# Patient Record
Sex: MALE | Race: WHITE | NOT HISPANIC OR LATINO | Employment: UNEMPLOYED | URBAN - METROPOLITAN AREA
[De-identification: names, ages, dates, MRNs, and addresses within clinical notes are randomized per-mention and may not be internally consistent; named-entity substitution may affect disease eponyms.]

---

## 2017-10-11 ENCOUNTER — APPOINTMENT (EMERGENCY)
Dept: CT IMAGING | Facility: HOSPITAL | Age: 52
End: 2017-10-11
Payer: COMMERCIAL

## 2017-10-11 ENCOUNTER — HOSPITAL ENCOUNTER (EMERGENCY)
Facility: HOSPITAL | Age: 52
End: 2017-10-11
Attending: EMERGENCY MEDICINE
Payer: COMMERCIAL

## 2017-10-11 ENCOUNTER — HOSPITAL ENCOUNTER (INPATIENT)
Facility: HOSPITAL | Age: 52
LOS: 10 days | Discharge: LEFT BEFORE DISCHARGE | DRG: 021 | End: 2017-10-22
Attending: ANESTHESIOLOGY | Admitting: ANESTHESIOLOGY
Payer: COMMERCIAL

## 2017-10-11 VITALS
DIASTOLIC BLOOD PRESSURE: 98 MMHG | HEART RATE: 84 BPM | TEMPERATURE: 98.4 F | WEIGHT: 173.6 LBS | RESPIRATION RATE: 18 BRPM | OXYGEN SATURATION: 96 % | SYSTOLIC BLOOD PRESSURE: 144 MMHG | BODY MASS INDEX: 23.54 KG/M2

## 2017-10-11 DIAGNOSIS — I60.9 SAH (SUBARACHNOID HEMORRHAGE) (HCC): ICD-10-CM

## 2017-10-11 DIAGNOSIS — I60.9 SUBARACHNOID HEMORRHAGE (HCC): Primary | ICD-10-CM

## 2017-10-11 DIAGNOSIS — H53.2 DOUBLE VISION: ICD-10-CM

## 2017-10-11 LAB
ALBUMIN SERPL BCP-MCNC: 4.3 G/DL (ref 3.5–5)
ALP SERPL-CCNC: 88 U/L (ref 46–116)
ALT SERPL W P-5'-P-CCNC: 187 U/L (ref 12–78)
ANION GAP SERPL CALCULATED.3IONS-SCNC: 12 MMOL/L (ref 4–13)
APTT PPP: 29 SECONDS (ref 23–35)
AST SERPL W P-5'-P-CCNC: 212 U/L (ref 5–45)
BASOPHILS # BLD AUTO: 0.11 THOUSANDS/ΜL (ref 0–0.1)
BASOPHILS NFR BLD AUTO: 2 % (ref 0–1)
BILIRUB SERPL-MCNC: 0.4 MG/DL (ref 0.2–1)
BUN SERPL-MCNC: 8 MG/DL (ref 5–25)
CALCIUM SERPL-MCNC: 9.2 MG/DL (ref 8.3–10.1)
CHLORIDE SERPL-SCNC: 98 MMOL/L (ref 100–108)
CO2 SERPL-SCNC: 27 MMOL/L (ref 21–32)
CREAT SERPL-MCNC: 0.75 MG/DL (ref 0.6–1.3)
EOSINOPHIL # BLD AUTO: 0.22 THOUSAND/ΜL (ref 0–0.61)
EOSINOPHIL NFR BLD AUTO: 3 % (ref 0–6)
ERYTHROCYTE [DISTWIDTH] IN BLOOD BY AUTOMATED COUNT: 12.6 % (ref 11.6–15.1)
ETHANOL SERPL-MCNC: 330 MG/DL (ref 0–3)
GFR SERPL CREATININE-BSD FRML MDRD: 107 ML/MIN/1.73SQ M
GLUCOSE SERPL-MCNC: 103 MG/DL (ref 65–140)
HCT VFR BLD AUTO: 44.9 % (ref 36.5–49.3)
HGB BLD-MCNC: 15.2 G/DL (ref 12–17)
INR PPP: 0.84 (ref 0.86–1.16)
LIPASE SERPL-CCNC: 424 U/L (ref 73–393)
LYMPHOCYTES # BLD AUTO: 2 THOUSANDS/ΜL (ref 0.6–4.47)
LYMPHOCYTES NFR BLD AUTO: 27 % (ref 14–44)
MCH RBC QN AUTO: 33.2 PG (ref 26.8–34.3)
MCHC RBC AUTO-ENTMCNC: 33.9 G/DL (ref 31.4–37.4)
MCV RBC AUTO: 98 FL (ref 82–98)
MONOCYTES # BLD AUTO: 0.94 THOUSAND/ΜL (ref 0.17–1.22)
MONOCYTES NFR BLD AUTO: 13 % (ref 4–12)
NEUTROPHILS # BLD AUTO: 4.24 THOUSANDS/ΜL (ref 1.85–7.62)
NEUTS SEG NFR BLD AUTO: 55 % (ref 43–75)
PLATELET # BLD AUTO: 243 THOUSANDS/UL (ref 149–390)
PMV BLD AUTO: 10.4 FL (ref 8.9–12.7)
POTASSIUM SERPL-SCNC: 4 MMOL/L (ref 3.5–5.3)
PROT SERPL-MCNC: 8.3 G/DL (ref 6.4–8.2)
PROTHROMBIN TIME: 11.7 SECONDS (ref 12.1–14.4)
RBC # BLD AUTO: 4.58 MILLION/UL (ref 3.88–5.62)
SODIUM SERPL-SCNC: 137 MMOL/L (ref 136–145)
WBC # BLD AUTO: 7.51 THOUSAND/UL (ref 4.31–10.16)

## 2017-10-11 PROCEDURE — 85025 COMPLETE CBC W/AUTO DIFF WBC: CPT | Performed by: PHYSICIAN ASSISTANT

## 2017-10-11 PROCEDURE — 93005 ELECTROCARDIOGRAM TRACING: CPT

## 2017-10-11 PROCEDURE — 85610 PROTHROMBIN TIME: CPT | Performed by: PHYSICIAN ASSISTANT

## 2017-10-11 PROCEDURE — 70450 CT HEAD/BRAIN W/O DYE: CPT

## 2017-10-11 PROCEDURE — 85730 THROMBOPLASTIN TIME PARTIAL: CPT | Performed by: PHYSICIAN ASSISTANT

## 2017-10-11 PROCEDURE — 96361 HYDRATE IV INFUSION ADD-ON: CPT

## 2017-10-11 PROCEDURE — 36415 COLL VENOUS BLD VENIPUNCTURE: CPT | Performed by: PHYSICIAN ASSISTANT

## 2017-10-11 PROCEDURE — 80320 DRUG SCREEN QUANTALCOHOLS: CPT | Performed by: PHYSICIAN ASSISTANT

## 2017-10-11 PROCEDURE — 96360 HYDRATION IV INFUSION INIT: CPT

## 2017-10-11 PROCEDURE — 93005 ELECTROCARDIOGRAM TRACING: CPT | Performed by: PHYSICIAN ASSISTANT

## 2017-10-11 PROCEDURE — 80053 COMPREHEN METABOLIC PANEL: CPT | Performed by: PHYSICIAN ASSISTANT

## 2017-10-11 PROCEDURE — 83690 ASSAY OF LIPASE: CPT | Performed by: PHYSICIAN ASSISTANT

## 2017-10-11 PROCEDURE — 99285 EMERGENCY DEPT VISIT HI MDM: CPT

## 2017-10-11 RX ORDER — NICOTINE 21 MG/24HR
14 PATCH, TRANSDERMAL 24 HOURS TRANSDERMAL ONCE
Status: DISCONTINUED | OUTPATIENT
Start: 2017-10-11 | End: 2017-10-11

## 2017-10-11 RX ADMIN — SODIUM CHLORIDE 1000 ML: 0.9 INJECTION, SOLUTION INTRAVENOUS at 20:05

## 2017-10-11 NOTE — ED NOTES
Daughter stated she was going to go home and come back later  She requested to be called with any updates        Devin Snellen Cell : [phone]     Jana Powell  10/11/17 1901

## 2017-10-11 NOTE — ED PROVIDER NOTES
History  Chief Complaint   Patient presents with   Maureen Erinyasmani     Pt presents to the ED for evaluation of "double vision" since yesterday morning  Pt reports drinking four 12oz beers today, yesterday 8-9 beers  Pt reports "never woke up with this vision" Pt reports "dark spot" in one eye     54yo male who presents to ER for evaluation of double vision  Onset yesterday  States he has been getting frontal headaches for weeks now  Taking aleve without relief  Admits to drinking alcohol daily  Denies injury  Denies all other symptoms  History provided by:  Patient  History limited by: alcohol intoxication  Prior to Admission Medications   Prescriptions Last Dose Informant Patient Reported? Taking?   oxyCODONE-acetaminophen (PERCOCET) 5-325 mg per tablet   No No   Sig: Take 1 tablet by mouth every 4 (four) hours as needed for severe pain  Max Daily Amount: 6 tablets      Facility-Administered Medications: None       History reviewed  No pertinent past medical history  History reviewed  No pertinent surgical history  History reviewed  No pertinent family history  I have reviewed and agree with the history as documented  Social History   Substance Use Topics    Smoking status: Current Every Day Smoker     Packs/day: 1 00    Smokeless tobacco: Not on file    Alcohol use 7 2 oz/week     12 Cans of beer per week        Review of Systems   Unable to perform ROS: Other (alcohol intoxication)   Constitutional: Negative for chills and fever  Eyes: Positive for visual disturbance  Respiratory: Negative for shortness of breath  Cardiovascular: Negative for chest pain  Gastrointestinal: Negative for diarrhea, nausea and vomiting  Musculoskeletal: Negative for back pain and neck pain  Skin: Negative for rash and wound  Neurological: Positive for headaches         Physical Exam  ED Triage Vitals   Temperature Pulse Respirations Blood Pressure SpO2   10/11/17 1839 10/11/17 1839 10/11/17 1839 10/11/17 1839 10/11/17 1839   98 4 °F (36 9 °C) 99 18 (!) 166/113 96 %      Temp Source Heart Rate Source Patient Position - Orthostatic VS BP Location FiO2 (%)   10/11/17 1839 10/11/17 1839 10/11/17 1839 10/11/17 1839 --   Oral Monitor Sitting Left arm       Pain Score       10/11/17 2003       No Pain           Physical Exam   Constitutional: He is oriented to person, place, and time  He appears well-developed and well-nourished  HENT:   Head: Normocephalic and atraumatic  Right Ear: External ear normal    Left Ear: External ear normal    Mouth/Throat: Oropharynx is clear and moist    Eyes: Conjunctivae are normal  Pupils are equal, round, and reactive to light  Right eye exhibits nystagmus  Left eye exhibits nystagmus  Neck: Normal range of motion  Neck supple  Cardiovascular: Normal rate, regular rhythm, normal heart sounds and intact distal pulses  No murmur heard  Pulmonary/Chest: Effort normal and breath sounds normal    Musculoskeletal: Normal range of motion  He exhibits no deformity  Lymphadenopathy:     He has no cervical adenopathy  Neurological: He is alert and oriented to person, place, and time  No cranial nerve deficit  He exhibits normal muscle tone  Coordination (fails finger to nose test by over shooting) abnormal    Skin: Skin is warm and dry  No pallor  Psychiatric: He has a normal mood and affect  Nursing note and vitals reviewed        ED Medications  Medications   sodium chloride 0 9 % bolus 1,000 mL (1,000 mL Intravenous New Bag 10/11/17 2005)       Diagnostic Studies  Labs Reviewed   CBC AND DIFFERENTIAL - Abnormal        Result Value Ref Range Status    Monocytes Relative 13 (*) 4 - 12 % Final    Basophils Relative 2 (*) 0 - 1 % Final    Basophils Absolute 0 11 (*) 0 00 - 0 10 Thousands/µL Final    WBC 7 51  4 31 - 10 16 Thousand/uL Final    RBC 4 58  3 88 - 5 62 Million/uL Final    Hemoglobin 15 2  12 0 - 17 0 g/dL Final    Hematocrit 44 9  36 5 - 49 3 % Final    MCV 98  82 - 98 fL Final    MCH 33 2  26 8 - 34 3 pg Final    MCHC 33 9  31 4 - 37 4 g/dL Final    RDW 12 6  11 6 - 15 1 % Final    MPV 10 4  8 9 - 12 7 fL Final    Platelets 267  361 - 390 Thousands/uL Final    Neutrophils Relative 55  43 - 75 % Final    Lymphocytes Relative 27  14 - 44 % Final    Eosinophils Relative 3  0 - 6 % Final    Neutrophils Absolute 4 24  1 85 - 7 62 Thousands/µL Final    Lymphocytes Absolute 2 00  0 60 - 4 47 Thousands/µL Final    Monocytes Absolute 0 94  0 17 - 1 22 Thousand/µL Final    Eosinophils Absolute 0 22  0 00 - 0 61 Thousand/µL Final   COMPREHENSIVE METABOLIC PANEL - Abnormal     Chloride 98 (*) 100 - 108 mmol/L Final     (*) 5 - 45 U/L Final    Comment:   Specimen collection should occur prior to Sulfasalazine administration due to the potential for falsely depressed results   (*) 12 - 78 U/L Final    Comment:   Specimen collection should occur prior to Sulfasalazine administration due to the potential for falsely depressed results  Total Protein 8 3 (*) 6 4 - 8 2 g/dL Final    Sodium 137  136 - 145 mmol/L Final    Potassium 4 0  3 5 - 5 3 mmol/L Final    CO2 27  21 - 32 mmol/L Final    Anion Gap 12  4 - 13 mmol/L Final    BUN 8  5 - 25 mg/dL Final    Creatinine 0 75  0 60 - 1 30 mg/dL Final    Comment: Standardized to IDMS reference method    Glucose 103  65 - 140 mg/dL Final    Comment:   If the patient is fasting, the ADA then defines impaired fasting glucose as > 100 mg/dL and diabetes as > or equal to 123 mg/dL  Specimen collection should occur prior to Sulfasalazine administration due to the potential for falsely depressed results  Specimen collection should occur prior to Sulfapyridine administration due to the potential for falsely elevated results      Calcium 9 2  8 3 - 10 1 mg/dL Final    Alkaline Phosphatase 88  46 - 116 U/L Final    Albumin 4 3  3 5 - 5 0 g/dL Final    Total Bilirubin 0 40  0 20 - 1 00 mg/dL Final    eGFR 107 ml/min/1 73sq m Final    Narrative:     National Kidney Disease Education Program recommendations are as follows:  GFR calculation is accurate only with a steady state creatinine  Chronic Kidney disease less than 60 ml/min/1 73 sq  meters  Kidney failure less than 15 ml/min/1 73 sq  meters  LIPASE - Abnormal     Lipase 424 (*) 73 - 393 u/L Final   PROTIME-INR - Abnormal     Protime 11 7 (*) 12 1 - 14 4 seconds Final    INR 0 84 (*) 0 86 - 1 16 Final   MEDICAL ALCOHOL - Abnormal     Ethanol Lvl 330 (*) 0 - 3 mg/dL Final   APTT - Normal    PTT 29  23 - 35 seconds Final    Narrative: Therapeutic Heparin Range = 60-90 seconds       CT head without contrast   Final Result   Subarachnoid hemorrhage accumulating within the 3rd ventricle, frontal horn right lateral ventricle and temporal horn left lateral ventricle  Findings raise suspicion for ruptured aneurysm  ##phoslh##[email or web address]         ##cfslh   I personally discussed this result with [address] on 10/11/2017 8:10 PM    ##         Workstation performed: [id number]             Procedures  Procedures      Phone Contacts  ED Phone Contact    ED Course  ED Course as of Oct 11 2245   Wed Oct 11, 2017   2020 Medical Critical [facility] at [facility] AND [facility] paged for transfer    2047 Discussed case with Dr Kimberley Adams [facility]- no reccommended intervention at this time, Lizzy Tran from from [facility] is setting up transport and bed assignment and will call back with availability                                   MDM  Number of Diagnoses or Management Options  Double vision: new and requires workup  Subarachnoid hemorrhage [facility]): new and requires workup     Amount and/or Complexity of Data Reviewed  Clinical lab tests: ordered and reviewed  Tests in the radiology section of CPT®: ordered and reviewed  Discuss the patient with other providers: yes    Patient Progress  Patient progress: stable    The patient presented with a condition in which there was a high probability of imminent or life-threatening deterioration, and critical care services (excluding separately billable procedures) totalled 30-74 minutes  Disposition  Final diagnoses:   Subarachnoid hemorrhage ([address] )   Double vision     ED Disposition     ED Disposition Condition Comment    Transfer to Another [facility] should be transferred out to [facility]        MD Documentation    Guadalupe Cockayne Most Recent Value   Patient Condition  The patient has been stabilized such that within reasonable medical probability, no material deterioration of the patient condition or the condition of the unborn child(yeni) is likely to result from the transfer   Reason for Transfer  Level of Care needed not available at this facility   Benefits of Transfer  Specialized equipment and/or services available at the receiving facility (Include comment)________________________   Risks of Transfer  Potential deterioration of medical condition, Loss of IV, Increased discomfort during transfer, Possible worsening of condition or death during transfer, Potential for delay in receiving treatment   Accepting Physician  [address] [place] [address] Name, Júnior Hampton    (Name & Tel number)  Paige aYn by Khurram Perez and Unit #)  Lenoard Certain   Sending MD Nael Weeks   Provider Certification  General risk, such as traffic hazards, adverse weather conditions, rough terrain or turbulence, possible failure of equipment (including vehicle or aircraft), or consequences of actions of persons outside the control of the transport personnel, Unanticipated needs of medical equipment and personnel during transport, Risk of worsening condition, The possibility of a transport vehicle being unavailable      RN Documentation    Flowsheet Row Most [address] [facility] Name, [address]   B    (Name & Tel number)  Ale Rogel   Transported by Khurram Perez and Unit #)  Lenoard Certain      Follow-up Information None       Discharge Medication List as of 10/11/2017 10:19 PM      CONTINUE these medications which have NOT CHANGED    Details   oxyCODONE-acetaminophen (PERCOCET) 5-325 mg per tablet Take 1 tablet by mouth every 4 (four) hours as needed for severe pain  Max Daily Amount: 6 tablets, Starting 5/16/2016, Until Discontinued, Print           No discharge procedures on file      ED Provider  Electronically Signed by       Naila Ramos PA-C  10/11/17 6203

## 2017-10-12 ENCOUNTER — APPOINTMENT (INPATIENT)
Dept: NON INVASIVE DIAGNOSTICS | Facility: HOSPITAL | Age: 52
DRG: 021 | End: 2017-10-12
Payer: COMMERCIAL

## 2017-10-12 ENCOUNTER — APPOINTMENT (EMERGENCY)
Dept: RADIOLOGY | Facility: HOSPITAL | Age: 52
DRG: 021 | End: 2017-10-12
Payer: COMMERCIAL

## 2017-10-12 ENCOUNTER — APPOINTMENT (INPATIENT)
Dept: RADIOLOGY | Facility: HOSPITAL | Age: 52
DRG: 021 | End: 2017-10-12
Payer: COMMERCIAL

## 2017-10-12 ENCOUNTER — ANESTHESIA (INPATIENT)
Dept: RADIOLOGY | Facility: HOSPITAL | Age: 52
DRG: 021 | End: 2017-10-12
Payer: COMMERCIAL

## 2017-10-12 ENCOUNTER — TELEPHONE (OUTPATIENT)
Dept: RADIOLOGY | Facility: HOSPITAL | Age: 52
End: 2017-10-12

## 2017-10-12 ENCOUNTER — ANESTHESIA EVENT (INPATIENT)
Dept: RADIOLOGY | Facility: HOSPITAL | Age: 52
DRG: 021 | End: 2017-10-12
Payer: COMMERCIAL

## 2017-10-12 ENCOUNTER — GENERIC CONVERSION - ENCOUNTER (OUTPATIENT)
Dept: OTHER | Facility: OTHER | Age: 52
End: 2017-10-12

## 2017-10-12 ENCOUNTER — APPOINTMENT (INPATIENT)
Dept: RADIOLOGY | Facility: HOSPITAL | Age: 52
DRG: 021 | End: 2017-10-12
Attending: RADIOLOGY
Payer: COMMERCIAL

## 2017-10-12 PROBLEM — F17.200 NICOTINE DEPENDENCE: Status: ACTIVE | Noted: 2017-10-12

## 2017-10-12 PROBLEM — F10.10 ALCOHOL ABUSE: Status: ACTIVE | Noted: 2017-10-12

## 2017-10-12 PROBLEM — R51.9 HEADACHE: Status: ACTIVE | Noted: 2017-10-12

## 2017-10-12 PROBLEM — I60.9 SUBARACHNOID HEMORRHAGE (HCC): Status: ACTIVE | Noted: 2017-10-12

## 2017-10-12 LAB
ABO GROUP BLD: NORMAL
ALBUMIN SERPL BCP-MCNC: 3.6 G/DL (ref 3.5–5)
ALP SERPL-CCNC: 65 U/L (ref 46–116)
ALT SERPL W P-5'-P-CCNC: 161 U/L (ref 12–78)
ANION GAP SERPL CALCULATED.3IONS-SCNC: 11 MMOL/L (ref 4–13)
APTT PPP: 28 SECONDS (ref 23–35)
AST SERPL W P-5'-P-CCNC: 201 U/L (ref 5–45)
ATRIAL RATE: 98 BPM
BASE EXCESS BLDA CALC-SCNC: 0.7 MMOL/L
BILIRUB SERPL-MCNC: 0.67 MG/DL (ref 0.2–1)
BLD GP AB SCN SERPL QL: NEGATIVE
BUN SERPL-MCNC: 7 MG/DL (ref 5–25)
CALCIUM SERPL-MCNC: 8.2 MG/DL (ref 8.3–10.1)
CHLORIDE SERPL-SCNC: 104 MMOL/L (ref 100–108)
CHOLEST SERPL-MCNC: 249 MG/DL (ref 50–200)
CO2 SERPL-SCNC: 25 MMOL/L (ref 21–32)
CREAT SERPL-MCNC: 0.54 MG/DL (ref 0.6–1.3)
ERYTHROCYTE [DISTWIDTH] IN BLOOD BY AUTOMATED COUNT: 12.7 % (ref 11.6–15.1)
EST. AVERAGE GLUCOSE BLD GHB EST-MCNC: 103 MG/DL
GFR SERPL CREATININE-BSD FRML MDRD: 122 ML/MIN/1.73SQ M
GLUCOSE SERPL-MCNC: 76 MG/DL (ref 65–140)
GLUCOSE SERPL-MCNC: 83 MG/DL (ref 65–140)
HBA1C MFR BLD: 5.2 % (ref 4.2–6.3)
HCO3 BLDA-SCNC: 25.6 MMOL/L (ref 22–28)
HCT VFR BLD AUTO: 42.8 % (ref 36.5–49.3)
HDLC SERPL-MCNC: 135 MG/DL (ref 40–60)
HGB BLD-MCNC: 15.2 G/DL (ref 12–17)
INR PPP: 0.96 (ref 0.86–1.16)
LDLC SERPL CALC-MCNC: 90 MG/DL (ref 0–100)
MAGNESIUM SERPL-MCNC: 2.1 MG/DL (ref 1.6–2.6)
MCH RBC QN AUTO: 34.8 PG (ref 26.8–34.3)
MCHC RBC AUTO-ENTMCNC: 35.5 G/DL (ref 31.4–37.4)
MCV RBC AUTO: 98 FL (ref 82–98)
O2 CT BLDA-SCNC: 19.8 ML/DL (ref 16–23)
OXYHGB MFR BLDA: 97.6 % (ref 94–97)
P AXIS: 73 DEGREES
PCO2 BLDA: 42 MM HG (ref 36–44)
PH BLDA: 7.4 [PH] (ref 7.35–7.45)
PLATELET # BLD AUTO: 212 THOUSANDS/UL (ref 149–390)
PMV BLD AUTO: 10.2 FL (ref 8.9–12.7)
PO2 BLDA: 160.2 MM HG (ref 75–129)
POTASSIUM SERPL-SCNC: 3.6 MMOL/L (ref 3.5–5.3)
PR INTERVAL: 156 MS
PROT SERPL-MCNC: 7.4 G/DL (ref 6.4–8.2)
PROTHROMBIN TIME: 12.8 SECONDS (ref 12.1–14.4)
QRS AXIS: 85 DEGREES
QRSD INTERVAL: 100 MS
QT INTERVAL: 362 MS
QTC INTERVAL: 462 MS
RBC # BLD AUTO: 4.37 MILLION/UL (ref 3.88–5.62)
RH BLD: POSITIVE
SODIUM SERPL-SCNC: 140 MMOL/L (ref 136–145)
SPECIMEN EXPIRATION DATE: NORMAL
SPECIMEN SOURCE: ABNORMAL
T WAVE AXIS: 72 DEGREES
TRIGL SERPL-MCNC: 122 MG/DL
VENTRICULAR RATE: 98 BPM
WBC # BLD AUTO: 6.28 THOUSAND/UL (ref 4.31–10.16)

## 2017-10-12 PROCEDURE — 99285 EMERGENCY DEPT VISIT HI MDM: CPT

## 2017-10-12 PROCEDURE — 36223 PLACE CATH CAROTID/INOM ART: CPT

## 2017-10-12 PROCEDURE — 82805 BLOOD GASES W/O2 SATURATION: CPT | Performed by: INTERNAL MEDICINE

## 2017-10-12 PROCEDURE — 80061 LIPID PANEL: CPT | Performed by: EMERGENCY MEDICINE

## 2017-10-12 PROCEDURE — 0BH17EZ INSERTION OF ENDOTRACHEAL AIRWAY INTO TRACHEA, VIA NATURAL OR ARTIFICIAL OPENING: ICD-10-PCS | Performed by: INTERNAL MEDICINE

## 2017-10-12 PROCEDURE — 86900 BLOOD TYPING SEROLOGIC ABO: CPT | Performed by: EMERGENCY MEDICINE

## 2017-10-12 PROCEDURE — 70450 CT HEAD/BRAIN W/O DYE: CPT

## 2017-10-12 PROCEDURE — 009630Z DRAINAGE OF CEREBRAL VENTRICLE WITH DRAINAGE DEVICE, PERCUTANEOUS APPROACH: ICD-10-PCS | Performed by: NEUROLOGICAL SURGERY

## 2017-10-12 PROCEDURE — 83036 HEMOGLOBIN GLYCOSYLATED A1C: CPT | Performed by: EMERGENCY MEDICINE

## 2017-10-12 PROCEDURE — 70553 MRI BRAIN STEM W/O & W/DYE: CPT

## 2017-10-12 PROCEDURE — 94760 N-INVAS EAR/PLS OXIMETRY 1: CPT

## 2017-10-12 PROCEDURE — 85610 PROTHROMBIN TIME: CPT | Performed by: EMERGENCY MEDICINE

## 2017-10-12 PROCEDURE — 36224 PLACE CATH CAROTD ART: CPT

## 2017-10-12 PROCEDURE — 86850 RBC ANTIBODY SCREEN: CPT | Performed by: EMERGENCY MEDICINE

## 2017-10-12 PROCEDURE — 36226 PLACE CATH VERTEBRAL ART: CPT

## 2017-10-12 PROCEDURE — 87040 BLOOD CULTURE FOR BACTERIA: CPT | Performed by: INTERNAL MEDICINE

## 2017-10-12 PROCEDURE — 93306 TTE W/DOPPLER COMPLETE: CPT

## 2017-10-12 PROCEDURE — 36227 PLACE CATH XTRNL CAROTID: CPT

## 2017-10-12 PROCEDURE — A9585 GADOBUTROL INJECTION: HCPCS | Performed by: ANESTHESIOLOGY

## 2017-10-12 PROCEDURE — B31GYZZ FLUOROSCOPY OF BILATERAL VERTEBRAL ARTERIES USING OTHER CONTRAST: ICD-10-PCS | Performed by: RADIOLOGY

## 2017-10-12 PROCEDURE — 82948 REAGENT STRIP/BLOOD GLUCOSE: CPT

## 2017-10-12 PROCEDURE — 83735 ASSAY OF MAGNESIUM: CPT | Performed by: EMERGENCY MEDICINE

## 2017-10-12 PROCEDURE — 70496 CT ANGIOGRAPHY HEAD: CPT

## 2017-10-12 PROCEDURE — 94002 VENT MGMT INPAT INIT DAY: CPT | Performed by: SOCIAL WORKER

## 2017-10-12 PROCEDURE — B41FYZZ FLUOROSCOPY OF RIGHT LOWER EXTREMITY ARTERIES USING OTHER CONTRAST: ICD-10-PCS | Performed by: RADIOLOGY

## 2017-10-12 PROCEDURE — 86901 BLOOD TYPING SEROLOGIC RH(D): CPT | Performed by: EMERGENCY MEDICINE

## 2017-10-12 PROCEDURE — 85027 COMPLETE CBC AUTOMATED: CPT | Performed by: EMERGENCY MEDICINE

## 2017-10-12 PROCEDURE — 71010 HB CHEST X-RAY 1 VIEW FRONTAL (PORTABLE): CPT

## 2017-10-12 PROCEDURE — 70498 CT ANGIOGRAPHY NECK: CPT

## 2017-10-12 PROCEDURE — B315YZZ FLUOROSCOPY OF BILATERAL COMMON CAROTID ARTERIES USING OTHER CONTRAST: ICD-10-PCS | Performed by: RADIOLOGY

## 2017-10-12 PROCEDURE — B316YZZ FLUOROSCOPY OF RIGHT INTERNAL CAROTID ARTERY USING OTHER CONTRAST: ICD-10-PCS | Performed by: RADIOLOGY

## 2017-10-12 PROCEDURE — 5A1955Z RESPIRATORY VENTILATION, GREATER THAN 96 CONSECUTIVE HOURS: ICD-10-PCS | Performed by: INTERNAL MEDICINE

## 2017-10-12 PROCEDURE — 85730 THROMBOPLASTIN TIME PARTIAL: CPT | Performed by: EMERGENCY MEDICINE

## 2017-10-12 PROCEDURE — C1894 INTRO/SHEATH, NON-LASER: HCPCS

## 2017-10-12 PROCEDURE — 36415 COLL VENOUS BLD VENIPUNCTURE: CPT | Performed by: EMERGENCY MEDICINE

## 2017-10-12 PROCEDURE — 94760 N-INVAS EAR/PLS OXIMETRY 1: CPT | Performed by: SOCIAL WORKER

## 2017-10-12 PROCEDURE — B319YZZ FLUOROSCOPY OF RIGHT EXTERNAL CAROTID ARTERY USING OTHER CONTRAST: ICD-10-PCS | Performed by: RADIOLOGY

## 2017-10-12 PROCEDURE — 76937 US GUIDE VASCULAR ACCESS: CPT

## 2017-10-12 PROCEDURE — 80053 COMPREHEN METABOLIC PANEL: CPT | Performed by: EMERGENCY MEDICINE

## 2017-10-12 PROCEDURE — C1769 GUIDE WIRE: HCPCS

## 2017-10-12 PROCEDURE — 93005 ELECTROCARDIOGRAM TRACING: CPT | Performed by: EMERGENCY MEDICINE

## 2017-10-12 RX ORDER — MIDAZOLAM HYDROCHLORIDE 1 MG/ML
2 INJECTION INTRAMUSCULAR; INTRAVENOUS ONCE
Status: COMPLETED | OUTPATIENT
Start: 2017-10-12 | End: 2017-10-12

## 2017-10-12 RX ORDER — NIMODIPINE 30 MG/1
60 CAPSULE, LIQUID FILLED ORAL
Status: DISCONTINUED | OUTPATIENT
Start: 2017-10-12 | End: 2017-10-12

## 2017-10-12 RX ORDER — PROPOFOL 10 MG/ML
INJECTION, EMULSION INTRAVENOUS AS NEEDED
Status: DISCONTINUED | OUTPATIENT
Start: 2017-10-12 | End: 2017-10-12 | Stop reason: SURG

## 2017-10-12 RX ORDER — LORAZEPAM 2 MG/ML
2 INJECTION INTRAMUSCULAR
Status: DISCONTINUED | OUTPATIENT
Start: 2017-10-12 | End: 2017-10-17

## 2017-10-12 RX ORDER — OXAZEPAM 15 MG/1
30 CAPSULE ORAL EVERY 8 HOURS SCHEDULED
Status: DISCONTINUED | OUTPATIENT
Start: 2017-10-12 | End: 2017-10-12

## 2017-10-12 RX ORDER — LORAZEPAM 2 MG/ML
6 INJECTION INTRAMUSCULAR ONCE
Status: COMPLETED | OUTPATIENT
Start: 2017-10-12 | End: 2017-10-12

## 2017-10-12 RX ORDER — LIDOCAINE HYDROCHLORIDE 10 MG/ML
INJECTION, SOLUTION EPIDURAL; INFILTRATION; INTRACAUDAL; PERINEURAL
Status: COMPLETED
Start: 2017-10-12 | End: 2017-10-12

## 2017-10-12 RX ORDER — PROPOFOL 10 MG/ML
5-50 INJECTION, EMULSION INTRAVENOUS
Status: DISCONTINUED | OUTPATIENT
Start: 2017-10-12 | End: 2017-10-16

## 2017-10-12 RX ORDER — ACETAMINOPHEN 325 MG/1
650 TABLET ORAL EVERY 4 HOURS PRN
Status: DISCONTINUED | OUTPATIENT
Start: 2017-10-12 | End: 2017-10-14

## 2017-10-12 RX ORDER — HYDRALAZINE HYDROCHLORIDE 20 MG/ML
10 INJECTION INTRAMUSCULAR; INTRAVENOUS ONCE
Status: COMPLETED | OUTPATIENT
Start: 2017-10-12 | End: 2017-10-12

## 2017-10-12 RX ORDER — GLYCOPYRROLATE 0.2 MG/ML
INJECTION INTRAMUSCULAR; INTRAVENOUS AS NEEDED
Status: DISCONTINUED | OUTPATIENT
Start: 2017-10-12 | End: 2017-10-12 | Stop reason: SURG

## 2017-10-12 RX ORDER — DIAZEPAM 5 MG/ML
5 INJECTION, SOLUTION INTRAMUSCULAR; INTRAVENOUS EVERY 6 HOURS PRN
Status: DISCONTINUED | OUTPATIENT
Start: 2017-10-12 | End: 2017-10-12

## 2017-10-12 RX ORDER — SUCCINYLCHOLINE CHLORIDE 20 MG/ML
INJECTION INTRAMUSCULAR; INTRAVENOUS AS NEEDED
Status: DISCONTINUED | OUTPATIENT
Start: 2017-10-12 | End: 2017-10-12 | Stop reason: SURG

## 2017-10-12 RX ORDER — LIDOCAINE HYDROCHLORIDE 10 MG/ML
INJECTION, SOLUTION INFILTRATION; PERINEURAL AS NEEDED
Status: DISCONTINUED | OUTPATIENT
Start: 2017-10-12 | End: 2017-10-12 | Stop reason: SURG

## 2017-10-12 RX ORDER — PROPOFOL 10 MG/ML
INJECTION, EMULSION INTRAVENOUS
Status: COMPLETED
Start: 2017-10-12 | End: 2017-10-12

## 2017-10-12 RX ORDER — ROCURONIUM BROMIDE 10 MG/ML
INJECTION, SOLUTION INTRAVENOUS AS NEEDED
Status: DISCONTINUED | OUTPATIENT
Start: 2017-10-12 | End: 2017-10-12 | Stop reason: SURG

## 2017-10-12 RX ORDER — LABETALOL HYDROCHLORIDE 5 MG/ML
10 INJECTION, SOLUTION INTRAVENOUS EVERY 4 HOURS PRN
Status: DISCONTINUED | OUTPATIENT
Start: 2017-10-12 | End: 2017-10-22 | Stop reason: HOSPADM

## 2017-10-12 RX ORDER — ONDANSETRON 2 MG/ML
INJECTION INTRAMUSCULAR; INTRAVENOUS AS NEEDED
Status: DISCONTINUED | OUTPATIENT
Start: 2017-10-12 | End: 2017-10-12 | Stop reason: SURG

## 2017-10-12 RX ORDER — FENTANYL CITRATE 50 UG/ML
INJECTION, SOLUTION INTRAMUSCULAR; INTRAVENOUS
Status: COMPLETED
Start: 2017-10-12 | End: 2017-10-12

## 2017-10-12 RX ORDER — CHLORHEXIDINE GLUCONATE 0.12 MG/ML
15 RINSE ORAL EVERY 12 HOURS SCHEDULED
Status: DISCONTINUED | OUTPATIENT
Start: 2017-10-12 | End: 2017-10-20

## 2017-10-12 RX ORDER — LABETALOL HYDROCHLORIDE 5 MG/ML
INJECTION, SOLUTION INTRAVENOUS AS NEEDED
Status: DISCONTINUED | OUTPATIENT
Start: 2017-10-12 | End: 2017-10-12 | Stop reason: SURG

## 2017-10-12 RX ORDER — NICOTINE 21 MG/24HR
21 PATCH, TRANSDERMAL 24 HOURS TRANSDERMAL DAILY
Status: DISCONTINUED | OUTPATIENT
Start: 2017-10-12 | End: 2017-10-22 | Stop reason: HOSPADM

## 2017-10-12 RX ORDER — SODIUM CHLORIDE, SODIUM GLUCONATE, SODIUM ACETATE, POTASSIUM CHLORIDE, MAGNESIUM CHLORIDE, SODIUM PHOSPHATE, DIBASIC, AND POTASSIUM PHOSPHATE .53; .5; .37; .037; .03; .012; .00082 G/100ML; G/100ML; G/100ML; G/100ML; G/100ML; G/100ML; G/100ML
125 INJECTION, SOLUTION INTRAVENOUS CONTINUOUS
Status: DISCONTINUED | OUTPATIENT
Start: 2017-10-12 | End: 2017-10-14

## 2017-10-12 RX ORDER — FENTANYL CITRATE 50 UG/ML
50 INJECTION, SOLUTION INTRAMUSCULAR; INTRAVENOUS ONCE
Status: COMPLETED | OUTPATIENT
Start: 2017-10-12 | End: 2017-10-12

## 2017-10-12 RX ORDER — LABETALOL HYDROCHLORIDE 5 MG/ML
10 INJECTION, SOLUTION INTRAVENOUS ONCE
Status: COMPLETED | OUTPATIENT
Start: 2017-10-12 | End: 2017-10-12

## 2017-10-12 RX ORDER — FENTANYL CITRATE 50 UG/ML
100 INJECTION, SOLUTION INTRAMUSCULAR; INTRAVENOUS ONCE
Status: COMPLETED | OUTPATIENT
Start: 2017-10-12 | End: 2017-10-12

## 2017-10-12 RX ORDER — VECURONIUM BROMIDE 1 MG/ML
10 INJECTION, POWDER, LYOPHILIZED, FOR SOLUTION INTRAVENOUS ONCE
Status: COMPLETED | OUTPATIENT
Start: 2017-10-12 | End: 2017-10-12

## 2017-10-12 RX ORDER — SODIUM CHLORIDE 9 MG/ML
INJECTION, SOLUTION INTRAVENOUS CONTINUOUS PRN
Status: DISCONTINUED | OUTPATIENT
Start: 2017-10-12 | End: 2017-10-12 | Stop reason: SURG

## 2017-10-12 RX ORDER — LIDOCAINE HYDROCHLORIDE 10 MG/ML
10 INJECTION, SOLUTION EPIDURAL; INFILTRATION; INTRACAUDAL; PERINEURAL ONCE
Status: COMPLETED | OUTPATIENT
Start: 2017-10-12 | End: 2017-10-12

## 2017-10-12 RX ORDER — ONDANSETRON 2 MG/ML
4 INJECTION INTRAMUSCULAR; INTRAVENOUS EVERY 6 HOURS PRN
Status: DISCONTINUED | OUTPATIENT
Start: 2017-10-12 | End: 2017-10-22 | Stop reason: HOSPADM

## 2017-10-12 RX ORDER — MIDAZOLAM HYDROCHLORIDE 1 MG/ML
INJECTION INTRAMUSCULAR; INTRAVENOUS
Status: COMPLETED
Start: 2017-10-12 | End: 2017-10-12

## 2017-10-12 RX ADMIN — NEOSTIGMINE METHYLSULFATE 3 MG: 1 INJECTION, SOLUTION INTRAMUSCULAR; INTRAVENOUS; SUBCUTANEOUS at 10:56

## 2017-10-12 RX ADMIN — CHLORHEXIDINE GLUCONATE 15 ML: 1.2 RINSE ORAL at 15:51

## 2017-10-12 RX ADMIN — ONDANSETRON 4 MG: 2 INJECTION INTRAMUSCULAR; INTRAVENOUS at 09:15

## 2017-10-12 RX ADMIN — NICOTINE 21 MG: 21 PATCH, EXTENDED RELEASE TRANSDERMAL at 15:51

## 2017-10-12 RX ADMIN — SODIUM CHLORIDE, SODIUM GLUCONATE, SODIUM ACETATE, POTASSIUM CHLORIDE AND MAGNESIUM CHLORIDE 125 ML/HR: 526; 502; 368; 37; 30 INJECTION, SOLUTION INTRAVENOUS at 03:42

## 2017-10-12 RX ADMIN — LABETALOL HYDROCHLORIDE 10 MG: 5 INJECTION, SOLUTION INTRAVENOUS at 11:12

## 2017-10-12 RX ADMIN — LEVETIRACETAM 500 MG: 100 INJECTION, SOLUTION INTRAVENOUS at 02:22

## 2017-10-12 RX ADMIN — LIDOCAINE HYDROCHLORIDE 50 MG: 10 INJECTION, SOLUTION INFILTRATION; PERINEURAL at 09:06

## 2017-10-12 RX ADMIN — PROPOFOL 80 MCG/KG/MIN: 10 INJECTION, EMULSION INTRAVENOUS at 17:34

## 2017-10-12 RX ADMIN — LEVETIRACETAM 500 MG: 100 INJECTION, SOLUTION INTRAVENOUS at 08:22

## 2017-10-12 RX ADMIN — PROPOFOL 60 MCG/KG/MIN: 10 INJECTION, EMULSION INTRAVENOUS at 20:27

## 2017-10-12 RX ADMIN — PROPOFOL 100 MCG/KG/MIN: 10 INJECTION, EMULSION INTRAVENOUS at 15:16

## 2017-10-12 RX ADMIN — PROPOFOL 50 MCG/KG/MIN: 10 INJECTION, EMULSION INTRAVENOUS at 13:25

## 2017-10-12 RX ADMIN — FENTANYL CITRATE 50 MCG: 50 INJECTION, SOLUTION INTRAMUSCULAR; INTRAVENOUS at 16:46

## 2017-10-12 RX ADMIN — ACETAMINOPHEN 650 MG: 325 TABLET, FILM COATED ORAL at 01:44

## 2017-10-12 RX ADMIN — MIDAZOLAM HYDROCHLORIDE 2 MG: 1 INJECTION INTRAMUSCULAR; INTRAVENOUS at 16:50

## 2017-10-12 RX ADMIN — FENTANYL CITRATE 100 MCG/HR: at 15:46

## 2017-10-12 RX ADMIN — SODIUM CHLORIDE 2 MG/HR: 0.9 INJECTION, SOLUTION INTRAVENOUS at 09:15

## 2017-10-12 RX ADMIN — ROCURONIUM BROMIDE 10 MG: 10 INJECTION, SOLUTION INTRAVENOUS at 09:06

## 2017-10-12 RX ADMIN — ROCURONIUM BROMIDE 20 MG: 10 INJECTION, SOLUTION INTRAVENOUS at 10:24

## 2017-10-12 RX ADMIN — PROPOFOL 50 MCG/KG/MIN: 10 INJECTION, EMULSION INTRAVENOUS at 22:30

## 2017-10-12 RX ADMIN — LEVETIRACETAM 500 MG: 100 INJECTION, SOLUTION INTRAVENOUS at 21:01

## 2017-10-12 RX ADMIN — IOHEXOL 85 ML: 350 INJECTION, SOLUTION INTRAVENOUS at 00:27

## 2017-10-12 RX ADMIN — FENTANYL CITRATE 100 MCG: 50 INJECTION INTRAMUSCULAR; INTRAVENOUS at 13:15

## 2017-10-12 RX ADMIN — FENTANYL CITRATE 100 MCG/HR: at 22:30

## 2017-10-12 RX ADMIN — GADOBUTROL 7 ML: 604.72 INJECTION INTRAVENOUS at 23:20

## 2017-10-12 RX ADMIN — GLYCOPYRROLATE 0.4 MG: 0.2 INJECTION, SOLUTION INTRAMUSCULAR; INTRAVENOUS at 10:56

## 2017-10-12 RX ADMIN — OXAZEPAM 30 MG: 15 CAPSULE, GELATIN COATED ORAL at 01:43

## 2017-10-12 RX ADMIN — SODIUM CHLORIDE: 0.9 INJECTION, SOLUTION INTRAVENOUS at 08:58

## 2017-10-12 RX ADMIN — DEXAMETHASONE SODIUM PHOSPHATE 10 MG: 10 INJECTION INTRAMUSCULAR; INTRAVENOUS at 09:15

## 2017-10-12 RX ADMIN — FENTANYL CITRATE 50 MCG: 50 INJECTION INTRAMUSCULAR; INTRAVENOUS at 16:46

## 2017-10-12 RX ADMIN — CEFAZOLIN SODIUM 2000 MG: 2 SOLUTION INTRAVENOUS at 20:15

## 2017-10-12 RX ADMIN — MIDAZOLAM 2 MG: 1 INJECTION INTRAMUSCULAR; INTRAVENOUS at 16:50

## 2017-10-12 RX ADMIN — FENTANYL CITRATE 100 MCG: 50 INJECTION, SOLUTION INTRAMUSCULAR; INTRAVENOUS at 13:15

## 2017-10-12 RX ADMIN — LIDOCAINE HYDROCHLORIDE 10 ML: 10 INJECTION, SOLUTION EPIDURAL; INFILTRATION; INTRACAUDAL; PERINEURAL at 15:49

## 2017-10-12 RX ADMIN — LABETALOL 20 MG/4 ML (5 MG/ML) INTRAVENOUS SYRINGE 10 MG: at 13:15

## 2017-10-12 RX ADMIN — SUCCINYLCHOLINE CHLORIDE 120 MG: 20 INJECTION, SOLUTION INTRAMUSCULAR; INTRAVENOUS at 09:06

## 2017-10-12 RX ADMIN — NIMODIPINE 60 MG: 30 CAPSULE, LIQUID FILLED ORAL at 05:40

## 2017-10-12 RX ADMIN — SUCCINYLCHOLINE CHLORIDE 20 MG: 20 INJECTION, SOLUTION INTRAMUSCULAR; INTRAVENOUS at 09:11

## 2017-10-12 RX ADMIN — NIMODIPINE 60 MG: 30 CAPSULE, LIQUID FILLED ORAL at 01:47

## 2017-10-12 RX ADMIN — OXAZEPAM 15 MG: 15 CAPSULE, GELATIN COATED ORAL at 12:50

## 2017-10-12 RX ADMIN — PROPOFOL: 10 INJECTION, EMULSION INTRAVENOUS at 13:30

## 2017-10-12 RX ADMIN — LORAZEPAM 6 MG: 2 INJECTION INTRAMUSCULAR; INTRAVENOUS at 13:05

## 2017-10-12 RX ADMIN — IODIXANOL 136 ML: 320 INJECTION, SOLUTION INTRAVASCULAR at 11:42

## 2017-10-12 RX ADMIN — CHLORHEXIDINE GLUCONATE 15 ML: 1.2 RINSE ORAL at 04:15

## 2017-10-12 RX ADMIN — PROPOFOL 200 MG: 10 INJECTION, EMULSION INTRAVENOUS at 09:06

## 2017-10-12 RX ADMIN — ROCURONIUM BROMIDE 10 MG: 10 INJECTION, SOLUTION INTRAVENOUS at 09:48

## 2017-10-12 RX ADMIN — CHLORHEXIDINE GLUCONATE 15 ML: 1.2 RINSE ORAL at 20:36

## 2017-10-12 RX ADMIN — ROCURONIUM BROMIDE 20 MG: 10 INJECTION, SOLUTION INTRAVENOUS at 09:12

## 2017-10-12 RX ADMIN — VECURONIUM BROMIDE 10 MG: 1 INJECTION, POWDER, LYOPHILIZED, FOR SOLUTION INTRAVENOUS at 13:25

## 2017-10-12 RX ADMIN — LABETALOL 20 MG/4 ML (5 MG/ML) INTRAVENOUS SYRINGE 10 MG: at 12:41

## 2017-10-12 RX ADMIN — FOLIC ACID 1 MG: 5 INJECTION, SOLUTION INTRAMUSCULAR; INTRAVENOUS; SUBCUTANEOUS at 01:28

## 2017-10-12 RX ADMIN — HYDRALAZINE HYDROCHLORIDE 10 MG: 20 INJECTION INTRAMUSCULAR; INTRAVENOUS at 13:15

## 2017-10-12 RX ADMIN — THIAMINE HYDROCHLORIDE 100 MG: 100 INJECTION, SOLUTION INTRAMUSCULAR; INTRAVENOUS at 01:45

## 2017-10-12 RX ADMIN — DIAZEPAM 5 MG: 5 INJECTION, SOLUTION INTRAMUSCULAR; INTRAVENOUS at 01:49

## 2017-10-12 NOTE — RESPIRATORY THERAPY NOTE
RT Protocol Note  Angel Perea 50 y o  male MRN: [id number]  Unit/Bed#: ICU 02 Encounter: [id number]    Assessment     Principal Problem:    Subarachnoid hemorrhage (HCC)  Active Problems:    Headache    Alcohol abuse    Nicotine dependence      Home Pulmonary Medications:  none    Past Medical History:   Diagnosis Date    Alcoholism /alcohol abuse ([address] )      Social History     Social History    Marital status: Single     Spouse name: N/A    Number of children: N/A    Years of education: N/A     Social History Main Topics    Smoking status: Current Every Day Smoker     Packs/day: 1 50    Smokeless tobacco: Never Used    Alcohol use 18 0 oz/week     30 Cans of beer per week    Drug use:      Types: Marijuana, Cocaine    Sexual activity: Not Asked     Other Topics Concern    None     Social History Narrative    None       Subjective     Subjective Data: Pt  sedate on vent  Pt  taken to CT for head CT  Objective     Physical Exam:   Assessment Type: Assess only  General Appearance: Sedated  Respiratory Pattern: Assisted  Chest Assessment: Chest expansion symmetrical  Bilateral Breath Sounds: Clear, Diminished  Cough: None  O2 Device: vent  Subjective Data: Pt  sedate on vent  Pt  taken to CT for head CT  Vitals:  Blood pressure 129/70, pulse 74, temperature 98 6 °F (37 °C), temperature source Oral, resp  rate 16, height 6' (1 829 m), weight 78 9 kg (173 lb 15 1 oz), SpO2 93 %  Imaging and other studies: I have personally reviewed pertinent reports  O2 Device: vent     Plan     Respiratory Plan: Vent/NIV/HFNC        Resp Comments: pt intubated after IR procedure for change in mental status  pt has no pulm hx or meds at home  will cont to follow pt per resp  protocol until extubated  Pt  Has smoking hx

## 2017-10-12 NOTE — H&P
History and Physical - Critical Care   Cecelia Savage 50 y o  male MRN: [id number]  Unit/Bed#: ICU 02 Encounter: [id number]    Reason for Admission / Chief Complaint: SAH     History of Present Illness:  Cecelia Savage is a 50 y o  male who presents as a tx from [place] for [facility], patient came to [place] c/o double vision x1 day  He was also c/o frontal headaches for the last few weeks for which he has been taking aleve w/o relief  He is a daily alcohol drinker  Patient denies falls/head injury  No other complaints  Patient was intoxicated on arrival to [address] w/ alcohol level of 330  CT head was + for subarachnoid hemorrhage w/ intraventricular extension  Platt Baird I  Brooks IV   On arrival to [facility] patient w/o complaints, states that double vision is improving, denies headache, lightheadedness, blurry vision, numbness or tingling in arms and legs  Patient is an alcoholic w;/ history of withdrawal, multiple rehab admissions, drinks about 1 case of beer daily, heavy tobacco use  History obtained from chart review and the patient  Denies IVDA    Past Medical History:  Past Medical History:   Diagnosis Date    Alcoholism /alcohol abuse [facility])        Past Surgical History:  History reviewed  No pertinent surgical history  Past Family History:  History reviewed  No pertinent family history      Social History:  History   Smoking Status    Current Every Day Smoker    Packs/day: 1 50   Smokeless Tobacco    Never Used     History   Alcohol Use    18 0 oz/week    30 Cans of beer per week     History   Drug Use    Types: Marijuana, Cocaine     Marital Status: Single    Medications:  Current Facility-Administered Medications   Medication Dose Route Frequency    acetaminophen (TYLENOL) tablet 650 mg  650 mg Oral Q4H PRN    chlorhexidine (PERIDEX) 0 12 % oral rinse 15 mL  15 mL Swish & Spit Q12H [address]    diazepam (VALIUM) injection 5 mg  5 mg Intravenous Q6H PRN    labetalol (NORMODYNE) injection 10 mg  10 mg Intravenous Q4H PRN    levETIRAcetam (KEPPRA) 500 mg in sodium chloride 0 9 % 100 mL IVPB  500 mg Intravenous Q12H [address]    multi-electrolyte (ISOLYTE-S PH 7 4 equivalent) IV solution  125 mL/hr Intravenous Continuous    nicotine (NICODERM CQ) 21 mg/24 hr TD 24 hr patch 21 mg  21 mg Transdermal Daily    niMODipine (NIMOTOP) capsule 60 mg  60 mg Oral Q4H [address]    ondansetron (ZOFRAN) injection 4 mg  4 mg Intravenous Q6H PRN    oxazepam (SERAX) capsule 30 mg  30 mg Oral Q8H [address]    thiamine (VITAMIN B1) 100 mg in sodium chloride 0 9 % 50 mL IVPB  100 mg Intravenous Q24H     Home medications:  Prior to Admission medications    Not on File     Allergies:  No Known Allergies    ROS:   Review of Systems   Eyes: Positive for visual disturbance  Neurological: Positive for headaches  All other systems reviewed and are negative  Vitals:  Vitals:    10/11/17 2345 10/12/17 0130 10/12/17 0200 10/12/17 0247   BP: 138/87 146/96 134/89    Pulse: (!) 112 85 86    Resp: 18 16 16    Temp:    97 9 °F (36 6 °C)   TempSrc:    Oral   SpO2: 94% 95% 93%    Weight:       Height:    6' (1 829 m)     Temperature:   Temp (24hrs), Av 3 °F (36 8 °C), Min:97 9 °F (36 6 °C), Max:98 6 °F (37 °C)    Current Temperature: 97 9 °F (36 6 °C)    Weights:   IBW: 77 6 kg  Body mass index is 23 73 kg/m²  Hemodynamic Monitoring:  N/A     Non-Invasive/Invasive Ventilation Settings:  Respiratory    Lab Data (Last 4 hours)    None         O2/Vent Data (Last 4 hours)    None              No results found for: PHART, VBV9XVT, PO2ART, YBY9YVH, L1TFUOWC, BEART, SOURCE  SpO2: SpO2: 93 %     Physical Exam:  Physical Exam   Constitutional: He is oriented to person, place, and time  He appears well-developed and well-nourished  HENT:   Head: Normocephalic and atraumatic  Right Ear: External ear normal    Left Ear: External ear normal    Eyes: EOM are normal  Pupils are equal, round, and reactive to light  Neck: Normal range of motion   Neck supple  No tracheal deviation present  No thyromegaly present  Cardiovascular: Normal rate, regular rhythm and normal heart sounds  Exam reveals no gallop and no friction rub  No murmur heard  Pulmonary/Chest: Effort normal and breath sounds normal  No respiratory distress  He has no wheezes  He has no rales  He exhibits no tenderness  Abdominal: Soft  Bowel sounds are normal  He exhibits no distension and no mass  There is no tenderness  There is no rebound and no guarding  Musculoskeletal: Normal range of motion  He exhibits no edema or deformity  Neurological: He is alert and oriented to person, place, and time  No cranial nerve deficit  Dysmetria w/ LUE, visual fields intact, EOMI, no pronator drift, MS 5/5 B/L UE and LE    Skin: Skin is warm and dry  No erythema  Psychiatric: He has a normal mood and affect  Intoxicated     Nursing note and vitals reviewed  Labs:    Results from last 7 days  Lab Units 10/12/17  0228 10/11/17  1951   WBC Thousand/uL 6 28 7 51   HEMOGLOBIN g/dL 15 2 15 2   HEMATOCRIT % 42 8 44 9   PLATELETS Thousands/uL 212 243   NEUTROS PCT %  --  55   MONOS PCT %  --  13*        Results from last 7 days  Lab Units 10/11/17  1951   SODIUM mmol/L 137   POTASSIUM mmol/L 4 0   CHLORIDE mmol/L 98*   CO2 mmol/L 27   BUN mg/dL 8   CREATININE mg/dL 0 75   CALCIUM mg/dL 9 2   TOTAL PROTEIN g/dL 8 3*   BILIRUBIN TOTAL mg/dL 0 40   ALK PHOS U/L 88   ALT U/L 187*   AST U/L 212*   GLUCOSE RANDOM mg/dL 103                Results from last 7 days  Lab Units 10/11/17  1951   INR  0 84*   PTT seconds 29         No results found for: TROPONINI    Imaging:   10/11 CT head - Subarachnoid hemorrhage accumulating within the 3rd ventricle, frontal horn right lateral ventricle and temporal horn left lateral ventricle  Findings raise suspicion for ruptured aneurysm  10/12 CTA head/neck - 1   Precontrast CT scan of the head demonstrates intraventricular hemorrhage grossly stable low with  slight redistribution when compared with the prior  2  Left-sided cavernous carotid contour irregularity may reflect a focal aneurysm measuring up to 1 7  mm projecting medially  3  Focal aneurysmal dilatation of the right intradural vertebral artery measures up to 5 8 cm with  more proximal measurement 4 4 cm, incidentally noted  Normal neck CTA  I have personally reviewed pertinent reports  EKG: This was personally reviewed by myself  Micro:  No results found for: Lucas Wadsworth    ______________________________________________________________________    Assessment:   Patient Active Problem List   Diagnosis    Subarachnoid hemorrhage ([address] )    Headache    Alcohol abuse    Nicotine dependence         Plan:      Neuro: SAH w/ Intraventricular extention concerning for Annerysmal bleed, consult neurosurgery, maintain SBP <140, Insert a-line, start nimodipine and nicardipine as needed for BP goal  TCDs x 21 days, hold anticoagulation  Keppra 500 BID for seizure prophylaxis  Alcohol abuse/dependance - start serax 30 mg q8 hours, valium as needed  Tobacco abuse - nicotine patch   CV: Keep SBP <140 , no acute issues     Pulm: No acute issues     GI: NPO for now     : No acute issues, I/O     F/E/N: Isolyte @ 125/hour, thiamine, folate daily , replete electrolytes as needed     ID: No acute issues     Heme: CBC, PTT, INR now and in AM, Type and Screen     Endo: Accuchecks q6 hours while NPO     Msk/Skin: No acute issues      Disposition: Admit to ICU         ______________________________________________________________________    VTE Pharmacologic Prophylaxis: SAH   VTE Mechanical Prophylaxis: sequential compression device    Invasive lines and devices:   Invasive Devices     Peripheral Intravenous Line            Peripheral IV 10/11/17 Left Antecubital less than 1 day    Peripheral IV 10/11/17 Right Antecubital less than 1 day                Code Status: Level 1 - Full Code  POA: POLST:      Given critical illness, patient length of stay will require greater than two midnights  Portions of the record may have been created with voice recognition software  Occasional wrong word or "sound a like" substitutions may have occurred due to the inherent limitations of voice recognition software  Read the chart carefully and recognize, using context, where substitutions have occurred        Osiris Gatica MD

## 2017-10-12 NOTE — PHYSICAL THERAPY NOTE
Physical Therapy Cancellation Note  PT orders received  Chart review completed  PT currently off the floor at IR  PT will follow and eval as appropriate     Pretty Garcia PT

## 2017-10-12 NOTE — CASE MANAGEMENT
Initial Clinical Review    Admission: Date/Time/Statement: 10/12/17 @ 0032     Orders Placed This Encounter   Procedures    Inpatient Admission     Standing Status:   Standing     Number of Occurrences:   1     Order Specific Question:   Admitting Physician     Answer:   Claire Sevilla     Order Specific Question:   Level of Care     Answer:   Critical Care [15]     Order Specific Question:   Estimated length of stay     Answer:   More than 2 Midnights     Order Specific Question:   Certification     Answer:   I certify that inpatient services are medically necessary for this patient for a duration of greater than two midnights  See H&P and MD Progress Notes for additional information about the patient's course of treatment  10/11/2017 (3 hours)  [facility] Emergency Department  TRANSFER TO [facility])   Double vision  Temperature Pulse Respirations Blood Pressure SpO2   10/11/17 1839 10/11/17 1839 10/11/17 1839 10/11/17 1839 10/11/17 1839   98 4 °F (36 9 °C) 99 18 (!) 166/113 96 %       ED: Date/Time/Mode of Arrival:   [facility]  ED Arrival Information     Expected Arrival Acuity Means of Arrival Escorted By Service Admission Type    10/11/2017 22:36 10/11/2017 22:40 Emergent Ambulance [facility] Critical Care/ICU Emergency    Arrival Complaint    SUBARACHNOID          Chief Complaint:   Chief Complaint   Patient presents with    Blurred Vision     Pt is a transfer from [facility] for a [address]  Per pt, he has no significant PMH besides chronic alcoholism  Tuesday morning, pt woke up with blurred vision after drinking and thought it was residual from drinking so went back to sleep   Pt woke up later in the day and the blurred vision was still present so he decided to have it checked out  j       History of Illness:       Amelia Braxton is a 50 y o  male who presents as a tx from [place] for [address], patient came to [place] c/o double vision x1 day  He was also c/o frontal headaches for the last few weeks for which he has been taking aleve w/o relief  He is a daily alcohol drinker  Patient denies falls/head injury  No other complaints  Patient was intoxicated on arrival to [facility] w/ alcohol level of 330  CT head was + for subarachnoid hemorrhage w/ intraventricular extension  Platt Baird I  Brooks IV   On arrival to [facility] patient w/o complaints, states that double vision is improving, denies headache, lightheadedness, blurry vision, numbness or tingling in arms and legs  Patient is an alcoholic w;/ history of withdrawal, multiple rehab admissions, drinks about 1 case of beer daily, heavy tobacco use  ED Vital Signs:   ED Triage Vitals [10/11/17 2245]   Temperature Pulse Respirations Blood Pressure SpO2   98 6 °F (37 °C) 92 18 (!) 157/104 96 %      Temp Source Heart Rate Source Patient Position - Orthostatic VS BP Location FiO2 (%)   Oral Monitor Lying Right arm --      Pain Score       4        Wt Readings from Last 1 Encounters:   10/12/17 78 9 kg (173 lb 15 1 oz)         Vital Signs (abnormal):     10/11/17 2345  --   112  18  138/87  --  94 %  --     10/12/17 0200  --  86  16  134/89  --  93 %  --       10/12/17 0300  --  88   36  108/78  90 MAP  94 %  Nasal cannula  Lying         Abnormal Labs/Diagnostic Test Results:   Right eye exhibits nystagmus  Left eye exhibits nystagmus     Coordination (fails finger to nose test by over shooting)    CBC AND DIFFERENTIAL - Abnormal      Monocytes Relative 13 (*) 4 - 12 % Final     Basophils Relative 2 (*) 0 - 1 % Final     Basophils Absolute 0 11 (*) 0 00 - 0 10 Thousands/µL Final       COMPREHENSIVE METABOLIC PANEL - Abnormal      Chloride 98 (*) 100 - 108 mmol/L Final      (*) 5 - 45 U/L Final      (*) 12 - 78 U/L Final     Total Protein 8 3 (*) 6 4 - 8 2 g/dL Final     LIPASE - Abnormal      Lipase 424 (*) 73 - 393 u/L Final   PROTIME-INR - Abnormal      Protime 11 7 (*) 12 1 - 14 4 seconds Final     INR 0 84 (*) 0 86 - 1 16 Final   MEDICAL ALCOHOL - Abnormal      Ethanol Lvl 330 (*) 0 - 3 mg/dL Final     CT head without contrast   Final Result   Subarachnoid hemorrhage accumulating within the 3rd ventricle, frontal horn right lateral ventricle and temporal horn left lateral ventricle  Findings raise suspicion for ruptured aneurysm  ED Treatment:   Medication Administration from 10/11/2017 2236 to 10/12/2017 0239       Date/Time Order Dose Route     10/12/2017 0143 oxazepam (SERAX) capsule 30 mg 30 mg Oral     10/12/2017 0149 diazepam (VALIUM) injection 5 mg 5 mg Intravenous     10/12/2017 0144 acetaminophen (TYLENOL) tablet 650 mg 650 mg Oral     10/12/2017 0145 thiamine (VITAMIN B1) 100 mg in sodium chloride 0 9 % 50 mL IVPB 100 mg Intravenous     11/51/0379 0276 folic acid 1 mg in sodium chloride 0 9 % 50 mL IVPB 1 mg Intravenous     10/12/2017 0147 niMODipine (NIMOTOP) capsule 60 mg 60 mg Oral     10/12/2017 0222 levETIRAcetam (KEPPRA) 500 mg in sodium chloride 0 9 % 100 mL IVPB 500 mg Intravenous          Past Medical/Surgical History:    Active Ambulatory Problems     Diagnosis Date Noted    No Active Ambulatory Problems     Resolved Ambulatory Problems     Diagnosis Date Noted    No Resolved Ambulatory Problems     Past Medical History:   Diagnosis Date    Alcoholism /alcohol abuse [facility])        Admitting Diagnosis: Subarachnoid hemorrhage ([facility] [address] ) [I60 9]    Age/Sex: 50 y o  male    Assessment/Plan:     Anesthesia Start Date/Time: 10/12/17 0859   Procedure: IR CEREBRAL ANGIOGRAPHY / INTERVENTION   Indications: IVH       Patient Active Problem List   Diagnosis    Subarachnoid hemorrhage ([facility] [address] )    Headache    Alcohol abuse    Nicotine dependence            Plan:                            Neuro: SAH w/ Intraventricular extention concerning for Annerysmal bleed, consult neurosurgery, maintain SBP <140, Insert a-line, start nimodipine and nicardipine as needed for BP goal  TCDs x 21 days, hold anticoagulation  Keppra 500 BID for seizure prophylaxis  Alcohol abuse/dependance - start serax 30 mg q8 hours, valium as needed    Tobacco abuse - nicotine patch                        CV: Keep SBP <140 , no acute issues                           Pulm: No acute issues                           GI: NPO for now                           : No acute issues, I/O                           F/E/N: Isolyte @ 125/hour, thiamine, folate daily , replete electrolytes as needed                           ID: No acute issues                           Heme: CBC, PTT, INR now and in AM, Type and Screen                           Endo: Accuchecks q6 hours while NPO                           Msk/Skin: No acute issues                            Disposition: Admit to ICU         Admission Orders:    BLOOD CULTURES X2  MRI HEAD  CT HEAD   CONSULT NEUROSURGERY  SEQUENTIAL COMPRESSION DEVICE  ASPIRATION PRECAUTIONS  SEIZURE PRECAUTIONS  PT OT AND SPEECH EVAL AND TREAT   DYSPHAGIA ASSESSMENT PRIOR TO PO INTAKE  NEURO CHECKS  Q1 HR  CONSULT  PHYSICAL MEDICINE AND REHABILITATION  ARTERIAL LINE    Scheduled Meds:   chlorhexidine 15 mL Swish & Spit Q12H [address]   hydrALAZINE 10 mg Intravenous Once   labetalol 10 mg Intravenous Once   levETIRAcetam 500 mg Intravenous Q12H [address]   LORazepam 6 mg Intravenous Once   multivitamin 10 mL Intravenous Daily   nicotine 21 mg Transdermal Daily   propofol      thiamine (VITAMIN B1) 50 mL IVPB 100 mg Intravenous Q24H     Continuous Infusions:   multi-electrolyte 125 mL/hr Last Rate: 125 mL/hr (10/12/17 0837)     PRN Meds:   acetaminophen    labetalol    LORazepam    ondansetron

## 2017-10-12 NOTE — EMTALA/ACUTE CARE TRANSFER
[address] [address] [address]  Dept: [phone]      EMTALA TRANSFER CONSENT    NAME Aleksander Dye                                         1965                              MRN [id number]    I have been informed of my rights regarding examination, treatment, and transfer   by Dr Rogerio Dumas MD    Benefits: Specialized equipment and/or services available at the receiving facility (Include comment)________________________    Risks: Potential deterioration of medical condition, Loss of IV, Increased discomfort during transfer, Possible worsening of condition or death during transfer, Potential for delay in receiving treatment      Consent for Transfer:  I acknowledge that my medical condition has been evaluated and explained to me by the emergency department physician or other qualified medical person and/or my attending physician, who has recommended that I be transferred to the service of  Accepting Physician: Reyes Kang at [facility] Name, [facility] : \[facility] [place] [facility]\""  The above potential benefits of such transfer, the potential risks associated with such transfer, and the probable risks of not being transferred have been explained to me, and I fully understand them  The doctor has explained that, in my case, the benefits of transfer outweigh the risks  I agree to be transferred  I authorize the performance of emergency medical procedures and treatments upon me in both transit and upon arrival at the receiving facility  Additionally, I authorize the release of any and all medical records to the receiving facility and request they be transported with me, if possible  I understand that the safest mode of transportation during a medical emergency is an ambulance and that the Hospital advocates the use of this mode of transport   Risks of traveling to the receiving facility by car, including absence of medical control, life sustaining equipment, such as oxygen, and medical personnel has been explained to me and I fully understand them  (MARIA ELENA CORRECT BOX BELOW)  [  ]  I consent to the stated transfer and to be transported by ambulance/helicopter  [  ]  I consent to the stated transfer, but refuse transportation by ambulance and accept full responsibility for my transportation by car  I understand the risks of non-ambulance transfers and I exonerate the Hospital and its staff from any deterioration in my condition that results from this refusal     X___________________________________________    DATE  10/11/17  TIME________  Signature of patient or legally responsible individual signing on patient behalf           RELATIONSHIP TO PATIENT_________________________          Provider Certification    NAME Jacquie Rivera                                         1965                              MRN [id number]    A medical screening exam was performed on the above named patient  Based on the examination:    Condition Necessitating Transfer There were no encounter diagnoses      Patient Condition: The patient has been stabilized such that within reasonable medical probability, no material deterioration of the patient condition or the condition of the unborn child(yeni) is likely to result from the transfer    Reason for Transfer: Level of Care needed not available at this facility    Transfer Requirements: Facility [facility]   · Space available and qualified personnel available for treatment as acknowledged by Anastacia Easton  · Agreed to accept transfer and to provide appropriate medical treatment as acknowledged by       [address]  · Appropriate medical records of the examination and treatment of the patient are provided at the time of transfer   [address] _______  · Transfer will be performed by qualified personnel from [facility]  and appropriate transfer equipment as required, including the use of necessary and appropriate life support measures  Provider Certification: I have examined the patient and explained the following risks and benefits of being transferred/refusing transfer to the patient/family:  General risk, such as traffic hazards, adverse weather conditions, rough terrain or turbulence, possible failure of equipment (including vehicle or aircraft), or consequences of actions of persons outside the control of the transport personnel, Unanticipated needs of medical equipment and personnel during transport, Risk of worsening condition, The possibility of a transport vehicle being unavailable      Based on these reasonable risks and benefits to the patient and/or the unborn child(yeni), and based upon the information available at the time of the patients examination, I certify that the medical benefits reasonably to be expected from the provision of appropriate medical treatments at another medical facility outweigh the increasing risks, if any, to the individuals medical condition, and in the case of labor to the unborn child, from effecting the transfer      X____________________________________________ DATE 10/11/17        TIME_______      ORIGINAL - SEND TO MEDICAL RECORDS   COPY - SEND WITH PATIENT DURING TRANSFER

## 2017-10-12 NOTE — ANESTHESIA POSTPROCEDURE EVALUATION
Post-Op Assessment Note      CV Status:  Stable    Mental Status:  Alert and combative    Hydration Status:  Stable    PONV Controlled:  Controlled    Airway Patency:  Patent    Post Op Vitals Reviewed: Yes          Staff: CRNA           BP   148/74   Temp 99 0   Pulse 90   Resp 12   SpO2 97%

## 2017-10-12 NOTE — CONSULTS
Consultation - Neurosurgery   Huong Mcclain 50 y o  male MRN: [id number]  Unit/Bed#: ICU 02 Encounter: [id number]  Consult completed on 10/12/2107      Inpatient consult to Neurosurgery  Consult performed by: Anita Kong ordered by: Rana Koyanagi          Assessment/Plan     Assessment:  1  Subarachnoid hemorrhage with intraventricular hemorrhage extension  2  Mabelene Corolla 1; Valdes 4  3  Alcohol abuse  4  Nicotine dependency     Plan:  · Exam: examined on sedation  Spontaneously moving all extremities and +cough/gag when sedation was first started  Pupils are 2mm bilaterally, conjugate gaze  No corneal responses bilaterally  · Imaging: reviewed personally and with attending on 10/12:  · 10/11 - CT head wo: 1) subarachnoid hemorrhage accumulating within the 3rd ventricle, frontal horn right lateral ventricle and temporal horn of left lateral ventricle  Findings raise suspicion for ruptured aneurysm  · 10/12 - CTA head and neck wo: 1) Although CTA demonstrates mild multifocal vascular ectasia and, an incidental, inconsequential tiny left cavernous ICA aneurysm, CTA does not offer cause of patient's 3rd ventricular hemorrhage  2) Redistribution of intraventricular hemorrhage  Based on appearance of the cistern of the lamina terminalis and floor of the 3rd ventricle and minor asymmetry of the hemorrhage extending into the left diencephalon, query hypertensive thalamic hemorrhage which gained access to the posterior 3rd ventricle  · 10/12 Angio - 7 5 x 3 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  · Patient had change in mental status, he was not verbally respond, becoming confused   He was intubated for airway protection and taken for a STAT CT head  · CT head wo showing redistribution of within the 3rd ventricle as well as within the occipital horns of the lateral ventricle (R>L)  · Recommend MRI w/wo, EDWIN, blood cx to r/o endocarditis  · Continue keppra 500mg BID  · SBP goals <160  · Observe for DT's   · Pain control/Sedation: fentanyl 100mcg/hr, propofolo 100mcg/kg/min   · Medical management per primary team  · EVD placed at bedside by Nati Ventura, supervised by Dr Jorge L Galloway  · EVD at 10+mmHg above tragus  · CT head in a m    · Nsx continue to monitor, call with question or concerns      History of Present Illness   HPI: Amelia Braxton is a 50y o  year old male who was a transfer from [facility] for a SAH  He presented to [facility] with complains of double vision for one day as well as frontal headaches for a few weeks  He was taking Aleve without relief  He admits to being a daily alcohol drinker  He denies any falls or head injury  When presented to 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  He had a decline in mental status requiring intubation and a STAT CT head showing redistribution of the [facility]  Review of Systems    Historical Information   Past Medical History:   Diagnosis Date    Alcoholism /alcohol abuse [facility])      History reviewed  No pertinent surgical history  History   Alcohol Use    18 0 oz/week    30 Cans of beer per week     History   Drug Use    Types: Marijuana, Cocaine     History   Smoking Status    Current Every Day Smoker    Packs/day: 1 50   Smokeless Tobacco    Never Used     History reviewed  No pertinent family history      Meds/Allergies   all current active meds have been reviewed and current meds:   Current Facility-Administered Medications   Medication Dose Route Frequency    lidocaine (PF) (XYLOCAINE-MPF) 1 % injection **AcuDose Override Pull**        acetaminophen (TYLENOL) tablet 650 mg  650 mg Oral Q4H PRN    chlorhexidine (PERIDEX) 0 12 % oral rinse 15 mL  15 mL Swish & Spit Q12H [address]    fentaNYL 1250 mcg in sodium chloride 0 9% 125mL drip  100 mcg/hr Intravenous Continuous    fentanyl citrate (PF) 100 MCG/2ML **AcuDose Override Pull**        fentanyl citrate (PF) 100 MCG/2ML 100 mcg  100 mcg Intravenous Once    hydrALAZINE (APRESOLINE) injection 10 mg  10 mg Intravenous Once    labetalol (NORMODYNE) injection 10 mg  10 mg Intravenous Q4H PRN    labetalol (NORMODYNE) injection 10 mg  10 mg Intravenous Once    levETIRAcetam (KEPPRA) 500 mg in sodium chloride 0 9 % 100 mL IVPB  500 mg Intravenous Q12H [address]    LORazepam (ATIVAN) 2 mg/mL injection 2 mg  2 mg Intravenous Q1H PRN    multi-electrolyte (ISOLYTE-S PH 7 4 equivalent) IV solution  125 mL/hr Intravenous Continuous    nicotine (NICODERM CQ) 21 mg/24 hr TD 24 hr patch 21 mg  21 mg Transdermal Daily    ondansetron (ZOFRAN) injection 4 mg  4 mg Intravenous Q6H PRN    propofol (DIPRIVAN) 1000 mg in 100 mL infusion (premix)  100 mcg/kg/min Intravenous Titrated    thiamine (VITAMIN B1) 100 mg in sodium chloride 0 9 % 50 mL IVPB  100 mg Intravenous Q24H    vecuronium (NORCURON) injection 10 mg  10 mg Intravenous Once     No Known Allergies    Objective   I/O       10/10 0701 - 10/11 0700 10/11 0701 - 10/12 0700 10/12 0701 - 10/13 0700    I V  (mL/kg)  299 6 (3 8) 800 (10 1)    Total Intake(mL/kg)  299 6 (3 8) 800 (10 1)    Urine (mL/kg/hr)  200     Total Output   200      Net   +99 6 +800                 Physical Exam   Constitutional: He appears well-developed  He is sedated and intubated  Cardiovascular: Normal rate  Pulmonary/Chest: He is intubated  Neurological:   Reflex Scores:       Bicep reflexes are 1+ on the right side and 1+ on the left side  Brachioradialis reflexes are 1+ on the right side and 1+ on the left side         Patellar reflexes are 1+ on the right side and 1+ on the left side  Achilles reflexes are 1+ on the right side and 1+ on the left side  Neurologic Exam     Mental Status   Observed while increasing sedation, patient was spontaneously moving all extremities which diminished due to sedation  Cranial Nerves     CN III, IV, VI   Right pupil: Size: 2 mm  Shape: regular  Reactivity: non-reactive  Left pupil: Size: 2 mm  Shape: regular  Reactivity: non-reactive  Conjugate gaze: present  +cough/gag  - corneals bilaterally      Motor Exam Spontaneously moving all extremities which diminished due to increase in sedation  Sensory Exam   Unable to assess due to intubation/sedation     Gait, Coordination, and Reflexes     Reflexes   Right brachioradialis: 1+  Left brachioradialis: 1+  Right biceps: 1+  Left biceps: 1+  Right patellar: 1+  Left patellar: 1+  Right achilles: 1+  Left achilles: 1+  Right Goldberg: absent  Left Goldberg: absent  Right ankle clonus: absent  Left ankle clonus: absent      Vitals:Blood pressure 129/70, pulse 74, temperature 98 6 °F (37 °C), temperature source Oral, resp  rate 16, height 6' (1 829 m), weight 78 9 kg (173 lb 15 1 oz), SpO2 93 %  ,Body mass index is 23 59 kg/m²       Lab Results:     Results from last 7 days  Lab Units 10/12/17  0228 10/11/17  1951   WBC Thousand/uL 6 28 7 51   HEMOGLOBIN g/dL 15 2 15 2   HEMATOCRIT % 42 8 44 9   PLATELETS Thousands/uL 212 243   NEUTROS PCT %  --  55   MONOS PCT %  --  13*       Results from last 7 days  Lab Units 10/12/17  0537 10/11/17  1951   SODIUM mmol/L 140 137   POTASSIUM mmol/L 3 6 4 0   CHLORIDE mmol/L 104 98*   CO2 mmol/L 25 27   BUN mg/dL 7 8   CREATININE mg/dL 0 54* 0 75   CALCIUM mg/dL 8 2* 9 2   TOTAL PROTEIN g/dL 7 4 8 3*   BILIRUBIN TOTAL mg/dL 0 67 0 40   ALK PHOS U/L 65 88   ALT U/L 161* 187*   AST U/L 201* 212*   GLUCOSE RANDOM mg/dL 76 103       Results from last 7 days  Lab Units 10/12/17  0537   MAGNESIUM mg/dL 2 1           Results from last 7 days  Lab Units 10/12/17  0537 10/11/17  1951   INR  0 96 0 84*   PTT seconds 28 29     No results found for: TROPONINT  ABG:No results found for: PHART, DAL9IVE, PO2ART, XNK6JPJ, V1AYRJID, BEART, SOURCE    Imaging Studies: I have personally reviewed pertinent reports  and I have personally reviewed pertinent films in PACS    EKG, Pathology, and Other Studies: I have personally reviewed pertinent reports     and I have personally reviewed pertinent films in PACS    VTE Prophylaxis: Sequential compression device (Venodyne)  and Reason for no pharmacologic prophylaxis intraventricular hemorrhage    Code Status: Level 1 - Full Code  Advance Directive and Living Will:      Power of :    POLST:

## 2017-10-12 NOTE — PROCEDURES
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 most likely not responsible for his hemorrhage  However given his poor exam radiographic evidence of hydrocephalus is determined the ventriculostomy would be useful in CSF diversion  The risks and benefits of the procedure were discussed with the family including bleeding, infection, stroke, paralysis, and death  Complications:  None     Blood loss:   Minima    Antibiotics:  2g ancef given postprocedurally  Procedure Details: The right side of the head was shaved  Kocher's point was marked  Sterilely prepped with Chloro Prep  Draped sterilely  1cm incision made over Kochers' point  Scraped periosteum off  Small retractor placed  Hand drill used for enrique hole  Dura incised with an 11 blade  Ventricular catheter passed successfully into ventricle on the third attempt  Brisk flow obtained under clear pressure  Clear at first, then blood tinged  Tunneled 2 cm posteriorly  Incision closed with 2-0 nylon suture  Catheter secured in 3 places with 2-0 silk and catheter secured to connecter with 2-0 silk  Plan:  HCT ordered  2g Ancef q8 x3  Patient tolerated procedure well  I was present for the entire procedure and completed the critical portions

## 2017-10-12 NOTE — RESTORATIVE TECHNICIAN NOTE
Restorative Specialist Mobility Note       Activity: Other (Comment) (attempted to see pt, was out in IR   Will try later)

## 2017-10-12 NOTE — PROGRESS NOTES
Patient belongings sent home with [facility] per patients request   2 socks, 2 pants, 2 shirts, two underwear, pair of shoes, 1 belt, 2 knives,

## 2017-10-12 NOTE — SPEECH THERAPY NOTE
Speech/Language Pathology  Assessment    Patient Name: Amelia Braxton  XHOXK'[id number] Date: 10/12/2017     Problem List  Patient Active Problem List   Diagnosis    Subarachnoid hemorrhage ([facility] [address] )    Headache    Alcohol abuse    Nicotine dependence     Past Medical History  Past Medical History:   Diagnosis Date    Alcoholism /alcohol abuse ([facility] [address] )    50 y o  male who presents as a tx from [place] for [facility], patient came to [place] c/o double vision x1 day  He was also c/o frontal headaches for the last few weeks for which he has been taking aleve w/o relief  He is a daily alcohol drinker  Patient denies falls/head injury  No other complaints  Patient was intoxicated on arrival to [facility] w/ alcohol level of 330  CT head was + for subarachnoid hemorrhage w/ intraventricular extension  Platt Baird I  Brooks IV   On arrival to [facility] patient w/o complaints, states that double vision is improving, denies headache, lightheadedness, blurry vision, numbness or tingling in arms and legs  Patient is an alcoholic w;/ history of withdrawal, multiple rehab admissions, drinks about 1 case of beer daily, heavy tobacco use  CT Head-Subarachnoid hemorrhage accumulating within the 3rd ventricle, frontal horn right lateral ventricle and temporal horn left lateral ventricle  Findings raise suspicion for ruptured aneurysm    Order received, chart reviewed  Arrived on floor to see pt who is now requiring intubation  Please re consult when able

## 2017-10-12 NOTE — PROGRESS NOTES
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  Unlikely to represent the source of bleeding giving the location  - Intraventricular hemorrhage unknown etiology -df dg  Mycotic aneurysm   - will perform workup for infectious endocarditis - blood cultures x3, Echo of heart  - change in mental status after coming from cerebral angio - stat CT of head, MRI of head pending  - Keppra 500 BID for seizure prophylaxis  maintain SBP <140, Insert A-line      2  Acute encephalopathy 2/2 vs  Alcohol withdrawal vs  Worsening of intraventricular hemorrhage - started on ativan 2mg iv prn every 1 hour  - patient intubated  - ordered stat head CT    3  Tobacco abuse - nicotine patch                          CV: Patient on labetalol 10mg iv every 4 hours prn for BP >160                           Pulm:  Intubated, will adjust vent settings per ABG                           GI: NPO                           : No acute issues, I/O                           F/E/N: Isolyte @ 125/hour, thiamine, folate, multivitamin daily , replete electrolytes as needed                           ID: No acute issues                           Heme: stable                           Endo: Accuchecks q6 hours                            Msk/Skin: No acute issues                            Disposition: Admit to ICU         Code Status: Level 1 - Full Code    Counseling / Coordination of Care  ______________________________________________________________________    Chief Complaint: double vision    24 Hour Events: none      ______________________________________________________________________    Physical Exam:   Physical Exam   Constitutional: He appears well-developed and well-nourished  HENT:   Head: Normocephalic and atraumatic  Eyes: Pupils are equal, round, and reactive to light  Right eye exhibits no discharge  Left eye exhibits no discharge  No scleral icterus  Neck: Neck supple  Cardiovascular: Normal rate and regular rhythm  No murmur heard  Pulmonary/Chest: Effort normal and breath sounds normal  No respiratory distress  He has no wheezes  Abdominal: Soft  He exhibits no distension  There is no tenderness  Musculoskeletal: He exhibits no edema  Neurological: No cranial nerve deficit  Double vision   Skin: Skin is dry        ______________________________________________________________________  Vitals:    10/12/17 0300 10/12/17 0400 10/12/17 0500 10/12/17 0600   BP: 108/78 123/72 128/78 142/94   Pulse: 88 70 70 68   Resp: (!) 36 15 15 16   Temp:  98 6 °F (37 °C)     TempSrc:  Oral     SpO2: 94% 97% 96% 96%   Weight:       Height:           Temperature:   Temp (24hrs), Av 4 °F (36 9 °C), Min:97 9 °F (36 6 °C), Max:98 6 °F (37 °C)    Current Temperature: 98 6 °F (37 °C)  Weights:   IBW: 77 6 kg    Body mass index is 23 59 kg/m²    Weight (last 2 days)     Date/Time   Weight    10/12/17 0247  78 9 (173 94)    10/11/17 2245  79 4 (175)            Hemodynamic Monitoring:     Non-Invasive/Invasive Ventilation Settings:  Respiratory    Lab Data (Last 4 hours)    None         O2/Vent Data (Last 4 hours)    None              No results found for: PHART, CJC6DBS, PO2ART, UYK0ZJO, W4TIEXUG, BEART, SOURCE  Intake and Outputs:  I/O       10/10 0701 - 10/11 0700 10/11 0701 - 10/12 0700 10/12 0701 - 10/13 07    I V  (mL/kg)  299 6 (3 8)     Total Intake(mL/kg) 299 6 (3 8)     Urine (mL/kg/hr)  200     Total Output   200      Net   +99 6                 Nutrition:        Diet Orders            Start     Ordered    10/12/17 0023  Diet NPO  Diet effective now     Question Answer Comment   Diet Type NPO    RD to adjust diet per protocol? No        10/12/17 0032        Labs:     Results from last 7 days  Lab Units 10/12/17  0228 10/11/17  1951   WBC Thousand/uL 6 28 7 51   HEMOGLOBIN g/dL 15 2 15 2   HEMATOCRIT % 42 8 44 9   PLATELETS Thousands/uL 212 243   NEUTROS PCT %  --  55   MONOS PCT %  --  13*       Results from last 7 days  Lab Units 10/12/17  0537 10/11/17  1951   SODIUM mmol/L 140 137   POTASSIUM mmol/L 3 6 4 0   CHLORIDE mmol/L 104 98*   CO2 mmol/L 25 27   BUN mg/dL 7 8   CREATININE mg/dL 0 54* 0 75   CALCIUM mg/dL 8 2* 9 2   TOTAL PROTEIN g/dL 7 4 8 3*   BILIRUBIN TOTAL mg/dL 0 67 0 40   ALK PHOS U/L 65 88   ALT U/L 161* 187*   AST U/L 201* 212*   GLUCOSE RANDOM mg/dL 76 103       Results from last 7 days  Lab Units 10/12/17  0537   MAGNESIUM mg/dL 2 1     No results found for: PHOS     Results from last 7 days  Lab Units 10/12/17  0537 10/11/17  1951   INR  0 96 0 84*   PTT seconds 28 29     No results found for: TROPONINI      ABG:No results found for: PHART, AYK8ZOK, PO2ART, OKA5MRV, I5YQUYYO, BEART, SOURCE  Imaging:  I have personally reviewed pertinent reports      EKG:   Micro:  No results found for: Pat Pointer, WOUNDCULT, SPUTUMCULTUR  Allergies: No Known Allergies  Medications:   Scheduled Meds:  chlorhexidine 15 mL Swish & Spit Q12H [address]   levETIRAcetam 500 mg Intravenous Q12H [address]   nicotine 21 mg Transdermal Daily   niMODipine 60 mg Oral Q4H CARMITA   oxazepam 30 mg Oral Q8H [address]   thiamine (VITAMIN B1) 50 mL IVPB 100 mg Intravenous Q24H     Continuous Infusions:  multi-electrolyte 125 mL/hr Last Rate: 125 mL/hr (10/12/17 0342)     PRN Meds:    acetaminophen 650 mg Q4H PRN   diazepam 5 mg Q6H PRN   labetalol 10 mg Q4H PRN   ondansetron 4 mg Q6H PRN VTE Pharmacologic Prophylaxis: Reason for no pharmacologic prophylaxis subarachnoid hemorrhage  VTE Mechanical Prophylaxis: sequential compression device  Invasive lines and devices: Invasive Devices     Peripheral Intravenous Line            Peripheral IV 10/11/17 Left Antecubital less than 1 day    Peripheral IV 10/11/17 Right Antecubital less than 1 day                     Portions of the record may have been created with voice recognition software  Occasional wrong word or "sound a like" substitutions may have occurred due to the inherent limitations of voice recognition software  Read the chart carefully and recognize, using context, where substitutions have occurred      Ryan Toney MD

## 2017-10-12 NOTE — NUTRITION
10/12/17 1418   Recommendations/Interventions   Interventions Diet: continued as ordered   Nutrition Recommendations Initiate diet  (When medically appropriate, rec advance diet to Cardiac Step 1 (due to elevated cholesterol))

## 2017-10-12 NOTE — ANESTHESIA PREPROCEDURE EVALUATION
PRESENTED TO [facility] WITH H/A, INTOXICATED, FOUND TO HAVE SAH    Review of Systems/Medical History  Patient summary reviewed  Chart reviewed  No history of anesthetic complications     Cardiovascular  Negative cardio ROS    Pulmonary  Smoker cigarette smoker , ,        GI/Hepatic  Negative GI/hepatic ROS          Negative  ROS        Endo/Other  Negative endo/other ROS      GYN  Negative gynecology ROS          Hematology  Negative hematology ROS      Musculoskeletal  Negative musculoskeletal ROS        Neurology  Negative neurology ROS     Comment: [address] Psychology   Negative psychology ROS            Physical Exam    Airway    Mallampati score: III  TM Distance: >3 FB  Neck ROM: full     Dental   Comment: POOR DENTITION, MULTIPLE BROKEN DECAYED TEETH, #8 LOOSE,     Cardiovascular  Comment: Negative ROS,     Pulmonary      Other Findings        Anesthesia Plan  ASA Score- 2 Emergent      Anesthesia Type- general with ASA Monitors  Additional Monitors: arterial line  Airway Plan: ETT  Comment: SANTANA Weber , have personally seen and evaluated the patient prior to anesthetic care  I have reviewed the pre-anesthetic record, and other medical records if appropriate to the anesthetic care  If a CRNA is involved in the case, I have reviewed the CRNA assessment, if present, and agree  Risks/benefits and alternatives discussed with patient including possible PONV, sore throat, and possibility of rare anesthetic and surgical emergencies          Induction- intravenous and rapid sequence induction  Informed Consent- Anesthetic plan and risks discussed with patient  I personally reviewed this patient with the CRNA  Discussed and agreed on the Anesthesia Plan with the CRNA  Verito Lopez

## 2017-10-12 NOTE — PROGRESS NOTES
Patient seen and examined at bedside  Chart, labs and imaging reviewed  Very briefly, Mr Lizett Uribe has an intraventricular hemorrhage with mild hydrocephalus  CTA did not reveal a source of bleed  He requires a cerebral angiogram and possible embolization depending on the findings  The patient is oriented to self and place and year  Exam is essentially unremarkable with note made of some subjective diplopia but no appreciable CN palsy  Patient understands the risks and benefits and would like to proceed with the procedure  This has also been discussed with his mother who has provided consent as well

## 2017-10-12 NOTE — BRIEF OP NOTE (RAD/CATH)
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

## 2017-10-13 ENCOUNTER — APPOINTMENT (INPATIENT)
Dept: NON INVASIVE DIAGNOSTICS | Facility: HOSPITAL | Age: 52
DRG: 021 | End: 2017-10-13
Payer: COMMERCIAL

## 2017-10-13 ENCOUNTER — APPOINTMENT (INPATIENT)
Dept: RADIOLOGY | Facility: HOSPITAL | Age: 52
DRG: 021 | End: 2017-10-13
Payer: COMMERCIAL

## 2017-10-13 LAB
ALBUMIN SERPL BCP-MCNC: 3.2 G/DL (ref 3.5–5)
ALP SERPL-CCNC: 51 U/L (ref 46–116)
ALT SERPL W P-5'-P-CCNC: 119 U/L (ref 12–78)
ANION GAP SERPL CALCULATED.3IONS-SCNC: 8 MMOL/L (ref 4–13)
AST SERPL W P-5'-P-CCNC: 107 U/L (ref 5–45)
ATRIAL RATE: 91 BPM
BASOPHILS # BLD AUTO: 0.01 THOUSANDS/ΜL (ref 0–0.1)
BASOPHILS NFR BLD AUTO: 0 % (ref 0–1)
BILIRUB SERPL-MCNC: 0.86 MG/DL (ref 0.2–1)
BUN SERPL-MCNC: 11 MG/DL (ref 5–25)
CALCIUM SERPL-MCNC: 8.1 MG/DL (ref 8.3–10.1)
CHLORIDE SERPL-SCNC: 99 MMOL/L (ref 100–108)
CO2 SERPL-SCNC: 28 MMOL/L (ref 21–32)
CREAT SERPL-MCNC: 0.61 MG/DL (ref 0.6–1.3)
EOSINOPHIL # BLD AUTO: 0.01 THOUSAND/ΜL (ref 0–0.61)
EOSINOPHIL NFR BLD AUTO: 0 % (ref 0–6)
ERYTHROCYTE [DISTWIDTH] IN BLOOD BY AUTOMATED COUNT: 13 % (ref 11.6–15.1)
GFR SERPL CREATININE-BSD FRML MDRD: 116 ML/MIN/1.73SQ M
GLUCOSE SERPL-MCNC: 130 MG/DL (ref 65–140)
HCT VFR BLD AUTO: 38.4 % (ref 36.5–49.3)
HGB BLD-MCNC: 12.8 G/DL (ref 12–17)
LYMPHOCYTES # BLD AUTO: 0.84 THOUSANDS/ΜL (ref 0.6–4.47)
LYMPHOCYTES NFR BLD AUTO: 9 % (ref 14–44)
MAGNESIUM SERPL-MCNC: 2.4 MG/DL (ref 1.6–2.6)
MCH RBC QN AUTO: 33.5 PG (ref 26.8–34.3)
MCHC RBC AUTO-ENTMCNC: 33.3 G/DL (ref 31.4–37.4)
MCV RBC AUTO: 101 FL (ref 82–98)
MONOCYTES # BLD AUTO: 0.64 THOUSAND/ΜL (ref 0.17–1.22)
MONOCYTES NFR BLD AUTO: 7 % (ref 4–12)
NEUTROPHILS # BLD AUTO: 7.63 THOUSANDS/ΜL (ref 1.85–7.62)
NEUTS SEG NFR BLD AUTO: 84 % (ref 43–75)
NRBC BLD AUTO-RTO: 0 /100 WBCS
P AXIS: 72 DEGREES
PHOSPHATE SERPL-MCNC: 3.5 MG/DL (ref 2.7–4.5)
PLATELET # BLD AUTO: 195 THOUSANDS/UL (ref 149–390)
PMV BLD AUTO: 10.3 FL (ref 8.9–12.7)
POTASSIUM SERPL-SCNC: 3.7 MMOL/L (ref 3.5–5.3)
PR INTERVAL: 158 MS
PROT SERPL-MCNC: 6.8 G/DL (ref 6.4–8.2)
QRS AXIS: 80 DEGREES
QRSD INTERVAL: 102 MS
QT INTERVAL: 346 MS
QTC INTERVAL: 419 MS
RBC # BLD AUTO: 3.82 MILLION/UL (ref 3.88–5.62)
SODIUM SERPL-SCNC: 135 MMOL/L (ref 136–145)
T WAVE AXIS: 57 DEGREES
VENTRICULAR RATE: 88 BPM
WBC # BLD AUTO: 9.17 THOUSAND/UL (ref 4.31–10.16)

## 2017-10-13 PROCEDURE — 80053 COMPREHEN METABOLIC PANEL: CPT | Performed by: INTERNAL MEDICINE

## 2017-10-13 PROCEDURE — 85025 COMPLETE CBC W/AUTO DIFF WBC: CPT | Performed by: INTERNAL MEDICINE

## 2017-10-13 PROCEDURE — 94003 VENT MGMT INPAT SUBQ DAY: CPT

## 2017-10-13 PROCEDURE — 94760 N-INVAS EAR/PLS OXIMETRY 1: CPT | Performed by: SOCIAL WORKER

## 2017-10-13 PROCEDURE — 70450 CT HEAD/BRAIN W/O DYE: CPT

## 2017-10-13 PROCEDURE — 83735 ASSAY OF MAGNESIUM: CPT | Performed by: INTERNAL MEDICINE

## 2017-10-13 PROCEDURE — 94760 N-INVAS EAR/PLS OXIMETRY 1: CPT

## 2017-10-13 PROCEDURE — 84100 ASSAY OF PHOSPHORUS: CPT | Performed by: INTERNAL MEDICINE

## 2017-10-13 RX ORDER — THIAMINE MONONITRATE (VIT B1) 100 MG
100 TABLET ORAL DAILY
Status: DISCONTINUED | OUTPATIENT
Start: 2017-10-13 | End: 2017-10-18

## 2017-10-13 RX ORDER — PANTOPRAZOLE SODIUM 40 MG/1
40 INJECTION, POWDER, FOR SOLUTION INTRAVENOUS
Status: DISCONTINUED | OUTPATIENT
Start: 2017-10-13 | End: 2017-10-13

## 2017-10-13 RX ORDER — PANTOPRAZOLE SODIUM 40 MG/1
40 TABLET, DELAYED RELEASE ORAL
Status: DISCONTINUED | OUTPATIENT
Start: 2017-10-13 | End: 2017-10-13

## 2017-10-13 RX ORDER — FOLIC ACID 1 MG/1
1 TABLET ORAL DAILY
Status: DISCONTINUED | OUTPATIENT
Start: 2017-10-13 | End: 2017-10-18

## 2017-10-13 RX ORDER — MIDAZOLAM HYDROCHLORIDE 1 MG/ML
INJECTION INTRAMUSCULAR; INTRAVENOUS
Status: DISPENSED
Start: 2017-10-13 | End: 2017-10-13

## 2017-10-13 RX ORDER — LEVETIRACETAM 100 MG/ML
500 SOLUTION ORAL EVERY 12 HOURS SCHEDULED
Status: DISCONTINUED | OUTPATIENT
Start: 2017-10-13 | End: 2017-10-18

## 2017-10-13 RX ORDER — LEVETIRACETAM 100 MG/ML
500 SOLUTION ORAL EVERY 12 HOURS SCHEDULED
Status: DISCONTINUED | OUTPATIENT
Start: 2017-10-13 | End: 2017-10-13

## 2017-10-13 RX ORDER — LEVETIRACETAM 500 MG/1
500 TABLET ORAL EVERY 12 HOURS SCHEDULED
Status: DISCONTINUED | OUTPATIENT
Start: 2017-10-13 | End: 2017-10-13

## 2017-10-13 RX ADMIN — NICOTINE 21 MG: 21 PATCH, EXTENDED RELEASE TRANSDERMAL at 10:00

## 2017-10-13 RX ADMIN — PROPOFOL 50 MCG/KG/MIN: 10 INJECTION, EMULSION INTRAVENOUS at 14:21

## 2017-10-13 RX ADMIN — CEFAZOLIN SODIUM 2000 MG: 2 SOLUTION INTRAVENOUS at 04:09

## 2017-10-13 RX ADMIN — PROPOFOL 50 MCG/KG/MIN: 10 INJECTION, EMULSION INTRAVENOUS at 06:05

## 2017-10-13 RX ADMIN — CHLORHEXIDINE GLUCONATE 15 ML: 1.2 RINSE ORAL at 10:00

## 2017-10-13 RX ADMIN — PROPOFOL 50 MCG/KG/MIN: 10 INJECTION, EMULSION INTRAVENOUS at 22:14

## 2017-10-13 RX ADMIN — SODIUM CHLORIDE, SODIUM GLUCONATE, SODIUM ACETATE, POTASSIUM CHLORIDE AND MAGNESIUM CHLORIDE 125 ML/HR: 526; 502; 368; 37; 30 INJECTION, SOLUTION INTRAVENOUS at 09:38

## 2017-10-13 RX ADMIN — Medication 100 MG: at 09:59

## 2017-10-13 RX ADMIN — LORAZEPAM 2 MG: 2 INJECTION INTRAMUSCULAR; INTRAVENOUS at 21:31

## 2017-10-13 RX ADMIN — LORAZEPAM 2 MG: 2 INJECTION INTRAMUSCULAR; INTRAVENOUS at 10:13

## 2017-10-13 RX ADMIN — FOLIC ACID 1 MG: 1 TABLET ORAL at 10:00

## 2017-10-13 RX ADMIN — CHLORHEXIDINE GLUCONATE 15 ML: 1.2 RINSE ORAL at 21:10

## 2017-10-13 RX ADMIN — CEFAZOLIN SODIUM 2000 MG: 2 SOLUTION INTRAVENOUS at 11:55

## 2017-10-13 RX ADMIN — PROPOFOL 50 MCG/KG/MIN: 10 INJECTION, EMULSION INTRAVENOUS at 09:32

## 2017-10-13 RX ADMIN — FENTANYL CITRATE 100 MCG/HR: at 09:36

## 2017-10-13 RX ADMIN — LORAZEPAM 2 MG: 2 INJECTION INTRAMUSCULAR; INTRAVENOUS at 03:08

## 2017-10-13 RX ADMIN — PROPOFOL 100 MCG/KG/MIN: 10 INJECTION, EMULSION INTRAVENOUS at 03:05

## 2017-10-13 RX ADMIN — LEVETIRACETAM 500 MG: 100 SOLUTION ORAL at 21:10

## 2017-10-13 RX ADMIN — LEVETIRACETAM 500 MG: 100 INJECTION, SOLUTION INTRAVENOUS at 09:59

## 2017-10-13 RX ADMIN — SODIUM CHLORIDE, SODIUM GLUCONATE, SODIUM ACETATE, POTASSIUM CHLORIDE AND MAGNESIUM CHLORIDE 125 ML/HR: 526; 502; 368; 37; 30 INJECTION, SOLUTION INTRAVENOUS at 17:44

## 2017-10-13 RX ADMIN — SODIUM CHLORIDE, SODIUM GLUCONATE, SODIUM ACETATE, POTASSIUM CHLORIDE AND MAGNESIUM CHLORIDE 125 ML/HR: 526; 502; 368; 37; 30 INJECTION, SOLUTION INTRAVENOUS at 01:09

## 2017-10-13 RX ADMIN — PROPOFOL 50 MCG/KG/MIN: 10 INJECTION, EMULSION INTRAVENOUS at 17:43

## 2017-10-13 RX ADMIN — THIAMINE HYDROCHLORIDE 100 MG: 100 INJECTION, SOLUTION INTRAMUSCULAR; INTRAVENOUS at 00:20

## 2017-10-13 RX ADMIN — FENTANYL CITRATE 100 MCG/HR: at 22:44

## 2017-10-13 RX ADMIN — Medication 40 MG: at 11:55

## 2017-10-13 NOTE — PLAN OF CARE
Problem: SAFETY,RESTRAINT: NV/NON-SELF DESTRUCTIVE BEHAVIOR  Goal: Remains free of harm/injury (restraint for non violent/non self-detsructive behavior)  INTERVENTIONS:  - Instruct patient/family regarding restraint use   - Assess and monitor physiologic and psychological status   - Provide interventions and comfort measures to meet assessed patient needs   - Identify and implement measures to help patient regain control  - Assess readiness for release of restraint   Outcome: Progressing    Goal: Returns to optimal restraint-free functioning  INTERVENTIONS:  - Assess the patient's behavior and symptoms that indicate continued need for restraint  - Identify and implement measures to help patient regain control  - Assess readiness for release of restraint   Outcome: Progressing

## 2017-10-13 NOTE — OCCUPATIONAL THERAPY NOTE
Occupational Therapy   Patient Name: Adolfo Gay VCODA'S Date: 10/13/2017    OT consult received, chart review completed, pt dx'd w/ SAH w/ IVH extension now s/p placement of EVD 10/12/2017 2* R MCA aneurysm, ETOH/nicotine dependency, presently intubated/sedated, vent support, GCS 6-7  Pt cx'd this am 2* not yet medically stable/appropriate to participate in OT evaluation  OT to follow to evaluate to A w/ safe d/c planning/recommendations  Thank you for the consult!  Please call if you have any questions: Nohemi Chan, HALEY, OTR/L, C-GCM, CSRS  Neuro [facility]

## 2017-10-13 NOTE — RESPIRATORY THERAPY NOTE
RT Ventilator Management Note  Flory Thompson 50 y o  male MRN: [id number]  Unit/Bed#: ICU 02 Encounter: [id number]      Daily Screen  Patient safety screen outcome[de-identified] Failed  Not Ready for Weaning due to[de-identified] Underline problem not resolved      Physical Exam:   Assessment Type: (P) Assess only  General Appearance: (P) Sedated  Respiratory Pattern: (P) Assisted  Chest Assessment: (P) Chest expansion symmetrical  Bilateral Breath Sounds: (P) Diminished, Clear  Cough: None  O2 Device: vent  Subjective Data: (P) Pt remains sedated  Offers no complaints at this time  Resp Comments: (P) Pt remains on AC vent mode  SBT not attempted today  Pt agitated when sedation is taken off  Will cont to assess for ability to start SBT daily

## 2017-10-13 NOTE — PROGRESS NOTES
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  - Intraventricular hemorrhage unknown etiology -df dg  Mycotic aneurysm   - will perform workup for infectious endocarditis - blood cultures x3, Echo of heart - pending  - Keppra 500 BID for seizure prophylaxis  maintain SBP <140, continue A-line  - Repeat CT head 10/13 - No significant interval change in intraventricular blood products in the 3rd and lateral ventricles  No significant change in ventriculomegaly   - MRI of brain w wo contrast 10/12 - Obstructive hydrocephalus secondary to hemorrhage within the 3rd ventricle  Dilated lateral and 3rd ventricles noted with transependymal flow CSF  Right transverse frontal ventriculostomy catheter terminates in the body of the right lateral ventricle  2  Hydrocephalus - EVD in place, placed on 10/12  - s/p right frontal ventriculostomy 10/12  - external ventricular drain output - 84ml    3  Acute encephalopathy 2/2 alcohol withdrawal  - started on ativan 2mg iv prn every 1 hour  - patient intubated  - ordered stat head CT - Redistribution of subarachnoid intraventricular hemorrhage as above  Again no mass effect or midline shift  CV: BP stable - 108/60  Patient on labetalol 10mg iv every 4 hours prn for BP >140                           Pulm: Intubated and sedated - propofol 50, fentanyl 100   Vent setting - AC/VC, f20, Vt 450, FiO2 50%, PEEP 5 0   Tobacco abuse - nicotine patch                           GI: Elevation in LFTs 2/2 alcoholic hepatitis  - Maddrey's score 5 3 - good prognosis, no need for steroid therapy                          : morel in place, no acute issues, I/O                           F/E/N: Isolyte @ 125/hour, thiamine, folate daily , replete electrolytes as needed                           ID: No acute issues                           Heme: stable                           Endo: Accuchecks q6 hours                            Msk/Skin: No acute issues                            Disposition: Continue ICU care        Code Status: Level 1 - Full Code    Counseling / Coordination of Care  ______________________________________________________________________    Chief Complaint: none, intubated    24 Hour Events: very restless and combative when tried to wean off propofol      ______________________________________________________________________    Physical Exam:   Physical Exam   Constitutional: He appears well-developed and well-nourished  HENT:   Head: Normocephalic and atraumatic  Eyes: Right eye exhibits no discharge  Left eye exhibits no discharge  No scleral icterus  Pinpoint sluggish pupils   Neck: Neck supple  Cardiovascular: Normal rate and regular rhythm  No murmur heard  Pulmonary/Chest: Effort normal and breath sounds normal  No respiratory distress  He has no wheezes  Abdominal: Soft  He exhibits no distension  Musculoskeletal: He exhibits no edema  Neurological: No cranial nerve deficit     Sedated, intubated, GCS 3   Skin: Skin is dry        ______________________________________________________________________  Vitals:    10/13/17 0500 10/13/17 0600 10/13/17 0633 10/13/17 0700   BP: 112/68 116/71  99/67   Pulse: 76 78  72   Resp: 20 20  20   Temp:       TempSrc:       SpO2: 100% 100% 100% 98%   Weight:       Height:           Temperature:   Temp (24hrs), Av 8 °F (37 1 °C), Min:98 3 °F (36 8 °C), Max:99 3 °F (37 4 °C)    Current Temperature: 98 4 °F (36 9 °C)  Weights:   IBW: 77 6 kg    Body mass index is 23 59 kg/m²  Weight (last 2 days)     Date/Time   Weight    10/12/17 0247  78 9 (173 94)    10/11/17 2245  79 4 (175)            Hemodynamic Monitoring:     Non-Invasive/Invasive Ventilation Settings:  Respiratory    Lab Data (Last 4 hours)    None         O2/Vent Data (Last 4 hours)      10/13 0425 10/13 0633         Vent Mode AC/VC AC/VC      Resp Rate (BPM) (BPM) 20 20      Vt (mL) (mL) 450 450      FIO2 (%) (%) 50 50      PEEP (cmH2O) (cmH2O) 5 5      MV 9 56 9                Lab Results   Component Value Date    PHART 7 403 10/12/2017    GFS9SFF 42 0 10/12/2017    PO2ART 160 2 (H) 10/12/2017    SZX5NVQ 25 6 10/12/2017    BEART 0 7 10/12/2017    SOURCE Line, Arterial 10/12/2017     Intake and Outputs:  I/O       10/10 0701 - 10/11 0700 10/11 0701 - 10/12 0700 10/12 0701 - 10/13 0700    I V  (mL/kg)  299 6 (3 8)     Total Intake(mL/kg)  299 6 (3 8)     Urine (mL/kg/hr)  200     Total Output   200      Net   +99 6                 Nutrition:        Diet Orders            Start     Ordered    10/12/17 0023  Diet NPO  Diet effective now     Question Answer Comment   Diet Type NPO    RD to adjust diet per protocol?  No        10/12/17 0032        Labs:     Results from last 7 days  Lab Units 10/13/17  0422 10/12/17  0228 10/11/17  1951   WBC Thousand/uL 9 17 6 28 7 51   HEMOGLOBIN g/dL 12 8 15 2 15 2   HEMATOCRIT % 38 4 42 8 44 9   PLATELETS Thousands/uL 195 212 243   NEUTROS PCT % 84*  --  55   MONOS PCT % 7  --  13*       Results from last 7 days  Lab Units 10/13/17  0422 10/12/17  0537 10/11/17  1951   SODIUM mmol/L 135* 140 137   POTASSIUM mmol/L 3 7 3 6 4 0   CHLORIDE mmol/L 99* 104 98*   CO2 mmol/L 28 25 27   BUN mg/dL 11 7 8   CREATININE mg/dL 0 61 0 54* 0 75   CALCIUM mg/dL 8 1* 8 2* 9 2   TOTAL PROTEIN g/dL 6 8 7 4 8 3*   BILIRUBIN TOTAL mg/dL 0 86 0 67 0 40   ALK PHOS U/L 51 65 88   ALT U/L 119* 161* 187*   AST U/L 107* 201* 212*   GLUCOSE RANDOM mg/dL 130 76 103       Results from last 7 days  Lab Units 10/13/17  0422 10/12/17  0537   MAGNESIUM mg/dL 2 4 2 1     Lab Results   Component Value Date    PHOS 3 5 10/13/2017        Results from last 7 days  Lab Units 10/12/17  0537 10/11/17  1951   INR  0 96 0 84*   PTT seconds 28 29     No results found for: TROPONINI      ABG:  Lab Results   Component Value Date    PHART 7 403 10/12/2017    WGR6UVC 42 0 10/12/2017    PO2ART 160 2 (H) 10/12/2017    OLM2PQV 25 6 10/12/2017    BEART 0 7 10/12/2017    SOURCE Line, Arterial 10/12/2017     Imaging:  I have personally reviewed pertinent reports  EKG:   Micro:  No results found for: Miguel Gent, WOUNDCULT, SPUTUMCULTUR  Allergies: No Known Allergies  Medications:   Scheduled Meds:    cefazolin 2,000 mg Intravenous Q8H   chlorhexidine 15 mL Swish & Spit Q12H [address]   levETIRAcetam 500 mg Intravenous Q12H CARMITA   midazolam      nicotine 21 mg Transdermal Daily   thiamine (VITAMIN B1) 50 mL IVPB 100 mg Intravenous Q24H     Continuous Infusions:    fentaNYL 100 mcg/hr Last Rate: 100 mcg/hr (10/12/17 2230)   multi-electrolyte 125 mL/hr Last Rate: 125 mL/hr (10/13/17 0439)   propofol 100 mcg/kg/min Last Rate: 50 mcg/kg/min (10/13/17 0605)     PRN Meds:    acetaminophen 650 mg Q4H PRN   labetalol 10 mg Q4H PRN   LORazepam 2 mg Q1H PRN   ondansetron 4 mg Q6H PRN     VTE Pharmacologic Prophylaxis: Reason for no pharmacologic prophylaxis subarachnoid hemorrhage  VTE Mechanical Prophylaxis: sequential compression device  Invasive lines and devices:   Invasive Devices     Peripheral Intravenous Line            Peripheral IV 10/11/17 Left Antecubital 1 day    Peripheral IV 10/11/17 Right Antecubital 1 day    Peripheral IV 10/12/17 Left Forearm less than 1 day          Arterial Line            Arterial Line 10/12/17 Right Radial less than 1 day          Drain            NG/OG/Enteral Tube Orogastric Center mouth less than 1 day    Urethral Catheter Non-latex 16 Fr  less than 1 day    Ventriculostomy/Subdural Ventricular drainage catheter Right Parietal region less than 1 day          Airway            ETT  Hi-Lo 8 mm less than 1 day                     Portions of the record may have been created with voice recognition software  Occasional wrong word or "sound a like" substitutions may have occurred due to the inherent limitations of voice recognition software  Read the chart carefully and recognize, using context, where substitutions have occurred      Timothy Saleh MD

## 2017-10-13 NOTE — PROGRESS NOTES
Progress Note - Neurosurgery   Illene Come 50 y o  male MRN: [id number]  Unit/Bed#: ICU 02 Encounter: [id number]    Assessment:  1  Subarachnoid hemorrhage with intraventricular hemorrhage extension  2  Placement of EVD (10/12/2017)  3  Platt Baird score 1; Valdes 4  4  Right MCA fusiform aneurysm   5  Alcohol abuse  6  Nicotine dependency     Plan: 52M presented with alcohol intoxication & dependency c/o visual disturbances, and headaches  CT showed SAH with IVH extension  He was intubated for airway protection and an EVD was placed  · Exam: Per [facility], sedation break was this morning and he was moving all extremities  examined on sedation, GCS 6-7 (1E, 4-5, 1VT)  Localizes in BUE to painful stimuli and withdrawals in BLE  Negative wells and clonus  Unable to follow commands  Dysconjugate gaze, pupils sluggish reacting bilaterally  +cough  · Imaging: personally reviewed and reviewed by attending on 10/13/2017  · 10/12 -MRI brain w/wo: Pending official radiologist read  · Appeared no new intracranial hemorrhage   · 10/13 - CT head wo: 1) No significant interval change in intraventicular blood products in the 3rd and lateral ventricles  No significant change in ventriculomegaly  2) Postsurgical changes after EVD catheter placement  · STAT CT head if decline in GCS >2 pts/1 hr  · EVD at 10+ mmHg above tragus  Output is 84cc total overnight  · 2g Ancef every 8 hours x 3 doses   · Monitor ICP's ranging 8-9  Goal of ICPs <20  · Continue Keppra 500mg every 12 hours   · DVT ppx: SCDs, hold AC/AP due to hemorrhage  · Pain/Sedation: Fentanyl 100 mcg/hr, Propofol 100 mcg/kg/min  · SBP goal <140-160  · Blood cultures in process  · Recommend EDWIN once medically stable  · Continue management for alcohol withdrawal  2mg Ativan every 1 hours as needed  · Continue to monitor examination  Call with questions or concerns       Subjective/Objective   Chief Complaint: intubated/sedated    Subjective: intubated/sedated    Objective: intubated/sedated    I/O       10/11 0701 - 10/12 0700 10/12 0701 - 10/13 0700 10/13 0701 - 10/14 0700    I V  (mL/kg) 299 6 (3 8) 4014 6 (50 9)     NG/GT  90 30    IV Piggyback  400     Total Intake(mL/kg) 299 6 (3 8) 4504 6 (57 1) 30 (0 4)    Urine (mL/kg/hr) 200 3218 (1 7) 170 (1 1)    Emesis/NG output  250 (0 1) 160 (1)    Drains  84 (0) 2 (0)    Total Output 200 3552 332    Net +99 6 +952 6 -302                 Invasive Devices     Peripheral Intravenous Line            Peripheral IV 10/11/17 Left Antecubital 1 day    Peripheral IV 10/11/17 Right Antecubital 1 day    Peripheral IV 10/12/17 Left Forearm less than 1 day          Arterial Line            Arterial Line 10/12/17 Right Radial less than 1 day          Drain            NG/OG/Enteral Tube Orogastric Center mouth less than 1 day    Urethral Catheter Non-latex 16 Fr  less than 1 day    Ventriculostomy/Subdural Ventricular drainage catheter Right Parietal region less than 1 day          Airway            ETT  Hi-Lo 8 mm less than 1 day                Physical Exam:  Vitals: Blood pressure 105/67, pulse 70, temperature 98 7 °F (37 1 °C), temperature source Oral, resp  rate 20, height 6' (1 829 m), weight 78 9 kg (173 lb 15 1 oz), SpO2 98 %  ,Body mass index is 23 59 kg/m²  Hemodynamic Monitoring: MAP: Arterial Line MAP (mmHg): 76 mmHg, CPP: CPP: 68, ICP Mean: ICP Mean (mmHg): 8 mmHg    General appearance: intubated, no distress  Head: Normocephalic, right EVD at 78+ mmHg tragus  Eyes: dysconjugate gaze, PERRL sluggish bilaterally  Lungs: intubated  Heart: regular heart rate  Neurologic:   Mental status: intubated and sedated  Cranial nerves: pupils reactive, sluggish  +cough  Sensory: unable to assess due to intubation and sedation  Motor: localizes from painful stimuli in bilateral upper extremities and withdrawals equally in bilateral lower extremities  Reflexes: 1+ and symmetric  negative wells, negative clonus  Left +babinski reflex  Lab Results:    Results from last 7 days  Lab Units 10/13/17  0422 10/12/17  0228 10/11/17  1949   WBC Thousand/uL 9 17 6 28 7 51   HEMOGLOBIN g/dL 12 8 15 2 15 2   HEMATOCRIT % 38 4 42 8 44 9   PLATELETS Thousands/uL 195 212 243   NEUTROS PCT % 84*  --  55   MONOS PCT % 7  --  13*       Results from last 7 days  Lab Units 10/13/17  0422 10/12/17  0537 10/11/17  1949   SODIUM mmol/L 135* 140 137   POTASSIUM mmol/L 3 7 3 6 4 0   CHLORIDE mmol/L 99* 104 98*   CO2 mmol/L 28 25 27   BUN mg/dL 11 7 8   CREATININE mg/dL 0 61 0 54* 0 75   CALCIUM mg/dL 8 1* 8 2* 9 2   TOTAL PROTEIN g/dL 6 8 7 4 8 3*   BILIRUBIN TOTAL mg/dL 0 86 0 67 0 40   ALK PHOS U/L 51 65 88   ALT U/L 119* 161* 187*   AST U/L 107* 201* 212*   GLUCOSE RANDOM mg/dL 130 76 103       Results from last 7 days  Lab Units 10/13/17  0422 10/12/17  0537   MAGNESIUM mg/dL 2 4 2 1       Results from last 7 days  Lab Units 10/13/17  0422   PHOSPHORUS mg/dL 3 5       Results from last 7 days  Lab Units 10/12/17  0537 10/11/17  1949   INR  0 96 0 84*   PTT seconds 28 29     No results found for: TROPONINT  ABG:  Lab Results   Component Value Date    PHART 7 403 10/12/2017    VRZ8ENG 42 0 10/12/2017    PO2ART 160 2 (H) 10/12/2017    RTP6UBU 25 6 10/12/2017    BEART 0 7 10/12/2017    SOURCE Line, Arterial 10/12/2017       Imaging Studies: I have personally reviewed pertinent reports  and I have personally reviewed pertinent films in PACS    EKG, Pathology, and Other Studies: I have personally reviewed pertinent reports        VTE  Prophylaxis: Sequential compression device (Venodyne)  and Reason for no pharmacologic prophylaxis SAH with intraventricular extension

## 2017-10-13 NOTE — PHYSICAL THERAPY NOTE
Eval deferred, pt intubated and sedated  Will follow for eval when appropriate    Veronica Christianson PT, DPT CSRS

## 2017-10-14 ENCOUNTER — APPOINTMENT (INPATIENT)
Dept: RADIOLOGY | Facility: HOSPITAL | Age: 52
DRG: 021 | End: 2017-10-14
Payer: COMMERCIAL

## 2017-10-14 PROBLEM — I72.9 PSEUDOANEURYSM (HCC): Status: ACTIVE | Noted: 2017-10-14

## 2017-10-14 PROBLEM — I61.5 INTRAVENTRICULAR HEMORRHAGE (HCC): Status: ACTIVE | Noted: 2017-10-14

## 2017-10-14 LAB
ALBUMIN SERPL BCP-MCNC: 3 G/DL (ref 3.5–5)
ALP SERPL-CCNC: 49 U/L (ref 46–116)
ALT SERPL W P-5'-P-CCNC: 90 U/L (ref 12–78)
ANION GAP SERPL CALCULATED.3IONS-SCNC: 5 MMOL/L (ref 4–13)
ANION GAP SERPL CALCULATED.3IONS-SCNC: 6 MMOL/L (ref 4–13)
AST SERPL W P-5'-P-CCNC: 83 U/L (ref 5–45)
BASOPHILS # BLD AUTO: 0.05 THOUSANDS/ΜL (ref 0–0.1)
BASOPHILS NFR BLD AUTO: 1 % (ref 0–1)
BILIRUB SERPL-MCNC: 0.62 MG/DL (ref 0.2–1)
BUN SERPL-MCNC: 10 MG/DL (ref 5–25)
BUN SERPL-MCNC: 8 MG/DL (ref 5–25)
CALCIUM SERPL-MCNC: 7.9 MG/DL (ref 8.3–10.1)
CALCIUM SERPL-MCNC: 8 MG/DL (ref 8.3–10.1)
CHLORIDE SERPL-SCNC: 101 MMOL/L (ref 100–108)
CHLORIDE SERPL-SCNC: 101 MMOL/L (ref 100–108)
CO2 SERPL-SCNC: 31 MMOL/L (ref 21–32)
CO2 SERPL-SCNC: 32 MMOL/L (ref 21–32)
CREAT SERPL-MCNC: 0.56 MG/DL (ref 0.6–1.3)
CREAT SERPL-MCNC: 0.61 MG/DL (ref 0.6–1.3)
EOSINOPHIL # BLD AUTO: 0.15 THOUSAND/ΜL (ref 0–0.61)
EOSINOPHIL NFR BLD AUTO: 2 % (ref 0–6)
ERYTHROCYTE [DISTWIDTH] IN BLOOD BY AUTOMATED COUNT: 13.2 % (ref 11.6–15.1)
GFR SERPL CREATININE-BSD FRML MDRD: 116 ML/MIN/1.73SQ M
GFR SERPL CREATININE-BSD FRML MDRD: 121 ML/MIN/1.73SQ M
GLUCOSE SERPL-MCNC: 95 MG/DL (ref 65–140)
GLUCOSE SERPL-MCNC: 97 MG/DL (ref 65–140)
HCT VFR BLD AUTO: 37.4 % (ref 36.5–49.3)
HGB BLD-MCNC: 12.2 G/DL (ref 12–17)
LYMPHOCYTES # BLD AUTO: 1.24 THOUSANDS/ΜL (ref 0.6–4.47)
LYMPHOCYTES NFR BLD AUTO: 16 % (ref 14–44)
MAGNESIUM SERPL-MCNC: 2.4 MG/DL (ref 1.6–2.6)
MAGNESIUM SERPL-MCNC: 2.5 MG/DL (ref 1.6–2.6)
MCH RBC QN AUTO: 33.6 PG (ref 26.8–34.3)
MCHC RBC AUTO-ENTMCNC: 32.6 G/DL (ref 31.4–37.4)
MCV RBC AUTO: 103 FL (ref 82–98)
MONOCYTES # BLD AUTO: 0.56 THOUSAND/ΜL (ref 0.17–1.22)
MONOCYTES NFR BLD AUTO: 7 % (ref 4–12)
NEUTROPHILS # BLD AUTO: 5.71 THOUSANDS/ΜL (ref 1.85–7.62)
NEUTS SEG NFR BLD AUTO: 74 % (ref 43–75)
NRBC BLD AUTO-RTO: 0 /100 WBCS
PHOSPHATE SERPL-MCNC: 2.4 MG/DL (ref 2.7–4.5)
PLATELET # BLD AUTO: 166 THOUSANDS/UL (ref 149–390)
PMV BLD AUTO: 10.7 FL (ref 8.9–12.7)
POTASSIUM SERPL-SCNC: 3.7 MMOL/L (ref 3.5–5.3)
POTASSIUM SERPL-SCNC: 4.1 MMOL/L (ref 3.5–5.3)
PROT SERPL-MCNC: 6.2 G/DL (ref 6.4–8.2)
RBC # BLD AUTO: 3.63 MILLION/UL (ref 3.88–5.62)
SODIUM SERPL-SCNC: 138 MMOL/L (ref 136–145)
SODIUM SERPL-SCNC: 138 MMOL/L (ref 136–145)
WBC # BLD AUTO: 7.76 THOUSAND/UL (ref 4.31–10.16)

## 2017-10-14 PROCEDURE — 94760 N-INVAS EAR/PLS OXIMETRY 1: CPT

## 2017-10-14 PROCEDURE — 71010 HB CHEST X-RAY 1 VIEW FRONTAL (PORTABLE): CPT

## 2017-10-14 PROCEDURE — 94003 VENT MGMT INPAT SUBQ DAY: CPT

## 2017-10-14 PROCEDURE — 83735 ASSAY OF MAGNESIUM: CPT | Performed by: INTERNAL MEDICINE

## 2017-10-14 PROCEDURE — 85025 COMPLETE CBC W/AUTO DIFF WBC: CPT | Performed by: INTERNAL MEDICINE

## 2017-10-14 PROCEDURE — 84100 ASSAY OF PHOSPHORUS: CPT | Performed by: INTERNAL MEDICINE

## 2017-10-14 PROCEDURE — 80053 COMPREHEN METABOLIC PANEL: CPT | Performed by: INTERNAL MEDICINE

## 2017-10-14 PROCEDURE — 80048 BASIC METABOLIC PNL TOTAL CA: CPT | Performed by: INTERNAL MEDICINE

## 2017-10-14 RX ORDER — ACETAMINOPHEN 160 MG/5ML
650 SUSPENSION, ORAL (FINAL DOSE FORM) ORAL EVERY 6 HOURS
Status: DISCONTINUED | OUTPATIENT
Start: 2017-10-14 | End: 2017-10-16

## 2017-10-14 RX ORDER — SODIUM CHLORIDE, SODIUM GLUCONATE, SODIUM ACETATE, POTASSIUM CHLORIDE, MAGNESIUM CHLORIDE, SODIUM PHOSPHATE, DIBASIC, AND POTASSIUM PHOSPHATE .53; .5; .37; .037; .03; .012; .00082 G/100ML; G/100ML; G/100ML; G/100ML; G/100ML; G/100ML; G/100ML
100 INJECTION, SOLUTION INTRAVENOUS CONTINUOUS
Status: DISCONTINUED | OUTPATIENT
Start: 2017-10-14 | End: 2017-10-19

## 2017-10-14 RX ORDER — ACETAMINOPHEN 160 MG/5ML
650 SUSPENSION, ORAL (FINAL DOSE FORM) ORAL EVERY 4 HOURS PRN
Status: DISCONTINUED | OUTPATIENT
Start: 2017-10-14 | End: 2017-10-14

## 2017-10-14 RX ORDER — FUROSEMIDE 10 MG/ML
20 INJECTION INTRAMUSCULAR; INTRAVENOUS ONCE
Status: COMPLETED | OUTPATIENT
Start: 2017-10-14 | End: 2017-10-14

## 2017-10-14 RX ADMIN — PROPOFOL 50 MCG/KG/MIN: 10 INJECTION, EMULSION INTRAVENOUS at 13:06

## 2017-10-14 RX ADMIN — CHLORHEXIDINE GLUCONATE 15 ML: 1.2 RINSE ORAL at 20:47

## 2017-10-14 RX ADMIN — LEVETIRACETAM 500 MG: 100 SOLUTION ORAL at 10:54

## 2017-10-14 RX ADMIN — CHLORHEXIDINE GLUCONATE 15 ML: 1.2 RINSE ORAL at 09:15

## 2017-10-14 RX ADMIN — SODIUM CHLORIDE, SODIUM GLUCONATE, SODIUM ACETATE, POTASSIUM CHLORIDE AND MAGNESIUM CHLORIDE 125 ML/HR: 526; 502; 368; 37; 30 INJECTION, SOLUTION INTRAVENOUS at 01:35

## 2017-10-14 RX ADMIN — ACETAMINOPHEN 650 MG: 160 SUSPENSION ORAL at 20:46

## 2017-10-14 RX ADMIN — LEVETIRACETAM 500 MG: 100 SOLUTION ORAL at 20:46

## 2017-10-14 RX ADMIN — SODIUM CHLORIDE, SODIUM GLUCONATE, SODIUM ACETATE, POTASSIUM CHLORIDE AND MAGNESIUM CHLORIDE 125 ML/HR: 526; 502; 368; 37; 30 INJECTION, SOLUTION INTRAVENOUS at 08:20

## 2017-10-14 RX ADMIN — PROPOFOL 50 MCG/KG/MIN: 10 INJECTION, EMULSION INTRAVENOUS at 05:37

## 2017-10-14 RX ADMIN — ACETAMINOPHEN 650 MG: 325 TABLET, FILM COATED ORAL at 05:37

## 2017-10-14 RX ADMIN — FUROSEMIDE 20 MG: 10 INJECTION, SOLUTION INTRAMUSCULAR; INTRAVENOUS at 09:16

## 2017-10-14 RX ADMIN — FOLIC ACID 1 MG: 1 TABLET ORAL at 09:15

## 2017-10-14 RX ADMIN — FENTANYL CITRATE 100 MCG/HR: at 11:43

## 2017-10-14 RX ADMIN — PROPOFOL 50 MCG/KG/MIN: 10 INJECTION, EMULSION INTRAVENOUS at 17:45

## 2017-10-14 RX ADMIN — ACETAMINOPHEN 650 MG: 160 SUSPENSION ORAL at 09:15

## 2017-10-14 RX ADMIN — Medication 20 MG: at 09:16

## 2017-10-14 RX ADMIN — FENTANYL CITRATE 100 MCG/HR: at 23:57

## 2017-10-14 RX ADMIN — PROPOFOL 50 MCG/KG/MIN: 10 INJECTION, EMULSION INTRAVENOUS at 09:17

## 2017-10-14 RX ADMIN — POTASSIUM PHOSPHATE, MONOBASIC AND POTASSIUM PHOSPHATE, DIBASIC 21 MMOL: 224; 236 INJECTION, SOLUTION INTRAVENOUS at 10:53

## 2017-10-14 RX ADMIN — PROPOFOL 50 MCG/KG/MIN: 10 INJECTION, EMULSION INTRAVENOUS at 20:46

## 2017-10-14 RX ADMIN — NICOTINE 21 MG: 21 PATCH, EXTENDED RELEASE TRANSDERMAL at 09:17

## 2017-10-14 RX ADMIN — PROPOFOL 50 MCG/KG/MIN: 10 INJECTION, EMULSION INTRAVENOUS at 01:38

## 2017-10-14 RX ADMIN — Medication 100 MG: at 09:17

## 2017-10-14 RX ADMIN — ACETAMINOPHEN 650 MG: 160 SUSPENSION ORAL at 15:37

## 2017-10-14 NOTE — SOCIAL WORK
Pt intubated and sedated  CM met with pt mother - Kamille Whittaker and daughter Cristal Harp [phone]  CM reviewed role with dcp  Pt resides with friends and his g/f Radha Vogel in a 2 story home  Pt has 7 LOKI and bedroom/bathroom on 2nd floor  Pt independent with ADLs and ambulation PTA  Pt does not work and at times helps his brother  Pt has no hx of VNA or STR  Pt has hx of incarceration 10 years ago for DUI  Pt has long hx of ETOH abuse and was in IP rehab 30 years ago  According to pt family he currently drinks everyday  Pt has no hx of MH tx  Pt does not have insurance and is MA pending  Pharmacy - [facility]  CM reviewed in the event pt would need STR at d/c and pt agreeable  MA process would need to be completed through [address] [place] [address] on Aging  CM to f/u Monday with submitting MA-51 and PASRR  Family would like pt to have IP Alcohol tx at d/c  CM reviewed it would be pt choice  CM reviewed HOST program as a possible option  CM reviewed d/c planning process including the following: identifying help at home, patient preference for d/c planning needs, Discharge Lounge, [facility] Meds to Bed program, availability of treatment team to discuss questions or concerns patient and/or family may have regarding understanding medications and recognizing signs and symptoms once discharged  CM also encouraged patient to follow up with all recommended appointments after discharge  Patient advised of importance for patient and family to participate in managing patients medical well being

## 2017-10-14 NOTE — PROGRESS NOTES
md assesses pt  Labs and hemos reviewed, K po4 to be replaced as ordered   Iv fluids decreased to 10/hr, md aware of fever this am ATC tylenol iniatiated as ordered and cooling blanket placed for normothermia

## 2017-10-14 NOTE — PLAN OF CARE
Problem: DISCHARGE PLANNING - CARE MANAGEMENT  Goal: Discharge to post-acute care or home with appropriate resources  INTERVENTIONS:  - Conduct assessment to determine patient/family and health care team treatment goals, and need for post-acute services based on payer coverage, community resources, and patient preferences, and barriers to discharge  - Address psychosocial, clinical, and financial barriers to discharge as identified in assessment in conjunction with the patient/family and health care team  - Arrange appropriate level of post-acute services according to patient's   needs and preference and payer coverage in collaboration with the physician and health care team  - Communicate with and update the patient/family, physician, and health care team regarding progress on the discharge plan  - Arrange appropriate transportation to post-acute venues   - Pt to d/c with appropriate resources when medically stable    Outcome: Progressing

## 2017-10-14 NOTE — RESPIRATORY THERAPY NOTE
RT Ventilator Management Note  Rachid Click 50 y o  male MRN: [id number]  Unit/Bed#: ICU 02 Encounter: [id number]      Daily Screen  Patient safety screen outcome[de-identified] Failed  Not Ready for Weaning due to[de-identified] Underline problem not resolved      Physical Exam:   Assessment Type: Assess only  General Appearance: Sedated  Respiratory Pattern: Assisted  Chest Assessment: Chest expansion symmetrical  Bilateral Breath Sounds: Clear, Diminished  Cough: Productive  Suction: ET Tube  O2 Device: vent  Subjective Data: na      Resp Comments: Pt  remains stable on AC mode  No problems throughout the night  Will continue to monitor

## 2017-10-14 NOTE — RESPIRATORY THERAPY NOTE
RT Ventilator Management Note  Amelia Braxton 50 y o  male MRN: [id number]  Unit/Bed#: ICU 02 Encounter: [id number]      Daily Screen  Patient safety screen outcome[de-identified] Failed  Not Ready for Weaning due to[de-identified] Underline problem not resolved      Physical Exam:   Assessment Type: Assess only  General Appearance: Sedated  Respiratory Pattern: Assisted  Chest Assessment: Chest expansion symmetrical  Bilateral Breath Sounds: Clear, Diminished  Cough: None  O2 Device: vent  Subjective Data: na      Resp Comments: Pt  remains on AC mode  no changes at this time  Will continue to monitor

## 2017-10-14 NOTE — NUTRITION
10/14/17 1159   Recommendations/Interventions   Nutrition Recommendations Adjust EN/PN  (w/ current propofol @ 23 7ml/hr rec continue Jevity 1 2 @50ml/hr and add 2 packs of prosource daily  will provide 2186 total kcal, 97g protein, 203g cho, 972ml free water  provides ~28kcal/kg current body weight and ~1 2g protein/kg   replete phosphorus )

## 2017-10-14 NOTE — PROGRESS NOTES
Progress Note - Critical Care   Julia Heredia 50 y o  male MRN: [id number]  Unit/Bed#: ICU 02 Encounter: [id number]    Attending Physician: Sara Carbone MD      ______________________________________________________________________  Assessment and Plan:   Principal Problem:    Subarachnoid hemorrhage ([facility])  Active Problems:    Headache    Alcohol abuse    Nicotine dependence  Resolved Problems:    * No resolved hospital problems  *    Neuro: 1  SAH w/ Intraventricular hemorrhage with unknown source of bleeding - cerebral angiography showed  7 5 x 3 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  - Intraventricular hemorrhage unknown etiology -df dg  Mycotic aneurysm   - will perform workup for infectious endocarditis - blood cultures x3 - negative, Echo of heart - EF 88%, grade 1 diastolic dysfunction  - Keppra 500 BID for seizure prophylaxis  maintain SBP <140, continue A-line  - Repeat CT head 10/13 - No significant interval change in intraventricular blood products in the 3rd and lateral ventricles  No significant change in ventriculomegaly   - MRI of brain w wo contrast 10/12 - Obstructive hydrocephalus secondary to hemorrhage within the 3rd ventricle  Dilated lateral and 3rd ventricles noted with transependymal flow CSF  Right transverse frontal ventriculostomy catheter terminates in the body of the right lateral ventricle  2  Hydrocephalus - EVD in place, placed on 10/12  - s/p right frontal ventriculostomy 10/12  - external ventricular drain output - 84ml    3  Acute encephalopathy 2/2 alcohol withdrawal  - intubated, ativan 2mg iv prn every 1 hour                          CV: BP stable - 116/68  Patient on labetalol 10mg iv every 4 hours prn for BP >140                           Pulm: Intubated and sedated - propofol 50, fentanyl 100   Vent setting - AC/VC, f20, Vt 450, FiO2 50%, PEEP 5 0   Tobacco abuse - nicotine patch                           GI: Elevation in LFTs 2/2 alcoholic hepatitis  - Maddrey's score 5 3 - good prognosis, no need for steroid therapy                          : morel in place, no acute issues, I/O                           F/E/N: Isolyte @ 125/hour, thiamine, folate daily , replete electrolytes as needed                           ID: No acute issues                           Heme: stable                           Endo: Accuchecks q6 hours                            Msk/Skin: No acute issues                            Disposition: Continue ICU care        Code Status: Level 1 - Full Code    Counseling / Coordination of Care  ______________________________________________________________________    Chief Complaint: none, intubated    24 Hour Events: very restless and combative when tried to wean off propofol up to rate 30, at that time had to be given ativan      ______________________________________________________________________    Physical Exam:   Physical Exam   Constitutional: He appears well-developed and well-nourished  HENT:   Head: Normocephalic and atraumatic  Eyes: Right eye exhibits no discharge  Left eye exhibits no discharge  No scleral icterus  Pinpoint sluggish pupils   Neck: Neck supple  Cardiovascular: Normal rate and regular rhythm  No murmur heard  Pulmonary/Chest: Effort normal and breath sounds normal  No respiratory distress  He has no wheezes  Abdominal: Soft  He exhibits no distension  Musculoskeletal: He exhibits no edema  Neurological: No cranial nerve deficit     Sedated, intubated, GCS 3   Skin: Skin is dry        ______________________________________________________________________  Vitals:    10/14/17 0300 10/14/17 0302 10/14/17 0400 10/14/17 0530   BP:       Pulse: 80  82    Resp: 20  19    Temp: 100 3 °F (37 9 °C)   (!) 101 °F (38 3 °C)   TempSrc: Oral   Rectal   SpO2: 96% 96% 96%    Weight:       Height:           Temperature:   Temp (24hrs), Av 7 °F (37 6 °C), Min:98 4 °F (36 9 °C), Max:101 °F (38 3 °C)    Current Temperature: (!) 101 °F (38 3 °C)  Weights:   IBW: 77 6 kg    Body mass index is 23 59 kg/m²  Weight (last 2 days)     Date/Time   Weight    10/12/17 0247  78 9 (173 94)            Hemodynamic Monitoring:     Non-Invasive/Invasive Ventilation Settings:  Respiratory    Lab Data (Last 4 hours)    None         O2/Vent Data (Last 4 hours)      10/14 0302           Vent Mode AC/VC       Resp Rate (BPM) (BPM) 20       Vt (mL) (mL) 450       FIO2 (%) (%) 40       PEEP (cmH2O) (cmH2O) 5       MV 9 65                 No results found for: PHART, XND7WJV, PO2ART, XDH0DRH, U3WGSPXE, BEART, SOURCE  Intake and Outputs:  I/O       10/10 0701 - 10/11 0700 10/11 0701 - 10/12 0700 10/12 0701 - 10/13 0700    I V  (mL/kg)  299 6 (3 8)     Total Intake(mL/kg)  299 6 (3 8)     Urine (mL/kg/hr)  200     Total Output   200      Net   +99 6                 Nutrition:        Diet Orders            Start     Ordered    10/13/17 1252  Diet Enteral/Parenteral; Tube Feeding No Oral Diet; Jevity 1 2 Rob; 50; 50  Diet effective now     Comments:  Jevity 1 2 - 50cc/hour   Question Answer Comment   Diet Type Enteral/Parenteral    Enteral/Parenteral Tube Feeding No Oral Diet    Tube Feeding Formula: Jevity 1 2 Rob    Tube Feeding Bolus (mL): 50    Tube Feeding Continuous rate (mL/hr): 50    RD to adjust diet per protocol?  No        10/13/17 1251        Labs:     Results from last 7 days  Lab Units 10/14/17  0426 10/13/17  0422 10/12/17  0228 10/11/17  1949   WBC Thousand/uL 7 76 9 17 6 28 7 51   HEMOGLOBIN g/dL 12 2 12 8 15 2 15 2   HEMATOCRIT % 37 4 38 4 42 8 44 9   PLATELETS Thousands/uL 166 195 212 243   NEUTROS PCT % 74 84*  --  55   MONOS PCT % 7 7  --  13*       Results from last 7 days  Lab Units 10/13/17  0422 10/12/17  0537 10/11/17  1949   SODIUM mmol/L 135* 140 137   POTASSIUM mmol/L 3 7 3 6 4 0   CHLORIDE mmol/L 99* 104 98*   CO2 mmol/L 28 25 27   BUN mg/dL 11 7 8   CREATININE mg/dL 0 61 0 54* 0 75   CALCIUM mg/dL 8  1* 8 2* 9 2   TOTAL PROTEIN g/dL 6 8 7 4 8 3*   BILIRUBIN TOTAL mg/dL 0 86 0 67 0 40   ALK PHOS U/L 51 65 88   ALT U/L 119* 161* 187*   AST U/L 107* 201* 212*   GLUCOSE RANDOM mg/dL 130 76 103       Results from last 7 days  Lab Units 10/13/17  0422 10/12/17  0537   MAGNESIUM mg/dL 2 4 2 1     Lab Results   Component Value Date    PHOS 3 5 10/13/2017        Results from last 7 days  Lab Units 10/12/17  0537 10/11/17  1949   INR  0 96 0 84*   PTT seconds 28 29     No results found for: TROPONINI      ABG:  Lab Results   Component Value Date    PHART 7 403 10/12/2017    OBQ1GNN 42 0 10/12/2017    PO2ART 160 2 (H) 10/12/2017    [id number] 25 6 10/12/2017    BEART 0 7 10/12/2017    SOURCE Line, Arterial 10/12/2017     Imaging:  I have personally reviewed pertinent reports  EKG:   Micro:  Lab Results   Component Value Date    BLOODCX No Growth at 24 hrs  10/12/2017    BLOODCX No Growth at 24 hrs  10/12/2017    BLOODCX No Growth at 24 hrs  10/12/2017     Allergies: No Known Allergies  Medications:   Scheduled Meds:    chlorhexidine 15 mL [employer]ish [employer] Spit S74D [address]   folic acid 1 mg Oral Daily   levETIRAcetam 500 mg Per NG Tube Q12H [address]   nicotine 21 mg Transdermal Daily   omeprazole (PRILOSEC) suspension 2 mg/mL 40 mg Oral Daily   thiamine 100 mg Oral Daily     Continuous Infusions:    fentaNYL 100 mcg/hr Last Rate: 100 mcg/hr (10/13/17 2244)   multi-electrolyte 125 mL/hr Last Rate: 125 mL/hr (10/14/17 0135)   propofol 5-50 mcg/kg/min Last Rate: 50 mcg/kg/min (10/14/17 0537)     PRN Meds:    acetaminophen 650 mg Q4H PRN   labetalol 10 mg Q4H PRN   LORazepam 2 mg Q1H PRN   ondansetron 4 mg Q6H PRN     VTE Pharmacologic Prophylaxis: Reason for no pharmacologic prophylaxis subarachnoid hemorrhage  VTE Mechanical Prophylaxis: sequential compression device  Invasive lines and devices:   Invasive Devices     Peripheral Intravenous Line            Peripheral IV 10/11/17 Left Antecubital 2 days    Peripheral IV 10/12/17 Left Forearm 1 day    Peripheral IV 10/13/17 Right Forearm 1 day          Arterial Line            Arterial Line 10/12/17 Right Radial 1 day          Drain            NG/OG/Enteral Tube Orogastric Center mouth 1 day    Urethral Catheter Non-latex 16 Fr  1 day    Ventriculostomy/Subdural Ventricular drainage catheter Right Parietal region 1 day          Airway            ETT  Hi-Lo 8 mm 1 day                     Portions of the record may have been created with voice recognition software  Occasional wrong word or "sound a like" substitutions may have occurred due to the inherent limitations of voice recognition software  Read the chart carefully and recognize, using context, where substitutions have occurred      Carolina Amaro MD

## 2017-10-14 NOTE — RESPIRATORY THERAPY NOTE
RT Ventilator Management Note  Amelia Braxton 50 y o  male MRN: [id number]  Unit/Bed#: ICU 02 Encounter: [id number]      Daily Screen  Patient safety screen outcome[de-identified] Failed  Not Ready for Weaning due to[de-identified] Underline problem not resolved      Physical Exam:   Assessment Type: Assess only  General Appearance: Sedated  Respiratory Pattern: Assisted  Chest Assessment: Chest expansion symmetrical  Bilateral Breath Sounds: Clear, Diminished  Cough: None  O2 Device: vent  Subjective Data: na      Resp Comments: Pt  stable on AC mode  no changes at this time  will continue to monitor

## 2017-10-14 NOTE — PROGRESS NOTES
Assessment:  1  Spontaneous IVH  2  Placement of EVD (10/12/2017)  3  Right MCA fusiform aneurysm   4  Alcohol abuse  5  Nicotine dependency      Plan: 52M presented with alcohol intoxication & dependency c/o visual disturbances, and headaches  CT showed SAH with IVH extension  He was intubated for airway protection and an EVD was placed  Neuro   - Cont EVD at 10   - Cont to monitor for EtOH wd     - Febrile ON with no leukocytosis, cont to monitor   - Cont keppra   - wean sedation as able   - Bcx negative to date   - Okay for [facility] for dvt ppx    No events overnight  Temp:  [98 4 °F (36 9 °C)-101 °F (38 3 °C)] 101 °F (38 3 °C)  HR:  [68-86] 81  Resp:  [19-20] 20  BP: (107-119)/(59-78) 110/67  Arterial Line BP: ()/(58-78) 122/60  Intubated, sedation turned off  PERRL +cpugh  No following commands, but moves all ext spont   Localizes in uppers  EVD at 10, 156cc ICP wnl        Results from last 7 days  Lab Units 10/14/17  0426 10/13/17  0422 10/12/17  0228 10/11/17  1949   WBC Thousand/uL 7 76 9 17 6 28 7 51   HEMOGLOBIN g/dL 12 2 12 8 15 2 15 2   HEMATOCRIT % 37 4 38 4 42 8 44 9   PLATELETS Thousands/uL 166 195 212 243   NEUTROS PCT % 74 84*  --  55   MONOS PCT % 7 7  --  13*       Results from last 7 days  Lab Units 10/14/17  0426 10/13/17  0422 10/12/17  0537   SODIUM mmol/L 138 135* 140   POTASSIUM mmol/L 3 7 3 7 3 6   CHLORIDE mmol/L 101 99* 104   CO2 mmol/L 32 28 25   BUN mg/dL 10 11 7   CREATININE mg/dL 0 56* 0 61 0 54*   CALCIUM mg/dL 7 9* 8 1* 8 2*   TOTAL PROTEIN g/dL 6 2* 6 8 7 4   BILIRUBIN TOTAL mg/dL 0 62 0 86 0 67   ALK PHOS U/L 49 51 65   ALT U/L 90* 119* 161*   AST U/L 83* 107* 201*   GLUCOSE RANDOM mg/dL 97 130 76       Results from last 7 days  Lab Units 10/14/17  0426 10/13/17  0422 10/12/17  0537   MAGNESIUM mg/dL 2 5 2 4 2 1       Results from last 7 days  Lab Units 10/14/17  0426 10/13/17  0422   PHOSPHORUS mg/dL 2 4* 3 5       Results from last 7 days  Lab Units 10/12/17  0537 10/11/17  1949   INR  0 96 0 84*   PTT seconds 28 29     No results found for: TROPONINT  ABG:  Lab Results   Component Value Date    PHART 7 403 10/12/2017    [id number] 42 0 10/12/2017    PO2ART 160 2 (H) 10/12/2017    [id number] 25 6 10/12/2017    BEART 0 7 10/12/2017    SOURCE Line, Arterial 10/12/2017

## 2017-10-15 ENCOUNTER — APPOINTMENT (INPATIENT)
Dept: RADIOLOGY | Facility: HOSPITAL | Age: 52
DRG: 021 | End: 2017-10-15
Payer: COMMERCIAL

## 2017-10-15 PROBLEM — D69.6 THROMBOCYTOPENIA (HCC): Status: ACTIVE | Noted: 2017-10-15

## 2017-10-15 PROBLEM — E88.09 HYPOALBUMINEMIA: Status: ACTIVE | Noted: 2017-10-15

## 2017-10-15 PROBLEM — D72.829 LEUKOCYTOSIS: Status: ACTIVE | Noted: 2017-10-15

## 2017-10-15 LAB
ALBUMIN SERPL BCP-MCNC: 2.7 G/DL (ref 3.5–5)
ALP SERPL-CCNC: 66 U/L (ref 46–116)
ALT SERPL W P-5'-P-CCNC: 85 U/L (ref 12–78)
ANION GAP SERPL CALCULATED.3IONS-SCNC: 6 MMOL/L (ref 4–13)
APPEARANCE CSF: ABNORMAL
AST SERPL W P-5'-P-CCNC: 73 U/L (ref 5–45)
BASOPHILS # BLD AUTO: 0.04 THOUSANDS/ΜL (ref 0–0.1)
BASOPHILS NFR BLD AUTO: 0 % (ref 0–1)
BILIRUB SERPL-MCNC: 0.92 MG/DL (ref 0.2–1)
BUN SERPL-MCNC: 8 MG/DL (ref 5–25)
CALCIUM SERPL-MCNC: 8 MG/DL (ref 8.3–10.1)
CHLORIDE SERPL-SCNC: 100 MMOL/L (ref 100–108)
CO2 SERPL-SCNC: 30 MMOL/L (ref 21–32)
CREAT SERPL-MCNC: 0.5 MG/DL (ref 0.6–1.3)
EOSINOPHIL # BLD AUTO: 0.28 THOUSAND/ΜL (ref 0–0.61)
EOSINOPHIL NFR BLD AUTO: 3 % (ref 0–6)
ERYTHROCYTE [DISTWIDTH] IN BLOOD BY AUTOMATED COUNT: 12.7 % (ref 11.6–15.1)
GFR SERPL CREATININE-BSD FRML MDRD: 126 ML/MIN/1.73SQ M
GLUCOSE CSF-MCNC: 84 MG/DL (ref 50–80)
GLUCOSE SERPL-MCNC: 106 MG/DL (ref 65–140)
GRAM STN SPEC: NORMAL
HCT VFR BLD AUTO: 40 % (ref 36.5–49.3)
HGB BLD-MCNC: 12.9 G/DL (ref 12–17)
LYMPHOCYTES # BLD AUTO: 1.11 THOUSANDS/ΜL (ref 0.6–4.47)
LYMPHOCYTES NFR BLD AUTO: 11 % (ref 14–44)
MAGNESIUM SERPL-MCNC: 2.3 MG/DL (ref 1.6–2.6)
MCH RBC QN AUTO: 33.2 PG (ref 26.8–34.3)
MCHC RBC AUTO-ENTMCNC: 32.3 G/DL (ref 31.4–37.4)
MCV RBC AUTO: 103 FL (ref 82–98)
MONOCYTES # BLD AUTO: 1.23 THOUSAND/ΜL (ref 0.17–1.22)
MONOCYTES NFR BLD AUTO: 12 % (ref 4–12)
NEUTROPHILS # BLD AUTO: 7.78 THOUSANDS/ΜL (ref 1.85–7.62)
NEUTS SEG NFR BLD AUTO: 74 % (ref 43–75)
NRBC BLD AUTO-RTO: 0 /100 WBCS
PHOSPHATE SERPL-MCNC: 3.4 MG/DL (ref 2.7–4.5)
PLATELET # BLD AUTO: 142 THOUSANDS/UL (ref 149–390)
PMV BLD AUTO: 10.7 FL (ref 8.9–12.7)
POTASSIUM SERPL-SCNC: 3.6 MMOL/L (ref 3.5–5.3)
PROT CSF-MCNC: 42 MG/DL (ref 15–45)
PROT SERPL-MCNC: 6.5 G/DL (ref 6.4–8.2)
RBC # BLD AUTO: 3.88 MILLION/UL (ref 3.88–5.62)
RBC # CSF MANUAL: 1250 UL (ref 0–10)
SODIUM SERPL-SCNC: 136 MMOL/L (ref 136–145)
TOTAL CELLS COUNTED BLD: NO
WBC # BLD AUTO: 10.49 THOUSAND/UL (ref 4.31–10.16)
WBC # CSF AUTO: 7 /UL (ref 0–5)

## 2017-10-15 PROCEDURE — 87070 CULTURE OTHR SPECIMN AEROBIC: CPT | Performed by: PHYSICIAN ASSISTANT

## 2017-10-15 PROCEDURE — 87040 BLOOD CULTURE FOR BACTERIA: CPT | Performed by: PHYSICIAN ASSISTANT

## 2017-10-15 PROCEDURE — 89050 BODY FLUID CELL COUNT: CPT | Performed by: PHYSICIAN ASSISTANT

## 2017-10-15 PROCEDURE — 82945 GLUCOSE OTHER FLUID: CPT | Performed by: PHYSICIAN ASSISTANT

## 2017-10-15 PROCEDURE — 83735 ASSAY OF MAGNESIUM: CPT | Performed by: INTERNAL MEDICINE

## 2017-10-15 PROCEDURE — 84157 ASSAY OF PROTEIN OTHER: CPT | Performed by: PHYSICIAN ASSISTANT

## 2017-10-15 PROCEDURE — 94760 N-INVAS EAR/PLS OXIMETRY 1: CPT

## 2017-10-15 PROCEDURE — 94003 VENT MGMT INPAT SUBQ DAY: CPT

## 2017-10-15 PROCEDURE — 89051 BODY FLUID CELL COUNT: CPT | Performed by: PHYSICIAN ASSISTANT

## 2017-10-15 PROCEDURE — 84100 ASSAY OF PHOSPHORUS: CPT | Performed by: INTERNAL MEDICINE

## 2017-10-15 PROCEDURE — 85025 COMPLETE CBC W/AUTO DIFF WBC: CPT | Performed by: INTERNAL MEDICINE

## 2017-10-15 PROCEDURE — 80053 COMPREHEN METABOLIC PANEL: CPT | Performed by: INTERNAL MEDICINE

## 2017-10-15 PROCEDURE — 71010 HB CHEST X-RAY 1 VIEW FRONTAL (PORTABLE): CPT

## 2017-10-15 RX ORDER — FAMOTIDINE 40 MG/5ML
20 POWDER, FOR SUSPENSION ORAL 2 TIMES DAILY
Status: DISCONTINUED | OUTPATIENT
Start: 2017-10-15 | End: 2017-10-18

## 2017-10-15 RX ORDER — SENNOSIDES 8.6 MG
1 TABLET ORAL
Status: DISCONTINUED | OUTPATIENT
Start: 2017-10-15 | End: 2017-10-20

## 2017-10-15 RX ORDER — HEPARIN SODIUM 5000 [USP'U]/ML
5000 INJECTION, SOLUTION INTRAVENOUS; SUBCUTANEOUS EVERY 8 HOURS SCHEDULED
Status: DISCONTINUED | OUTPATIENT
Start: 2017-10-15 | End: 2017-10-21

## 2017-10-15 RX ORDER — POLYETHYLENE GLYCOL 3350 17 G/17G
17 POWDER, FOR SOLUTION ORAL ONCE
Status: COMPLETED | OUTPATIENT
Start: 2017-10-15 | End: 2017-10-15

## 2017-10-15 RX ORDER — POTASSIUM CHLORIDE 20MEQ/15ML
40 LIQUID (ML) ORAL ONCE
Status: COMPLETED | OUTPATIENT
Start: 2017-10-15 | End: 2017-10-15

## 2017-10-15 RX ADMIN — NICOTINE 21 MG: 21 PATCH, EXTENDED RELEASE TRANSDERMAL at 08:32

## 2017-10-15 RX ADMIN — FOLIC ACID 1 MG: 1 TABLET ORAL at 08:32

## 2017-10-15 RX ADMIN — FAMOTIDINE 20 MG: 40 POWDER, FOR SUSPENSION ORAL at 18:44

## 2017-10-15 RX ADMIN — HEPARIN SODIUM 5000 UNITS: 5000 INJECTION, SOLUTION INTRAVENOUS; SUBCUTANEOUS at 15:47

## 2017-10-15 RX ADMIN — ACETAMINOPHEN 650 MG: 160 SUSPENSION ORAL at 03:40

## 2017-10-15 RX ADMIN — POLYETHYLENE GLYCOL 3350 17 G: 17 POWDER, FOR SOLUTION ORAL at 10:01

## 2017-10-15 RX ADMIN — FENTANYL CITRATE 100 MCG/HR: at 12:38

## 2017-10-15 RX ADMIN — ACETAMINOPHEN 650 MG: 160 SUSPENSION ORAL at 15:47

## 2017-10-15 RX ADMIN — FAMOTIDINE 20 MG: 40 POWDER, FOR SUSPENSION ORAL at 13:23

## 2017-10-15 RX ADMIN — PROPOFOL 50 MCG/KG/MIN: 10 INJECTION, EMULSION INTRAVENOUS at 15:47

## 2017-10-15 RX ADMIN — Medication 40 MG: at 08:35

## 2017-10-15 RX ADMIN — LEVETIRACETAM 500 MG: 100 SOLUTION ORAL at 08:20

## 2017-10-15 RX ADMIN — CHLORHEXIDINE GLUCONATE 15 ML: 1.2 RINSE ORAL at 08:33

## 2017-10-15 RX ADMIN — LABETALOL 20 MG/4 ML (5 MG/ML) INTRAVENOUS SYRINGE 10 MG: at 08:53

## 2017-10-15 RX ADMIN — PROPOFOL 50 MCG/KG/MIN: 10 INJECTION, EMULSION INTRAVENOUS at 12:20

## 2017-10-15 RX ADMIN — ACETAMINOPHEN 650 MG: 160 SUSPENSION ORAL at 08:32

## 2017-10-15 RX ADMIN — PROPOFOL 50 MCG/KG/MIN: 10 INJECTION, EMULSION INTRAVENOUS at 05:07

## 2017-10-15 RX ADMIN — ACETAMINOPHEN 650 MG: 160 SUSPENSION ORAL at 20:12

## 2017-10-15 RX ADMIN — HEPARIN SODIUM 5000 UNITS: 5000 INJECTION, SOLUTION INTRAVENOUS; SUBCUTANEOUS at 10:00

## 2017-10-15 RX ADMIN — Medication 100 MG: at 08:32

## 2017-10-15 RX ADMIN — SODIUM CHLORIDE, SODIUM GLUCONATE, SODIUM ACETATE, POTASSIUM CHLORIDE AND MAGNESIUM CHLORIDE 10 ML/HR: 526; 502; 368; 37; 30 INJECTION, SOLUTION INTRAVENOUS at 09:52

## 2017-10-15 RX ADMIN — PROPOFOL 50 MCG/KG/MIN: 10 INJECTION, EMULSION INTRAVENOUS at 01:17

## 2017-10-15 RX ADMIN — POTASSIUM CHLORIDE 40 MEQ: 20 SOLUTION ORAL at 08:35

## 2017-10-15 RX ADMIN — LEVETIRACETAM 500 MG: 100 SOLUTION ORAL at 20:12

## 2017-10-15 RX ADMIN — CHLORHEXIDINE GLUCONATE 15 ML: 1.2 RINSE ORAL at 20:12

## 2017-10-15 RX ADMIN — PROPOFOL 50 MCG/KG/MIN: 10 INJECTION, EMULSION INTRAVENOUS at 08:29

## 2017-10-15 RX ADMIN — PROPOFOL 50 MCG/KG/MIN: 10 INJECTION, EMULSION INTRAVENOUS at 21:00

## 2017-10-15 NOTE — PROGRESS NOTES
Assessment:  1  Spontaneous IVH  2  Placement of EVD (10/12/2017)  3  Right MCA fusiform aneurysm   4  Alcohol abuse  5  Nicotine dependency      Plan: 52M presented with alcohol intoxication & dependency c/o visual disturbances, and headaches  CT showed SAH with IVH extension  He was intubated for airway protection and an EVD was placed  Neuro   - Cont EVD at 10, HCT tomorrow   - Low grade fevers last night, send CSF today   - Cont to monitor for EtOH wd     - Febrile ON with inc leukocytosis, csf, cxr today   - Cont keppra   - wean sedation as able   - Bcx negative to date   - Okay for [facility] for dvt ppx    No events overnight  Temp:  [98 °F (36 7 °C)-100 4 °F (38 °C)] 100 °F (37 8 °C)  HR:  [74-94] 74  Resp:  [18-27] 19  Arterial Line BP: ()/(63-98) 146/72  FiO2 (%):  [40] 40  Intubated, sedation turned off  PERRL +cpugh  No following commands, but moves all ext spont   Localizes in uppers  EVD at 10, 271cc ICP wnl        Results from last 7 days  Lab Units 10/15/17  0507 10/14/17  0426 10/13/17  0422   WBC Thousand/uL 10 49* 7 76 9 17   HEMOGLOBIN g/dL 12 9 12 2 12 8   HEMATOCRIT % 40 0 37 4 38 4   PLATELETS Thousands/uL 142* 166 195   NEUTROS PCT % 74 74 84*   MONOS PCT % 12 7 7       Results from last 7 days  Lab Units 10/15/17  0507 10/14/17  1536 10/14/17  0426 10/13/17  0422   SODIUM mmol/L 136 138 138 135*   POTASSIUM mmol/L 3 6 4 1 3 7 3 7   CHLORIDE mmol/L 100 101 101 99*   CO2 mmol/L 30 31 32 28   BUN mg/dL 8 8 10 11   CREATININE mg/dL 0 50* 0 61 0 56* 0 61   CALCIUM mg/dL 8 0* 8 0* 7 9* 8 1*   TOTAL PROTEIN g/dL 6 5  --  6 2* 6 8   BILIRUBIN TOTAL mg/dL 0 92  --  0 62 0 86   ALK PHOS U/L 66  --  49 51   ALT U/L 85*  --  90* 119*   AST U/L 73*  --  83* 107*   GLUCOSE RANDOM mg/dL 106 95 97 130       Results from last 7 days  Lab Units 10/15/17  0507 10/14/17  1536 10/14/17  0426   MAGNESIUM mg/dL 2 3 2 4 2 5       Results from last 7 days  Lab Units 10/15/17  0507 10/14/17  0426 10/13/17  0422 PHOSPHORUS mg/dL 3 4 2 4* 3 5       Results from last 7 days  Lab Units 10/12/17  0537 10/11/17  1951   INR  0 96 0 84*   PTT seconds 28 29     No results found for: TROPONINT  ABG:  Lab Results   Component Value Date    PHART 7 403 10/12/2017    XUO0QZJ 42 0 10/12/2017    PO2ART 160 2 (H) 10/12/2017    CAA4EQM 25 6 10/12/2017    BEART 0 7 10/12/2017    SOURCE Line, Arterial 10/12/2017

## 2017-10-15 NOTE — RESPIRATORY THERAPY NOTE
RT Ventilator Management Note  Illene Come 50 y o  male MRN: [id number]  Unit/Bed#: ICU 02 Encounter: [id number]      Daily Screen  Patient safety screen outcome[de-identified] Failed  Not Ready for Weaning due to[de-identified] Underline problem not resolved      Physical Exam:   Assessment Type: Assess only  General Appearance: Sedated  Respiratory Pattern: Tachypneic, Assisted  Chest Assessment: Chest expansion symmetrical  Bilateral Breath Sounds: Diminished, Coarse  Cough: None  Suction: ET Tube  O2 Device: vent  Subjective Data: na      Resp Comments: pt  remains stable on AC mode  no changes at this time  Vent circuit changed at this time without incident  will continue to monitor

## 2017-10-15 NOTE — OCCUPATIONAL THERAPY NOTE
Occupational Therapy Cancellation    Orders received  Chart reviewed  Pt remains intubated at this time  Will continue to follow pt on caseload in order to complete evaluation pending further medical stability       Sohail Hanna MS, OTR/L

## 2017-10-15 NOTE — RESPIRATORY THERAPY NOTE
RT Ventilator Management Note  Angel Perea 50 y o  male MRN: [id number]  Unit/Bed#: ICU 02 Encounter: [id number]      Daily Screen  Patient safety screen outcome[de-identified] Failed  Not Ready for Weaning due to[de-identified] Underline problem not resolved      Physical Exam:   Assessment Type: Assess only  General Appearance: Sedated  Respiratory Pattern: Normal, Assisted  Chest Assessment: Chest expansion symmetrical  Bilateral Breath Sounds: Clear  Cough: Productive  Suction: ET Tube  O2 Device: vent  Subjective Data: na      Resp Comments: Pt continues on AC settings, no vent changes at this time  Will continue to monitor

## 2017-10-15 NOTE — PROGRESS NOTES
Progress Note - Critical Care   Kandace Schwab 50 y o  male MRN: [id number]  Unit/Bed#: ICU 02 Encounter: [id number]    Assessment/plan:    51-year-old male initially presented to [facility] 10/11 w/ double vision and alcohol intoxication, found on CT to have intraventricular hemorrhage with subarachnoid extension, intubated and transferred to [facility] ICU, s/p EVD placement 10/12  IR angiogram w/ MCA pseudoaneurysm, infectious workup in process  Intubated/OGT/EVD/morel/a-line    Patient Active Problem List    Diagnosis Date Noted    Thrombocytopenia ([facility] [address] ) 10/15/2017    Hypoalbuminemia 10/15/2017    Leukocytosis 10/15/2017    Intraventricular hemorrhage ([facility] [address] ) 10/14/2017    Pseudoaneurysm ([facility] [address] ) 10/14/2017    Subarachnoid hemorrhage ([facility] [address] ) 10/12/2017    Headache 10/12/2017    Alcohol abuse 10/12/2017    Nicotine dependence 10/12/2017       Neuro: IVH w/ subarachnoid extension (H/H 1, mod fritz 4) s/p EVD placement  IR cerebral angiogram 10/12 w/ 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, per neurosurg unlikely responsible for hemorrhage, infectious workup in process  Repeat [address] 10/13 with no sig interval changes in IVH blood or ventirculomegaly  - Intubated/sedated, propofol 50/fentanyl 100   - On exam this AM (10/15): On sedation, disconjugate gaze, pupils 4mm bilat/sluggish, minimal corneal, +cough, with sedation held sig agitation, eyes open/moving all limbs spontaneously  - 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: BloodCx (10/12): NG48H; TTE (10/12): EF 60%, G1DD, normal RV size/systolic function, IVC dilated  - Persistent low-grade temp ON, elevated WBC (10 46, no left shift): CSF stain/cx, repeat blood cx, CXR  Continue cooling blanket, tylenol scheduled     - EVD in place, pressure 5-11 , output: 271/24H  - SBP goal <140  - Seizure prophylaxis: Keppra 500 NGT BID  - Alcohol w/d: ativan 2mg q1H, thiamine/folate  - Neurosurg following        CV:   - A-line in place  - SBP goal <140, SBP trending 120s-160s, labetalol 10mg Q4H PRN  - TTE: see "neuro"      Lung:   - Intubated, vent settin/450/40/5  - ABG (10/12): 7 4/42/160/25 6, base excess 0 7     GI:  - GI PPx: Pepcid 20mg QD     FEN:   - Tube feeds: jevity 1 2, prosource protein liquid  - Replete lytes PRN     :   - Garcia in place  - BUN/creat 8/0 5  - U/o 3 1L/24H, +1L net     ID:   - Tmax 100 4 @ 8PM, WBC 10 46 (seg 74%) < 7 76 < 9 17  - Infectious workup in process, see "neuro"      Heme:   - Thrombocytopenia:  < 166 < 195  - Hgb stable, 12 9 this AM  - DvtPpx: SCDs, subqH     Endo: Maintain euglycemia, sugars trending 90s-100s  Msk/Skin: No active issues      Disposition: ICU    Chief Complaint: intubated/sedated    HPI/24hr events: Spiked fever ON    Physical Exam:   Physical Exam   Constitutional: No distress  HENT:   Head: Normocephalic and atraumatic  Cardiovascular: Normal rate and regular rhythm  Pulmonary/Chest:   Intubated   Abdominal: Soft  Bowel sounds are normal    OG tube   Genitourinary:   Genitourinary Comments: Jose   Musculoskeletal: He exhibits no edema  Neurological:   On sedation: Disonjugate gaze, pupils sluggish, poor corneals, +cough   Propofol held: Agitated, eyes open/moving limbs   Skin: Skin is warm and dry  He is not diaphoretic  Vitals:    10/15/17 0700 10/15/17 0800 10/15/17 0900 10/15/17 1000   BP:    119/75   Pulse:  72 80 76   Resp:  20 20 20   Temp:  98 6 °F (37 °C) 99 1 °F (37 3 °C) 98 5 °F (36 9 °C)   TempSrc:  Rectal Rectal Rectal   SpO2: 100% 100% 97% 98%   Weight:       Height:         Arterial Line BP: 134/68  Arterial Line MAP (mmHg): 90 mmHg    Temperature:   Temp (24hrs), Av 2 °F (37 3 °C), Min:98 °F (36 7 °C), Max:100 4 °F (38 °C)    Current: Temperature: 98 5 °F (36 9 °C)    Weights:   IBW: 77 6 kg    Body mass index is 23 59 kg/m²    Weight (last 2 days)     None             Non-Invasive/Invasive Ventilation Settings:  Respiratory Lab Data (Last 4 hours)    None         O2/Vent Data (Last 4 hours)    None              No results found for: PHART, ILJ8CHS, PO2ART, DUY8UQR, G4DNRVDL, BEART, SOURCE      Intake and Outputs:  I/O       10/13 0701 - 10/14 0700 10/14 0701 - 10/15 0700    P  O   1400    I V  (mL/kg) 3721 5 (47 2) 1211 5 (15 4)    NG/ 200    IV Piggyback 150 250    Feedings 900 1202      Nutrition:        Diet Orders            Start     Ordered    10/14/17 1451  Diet Enteral/Parenteral; Tube Feeding No Oral Diet; Jevity 1 2 Rob; 50; 50; Prosource Protein Liquid - Two Packets  Diet effective now     Comments:  Jevity 1 2 - 50cc/hour   Question Answer Comment   Diet Type Enteral/Parenteral    Enteral/Parenteral Tube Feeding No Oral Diet    Tube Feeding Formula: Jevity 1 2 Rob    Tube Feeding Bolus (mL): 50    Tube Feeding Continuous rate (mL/hr): 50    Prosource Protein Liquid - No Carb Prosource Protein Liquid - Two Packets    RD to adjust diet per protocol?  No        10/14/17 1450          Labs:     Results from last 7 days  Lab Units 10/15/17  0507 10/14/17  0426 10/13/17  0422   WBC Thousand/uL 10 49* 7 76 9 17   HEMOGLOBIN g/dL 12 9 12 2 12 8   HEMATOCRIT % 40 0 37 4 38 4   PLATELETS Thousands/uL 142* 166 195   NEUTROS PCT % 74 74 84*   MONOS PCT % 12 7 7        Results from last 7 days  Lab Units 10/15/17  0507 10/14/17  1536 10/14/17  0426 10/13/17  0422   SODIUM mmol/L 136 138 138 135*   POTASSIUM mmol/L 3 6 4 1 3 7 3 7   CHLORIDE mmol/L 100 101 101 99*   CO2 mmol/L 30 31 32 28   BUN mg/dL 8 8 10 11   CREATININE mg/dL 0 50* 0 61 0 56* 0 61   CALCIUM mg/dL 8 0* 8 0* 7 9* 8 1*   TOTAL PROTEIN g/dL 6 5  --  6 2* 6 8   BILIRUBIN TOTAL mg/dL 0 92  --  0 62 0 86   ALK PHOS U/L 66  --  49 51   ALT U/L 85*  --  90* 119*   AST U/L 73*  --  83* 107*   GLUCOSE RANDOM mg/dL 106 95 97 130       Results from last 7 days  Lab Units 10/15/17  0507 10/14/17  1536 10/14/17  0426   MAGNESIUM mg/dL 2 3 2 4 2 5       Results from last 7 days  Lab Units 10/15/17  0507 10/14/17  0426 10/13/17  0422   PHOSPHORUS mg/dL 3 4 2 4* 3 5        Results from last 7 days  Lab Units 10/12/17  0537 10/11/17  1951   INR  0 96 0 84*   PTT seconds 28 29         No results found for: TROPONINI    Imaging:  I have personally reviewed pertinent reports  Micro:  Lab Results   Component Value Date    BLOODCX No Growth at 48 hrs  10/12/2017    BLOODCX No Growth at 48 hrs  10/12/2017    BLOODCX No Growth at 48 hrs  10/12/2017       Allergies: No Known Allergies    Medications:   Scheduled Meds:    acetaminophen 650 mg Oral Q6H   chlorhexidine 15 mL Swish & Spit Q12H [address]   famotidine 20 mg Oral BID   folic acid 1 mg Oral Daily   heparin (porcine) 5,000 Units Subcutaneous Q8H [address]   levETIRAcetam 500 mg Per NG Tube Q12H [address]   nicotine 21 mg Transdermal Daily   senna 1 tablet Oral HS   thiamine 100 mg Oral Daily     Continuous Infusions:    fentaNYL 100 mcg/hr Last Rate: 100 mcg/hr (10/15/17 0800)   multi-electrolyte 10 mL/hr Last Rate: 10 mL/hr (10/15/17 0952)   propofol 5-50 mcg/kg/min Last Rate: 50 mcg/kg/min (10/15/17 0829)     PRN Meds:    bisacodyl 5 mg Daily PRN   labetalol 10 mg Q4H PRN   LORazepam 2 mg Q1H PRN   ondansetron 4 mg Q6H PRN       VTE Pharmacologic Prophylaxis: Heparin  VTE Mechanical Prophylaxis: sequential compression device    Invasive lines and devices:   Invasive Devices     Peripheral Intravenous Line            Peripheral IV 10/11/17 Left Antecubital 3 days    Peripheral IV 10/12/17 Left Forearm 2 days    Peripheral IV 10/13/17 Right Forearm 2 days          Arterial Line            Arterial Line 10/12/17 Right Radial 3 days          Drain            Urethral Catheter Non-latex 16 Fr  3 days    NG/OG/Enteral Tube Orogastric Center mouth 2 days    Ventriculostomy/Subdural Ventricular drainage catheter Right Parietal region 2 days          Airway            ETT  Hi-Lo 8 mm 2 days                   Counseling / Coordination of Care  Total time spent today 45 minutes  Greater than 50% of total time was spent with the patient and / or family counseling and / or coordination of care  A description of the counseling / coordination of care: 45     Code Status: Level 1 - Full Code     Portions of the record may have been created with voice recognition software  Occasional wrong word or "sound a like" substitutions may have occurred due to the inherent limitations of voice recognition software  Read the chart carefully and recognize, using context, where substitutions have occurred       Ryan Sims MD

## 2017-10-15 NOTE — PHYSICAL THERAPY NOTE
PT order received; chart reviewed; noted pt remains to be intubated w/ EVD in place; will cont to monitor status and initiate PT eval when clinically appropriate      Leala Romp, PT

## 2017-10-16 ENCOUNTER — APPOINTMENT (INPATIENT)
Dept: RADIOLOGY | Facility: HOSPITAL | Age: 52
DRG: 021 | End: 2017-10-16
Payer: COMMERCIAL

## 2017-10-16 LAB
ANION GAP SERPL CALCULATED.3IONS-SCNC: 8 MMOL/L (ref 4–13)
BASOPHILS # BLD AUTO: 0.04 THOUSANDS/ΜL (ref 0–0.1)
BASOPHILS NFR BLD AUTO: 1 % (ref 0–1)
BUN SERPL-MCNC: 8 MG/DL (ref 5–25)
CALCIUM SERPL-MCNC: 8.6 MG/DL (ref 8.3–10.1)
CHLORIDE SERPL-SCNC: 100 MMOL/L (ref 100–108)
CO2 SERPL-SCNC: 30 MMOL/L (ref 21–32)
CREAT SERPL-MCNC: 0.57 MG/DL (ref 0.6–1.3)
EOSINOPHIL # BLD AUTO: 0.46 THOUSAND/ΜL (ref 0–0.61)
EOSINOPHIL NFR BLD AUTO: 6 % (ref 0–6)
ERYTHROCYTE [DISTWIDTH] IN BLOOD BY AUTOMATED COUNT: 12.5 % (ref 11.6–15.1)
GFR SERPL CREATININE-BSD FRML MDRD: 120 ML/MIN/1.73SQ M
GLUCOSE SERPL-MCNC: 97 MG/DL (ref 65–140)
HCT VFR BLD AUTO: 40.1 % (ref 36.5–49.3)
HGB BLD-MCNC: 13.1 G/DL (ref 12–17)
LYMPHOCYTES # BLD AUTO: 0.9 THOUSANDS/ΜL (ref 0.6–4.47)
LYMPHOCYTES NFR BLD AUTO: 11 % (ref 14–44)
MAGNESIUM SERPL-MCNC: 2.4 MG/DL (ref 1.6–2.6)
MCH RBC QN AUTO: 33.5 PG (ref 26.8–34.3)
MCHC RBC AUTO-ENTMCNC: 32.7 G/DL (ref 31.4–37.4)
MCV RBC AUTO: 103 FL (ref 82–98)
MONOCYTES # BLD AUTO: 1.2 THOUSAND/ΜL (ref 0.17–1.22)
MONOCYTES NFR BLD AUTO: 15 % (ref 4–12)
NEUTROPHILS # BLD AUTO: 5.4 THOUSANDS/ΜL (ref 1.85–7.62)
NEUTS SEG NFR BLD AUTO: 67 % (ref 43–75)
NRBC BLD AUTO-RTO: 0 /100 WBCS
PHOSPHATE SERPL-MCNC: 3.8 MG/DL (ref 2.7–4.5)
PLATELET # BLD AUTO: 162 THOUSANDS/UL (ref 149–390)
PMV BLD AUTO: 11.1 FL (ref 8.9–12.7)
POTASSIUM SERPL-SCNC: 3.7 MMOL/L (ref 3.5–5.3)
RBC # BLD AUTO: 3.91 MILLION/UL (ref 3.88–5.62)
SODIUM SERPL-SCNC: 138 MMOL/L (ref 136–145)
WBC # BLD AUTO: 8.12 THOUSAND/UL (ref 4.31–10.16)

## 2017-10-16 PROCEDURE — 83735 ASSAY OF MAGNESIUM: CPT | Performed by: PHYSICIAN ASSISTANT

## 2017-10-16 PROCEDURE — 70450 CT HEAD/BRAIN W/O DYE: CPT

## 2017-10-16 PROCEDURE — 85025 COMPLETE CBC W/AUTO DIFF WBC: CPT | Performed by: PHYSICIAN ASSISTANT

## 2017-10-16 PROCEDURE — 84100 ASSAY OF PHOSPHORUS: CPT | Performed by: PHYSICIAN ASSISTANT

## 2017-10-16 PROCEDURE — 94760 N-INVAS EAR/PLS OXIMETRY 1: CPT

## 2017-10-16 PROCEDURE — 94003 VENT MGMT INPAT SUBQ DAY: CPT

## 2017-10-16 PROCEDURE — 80048 BASIC METABOLIC PNL TOTAL CA: CPT | Performed by: PHYSICIAN ASSISTANT

## 2017-10-16 RX ORDER — FENTANYL CITRATE 50 UG/ML
50 INJECTION, SOLUTION INTRAMUSCULAR; INTRAVENOUS
Status: DISCONTINUED | OUTPATIENT
Start: 2017-10-16 | End: 2017-10-18

## 2017-10-16 RX ORDER — ACETAMINOPHEN 160 MG/5ML
650 SUSPENSION, ORAL (FINAL DOSE FORM) ORAL EVERY 6 HOURS PRN
Status: DISCONTINUED | OUTPATIENT
Start: 2017-10-16 | End: 2017-10-17

## 2017-10-16 RX ORDER — POLYETHYLENE GLYCOL 3350 17 G/17G
17 POWDER, FOR SOLUTION ORAL DAILY
Status: DISCONTINUED | OUTPATIENT
Start: 2017-10-16 | End: 2017-10-20

## 2017-10-16 RX ADMIN — PROPOFOL 50 MCG/KG/MIN: 10 INJECTION, EMULSION INTRAVENOUS at 00:13

## 2017-10-16 RX ADMIN — LORAZEPAM 2 MG: 2 INJECTION INTRAMUSCULAR; INTRAVENOUS at 00:41

## 2017-10-16 RX ADMIN — LABETALOL 20 MG/4 ML (5 MG/ML) INTRAVENOUS SYRINGE 10 MG: at 11:53

## 2017-10-16 RX ADMIN — DEXMEDETOMIDINE HYDROCHLORIDE 0.7 MCG/KG/HR: 100 INJECTION, SOLUTION INTRAVENOUS at 13:40

## 2017-10-16 RX ADMIN — DEXMEDETOMIDINE HYDROCHLORIDE 0.5 MCG/KG/HR: 100 INJECTION, SOLUTION INTRAVENOUS at 23:12

## 2017-10-16 RX ADMIN — CHLORHEXIDINE GLUCONATE 15 ML: 1.2 RINSE ORAL at 10:28

## 2017-10-16 RX ADMIN — LEVETIRACETAM 500 MG: 100 SOLUTION ORAL at 20:14

## 2017-10-16 RX ADMIN — SODIUM CHLORIDE, SODIUM GLUCONATE, SODIUM ACETATE, POTASSIUM CHLORIDE AND MAGNESIUM CHLORIDE 10 ML/HR: 526; 502; 368; 37; 30 INJECTION, SOLUTION INTRAVENOUS at 18:00

## 2017-10-16 RX ADMIN — BISACODYL 10 MG: 5 TABLET, COATED ORAL at 10:27

## 2017-10-16 RX ADMIN — SENNOSIDES 8.6 MG: 8.6 TABLET, FILM COATED ORAL at 00:17

## 2017-10-16 RX ADMIN — NICOTINE 21 MG: 21 PATCH, EXTENDED RELEASE TRANSDERMAL at 10:29

## 2017-10-16 RX ADMIN — FOLIC ACID 1 MG: 1 TABLET ORAL at 10:28

## 2017-10-16 RX ADMIN — POLYETHYLENE GLYCOL 3350 17 G: 17 POWDER, FOR SOLUTION ORAL at 10:30

## 2017-10-16 RX ADMIN — DEXMEDETOMIDINE HYDROCHLORIDE 0.7 MCG/KG/HR: 100 INJECTION, SOLUTION INTRAVENOUS at 17:43

## 2017-10-16 RX ADMIN — LORAZEPAM 2 MG: 2 INJECTION INTRAMUSCULAR; INTRAVENOUS at 14:16

## 2017-10-16 RX ADMIN — Medication 100 MG: at 10:28

## 2017-10-16 RX ADMIN — FENTANYL CITRATE 50 MCG: 50 INJECTION INTRAMUSCULAR; INTRAVENOUS at 13:21

## 2017-10-16 RX ADMIN — DEXMEDETOMIDINE HYDROCHLORIDE 0.2 MCG/KG/HR: 100 INJECTION, SOLUTION INTRAVENOUS at 09:43

## 2017-10-16 RX ADMIN — CHLORHEXIDINE GLUCONATE 15 ML: 1.2 RINSE ORAL at 20:13

## 2017-10-16 RX ADMIN — FAMOTIDINE 20 MG: 40 POWDER, FOR SUSPENSION ORAL at 17:44

## 2017-10-16 RX ADMIN — LORAZEPAM 2 MG: 2 INJECTION INTRAMUSCULAR; INTRAVENOUS at 18:17

## 2017-10-16 RX ADMIN — HEPARIN SODIUM 5000 UNITS: 5000 INJECTION, SOLUTION INTRAVENOUS; SUBCUTANEOUS at 00:13

## 2017-10-16 RX ADMIN — LORAZEPAM 2 MG: 2 INJECTION INTRAMUSCULAR; INTRAVENOUS at 21:50

## 2017-10-16 RX ADMIN — SENNOSIDES 8.6 MG: 8.6 TABLET, FILM COATED ORAL at 21:50

## 2017-10-16 RX ADMIN — HEPARIN SODIUM 5000 UNITS: 5000 INJECTION, SOLUTION INTRAVENOUS; SUBCUTANEOUS at 06:52

## 2017-10-16 RX ADMIN — ACETAMINOPHEN 650 MG: 160 SUSPENSION ORAL at 04:44

## 2017-10-16 RX ADMIN — ACETAMINOPHEN 650 MG: 160 SUSPENSION ORAL at 10:29

## 2017-10-16 RX ADMIN — LORAZEPAM 2 MG: 2 INJECTION INTRAMUSCULAR; INTRAVENOUS at 10:22

## 2017-10-16 RX ADMIN — LEVETIRACETAM 500 MG: 100 SOLUTION ORAL at 10:30

## 2017-10-16 RX ADMIN — HEPARIN SODIUM 5000 UNITS: 5000 INJECTION, SOLUTION INTRAVENOUS; SUBCUTANEOUS at 21:50

## 2017-10-16 RX ADMIN — PROPOFOL 50 MCG/KG/MIN: 10 INJECTION, EMULSION INTRAVENOUS at 05:28

## 2017-10-16 RX ADMIN — LABETALOL 20 MG/4 ML (5 MG/ML) INTRAVENOUS SYRINGE 10 MG: at 22:25

## 2017-10-16 RX ADMIN — FENTANYL CITRATE 100 MCG/HR: at 01:35

## 2017-10-16 RX ADMIN — HEPARIN SODIUM 5000 UNITS: 5000 INJECTION, SOLUTION INTRAVENOUS; SUBCUTANEOUS at 14:42

## 2017-10-16 RX ADMIN — FAMOTIDINE 20 MG: 40 POWDER, FOR SUSPENSION ORAL at 10:30

## 2017-10-16 NOTE — PROGRESS NOTES
Progress Note - Critical Care   Jose Joshi 50 y o  male MRN: [id number]  Unit/Bed#: ICU 02 Encounter: [id number]    Attending Physician: Santiago Reveles MD      ______________________________________________________________________  Assessment and Plan:   Principal Problem:    Subarachnoid hemorrhage ([address] )  Active Problems:    Headache    Alcohol abuse    Nicotine dependence    Intraventricular hemorrhage (HCC)    Pseudoaneurysm (HCC)    Thrombocytopenia (HCC)    Hypoalbuminemia    Leukocytosis  Resolved Problems:    * No resolved hospital problems  *    Neuro: 1  SAH w/ Intraventricular hemorrhage with unknown etiology  2  Right MCA pseudoaneurysm  - workup for infectious endocarditis - blood cultures x3 - negative, Echo of heart - EF 60%, G1DD  - Keppra 500 BID for seizure prophylaxis  - maintain SBP <140, continue A-line  - Repeat CT head 10/16 - official reading pending  - MRI of brain w wo contrast 10/12 - Obstructive hydrocephalus secondary to hemorrhage within the 3rd ventricle  Dilated lateral and 3rd ventricles noted with transependymal flow CSF  Right transverse frontal ventriculostomy catheter terminates in the body of the right lateral ventricle  - Repeat CT head 10/16- official reading pending  - Neuro checks every 4 hours    2  Hydrocephalus - EVD in place, placed on 10/12  - s/p right frontal ventriculostomy 10/12  - external ventricular drain output - 271ml    3  Alcohol withdrawal 2/2 alcohol abuse - intubated, ativan 2mg iv prn every 1 hour                          CV: BP stable - 124/68  Patient on labetalol 10mg iv every 4 hours prn for BP >140                           Pulm: Intubated and sedated - propofol 50, fentanyl 100   Vent setting - AC/VC, f20, Vt 450, FiO2 50%, PEEP 5 0   Tobacco abuse - nicotine patch                           GI: Elevation in LFTs 2/2 alcoholic hepatitis  - Maddrey's score 5 3 - good prognosis, no need for steroid therapy  - Tube feeds - 50cc/hour + prosource 2 packs                          : morel in place, no acute issues, I/O - good urine output - 1 7L                           F/E/N: Isolyte @ 10cc/hour, thiamine, folate daily , replete electrolytes as needed                           ID: Low grade fevers 10/14 - - blood culture, CSF fluid - negative for bacteria   - used cooling blanket,currently off cooling, currently afebrile                           Heme: stable                           Endo: stable                           Msk/Skin: No acute issues                            Disposition: Continue ICU care        Code Status: Level 1 - Full Code    Counseling / Coordination of Care  ______________________________________________________________________    Chief Complaint: none, intubated    24 Hour Events: very restless and combative when tried to wean off propofol       ______________________________________________________________________    Physical Exam:   Physical Exam   Constitutional: He appears well-developed and well-nourished  HENT:   Head: Normocephalic and atraumatic  Eyes: Right eye exhibits no discharge  Left eye exhibits no discharge  No scleral icterus  Pinpoint sluggish pupils   Neck: Neck supple  Cardiovascular: Normal rate and regular rhythm  No murmur heard  Pulmonary/Chest: Effort normal and breath sounds normal  No respiratory distress  He has no wheezes  Abdominal: Soft  He exhibits no distension  Musculoskeletal: He exhibits no edema  Neurological: No cranial nerve deficit     Sedated, intubated, GCS 3   Skin: Skin is dry        ______________________________________________________________________  Vitals:    10/16/17 0400 10/16/17 0425 10/16/17 0500 10/16/17 0600   BP:       Pulse: 70  72 74   Resp:   19 20   Temp: 99 6 °F (37 6 °C)      TempSrc: Oral      SpO2: 98% 100% 97% 96%   Weight:       Height:           Temperature:   Temp (24hrs), Av 8 °F (37 1 °C), Min:98 5 °F (36 9 °C), Max:99 6 °F (37 6 °C)    Current Temperature: 99 6 °F (37 6 °C)  Weights:   IBW: 77 6 kg    Body mass index is 23 59 kg/m²  Weight (last 2 days)     None        Hemodynamic Monitoring:     Non-Invasive/Invasive Ventilation Settings:  Respiratory    Lab Data (Last 4 hours)    None         O2/Vent Data (Last 4 hours)      10/16 0425           Vent Mode AC/VC       Resp Rate (BPM) (BPM) 20       Vt (mL) (mL) 450       FIO2 (%) (%) 40       PEEP (cmH2O) (cmH2O) 5       MV 9 74                 No results found for: PHART, SPS2TPZ, PO2ART, WRF5HEW, R5YYYGHG, BEART, SOURCE  Intake and Outputs:  I/O       10/10 0701 - 10/11 0700 10/11 0701 - 10/12 0700 10/12 0701 - 10/13 0700    I V  (mL/kg)  299 6 (3 8)     Total Intake(mL/kg)  299 6 (3 8)     Urine (mL/kg/hr)  200     Total Output   200      Net   +99 6                 Nutrition:        Diet Orders            Start     Ordered    10/14/17 1451  Diet Enteral/Parenteral; Tube Feeding No Oral Diet; Jevity 1 2 Rob; 50; 50; Prosource Protein Liquid - Two Packets  Diet effective now     Comments:  Jevity 1 2 - 50cc/hour   Question Answer Comment   Diet Type Enteral/Parenteral    Enteral/Parenteral Tube Feeding No Oral Diet    Tube Feeding Formula: Jevity 1 2 Rob    Tube Feeding Bolus (mL): 50    Tube Feeding Continuous rate (mL/hr): 50    Prosource Protein Liquid - No Carb Prosource Protein Liquid - Two Packets    RD to adjust diet per protocol?  No        10/14/17 1450        Labs:     Results from last 7 days  Lab Units 10/16/17  0446 10/15/17  0507 10/14/17  0426   WBC Thousand/uL 8 12 10 49* 7 76   HEMOGLOBIN g/dL 13 1 12 9 12 2   HEMATOCRIT % 40 1 40 0 37 4   PLATELETS Thousands/uL 162 142* 166   NEUTROS PCT % 67 74 74   MONOS PCT % 15* 12 7       Results from last 7 days  Lab Units 10/16/17  0446 10/15/17  0507 10/14/17  1536 10/14/17  0426 10/13/17  0422   SODIUM mmol/L 138 136 138 138 135*   POTASSIUM mmol/L 3 7 3 6 4 1 3 7 3 7   CHLORIDE mmol/L 100 100 101 101 99*   CO2 mmol/L 30 30 31 32 28   BUN mg/dL 8 8 8 10 11   CREATININE mg/dL 0 57* 0 50* 0 61 0 56* 0 61   CALCIUM mg/dL 8 6 8 0* 8 0* 7 9* 8 1*   TOTAL PROTEIN g/dL  --  6 5  --  6 2* 6 8   BILIRUBIN TOTAL mg/dL  --  0 92  --  0 62 0 86   ALK PHOS U/L  --  66  --  49 51   ALT U/L  --  85*  --  90* 119*   AST U/L  --  73*  --  83* 107*   GLUCOSE RANDOM mg/dL 97 106 95 97 130       Results from last 7 days  Lab Units 10/16/17  0446 10/15/17  0507 10/14/17  1536   MAGNESIUM mg/dL 2 4 2 3 2 4     Lab Results   Component Value Date    PHOS 3 8 10/16/2017    PHOS 3 4 10/15/2017    PHOS 2 4 (L) 10/14/2017        Results from last 7 days  Lab Units 10/12/17  0537 10/11/17  1951   INR  0 96 0 84*   PTT seconds 28 29     No results found for: TROPONINI      ABG:  Lab Results   Component Value Date    PHART 7 403 10/12/2017    PFJ5GEK 42 0 10/12/2017    PO2ART 160 2 (H) 10/12/2017    WBR7HDS 25 6 10/12/2017    BEART 0 7 10/12/2017    SOURCE Line, Arterial 10/12/2017     Imaging:  I have personally reviewed pertinent reports  EKG:   Micro:  Lab Results   Component Value Date    BLOODCX No Growth at 72 hrs  10/12/2017    BLOODCX No Growth at 72 hrs  10/12/2017    BLOODCX No Growth at 72 hrs   10/12/2017     Allergies: No Known Allergies  Medications:   Scheduled Meds:    acetaminophen 650 mg Oral Q6H   chlorhexidine 15 mL Swish & Spit Q12H [address]   famotidine 20 mg Oral BID   folic acid 1 mg Oral Daily   heparin (porcine) 5,000 Units Subcutaneous Q8H [address]   levETIRAcetam 500 mg Per NG Tube Q12H [address]   nicotine 21 mg Transdermal Daily   senna 1 tablet Oral HS   thiamine 100 mg Oral Daily     Continuous Infusions:    fentaNYL 100 mcg/hr Last Rate: 100 mcg/hr (10/16/17 0135)   multi-electrolyte 10 mL/hr Last Rate: 10 mL/hr (10/15/17 3852)   propofol 5-50 mcg/kg/min Last Rate: 50 mcg/kg/min (10/16/17 9128)     PRN Meds:    bisacodyl 5 mg Daily PRN   labetalol 10 mg Q4H PRN   LORazepam 2 mg Q1H PRN   ondansetron 4 mg Q6H PRN     VTE Pharmacologic Prophylaxis: Reason for no pharmacologic prophylaxis subarachnoid hemorrhage  VTE Mechanical Prophylaxis: sequential compression device  Invasive lines and devices: Invasive Devices     Peripheral Intravenous Line            Peripheral IV 10/12/17 Left Forearm 3 days    Peripheral IV 10/13/17 Right Forearm 3 days          Arterial Line            Arterial Line 10/12/17 Right Radial 3 days          Drain            NG/OG/Enteral Tube Orogastric Center mouth 3 days    Urethral Catheter Non-latex 16 Fr  3 days    Ventriculostomy/Subdural Ventricular drainage catheter Right Parietal region 3 days          Airway            ETT  Hi-Lo 8 mm 3 days                     Portions of the record may have been created with voice recognition software  Occasional wrong word or "sound a like" substitutions may have occurred due to the inherent limitations of voice recognition software  Read the chart carefully and recognize, using context, where substitutions have occurred      Katy Ferguson MD

## 2017-10-16 NOTE — CONSULTS
Patient remains intubated and sedated  Please re-consult once medically stable to participate with therapy

## 2017-10-16 NOTE — CASE MANAGEMENT
Continued Stay Review    Date: 10/16/2017    Vital Signs: /75 Comment: Correlated with A-line within 5 mm/hg   Pulse 88   Temp 99 2 °F (37 3 °C) (Rectal)   Resp 16   Ht 6' (1 829 m)   Wt 78 9 kg (173 lb 15 1 oz)   SpO2 95%   BMI 23 59 kg/m²     Medications:   Scheduled Meds:   chlorhexidine 15 mL Swish & Spit Q12H [address]   famotidine 20 mg Oral BID   folic acid 1 mg Oral Daily   heparin (porcine) 5,000 Units Subcutaneous Q8H [address]   levETIRAcetam 500 mg Per NG Tube Q12H [address]   nicotine 21 mg Transdermal Daily   polyethylene glycol 17 g Oral Daily   senna 1 tablet Oral HS   thiamine 100 mg Oral Daily     Continuous Infusions:   dexmedetomidine 0 1-0 7 mcg/kg/hr Last Rate: 0 7 mcg/kg/hr (10/16/17 1340)   multi-electrolyte 10 mL/hr Last Rate: 10 mL/hr (10/15/17 0915)     PRN Meds:   acetaminophen    fentanyl citrate (PF)    labetalol    LORazepam    ondansetron    Abnormal Labs/Diagnostic Results: cr 0 57    Csf  Wbc 7--glucose 84--rbc 1250    Wbc 10 49  Ct of head-Decreasing volume of blood products and 3rd ventricle as well as mild decrease in ventriculomegaly  Age/Sex: 50 y o  male     1  Assessment/Plan: Subarachnoid hemorrhage with intraventricular hemorrhage extension  2  Placement of EVD (10/12/2017)  3  Platt Baird score 1; Valdes 4  4  Right MCA fusiform aneurysm   5  Alcohol abuse  6  Nicotine dependency      Plan: 52M presented with alcohol intoxication & dependency c/o visual disturbances, and headaches  CT showed SAH with IVH extension  He was intubated for airway protection and an EVD was placed  · Exam: GCS 11T (4E, 6M, 1VT)  Unable to follow commands with eyes, follows commands of wiggling toes, showing thumbs up and two fingers in BUE  +cough/gag  Negative wells or clonus  · Imaging: personally reviewed and reviewed by attending on 10/13/2017  ? 10/12 -MRI brain w/wo: 1) obstructive hydrocephalus secondary to hemorrhage within the 3rd ventricle   Dilated lateral and 3rd ventricles noted with transependymal flow CSF  Right transverse frontal ventriculostomy catheter terminates in the body of the right lateral ventricle  ? 10/13 - CT head wo: 1) no significant interval change in intraventricular blood products in the 3rd and lateral ventricles  No significant change in ventriculomegaly  2) Postsurgical changes after ventriculostomy catheter placement  ? 10/16 - CT head wo: 1) Decreasing volume of blood products and 3rd ventricle as well as mild decrease in ventricuomegaly  · STAT CT head if decline in GCS >2 pts/1 hr  · EVD at 10+ mmHg above tragus  ? Anticipate raising drain tomorrow (10/17/17)  ? Output is 271cc total overnight Continue to monitor  · Monitor ICP's ranging around 11 this morning  Goal of ICPs <20  · Continue Keppra 500mg every 12 hours   · DVT ppx: SCDs, subcutaneous heparin  · Pain/Sedation: Precedex 0 4mcg/kg/hr, as needed fentanyl 50 mcg q1h, as needed ativan 2mg q1 hr   · SBP goal <140  · Infectious workup - Blood cultures x3 - negative at 72 hrs, CSF culture collected on 10/15 - no growth   · Echo showed no vegetations   · Continue management for alcohol withdrawal per primary team   Continue to monitor examination  Call with questions or concerns       Discharge Plan: d/c planning with cm --no absolute plans at present

## 2017-10-16 NOTE — OCCUPATIONAL THERAPY NOTE
Occupational Therapy Cancellation    Orders received  Chart reviewed  Pt remains intubated/sedated  Will continue ot follow pt on caseload in order to complete evaluation pending further medical stability       Darlin Banda MS, OTR/L

## 2017-10-16 NOTE — PROGRESS NOTES
Progress Note - Neurosurgery   Aleksander Dye 50 y o  male MRN: [id number]  Unit/Bed#: ICU 02 Encounter: [id number]      Assessment:  1  Subarachnoid hemorrhage with intraventricular hemorrhage extension  2  Placement of EVD (10/12/2017)  3  Platt Baird score 1; Valdes 4  4  Right MCA fusiform aneurysm   5  Alcohol abuse  6  Nicotine dependency     Plan: 52M presented with alcohol intoxication & dependency c/o visual disturbances, and headaches  CT showed SAH with IVH extension  He was intubated for airway protection and an EVD was placed  · Exam: GCS 11T (4E, 6M, 1VT)  Unable to follow commands with eyes, follows commands of wiggling toes, showing thumbs up and two fingers in BUE  +cough/gag  Negative wells or clonus  · Imaging: personally reviewed and reviewed by attending on 10/13/2017  · 10/12 -MRI brain w/wo: 1) obstructive hydrocephalus secondary to hemorrhage within the 3rd ventricle  Dilated lateral and 3rd ventricles noted with transependymal flow CSF  Right transverse frontal ventriculostomy catheter terminates in the body of the right lateral ventricle  · 10/13 - CT head wo: 1) no significant interval change in intraventricular blood products in the 3rd and lateral ventricles  No significant change in ventriculomegaly  2) Postsurgical changes after ventriculostomy catheter placement  · 10/16 - CT head wo: 1) Decreasing volume of blood products and 3rd ventricle as well as mild decrease in ventricuomegaly  · STAT CT head if decline in GCS >2 pts/1 hr  · EVD at 10+ mmHg above tragus  · Anticipate raising drain tomorrow (10/17/17)  · Output is 271cc total overnight Continue to monitor  · Monitor ICP's ranging around 11 this morning  Goal of ICPs <20    · Continue Keppra 500mg every 12 hours   · DVT ppx: SCDs, subcutaneous heparin  · Pain/Sedation: Precedex 0 4mcg/kg/hr, as needed fentanyl 50 mcg q1h, as needed ativan 2mg q1 hr   · SBP goal <140  · Infectious workup - Blood cultures x3 - negative at 72 hrs, CSF culture collected on 10/15 - no growth   · Echo showed no vegetations   · Continue management for alcohol withdrawal per primary team   · Continue to monitor examination  Call with questions or concerns  Subjective/Objective   Chief Complaint: intubated    Subjective: none offered/intubated    Objective: intubated  I/O       10/14 0701 - 10/15 0700 10/15 0701 - 10/16 0700 10/16 0701 - 10/17 0700    P  O  1400      I V  (mL/kg) 1289 4 (16 3) 868 2 (11) 288 7 (3 7)    NG/ 240 40    IV Piggyback 250      Feedings 1313 1089 100    Total Intake(mL/kg) 4452 4 (56 4) 2197 2 (27 8) 428 7 (5 4)    Urine (mL/kg/hr) 3150 (1 7) 1770 (0 9) 80 (0 2)    Emesis/NG output       Drains 271 (0 1) 271 (0 1) 15 (0)    Total Output 3421 2041 95    Net +1031 4 +156 2 +333 7                 Invasive Devices     Peripheral Intravenous Line            Peripheral IV 10/12/17 Left Forearm 3 days    Peripheral IV 10/13/17 Right Forearm 3 days          Arterial Line            Arterial Line 10/12/17 Right Radial 4 days          Drain            Urethral Catheter Non-latex 16 Fr  4 days    NG/OG/Enteral Tube Orogastric Center mouth 3 days    Ventriculostomy/Subdural Ventricular drainage catheter Right Parietal region 3 days          Airway            ETT  Hi-Lo 8 mm 3 days                Physical Exam:  Vitals: Blood pressure 119/75, pulse 74, temperature 99 6 °F (37 6 °C), temperature source Oral, resp  rate 20, height 6' (1 829 m), weight 78 9 kg (173 lb 15 1 oz), SpO2 96 %  ,Body mass index is 23 59 kg/m²  Hemodynamic Monitoring: MAP: Arterial Line MAP (mmHg): 72 mmHg, CPP: CPP: 61, ICP Mean: ICP Mean (mmHg): 11 mmHg    General appearance: awake, appears stated age, and no distress  Head: Normocephalic, without obvious abnormality, atraumatic  Eyes: unable to follow commands and test EOM  Conjugate gaze  PERRL  Lungs: intubated  Heart: regular heart rate  Neurologic:   Mental status: awake, intubated  Cranial nerves: grossly intact (Cranial nerves II-XII)  Sensory: unable to assess due to intubated  Motor: moving all extremities without focal weakness  Able to follow simple commands of squeezing hands bilaterally, wiggling toes  Able to show thumbs up and 2 fingers on command  Reflexes: BUE 1+ and BLE 2+ symmetric  negative wells, negative clonus      Lab Results:    Results from last 7 days  Lab Units 10/16/17  0446 10/15/17  0507 10/14/17  0426   WBC Thousand/uL 8 12 10 49* 7 76   HEMOGLOBIN g/dL 13 1 12 9 12 2   HEMATOCRIT % 40 1 40 0 37 4   PLATELETS Thousands/uL 162 142* 166   NEUTROS PCT % 67 74 74   MONOS PCT % 15* 12 7       Results from last 7 days  Lab Units 10/16/17  0446 10/15/17  0507 10/14/17  1536 10/14/17  0426 10/13/17  0422   SODIUM mmol/L 138 136 138 138 135*   POTASSIUM mmol/L 3 7 3 6 4 1 3 7 3 7   CHLORIDE mmol/L 100 100 101 101 99*   CO2 mmol/L 30 30 31 32 28   BUN mg/dL 8 8 8 10 11   CREATININE mg/dL 0 57* 0 50* 0 61 0 56* 0 61   CALCIUM mg/dL 8 6 8 0* 8 0* 7 9* 8 1*   TOTAL PROTEIN g/dL  --  6 5  --  6 2* 6 8   BILIRUBIN TOTAL mg/dL  --  0 92  --  0 62 0 86   ALK PHOS U/L  --  66  --  49 51   ALT U/L  --  85*  --  90* 119*   AST U/L  --  73*  --  83* 107*   GLUCOSE RANDOM mg/dL 97 106 95 97 130       Results from last 7 days  Lab Units 10/16/17  0446 10/15/17  0507 10/14/17  1536   MAGNESIUM mg/dL 2 4 2 3 2 4       Results from last 7 days  Lab Units 10/16/17  0446 10/15/17  0507 10/14/17  0426   PHOSPHORUS mg/dL 3 8 3 4 2 4*       Results from last 7 days  Lab Units 10/12/17  0537 10/11/17  1951   INR  0 96 0 84*   PTT seconds 28 29     No results found for: TROPONINT  ABG:  Lab Results   Component Value Date    PHART 7 403 10/12/2017    XTC2SFE 42 0 10/12/2017    PO2ART 160 2 (H) 10/12/2017    NGZ1EOE 25 6 10/12/2017    BEART 0 7 10/12/2017    SOURCE Line, Arterial 10/12/2017       Imaging Studies: I have personally reviewed pertinent reports     and I have personally reviewed pertinent films in PACS    EKG, Pathology, and Other Studies: I have personally reviewed pertinent reports        VTE  Prophylaxis: Sequential compression device (Venodyne)  and Heparin

## 2017-10-16 NOTE — PHYSICAL THERAPY NOTE
Eval deferred, remains medically unstable  Will follow for eval when medically appropriate    Dickson Erazo PT, DPT CSRS

## 2017-10-17 LAB
ANION GAP SERPL CALCULATED.3IONS-SCNC: 7 MMOL/L (ref 4–13)
BACTERIA BLD CULT: NORMAL
BASOPHILS # BLD AUTO: 0.04 THOUSANDS/ΜL (ref 0–0.1)
BASOPHILS NFR BLD AUTO: 0 % (ref 0–1)
BUN SERPL-MCNC: 11 MG/DL (ref 5–25)
CALCIUM SERPL-MCNC: 8.8 MG/DL (ref 8.3–10.1)
CHLORIDE SERPL-SCNC: 100 MMOL/L (ref 100–108)
CO2 SERPL-SCNC: 27 MMOL/L (ref 21–32)
CREAT SERPL-MCNC: 0.57 MG/DL (ref 0.6–1.3)
EOSINOPHIL # BLD AUTO: 0.31 THOUSAND/ΜL (ref 0–0.61)
EOSINOPHIL NFR BLD AUTO: 3 % (ref 0–6)
ERYTHROCYTE [DISTWIDTH] IN BLOOD BY AUTOMATED COUNT: 12.4 % (ref 11.6–15.1)
GFR SERPL CREATININE-BSD FRML MDRD: 120 ML/MIN/1.73SQ M
GLUCOSE SERPL-MCNC: 125 MG/DL (ref 65–140)
HCT VFR BLD AUTO: 38.8 % (ref 36.5–49.3)
HGB BLD-MCNC: 13 G/DL (ref 12–17)
LYMPHOCYTES # BLD AUTO: 0.73 THOUSANDS/ΜL (ref 0.6–4.47)
LYMPHOCYTES NFR BLD AUTO: 8 % (ref 14–44)
MAGNESIUM SERPL-MCNC: 2.2 MG/DL (ref 1.6–2.6)
MCH RBC QN AUTO: 33.7 PG (ref 26.8–34.3)
MCHC RBC AUTO-ENTMCNC: 33.5 G/DL (ref 31.4–37.4)
MCV RBC AUTO: 101 FL (ref 82–98)
MONOCYTES # BLD AUTO: 1.22 THOUSAND/ΜL (ref 0.17–1.22)
MONOCYTES NFR BLD AUTO: 13 % (ref 4–12)
NEUTROPHILS # BLD AUTO: 7.04 THOUSANDS/ΜL (ref 1.85–7.62)
NEUTS SEG NFR BLD AUTO: 76 % (ref 43–75)
NRBC BLD AUTO-RTO: 0 /100 WBCS
PHOSPHATE SERPL-MCNC: 3 MG/DL (ref 2.7–4.5)
PLATELET # BLD AUTO: 220 THOUSANDS/UL (ref 149–390)
PMV BLD AUTO: 10.9 FL (ref 8.9–12.7)
POTASSIUM SERPL-SCNC: 3.9 MMOL/L (ref 3.5–5.3)
POTASSIUM SERPL-SCNC: 4.1 MMOL/L (ref 3.5–5.3)
RBC # BLD AUTO: 3.86 MILLION/UL (ref 3.88–5.62)
SODIUM SERPL-SCNC: 134 MMOL/L (ref 136–145)
SODIUM SERPL-SCNC: 137 MMOL/L (ref 136–145)
WBC # BLD AUTO: 9.47 THOUSAND/UL (ref 4.31–10.16)

## 2017-10-17 PROCEDURE — 83735 ASSAY OF MAGNESIUM: CPT | Performed by: INTERNAL MEDICINE

## 2017-10-17 PROCEDURE — 84100 ASSAY OF PHOSPHORUS: CPT | Performed by: INTERNAL MEDICINE

## 2017-10-17 PROCEDURE — 84132 ASSAY OF SERUM POTASSIUM: CPT | Performed by: INTERNAL MEDICINE

## 2017-10-17 PROCEDURE — 84295 ASSAY OF SERUM SODIUM: CPT | Performed by: INTERNAL MEDICINE

## 2017-10-17 PROCEDURE — 94760 N-INVAS EAR/PLS OXIMETRY 1: CPT

## 2017-10-17 PROCEDURE — 85025 COMPLETE CBC W/AUTO DIFF WBC: CPT | Performed by: INTERNAL MEDICINE

## 2017-10-17 PROCEDURE — 80048 BASIC METABOLIC PNL TOTAL CA: CPT | Performed by: INTERNAL MEDICINE

## 2017-10-17 PROCEDURE — 94003 VENT MGMT INPAT SUBQ DAY: CPT

## 2017-10-17 RX ORDER — OXAZEPAM 15 MG/1
30 CAPSULE ORAL EVERY 8 HOURS SCHEDULED
Status: DISCONTINUED | OUTPATIENT
Start: 2017-10-17 | End: 2017-10-18

## 2017-10-17 RX ORDER — QUETIAPINE FUMARATE 25 MG/1
25 TABLET, FILM COATED ORAL
Status: DISCONTINUED | OUTPATIENT
Start: 2017-10-17 | End: 2017-10-20

## 2017-10-17 RX ORDER — BISACODYL 10 MG
10 SUPPOSITORY, RECTAL RECTAL ONCE
Status: COMPLETED | OUTPATIENT
Start: 2017-10-17 | End: 2017-10-17

## 2017-10-17 RX ORDER — LORAZEPAM 2 MG/ML
1 INJECTION INTRAMUSCULAR EVERY 2 HOUR PRN
Status: DISCONTINUED | OUTPATIENT
Start: 2017-10-17 | End: 2017-10-20

## 2017-10-17 RX ORDER — ACETAMINOPHEN 325 MG/1
650 TABLET ORAL EVERY 6 HOURS SCHEDULED
Status: DISCONTINUED | OUTPATIENT
Start: 2017-10-17 | End: 2017-10-18

## 2017-10-17 RX ORDER — AMLODIPINE BESYLATE 5 MG/1
5 TABLET ORAL DAILY
Status: DISCONTINUED | OUTPATIENT
Start: 2017-10-17 | End: 2017-10-20

## 2017-10-17 RX ADMIN — ACETAMINOPHEN 650 MG: 325 TABLET, FILM COATED ORAL at 12:01

## 2017-10-17 RX ADMIN — LORAZEPAM 1 MG: 2 INJECTION INTRAMUSCULAR; INTRAVENOUS at 23:17

## 2017-10-17 RX ADMIN — CHLORHEXIDINE GLUCONATE 15 ML: 1.2 RINSE ORAL at 20:11

## 2017-10-17 RX ADMIN — LORAZEPAM 1 MG: 2 INJECTION INTRAMUSCULAR; INTRAVENOUS at 16:04

## 2017-10-17 RX ADMIN — HEPARIN SODIUM 5000 UNITS: 5000 INJECTION, SOLUTION INTRAVENOUS; SUBCUTANEOUS at 13:17

## 2017-10-17 RX ADMIN — BISACODYL 10 MG: 10 SUPPOSITORY RECTAL at 09:43

## 2017-10-17 RX ADMIN — ACETAMINOPHEN 650 MG: 160 SUSPENSION ORAL at 06:50

## 2017-10-17 RX ADMIN — LORAZEPAM 2 MG: 2 INJECTION INTRAMUSCULAR; INTRAVENOUS at 01:35

## 2017-10-17 RX ADMIN — HEPARIN SODIUM 5000 UNITS: 5000 INJECTION, SOLUTION INTRAVENOUS; SUBCUTANEOUS at 22:16

## 2017-10-17 RX ADMIN — LORAZEPAM 1 MG: 2 INJECTION INTRAMUSCULAR; INTRAVENOUS at 18:55

## 2017-10-17 RX ADMIN — MAGNESIUM HYDROXIDE 30 ML: 400 SUSPENSION ORAL at 16:19

## 2017-10-17 RX ADMIN — FAMOTIDINE 20 MG: 40 POWDER, FOR SUSPENSION ORAL at 08:49

## 2017-10-17 RX ADMIN — HEPARIN SODIUM 5000 UNITS: 5000 INJECTION, SOLUTION INTRAVENOUS; SUBCUTANEOUS at 06:13

## 2017-10-17 RX ADMIN — OXAZEPAM 30 MG: 15 CAPSULE, GELATIN COATED ORAL at 22:17

## 2017-10-17 RX ADMIN — LEVETIRACETAM 500 MG: 100 SOLUTION ORAL at 20:12

## 2017-10-17 RX ADMIN — ACETAMINOPHEN 650 MG: 325 TABLET, FILM COATED ORAL at 17:26

## 2017-10-17 RX ADMIN — LORAZEPAM 1 MG: 2 INJECTION INTRAMUSCULAR; INTRAVENOUS at 13:15

## 2017-10-17 RX ADMIN — POLYETHYLENE GLYCOL 3350 17 G: 17 POWDER, FOR SOLUTION ORAL at 08:47

## 2017-10-17 RX ADMIN — FENTANYL CITRATE 50 MCG: 50 INJECTION INTRAMUSCULAR; INTRAVENOUS at 03:58

## 2017-10-17 RX ADMIN — NICOTINE 21 MG: 21 PATCH, EXTENDED RELEASE TRANSDERMAL at 08:47

## 2017-10-17 RX ADMIN — LABETALOL 20 MG/4 ML (5 MG/ML) INTRAVENOUS SYRINGE 10 MG: at 02:58

## 2017-10-17 RX ADMIN — QUETIAPINE FUMARATE 25 MG: 25 TABLET ORAL at 22:17

## 2017-10-17 RX ADMIN — DEXMEDETOMIDINE HYDROCHLORIDE 0.6 MCG/KG/HR: 100 INJECTION, SOLUTION INTRAVENOUS at 03:09

## 2017-10-17 RX ADMIN — ACETAMINOPHEN 650 MG: 325 TABLET, FILM COATED ORAL at 23:16

## 2017-10-17 RX ADMIN — SODIUM CHLORIDE 500 ML: 0.9 INJECTION, SOLUTION INTRAVENOUS at 09:36

## 2017-10-17 RX ADMIN — OXAZEPAM 30 MG: 15 CAPSULE, GELATIN COATED ORAL at 09:43

## 2017-10-17 RX ADMIN — Medication 100 MG: at 08:47

## 2017-10-17 RX ADMIN — LORAZEPAM 2 MG: 2 INJECTION INTRAMUSCULAR; INTRAVENOUS at 08:44

## 2017-10-17 RX ADMIN — DEXMEDETOMIDINE HYDROCHLORIDE 0.7 MCG/KG/HR: 100 INJECTION, SOLUTION INTRAVENOUS at 11:30

## 2017-10-17 RX ADMIN — SODIUM CHLORIDE, SODIUM GLUCONATE, SODIUM ACETATE, POTASSIUM CHLORIDE AND MAGNESIUM CHLORIDE 10 ML/HR: 526; 502; 368; 37; 30 INJECTION, SOLUTION INTRAVENOUS at 16:20

## 2017-10-17 RX ADMIN — DEXMEDETOMIDINE HYDROCHLORIDE 0.7 MCG/KG/HR: 100 INJECTION, SOLUTION INTRAVENOUS at 15:57

## 2017-10-17 RX ADMIN — FENTANYL CITRATE 50 MCG: 50 INJECTION INTRAMUSCULAR; INTRAVENOUS at 09:46

## 2017-10-17 RX ADMIN — DEXMEDETOMIDINE HYDROCHLORIDE 0.7 MCG/KG/HR: 100 INJECTION, SOLUTION INTRAVENOUS at 08:00

## 2017-10-17 RX ADMIN — AMLODIPINE BESYLATE 5 MG: 5 TABLET ORAL at 09:43

## 2017-10-17 RX ADMIN — SENNOSIDES 8.6 MG: 8.6 TABLET, FILM COATED ORAL at 22:17

## 2017-10-17 RX ADMIN — OXAZEPAM 30 MG: 15 CAPSULE, GELATIN COATED ORAL at 15:00

## 2017-10-17 RX ADMIN — FENTANYL CITRATE 50 MCG: 50 INJECTION INTRAMUSCULAR; INTRAVENOUS at 20:11

## 2017-10-17 RX ADMIN — DEXMEDETOMIDINE HYDROCHLORIDE 0.7 MCG/KG/HR: 100 INJECTION, SOLUTION INTRAVENOUS at 23:18

## 2017-10-17 RX ADMIN — LORAZEPAM 2 MG: 2 INJECTION INTRAMUSCULAR; INTRAVENOUS at 05:55

## 2017-10-17 RX ADMIN — CHLORHEXIDINE GLUCONATE 15 ML: 1.2 RINSE ORAL at 08:47

## 2017-10-17 RX ADMIN — FOLIC ACID 1 MG: 1 TABLET ORAL at 08:47

## 2017-10-17 RX ADMIN — DEXMEDETOMIDINE HYDROCHLORIDE 0.7 MCG/KG/HR: 100 INJECTION, SOLUTION INTRAVENOUS at 20:35

## 2017-10-17 RX ADMIN — FAMOTIDINE 20 MG: 40 POWDER, FOR SUSPENSION ORAL at 17:22

## 2017-10-17 RX ADMIN — DEXMEDETOMIDINE HYDROCHLORIDE 0.5 MCG/KG/HR: 100 INJECTION, SOLUTION INTRAVENOUS at 06:18

## 2017-10-17 NOTE — PROGRESS NOTES
Progress Note - Critical Care   Arline Craig 50 y o  male MRN: [id number]  Unit/Bed#: ICU 02 Encounter: [id number]    Assessment/melissa:    82-year-old male initially presented to [facility] 10/11 w/ double vision and alcohol intoxication, found on CT to have intraventricular hemorrhage with subarachnoid extension, intubated and transferred to [facility] ICU, s/p EVD placement 10/12  IR angiogram w/ MCA pseudoaneurysm, infectious workup in process  Post-bleed day #7      Intubated/OGT/EVD/a-line/condomcath    Patient Active Problem List    Diagnosis Date Noted    Thrombocytopenia ([facility] ) 10/15/2017    Hypoalbuminemia 10/15/2017    Leukocytosis 10/15/2017    Intraventricular hemorrhage ([address] ) 10/14/2017    Pseudoaneurysm ([address] ) 10/14/2017    Subarachnoid hemorrhage ([facility] [address] ) 10/12/2017    Headache 10/12/2017    Alcohol abuse 10/12/2017    Nicotine dependence 10/12/2017        Neuro: IVH w/ subarachnoid extension (H/H 1, mod fritz 4) s/p EVD placement  IR cerebral angiogram 10/12 w/ 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, per neurosurg unlikely responsible for hemorrhage, infectious workup in process  Post-bleed day #7  - On exam this AM (10/17): on precedex 0 7, requiring intermittent PRN fentanyl, ativan for agitation  Moves all limbs spontaneously, intermittently follows commands  Added Serax 30mg q8H, Seroquel 25mg qHS  Decresed ativan from 2mg --> 1mg PRN  - 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: BCx (10/12): NG4D, TTE (10/12): EF 60%, G1DD, normal RV size/systolic function, IVC dilated  - Continues to spike overnight fevers, 102 2 @7AM  Tylenol scheduled, cooling blanket  Repeat BCx (10/15): NG1D  CSF stain 1+poly/rare mono, no bacteria  - Repeat head CT (10/16): Slightly decreased blood products in the 3rd ventricle   Unchanged volume of layering blood products in the occipital horns of the lateral ventricles   Blood is decreasing in density   No new foci of extra-axial hemorrhage   - EVD in place, pressure 5-22 , output: 190/24H  - SBP goal <140  - Neurochecks q4H  - Seizure prophylaxis: Keppra 500 NGT BID  - Alcohol w/d: ativan 1mg q2H, thiamine/folate  - Neurosurg following        CV:   - A-line in place  - SBP goal <140, SBP trending 120s-170s, high 188/100, on labetalol 10mg Q4H PRN  - Added Norvasc 5mg QD  - TTE: see "neuro"      Lung:   - PSV 10/5, extubation limited to AMS  - ABG (10/12): 7 4/42/160/25 6, base excess 0 7     GI:  - GI PPx: Pepcid 20mg BID  - No BM yet; miralax/senna, added Ducolax suppository  F/u BM      FEN:   - Tube feeds: jevity 1 2, prosource protein liquid  - Na 134 this Am, likely somewhat volume depleted in setting of intermittent fevers  Will give 500cc NS bolus for likely hypovolemic hyponatremia, f/u repeat BMP in afternoon       - Replete lytes PRN     :   - Condom cath  - BUN/creat 11/0 7  - U/o 1335cc/24H, -117cc/24h net     ID:   - Tmax 102 2 @ 7AM, WBC 9 47 <10 46 < 7 76 < 9 17  - Infectious workup in process, see "neuro"      Heme:   - Thrombocytopenia: resolved   - Hgb stable  - DvtPpx: SCDs, subqH     Endo: Maintain euglycemia, sugars trending 90s-120s                Msk/Skin: Skin ulcer prophylaxis     Disposition: ICU    Chief Complaint: Intubated/sedated    HPI/24hr events: Contiues to spike fevers ON    Physical Exam:   Physical Exam   Constitutional:   Intermittently agitated   HENT:   Head: Normocephalic and atraumatic  Neck: Normal range of motion  Neck supple  Cardiovascular: Normal rate and regular rhythm  Pulmonary/Chest: Effort normal and breath sounds normal    Abdominal: Soft  Bowel sounds are normal    Musculoskeletal: He exhibits no edema     Neurological:   Conjugate gaze  Pupils 3-4mm, sluggish  No eye opening to voice   Moves limbs spontaneously, intermittently follows commands  +cough+gag       Vitals:    10/17/17 0005 10/17/17 0100 10/17/17 0200 10/17/17 0436   BP:       Pulse:  80 82    Resp:  19 (!) 11 Temp:       TempSrc:       SpO2: 98% 98% 98% 95%   Weight:       Height:         Arterial Line BP: 176/92  Arterial Line MAP (mmHg): 124 mmHg    Temperature:   Temp (24hrs), Av 7 °F (37 6 °C), Min:98 8 °F (37 1 °C), Max:100 4 °F (38 °C)    Current: Temperature: 100 °F (37 8 °C)    Weights:   IBW: 77 6 kg    Body mass index is 23 59 kg/m²  Weight (last 2 days)     None          Hemodynamic Monitoring:  Non-Invasive/Invasive Ventilation Settings:  Respiratory    Lab Data (Last 4 hours)    None         O2/Vent Data (Last 4 hours)      10/17 0436           Vent Mode AC/PC       Resp Rate (BPM) (BPM) 12       Pressure Control Set (cm H2O) (cm) 10       Insp Time (sec) (sec) 1       FiO2 (%) (%) 40       PEEP (cmH2O) (cmH2O) 5       MV 12 7                 No results found for: PHART, FAS7BSF, PO2ART, LGC8ERH, T3ZHJAEK, BEART, SOURCE      Intake and Outputs:  I/O       10/15 0701 - 10/16 0700 10/16 07 - 10/17 0700    P  O   0    I V  (mL/kg) 868 2 (11) 402 6 (5 1)    NG/ 280    Feedings 1089 725    Total Intake(mL/kg) 2197 2 (27 8) 1407 6 (17 8)    Urine (mL/kg/hr) 1770 (0 9) 1335 (0 7)    Drains 271 (0 1) 190 (0 1)    Total Output 2041 1525    Net +156 2 -117 4          Unmeasured Urine Occurrence  1 x          Nutrition:        Diet Orders            Start     Ordered    10/14/17 1451  Diet Enteral/Parenteral; Tube Feeding No Oral Diet; Jevity 1 2 Rob; 50; 50; Prosource Protein Liquid - Two Packets  Diet effective now     Comments:  Jevity 1 2 - 50cc/hour   Question Answer Comment   Diet Type Enteral/Parenteral    Enteral/Parenteral Tube Feeding No Oral Diet    Tube Feeding Formula: Jevity 1 2 Rob    Tube Feeding Bolus (mL): 50    Tube Feeding Continuous rate (mL/hr): 50    Prosource Protein Liquid - No Carb Prosource Protein Liquid - Two Packets    RD to adjust diet per protocol?  No        10/14/17 1450            Labs:     Results from last 7 days  Lab Units 10/17/17  0435 10/16/17  0445 10/15/17  0507   WBC Thousand/uL 9 47 8 12 10 49*   HEMOGLOBIN g/dL 13 0 13 1 12 9   HEMATOCRIT % 38 8 40 1 40 0   PLATELETS Thousands/uL 220 162 142*   NEUTROS PCT % 76* 67 74   MONOS PCT % 13* 15* 12      Results from last 7 days  Lab Units 10/17/17  0435 10/16/17  0446 10/15/17  0507  10/14/17  0426 10/13/17  0422   SODIUM mmol/L 134* 138 136  < > 138 135*   POTASSIUM mmol/L 4 1 3 7 3 6  < > 3 7 3 7   CHLORIDE mmol/L 100 100 100  < > 101 99*   CO2 mmol/L 27 30 30  < > 32 28   BUN mg/dL 11 8 8  < > 10 11   CREATININE mg/dL 0 57* 0 57* 0 50*  < > 0 56* 0 61   CALCIUM mg/dL 8 8 8 6 8 0*  < > 7 9* 8 1*   TOTAL PROTEIN g/dL  --   --  6 5  --  6 2* 6 8   BILIRUBIN TOTAL mg/dL  --   --  0 92  --  0 62 0 86   ALK PHOS U/L  --   --  66  --  49 51   ALT U/L  --   --  85*  --  90* 119*   AST U/L  --   --  73*  --  83* 107*   GLUCOSE RANDOM mg/dL 125 97 106  < > 97 130   < > = values in this interval not displayed  Results from last 7 days  Lab Units 10/17/17  0435 10/16/17  0446 10/15/17  0507   MAGNESIUM mg/dL 2 2 2 4 2 3       Results from last 7 days  Lab Units 10/17/17  0435 10/16/17  0446 10/15/17  0507   PHOSPHORUS mg/dL 3 0 3 8 3 4        Results from last 7 days  Lab Units 10/12/17  0537 10/11/17  1951   INR  0 96 0 84*   PTT seconds 28 29         No results found for: TROPONINI    Imaging:  I have personally reviewed pertinent reports  Micro:  Lab Results   Component Value Date    BLOODCX No Growth at 24 hrs  10/15/2017    BLOODCX No Growth at 24 hrs  10/15/2017    BLOODCX No Growth After 4 Days   10/12/2017       Allergies: No Known Allergies    Medications:   Scheduled Meds:  chlorhexidine 15 mL Swish & Spit Q12H [facility]   famotidine 20 mg Oral BID   folic acid 1 mg Oral Daily   heparin (porcine) 5,000 Units Subcutaneous Q8H [facility]   levETIRAcetam 500 mg Per NG Tube Q12H [facility]   nicotine 21 mg Transdermal Daily   polyethylene glycol 17 g Oral Daily   senna 1 tablet Oral HS   thiamine 100 mg Oral Daily Continuous Infusions:  dexmedetomidine 0 1-0 7 mcg/kg/hr Last Rate: 0 6 mcg/kg/hr (10/17/17 0309)   multi-electrolyte 10 mL/hr Last Rate: 10 mL/hr (10/16/17 1800)     PRN Meds:    acetaminophen 650 mg Q6H PRN   fentanyl citrate (PF) 50 mcg Q1H PRN   labetalol 10 mg Q4H PRN   LORazepam 2 mg Q1H PRN   ondansetron 4 mg Q6H PRN       VTE Pharmacologic Prophylaxis: Heparin  VTE Mechanical Prophylaxis: sequential compression device    Invasive lines and devices: Invasive Devices     Peripheral Intravenous Line            Peripheral IV 10/13/17 Right Forearm 4 days          Arterial Line            Arterial Line 10/12/17 Right Radial 4 days          Drain            NG/OG/Enteral Tube Orogastric Center mouth 4 days    Ventriculostomy/Subdural Ventricular drainage catheter Right Parietal region 4 days    External Urinary Catheter Medium less than 1 day          Airway            ETT  Hi-Lo 8 mm 4 days                   Counseling / Coordination of Care  Total time spent today 30min minutes  Greater than 50% of total time was spent with the patient and / or family counseling and / or coordination of care  A description of the counseling / coordination of care: 30min     Code Status: Level 1 - Full Code     Portions of the record may have been created with voice recognition software  Occasional wrong word or "sound a like" substitutions may have occurred due to the inherent limitations of voice recognition software  Read the chart carefully and recognize, using context, where substitutions have occurred       Monica Mathews MD

## 2017-10-17 NOTE — OCCUPATIONAL THERAPY NOTE
Occupational Therapy   Patient Name: Cecelia Savage  [id number] Date: 10/17/2017    OT consult received, chart check performed, pt remains intubated/sedated/OGT/EVD/A-line  OT to follow as able to evaluate to A w/ safe d/c planning/recommendations when more medically appropriate  Thank you for the consult!  Please call if you have any questions: Bean Newton, OTD, OTR/L, C-GCM, CSRS  Neuro [facility]

## 2017-10-17 NOTE — PROGRESS NOTES
Progress Note - Neurosurgery   Jing Odell 50 y o  male MRN: [id number]  Unit/Bed#: ICU 02 Encounter: [id number]      Assessment:  1  Subarachnoid hemorrhage with intraventricular hemorrhage extension  2  Placement of EVD (10/12/2017)  3  Platt Baird score 1; Valdes 4  4  Right MCA fusiform aneurysm   5  Alcohol abuse  6  Nicotine dependency     Plan: 52M presented with alcohol intoxication & dependency c/o visual disturbances, and headaches  CT showed SAH with IVH extension  He was intubated for airway protection and an EVD was placed  · Exam: GCS 10T (3E, 6M, 1VT)  Unable to follow commands with eyes, eventually opened eyes during mid-examination  Follows commands of wiggling toes, showing thumbs up and two fingers in BUE  +cough/gag  Negative wells or clonus  · Imaging: personally reviewed and reviewed by attending on 10/13/2017  · 10/16 - CT head wo: 1) Decreasing volume of blood products and 3rd ventricle as well as mild decrease in ventricuomegaly  · STAT CT head if decline in GCS >2 pts/1 hr  · EVD at 10+ mmHg above tragus - continue at this time  · Output is 280 cc total overnight Continue to monitor  · Monitor ICP's ranging 5-low teens  Goal of ICPs <20  · Continue Keppra 500mg every 12 hours   · DVT ppx: SCDs, subcutaneous heparin  · Pain/Sedation: Precedex 0 4mcg/kg/hr, as needed fentanyl 50 mcg q1h, as needed ativan 2mg q1 hr   · SBP goal <140  · Patient has been getting agitated overnight, causing increase in BP   Continue to monitor  · Required one dose of prn fentanyl, and three doses of prn ativan  · Na 134 - continue to trend  · Primary team will give bolus and recheck electrolytes  · WBC 9 47, Tmax overnight of 101 8  · Blood cultures collected 10/12 - no growth after 4 days  · Blood cultures collected 10/15 - no growth after 24 hours  · CSF culture - no growth, protein 42  · Echo showed no vegetations   · Continue management for alcohol withdrawal per primary team   · Continue to monitor examination  Call with questions or concerns  Subjective/Objective   Chief Complaint: intubated    Subjective: intubated    Objective: intubated     I/O       10/15 0701 - 10/16 0700 10/16 0701 - 10/17 0700 10/17 0701 - 10/18 0700    P  O   0     I V  (mL/kg) 868 2 (11) 493 3 (6 3)     NG/ 280     IV Piggyback       Feedings 1089 1225     Total Intake(mL/kg) 2197 2 (27 8) 1998 3 (25 3)     Urine (mL/kg/hr) 1770 (0 9) 1535 (0 8)     Drains 271 (0 1) 280 (0 1)     Total Output 2041 1815      Net +156 2 +183 3             Unmeasured Urine Occurrence  1 x           Invasive Devices     Peripheral Intravenous Line            Peripheral IV 10/13/17 Right Forearm 4 days          Arterial Line            Arterial Line 10/12/17 Right Radial 4 days          Drain            NG/OG/Enteral Tube Orogastric Center mouth 4 days    Ventriculostomy/Subdural Ventricular drainage catheter Right Parietal region 4 days    External Urinary Catheter Medium less than 1 day          Airway            ETT  Hi-Lo 8 mm 4 days                Physical Exam:  Vitals: Blood pressure 119/75, pulse 90, temperature (!) 101 8 °F (38 8 °C), resp  rate 20, height 6' (1 829 m), weight 78 9 kg (173 lb 15 1 oz), SpO2 95 %  ,Body mass index is 23 59 kg/m²  Hemodynamic Monitoring: MAP: Arterial Line MAP (mmHg): 112 mmHg, CP Mean: ICP Mean (mmHg): -5 mmHg    General appearance: intubated, eventually awake during mid-examination,  cooperative and no distress  Head: Normocephalic, EVD in the right parietal intact  Eyes: +conjugated gaze, PERRL  Does not follow commands with eyes  Lungs: intubated  Heart: regular heart rate  Neurologic:   Mental status:  Intubated, eventually awake mid-examination  Cranial nerves: grossly intact (Cranial nerves II-XII)  Sensory: unable to assess  Motor: able to follow simple commands, squeezing bilateral hands, show thumbs up and two fingers, able to wiggle toes bilaterally     Reflexes: 2+ and symmetric  Negative wells, negative clonus      Lab Results:    Results from last 7 days  Lab Units 10/17/17  0435 10/16/17  0446 10/15/17  0507   WBC Thousand/uL 9 47 8 12 10 49*   HEMOGLOBIN g/dL 13 0 13 1 12 9   HEMATOCRIT % 38 8 40 1 40 0   PLATELETS Thousands/uL 220 162 142*   NEUTROS PCT % 76* 67 74   MONOS PCT % 13* 15* 12       Results from last 7 days  Lab Units 10/17/17  0435 10/16/17  0446 10/15/17  0507  10/14/17  0426 10/13/17  0422   SODIUM mmol/L 134* 138 136  < > 138 135*   POTASSIUM mmol/L 4 1 3 7 3 6  < > 3 7 3 7   CHLORIDE mmol/L 100 100 100  < > 101 99*   CO2 mmol/L 27 30 30  < > 32 28   BUN mg/dL 11 8 8  < > 10 11   CREATININE mg/dL 0 57* 0 57* 0 50*  < > 0 56* 0 61   CALCIUM mg/dL 8 8 8 6 8 0*  < > 7 9* 8 1*   TOTAL PROTEIN g/dL  --   --  6 5  --  6 2* 6 8   BILIRUBIN TOTAL mg/dL  --   --  0 92  --  0 62 0 86   ALK PHOS U/L  --   --  66  --  49 51   ALT U/L  --   --  85*  --  90* 119*   AST U/L  --   --  73*  --  83* 107*   GLUCOSE RANDOM mg/dL 125 97 106  < > 97 130   < > = values in this interval not displayed  Results from last 7 days  Lab Units 10/17/17  0435 10/16/17  0446 10/15/17  0507   MAGNESIUM mg/dL 2 2 2 4 2 3       Results from last 7 days  Lab Units 10/17/17  0435 10/16/17  0446 10/15/17  0507   PHOSPHORUS mg/dL 3 0 3 8 3 4       Results from last 7 days  Lab Units 10/12/17  0537 10/11/17  1951   INR  0 96 0 84*   PTT seconds 28 29     No results found for: TROPONINT  ABG:  Lab Results   Component Value Date    PHART 7 403 10/12/2017    ZGE9OIT 42 0 10/12/2017    PO2ART 160 2 (H) 10/12/2017    LVJ2KFF 25 6 10/12/2017    BEART 0 7 10/12/2017    SOURCE Line, Arterial 10/12/2017       Imaging Studies: I have personally reviewed pertinent reports  and I have personally reviewed pertinent films in PACS    EKG, Pathology, and Other Studies: I have personally reviewed pertinent reports        VTE  Prophylaxis: Sequential compression device (Venodyne)  and Heparin

## 2017-10-17 NOTE — RESPIRATORY THERAPY NOTE
RT Ventilator Management Note  Jing Odell 50 y o  male MRN: [id number]  Unit/Bed#: ICU 02 Encounter: [id number]      Daily Screen  Patient safety screen outcome[de-identified] Failed  Not Ready for Weaning due to[de-identified] Underline problem not resolved      Physical Exam:   Assessment Type: Assess only  General Appearance: Sedated  Respiratory Pattern: Assisted  Chest Assessment: Chest expansion symmetrical  Bilateral Breath Sounds: Coarse  Cough: Productive  Suction: ET Tube  O2 Device: vent  Subjective Data: na      Resp Comments: Pt remained stable on PC settings throughout the night  No changes made  Will cont to monitor

## 2017-10-18 LAB
ALBUMIN SERPL BCP-MCNC: 2.6 G/DL (ref 3.5–5)
ALP SERPL-CCNC: 79 U/L (ref 46–116)
ALT SERPL W P-5'-P-CCNC: 66 U/L (ref 12–78)
ANION GAP SERPL CALCULATED.3IONS-SCNC: 5 MMOL/L (ref 4–13)
ANION GAP SERPL CALCULATED.3IONS-SCNC: 6 MMOL/L (ref 4–13)
AST SERPL W P-5'-P-CCNC: 69 U/L (ref 5–45)
BACTERIA CSF CULT: NO GROWTH
BASOPHILS # BLD MANUAL: 0.08 THOUSAND/UL (ref 0–0.1)
BASOPHILS NFR MAR MANUAL: 1 % (ref 0–1)
BILIRUB SERPL-MCNC: 0.83 MG/DL (ref 0.2–1)
BUN SERPL-MCNC: 15 MG/DL (ref 5–25)
BUN SERPL-MCNC: 16 MG/DL (ref 5–25)
CALCIUM SERPL-MCNC: 8.8 MG/DL (ref 8.3–10.1)
CALCIUM SERPL-MCNC: 8.8 MG/DL (ref 8.3–10.1)
CHLORIDE SERPL-SCNC: 102 MMOL/L (ref 100–108)
CHLORIDE SERPL-SCNC: 104 MMOL/L (ref 100–108)
CO2 SERPL-SCNC: 29 MMOL/L (ref 21–32)
CO2 SERPL-SCNC: 29 MMOL/L (ref 21–32)
CREAT SERPL-MCNC: 0.51 MG/DL (ref 0.6–1.3)
CREAT SERPL-MCNC: 0.52 MG/DL (ref 0.6–1.3)
EOSINOPHIL # BLD MANUAL: 0.33 THOUSAND/UL (ref 0–0.4)
EOSINOPHIL NFR BLD MANUAL: 4 % (ref 0–6)
ERYTHROCYTE [DISTWIDTH] IN BLOOD BY AUTOMATED COUNT: 12.7 % (ref 11.6–15.1)
GFR SERPL CREATININE-BSD FRML MDRD: 124 ML/MIN/1.73SQ M
GFR SERPL CREATININE-BSD FRML MDRD: 125 ML/MIN/1.73SQ M
GLUCOSE SERPL-MCNC: 121 MG/DL (ref 65–140)
GLUCOSE SERPL-MCNC: 121 MG/DL (ref 65–140)
HCT VFR BLD AUTO: 37.3 % (ref 36.5–49.3)
HGB BLD-MCNC: 12.9 G/DL (ref 12–17)
LYMPHOCYTES # BLD AUTO: 1.41 THOUSAND/UL (ref 0.6–4.47)
LYMPHOCYTES # BLD AUTO: 17 % (ref 14–44)
MACROCYTES BLD QL AUTO: PRESENT
MAGNESIUM SERPL-MCNC: 2.6 MG/DL (ref 1.6–2.6)
MCH RBC QN AUTO: 34.7 PG (ref 26.8–34.3)
MCHC RBC AUTO-ENTMCNC: 34.6 G/DL (ref 31.4–37.4)
MCV RBC AUTO: 100 FL (ref 82–98)
METAMYELOCYTES NFR BLD MANUAL: 3 % (ref 0–1)
MONOCYTES # BLD AUTO: 1.16 THOUSAND/UL (ref 0–1.22)
MONOCYTES NFR BLD: 14 % (ref 4–12)
NEUTROPHILS # BLD MANUAL: 4.8 THOUSAND/UL (ref 1.85–7.62)
NEUTS SEG NFR BLD AUTO: 58 % (ref 43–75)
NRBC BLD AUTO-RTO: 0 /100 WBCS
PHOSPHATE SERPL-MCNC: 3.6 MG/DL (ref 2.7–4.5)
PLATELET # BLD AUTO: 281 THOUSANDS/UL (ref 149–390)
PLATELET BLD QL SMEAR: ADEQUATE
PMV BLD AUTO: 11.4 FL (ref 8.9–12.7)
POTASSIUM SERPL-SCNC: 4.1 MMOL/L (ref 3.5–5.3)
POTASSIUM SERPL-SCNC: 5.6 MMOL/L (ref 3.5–5.3)
PROT SERPL-MCNC: 7.4 G/DL (ref 6.4–8.2)
RBC # BLD AUTO: 3.72 MILLION/UL (ref 3.88–5.62)
RBC MORPH BLD: PRESENT
SODIUM SERPL-SCNC: 137 MMOL/L (ref 136–145)
SODIUM SERPL-SCNC: 138 MMOL/L (ref 136–145)
VARIANT LYMPHS # BLD AUTO: 3 %
WBC # BLD AUTO: 8.28 THOUSAND/UL (ref 4.31–10.16)

## 2017-10-18 PROCEDURE — 94760 N-INVAS EAR/PLS OXIMETRY 1: CPT

## 2017-10-18 PROCEDURE — 85007 BL SMEAR W/DIFF WBC COUNT: CPT | Performed by: STUDENT IN AN ORGANIZED HEALTH CARE EDUCATION/TRAINING PROGRAM

## 2017-10-18 PROCEDURE — 80053 COMPREHEN METABOLIC PANEL: CPT | Performed by: STUDENT IN AN ORGANIZED HEALTH CARE EDUCATION/TRAINING PROGRAM

## 2017-10-18 PROCEDURE — 83735 ASSAY OF MAGNESIUM: CPT | Performed by: STUDENT IN AN ORGANIZED HEALTH CARE EDUCATION/TRAINING PROGRAM

## 2017-10-18 PROCEDURE — 84100 ASSAY OF PHOSPHORUS: CPT | Performed by: STUDENT IN AN ORGANIZED HEALTH CARE EDUCATION/TRAINING PROGRAM

## 2017-10-18 PROCEDURE — 85027 COMPLETE CBC AUTOMATED: CPT | Performed by: STUDENT IN AN ORGANIZED HEALTH CARE EDUCATION/TRAINING PROGRAM

## 2017-10-18 PROCEDURE — 94003 VENT MGMT INPAT SUBQ DAY: CPT

## 2017-10-18 PROCEDURE — 94640 AIRWAY INHALATION TREATMENT: CPT

## 2017-10-18 PROCEDURE — 80048 BASIC METABOLIC PNL TOTAL CA: CPT | Performed by: INTERNAL MEDICINE

## 2017-10-18 PROCEDURE — 94668 MNPJ CHEST WALL SBSQ: CPT

## 2017-10-18 RX ORDER — THIAMINE HYDROCHLORIDE 100 MG/ML
100 INJECTION, SOLUTION INTRAMUSCULAR; INTRAVENOUS DAILY
Status: DISCONTINUED | OUTPATIENT
Start: 2017-10-19 | End: 2017-10-19

## 2017-10-18 RX ORDER — OXAZEPAM 15 MG/1
15 CAPSULE ORAL EVERY 8 HOURS SCHEDULED
Status: DISCONTINUED | OUTPATIENT
Start: 2017-10-18 | End: 2017-10-21

## 2017-10-18 RX ORDER — BISACODYL 10 MG
10 SUPPOSITORY, RECTAL RECTAL ONCE
Status: COMPLETED | OUTPATIENT
Start: 2017-10-18 | End: 2017-10-18

## 2017-10-18 RX ORDER — FENTANYL CITRATE 50 UG/ML
50 INJECTION, SOLUTION INTRAMUSCULAR; INTRAVENOUS EVERY 2 HOUR PRN
Status: DISCONTINUED | OUTPATIENT
Start: 2017-10-18 | End: 2017-10-19

## 2017-10-18 RX ORDER — ACETAMINOPHEN 650 MG/1
650 SUPPOSITORY RECTAL EVERY 6 HOURS
Status: DISCONTINUED | OUTPATIENT
Start: 2017-10-18 | End: 2017-10-19

## 2017-10-18 RX ORDER — LEVALBUTEROL 1.25 MG/.5ML
1.25 SOLUTION, CONCENTRATE RESPIRATORY (INHALATION)
Status: DISCONTINUED | OUTPATIENT
Start: 2017-10-18 | End: 2017-10-22 | Stop reason: HOSPADM

## 2017-10-18 RX ORDER — THIAMINE HYDROCHLORIDE 100 MG/ML
100 INJECTION, SOLUTION INTRAMUSCULAR; INTRAVENOUS DAILY
Status: DISCONTINUED | OUTPATIENT
Start: 2017-10-18 | End: 2017-10-18

## 2017-10-18 RX ORDER — ALBUTEROL SULFATE 90 UG/1
2 AEROSOL, METERED RESPIRATORY (INHALATION)
Status: DISCONTINUED | OUTPATIENT
Start: 2017-10-18 | End: 2017-10-18

## 2017-10-18 RX ADMIN — BISACODYL 10 MG: 10 SUPPOSITORY RECTAL at 09:10

## 2017-10-18 RX ADMIN — FAMOTIDINE 20 MG: 10 INJECTION, SOLUTION INTRAVENOUS at 21:26

## 2017-10-18 RX ADMIN — DEXMEDETOMIDINE HYDROCHLORIDE 0.7 MCG/KG/HR: 100 INJECTION, SOLUTION INTRAVENOUS at 04:17

## 2017-10-18 RX ADMIN — LEVETIRACETAM 500 MG: 100 INJECTION, SOLUTION INTRAVENOUS at 21:28

## 2017-10-18 RX ADMIN — HEPARIN SODIUM 5000 UNITS: 5000 INJECTION, SOLUTION INTRAVENOUS; SUBCUTANEOUS at 14:56

## 2017-10-18 RX ADMIN — CHLORHEXIDINE GLUCONATE 15 ML: 1.2 RINSE ORAL at 08:51

## 2017-10-18 RX ADMIN — HEPARIN SODIUM 5000 UNITS: 5000 INJECTION, SOLUTION INTRAVENOUS; SUBCUTANEOUS at 05:02

## 2017-10-18 RX ADMIN — FOLIC ACID 1 MG: 1 TABLET ORAL at 08:52

## 2017-10-18 RX ADMIN — Medication 100 MG: at 08:53

## 2017-10-18 RX ADMIN — POLYETHYLENE GLYCOL 3350 17 G: 17 POWDER, FOR SOLUTION ORAL at 08:53

## 2017-10-18 RX ADMIN — HEPARIN SODIUM 5000 UNITS: 5000 INJECTION, SOLUTION INTRAVENOUS; SUBCUTANEOUS at 21:27

## 2017-10-18 RX ADMIN — LEVALBUTEROL HYDROCHLORIDE 1.25 MG: 1.25 SOLUTION, CONCENTRATE RESPIRATORY (INHALATION) at 20:02

## 2017-10-18 RX ADMIN — MAGNESIUM HYDROXIDE 30 ML: 400 SUSPENSION ORAL at 09:10

## 2017-10-18 RX ADMIN — FENTANYL CITRATE 50 MCG: 50 INJECTION INTRAMUSCULAR; INTRAVENOUS at 21:27

## 2017-10-18 RX ADMIN — IPRATROPIUM BROMIDE 0.5 MG: 0.5 SOLUTION RESPIRATORY (INHALATION) at 20:02

## 2017-10-18 RX ADMIN — LORAZEPAM 1 MG: 2 INJECTION INTRAMUSCULAR; INTRAVENOUS at 05:32

## 2017-10-18 RX ADMIN — FENTANYL CITRATE 50 MCG: 50 INJECTION INTRAMUSCULAR; INTRAVENOUS at 04:47

## 2017-10-18 RX ADMIN — AMLODIPINE BESYLATE 5 MG: 5 TABLET ORAL at 08:51

## 2017-10-18 RX ADMIN — ACETAMINOPHEN 650 MG: 325 TABLET, FILM COATED ORAL at 05:02

## 2017-10-18 RX ADMIN — IPRATROPIUM BROMIDE 0.5 MG: 0.5 SOLUTION RESPIRATORY (INHALATION) at 14:49

## 2017-10-18 RX ADMIN — OXAZEPAM 30 MG: 15 CAPSULE, GELATIN COATED ORAL at 05:02

## 2017-10-18 RX ADMIN — ACETAMINOPHEN 650 MG: 650 SUPPOSITORY RECTAL at 17:00

## 2017-10-18 RX ADMIN — NICOTINE 21 MG: 21 PATCH, EXTENDED RELEASE TRANSDERMAL at 08:53

## 2017-10-18 RX ADMIN — CHLORHEXIDINE GLUCONATE 15 ML: 1.2 RINSE ORAL at 21:26

## 2017-10-18 RX ADMIN — DEXMEDETOMIDINE HYDROCHLORIDE 0.7 MCG/KG/HR: 100 INJECTION, SOLUTION INTRAVENOUS at 08:26

## 2017-10-18 RX ADMIN — FAMOTIDINE 20 MG: 40 POWDER, FOR SUSPENSION ORAL at 08:52

## 2017-10-18 RX ADMIN — LEVALBUTEROL HYDROCHLORIDE 1.25 MG: 1.25 SOLUTION, CONCENTRATE RESPIRATORY (INHALATION) at 14:49

## 2017-10-18 RX ADMIN — LEVETIRACETAM 500 MG: 100 SOLUTION ORAL at 08:52

## 2017-10-18 NOTE — PROGRESS NOTES
Progress Note - Critical Care   Eunice Montana 50 y o  male MRN: [id number]  Unit/Bed#: ICU 02 Encounter: [id number]    Assessment/Plan:     50year-old male initially presented to [facility] 10/11 w/ double vision and alcohol intoxication, found on CT to have intraventricular hemorrhage with subarachnoid extension, intubated and transferred to [facility] ICU, s/p EVD placement 10/12  IR angiogram w/ MCA pseudoaneurysm, infectious work-up so far negative  Extubated successfully 10/18       Post-bleed day #8  EVD day #7    EVD/a-line/condomcath    Patient Active Problem List   Diagnosis    Subarachnoid hemorrhage (HCC)    Headache    Alcohol abuse    Nicotine dependence    Intraventricular hemorrhage (HCC)    Pseudoaneurysm (HCC)    Thrombocytopenia (HCC)    Hypoalbuminemia    Leukocytosis         Neuro: IVH w/ subarachnoid extension (H/H 1, mod fritz 4) s/p EVD placement   IR cerebral angiogram 10/12 w/ 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, per neurosurg unlikely responsible for hemorrhage, infectious workup for mycotic etiology  Post-bleed day #8, post EVD-day#7  - This AM (10/18): On precedex 0 7, intermittent PRN fentanyl, ativan  Eyes open to voice, following sided-commands  Strong cough reflex  Weaned precedex to extubate  - 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 mycotic pseudoaneurysm: BCx (10/12): NG4D, TTE (10/12): EF 60%, G1DD, normal RV size/systolic function, IVC dilated  - EVD in place, pressure 5-22 , output: 121cc/24H  - SBP goal <140  - Neurochecks q4H  - Seizure prophylaxis: Keppra 500 NGT BID    - Alcohol w/d: Continue Serax 30mg q8H, Ativan 1mg q2H PRN  - Seroquel 25mg qHS  - Neurosurg following        CV:   - A-line in place  - SBP goal <140, SBP trending 120s-150s on Norvasc 5mg QD  - TTE: see "neuro"      Lung:   - Extubated successfully this AM  - Continues to have persistent secretions, hypophonia  Strong cough  Airway clearance protocol     - ABG (10/12): 7 4/42/160/25 6, base excess 0 7     Gi:  - NPO, f/u swallow eval  - GI PPx: Pepcid 20mg BID  - +BM today        FEN:   - Potassium 5 6 this AM; repeat 4 1   - Replete lytes PRN     :   - Condom cath  - BUN/creat 15/0 52  - U/o 1050cc/24H, +1256cc/24H, clinically euvolemic      ID:   - Afebrile overnight, WBC wnl  - Repeat BCx (10/15): NG3D   - Infectious workup in process, see "neuro"      Heme:   - Thrombocytopenia: resolved   - Hgb stable  - DvtPpx: SCDs, subqH     Endo: Maintain euglycemia, sugars trending 90s-120s                Msk/Skin: Skin ulcer prophylaxis     Disposition: ICU     Chief Complaint: Secretions, cough    HPI/24hr events: Extubated this AM     Physical Exam:   Physical Exam   Constitutional: No distress  HENT:   Head: Normocephalic and atraumatic  Eyes: Pupils are equal, round, and reactive to light  Cardiovascular: Normal rate and regular rhythm  Pulmonary/Chest: Effort normal and breath sounds normal    +cough, persistent secretions  Minimal rhonchi on aucultation   Abdominal: Soft  Bowel sounds are normal    Musculoskeletal: He exhibits no edema  Neurological:   Alert and oriented  Eyes open spontaneously, follows commands  Hypophonic     Skin: He is not diaphoretic  Vitals:    10/18/17 0600 10/18/17 0700 10/18/17 0800 10/18/17 0805   BP:       Pulse: 70 78 74 72   Resp: 13 (!) 35 16    Temp: 98 2 °F (36 8 °C) 98 2 °F (36 8 °C) 98 6 °F (37 °C)    TempSrc:       SpO2: 96% 98% 97% 97%   Weight:       Height:         Arterial Line BP: 126/96  Arterial Line MAP (mmHg): 110 mmHg    Temperature:   Temp (24hrs), Av 5 °F (36 9 °C), Min:97 5 °F (36 4 °C), Max:100 °F (37 8 °C)    Current: Temperature: 98 6 °F (37 °C)    Weights:   IBW: 77 6 kg    Body mass index is 23 59 kg/m²    Weight (last 2 days)     None               Non-Invasive/Invasive Ventilation Settings:  Respiratory    Lab Data (Last 4 hours)    None         O2/Vent Data (Last 4 hours)      10/18 0805           Vent Mode AC/PC Patient safety screen outcome: Passed       Resp Rate (BPM) (BPM) 12       Pressure Control Set (cm H2O) (cm) 10       Insp Time (sec) (sec) 1       FiO2 (%) (%) 40       PEEP (cmH2O) (cmH2O) 5       MV 7 6                 No results found for: PHART, WVZ4ZNA, PO2ART, ZJR8GIV, F4GAZJGL, BEART, SOURCE      Intake and Outputs:  I/O       10/16 0701 - 10/17 0700 10/17 0701 - 10/18 0700 10/18 0701 - 10/19 0700    P  O  0      I V  (mL/kg) 493 3 (6 3) 557 8 (7 1) 47 6 (0 6)    NG/ 170     IV Piggyback  500     Feedings 1225 1200 100    Total Intake(mL/kg) 1998 3 (25 3) 2427 8 (30 8) 147 6 (1 9)    Urine (mL/kg/hr) 1535 (0 8) 1050 (0 6) 200 (1 2)    Drains 280 (0 1) 121 (0 1) 35 (0 2)    Total Output 1815 1171 235    Net +183 3 +1256 8 -87 4           Unmeasured Urine Occurrence 1 x 2 x           Nutrition:        Diet Orders            Start     Ordered    10/17/17 1507  Room Service  Once     Question:  Type of Service  Answer:  Room Service- Not Appropriate    10/17/17 1506    10/14/17 1451  Diet Enteral/Parenteral; Tube Feeding No Oral Diet; Jevity 1 2 Rob; 50; 50; Prosource Protein Liquid - Two Packets  Diet effective now     Comments:  Jevity 1 2 - 50cc/hour   Question Answer Comment   Diet Type Enteral/Parenteral    Enteral/Parenteral Tube Feeding No Oral Diet    Tube Feeding Formula: Jevity 1 2 Rob    Tube Feeding Bolus (mL): 50    Tube Feeding Continuous rate (mL/hr): 50    Prosource Protein Liquid - No Carb Prosource Protein Liquid - Two Packets    RD to adjust diet per protocol?  No        10/14/17 1450            Labs:     Results from last 7 days  Lab Units 10/18/17  0438 10/17/17  0435 10/16/17  0446 10/15/17  0507   WBC Thousand/uL 8 28 9 47 8 12 10 49*   HEMOGLOBIN g/dL 12 9 13 0 13 1 12 9   HEMATOCRIT % 37 3 38 8 40 1 40 0   PLATELETS Thousands/uL 281 220 162 142*   NEUTROS PCT %  --  76* 67 74   MONOS PCT %  --  13* 15* 12   MONO PCT MAN % 14*  --   --   --       Results from last 7 days  Lab Units 10/18/17  0438 10/17/17  1828 10/17/17  0435 10/16/17  0446 10/15/17  0507  10/14/17  0426   SODIUM mmol/L 137 137 134* 138 136  < > 138   POTASSIUM mmol/L 5 6* 3 9 4 1 3 7 3 6  < > 3 7   CHLORIDE mmol/L 102  --  100 100 100  < > 101   CO2 mmol/L 29  --  27 30 30  < > 32   BUN mg/dL 16  --  11 8 8  < > 10   CREATININE mg/dL 0 51*  --  0 57* 0 57* 0 50*  < > 0 56*   CALCIUM mg/dL 8 8  --  8 8 8 6 8 0*  < > 7 9*   TOTAL PROTEIN g/dL 7 4  --   --   --  6 5  --  6 2*   BILIRUBIN TOTAL mg/dL 0 83  --   --   --  0 92  --  0 62   ALK PHOS U/L 79  --   --   --  66  --  49   ALT U/L 66  --   --   --  85*  --  90*   AST U/L 69*  --   --   --  73*  --  83*   GLUCOSE RANDOM mg/dL 121  --  125 97 106  < > 97   < > = values in this interval not displayed  Results from last 7 days  Lab Units 10/18/17  0438 10/17/17  0435 10/16/17  0446   MAGNESIUM mg/dL 2 6 2 2 2 4       Results from last 7 days  Lab Units 10/18/17  0438 10/17/17  0435 10/16/17  0446   PHOSPHORUS mg/dL 3 6 3 0 3 8        Results from last 7 days  Lab Units 10/12/17  0537 10/11/17  1951   INR  0 96 0 84*   PTT seconds 28 29         No results found for: TROPONINI    Imaging:  I have personally reviewed pertinent reports  Micro:  Lab Results   Component Value Date    BLOODCX No Growth at 48 hrs  10/15/2017    BLOODCX No Growth at 48 hrs  10/15/2017    BLOODCX No Growth After 5 Days   10/12/2017       Allergies: No Known Allergies    Medications:   Scheduled Meds:  acetaminophen 650 mg Oral Q6H [address]   amLODIPine 5 mg Oral Daily   bisacodyl 10 mg Rectal Once   chlorhexidine 15 mL Swish & Spit Q12H [address]   famotidine 20 mg Oral BID   folic acid 1 mg Oral Daily   heparin (porcine) 5,000 Units Subcutaneous Q8H [address]   levETIRAcetam 500 mg Per NG Tube Q12H [address]   magnesium hydroxide 30 mL Oral Daily   nicotine 21 mg Transdermal Daily   oxazepam 15 mg Oral Q8H [address]   polyethylene glycol 17 g Oral Daily   QUEtiapine 25 mg Oral HS   senna 1 tablet Oral HS thiamine 100 mg Oral Daily     Continuous Infusions:  dexmedetomidine 0 1-0 7 mcg/kg/hr Last Rate: 0 7 mcg/kg/hr (10/18/17 0826)   multi-electrolyte 10 mL/hr Last Rate: 10 mL/hr (10/17/17 1620)     PRN Meds:    fentanyl citrate (PF) 50 mcg Q1H PRN   labetalol 10 mg Q4H PRN   LORazepam 1 mg Q2H PRN   ondansetron 4 mg Q6H PRN       VTE Pharmacologic Prophylaxis: Heparin  VTE Mechanical Prophylaxis: sequential compression device    Invasive lines and devices: Invasive Devices     Peripheral Intravenous Line            Peripheral IV 10/17/17 Left Arm 1 day    Peripheral IV 10/18/17 Right Forearm less than 1 day          Arterial Line            Arterial Line 10/12/17 Left Radial 6 days          Drain            NG/OG/Enteral Tube Orogastric Center mouth 5 days    Ventriculostomy/Subdural Ventricular drainage catheter Right Parietal region 5 days          Airway            ETT  Hi-Lo 8 mm 5 days                   Counseling / Coordination of Care  Total time spent today 45min minutes  Greater than 50% of total time was spent with the patient and / or family counseling and / or coordination of care  A description of the counseling / coordination of care: 45min     Code Status: Level 1 - Full Code     Portions of the record may have been created with voice recognition software  Occasional wrong word or "sound a like" substitutions may have occurred due to the inherent limitations of voice recognition software  Read the chart carefully and recognize, using context, where substitutions have occurred       Piper Thrasher MD

## 2017-10-18 NOTE — OCCUPATIONAL THERAPY NOTE
Occupational Therapy Cancellation    Orders received  Chart reviewed  Pt remains intubated and sedated at this time  Will continue to follow pt on caseload in order to complete formal OT evaluation pending further medical stability       Carrie Garcia MS, OTR/L

## 2017-10-18 NOTE — RESPIRATORY THERAPY NOTE
RT Ventilator Management Note  Eunice Montana 50 y o  male MRN: [id number]  Unit/Bed#: ICU 02 Encounter: [id number]            Physical Exam:   Assessment Type: Assess only  General Appearance: Sedated  Respiratory Pattern: Assisted  Chest Assessment: Chest expansion symmetrical  Bilateral Breath Sounds: Clear  Cough: Productive  Suction: ET Tube  O2 Device: vent  Subjective Data: na      Resp Comments: Pt is doing well on current vent settings  No vent chagnes made through the night  No respiratory distress noted  Pt appears to be comfortable on current vent settings

## 2017-10-18 NOTE — RESPIRATORY THERAPY NOTE
RT Ventilator Management Note  Rachid Click 50 y o  male MRN: [id number]  Unit/Bed#: ICU 02 Encounter: [id number]      Daily Screen  Patient safety screen outcome[de-identified] Passed  Not Ready for Weaning due to[de-identified]  ( )  Spont breathing trial % for 30 min:  (psv 5/5)  Spont breathing trial outcome[de-identified] Passed  Spont breathing trial reason failed:  ( )  Previous settings resumed:  ( )  Name of Medical Team Notified[de-identified] medical team  Preparing to extubate/ Notify Nurse: Yes  Extubation order obtained: Yes  Patient extubated: Yes      Physical Exam:   Assessment Type: Assess only  General Appearance: Awake  Respiratory Pattern: Normal  Chest Assessment: Chest expansion symmetrical  Bilateral Breath Sounds: Coarse  Cough: Productive  Suction: Oral  O2 Device: 6l NC  Subjective Data: na      Resp Comments: pt extubated to 6l NC no stridor developed, soft voice heard   SpO2 93  will continue to monitor

## 2017-10-18 NOTE — PLAN OF CARE

## 2017-10-18 NOTE — RESPIRATORY THERAPY NOTE
RT Protocol Note  Miranda Cochran 50 y o  male MRN: [id number]  Unit/Bed#: ICU 02 Encounter: [id number]    Assessment     Principal Problem:    Subarachnoid hemorrhage (HCC)  Active Problems:    Headache    Alcohol abuse    Nicotine dependence    Intraventricular hemorrhage (HCC)    Pseudoaneurysm (HCC)    Thrombocytopenia (HCC)    Hypoalbuminemia    Leukocytosis      Home Pulmonary Medications:  No home resp  meds  Past Medical History:   Diagnosis Date    Alcoholism /alcohol abuse ([facility] [address] )      Social History     Social History    Marital status: Single     Spouse name: N/A    Number of children: N/A    Years of education: N/A     Social History Main Topics    Smoking status: Current Every Day Smoker     Packs/day: 1 50    Smokeless tobacco: Never Used    Alcohol use 18 0 oz/week     30 Cans of beer per week    Drug use:      Types: Marijuana, Cocaine    Sexual activity: Not Asked     Other Topics Concern    None     Social History Narrative    None       Subjective     Subjective Data: na    Objective     Physical Exam:   Assessment Type: (P) Assess only  General Appearance: (P) Awake, Alert  Respiratory Pattern: (P) Normal  Chest Assessment: (P) Chest expansion symmetrical  Bilateral Breath Sounds: (P) Rhonchi  Cough: (P) Productive  Suction: (P) Oral  O2 Device: (P) 6LNC  Subjective Data: na    Vitals:  Blood pressure 119/75, pulse (P) 92, temperature 98 6 °F (37 °C), resp  rate (!) 32, height 6' (1 829 m), weight 78 9 kg (173 lb 15 1 oz), SpO2 (P) 96 %        Results from last 7 days  Lab Units 10/12/17  1958   Shane 30 ART  7 403   PCO2 ART mm Hg 42 0   PO2 ART mm Hg 160 2*   HCO3 ART mmol/L 25 6   BASE EXC ART mmol/L 0 7   O2 CONTENT ART mL/dL 19 8   O2 HGB, ARTERIAL % 97 6*   ABG SOURCE  Line, Arterial       Imaging and other studies: I have personally reviewed pertinent films in PACS    O2 Device: (P) 6LNC     Plan     Respiratory Plan: (P) Mild Distress pathway  Airway Clearance Plan: Flutter Resp Comments: (P) assessed pt for respiratory and airway clearance protocol  arti 6LNC well, sx for a moderate amount of white oral secretions  will continue to monitor  ordering flutter valve and TID aerosol tx

## 2017-10-18 NOTE — PROGRESS NOTES
Progress Note - Neurosurgery   Millicent Méndez 50 y o  male MRN: [id number]  Unit/Bed#: ICU 02 Encounter: [id number]      Assessment:  1  Subarachnoid hemorrhage with intraventricular hemorrhage extension  2  Placement of EVD (10/12/2017)  3  Platt Baird score 1; Valdes 4  4  Right MCA fusiform aneurysm   5  Alcohol abuse  6  Nicotine dependency     Plan: 52M presented with alcohol intoxication & dependency c/o visual disturbances, and headaches  CT showed SAH with IVH extension  He was intubated for airway protection and an EVD was placed  · Exam: GCS 10T (3E, 6M, 1VT)  EOMI, PERRL briskly bilaterally  FC in all extremities  Able to squeeze hands bilaterally, show fingers, wiggle toes, tested HF/HE 4/5 bilaterally  · Imaging: personally reviewed and reviewed by attending on 10/13/2017  · 10/16 - CT head wo: 1) Decreasing volume of blood products and 3rd ventricle as well as mild decrease in ventricuomegaly  · STAT CT head if decline in GCS >2 pts/1 hr  · EVD at 10+ mmHg above tragus - continue at this time  · Output is 121 cc total overnight Continue to monitor  · Monitor ICP's ranging in the teens overnight  Goal of ICPs <20  · Continue Keppra 500mg every 12 hours   · DVT ppx: SCDs, subcutaneous heparin  · Pain/Sedation: Precedex 0 4mcg/kg/hr, as needed fentanyl 50 mcg q1h, as needed ativan 2mg q1 hr   · Required one dose of fentanyl at 0447 and one dose of ativan at 0532  · SBP goal <140  · Patient has been getting agitated overnight, causing increase in BP  Continue to monitor  · Na 137 and K+ 5 6 - continue to trend  · WBC 8 28 compared to 9 47, Tmax of 99 on 10/17 at 20:00, otherwise remaining afebrile   · Blood cultures collected 10/12 x3 - no growth after 5 days (final report)  · Blood cultures collected 10/15 x2  - no growth after 48 hours  · CSF culture - no growth (final result), protein 42  · Continue management per primary team   · Attempt extubation when able   Resp: was trial on PSV setting but had apneic episodes and placed back on previous setting and tolerating AC/PC settings  · Continue to monitor examination  Call with questions or concerns  Subjective/Objective   Chief Complaint: intubated    Subjective: intubated / none offered    Objective: intubated, NAD  I/O       10/16 0701 - 10/17 0700 10/17 0701 - 10/18 0700 10/18 0701 - 10/19 0700    P  O  0      I V  (mL/kg) 493 3 (6 3) 557 8 (7 1) 47 6 (0 6)    NG/ 170     IV Piggyback  500     Feedings 1225 1200 100    Total Intake(mL/kg) 1998 3 (25 3) 2427 8 (30 8) 147 6 (1 9)    Urine (mL/kg/hr) 1535 (0 8) 1050 (0 6) 200 (0 8)    Drains 280 (0 1) 121 (0 1) 35 (0 1)    Total Output 1815 1171 235    Net +183 3 +1256 8 -87 4           Unmeasured Urine Occurrence 1 x 2 x           Invasive Devices     Peripheral Intravenous Line            Peripheral IV 10/17/17 Left Arm 1 day    Peripheral IV 10/18/17 Right Forearm less than 1 day          Arterial Line            Arterial Line 10/12/17 Left Radial 6 days          Drain            NG/OG/Enteral Tube Orogastric Center mouth 5 days    Ventriculostomy/Subdural Ventricular drainage catheter Right Parietal region 5 days          Airway            ETT  Hi-Lo 8 mm 5 days                Physical Exam:  Vitals: Blood pressure 119/75, pulse 74, temperature 98 6 °F (37 °C), resp  rate 17, height 6' (1 829 m), weight 78 9 kg (173 lb 15 1 oz), SpO2 96 %  ,Body mass index is 23 59 kg/m²  Hemodynamic Monitoring: MAP: Arterial Line MAP (mmHg): 120 mmHg, CPP: CPP: 120, ICP Mean: ICP Mean (mmHg): 4 mmHg    General appearance: intubated, awakes to voice commands, appears stated age, cooperative and no distress  Head: Normocephalic  Eyes: EOMI, PERRL briskly bilaterally  Lungs: intubated  Heart: regular heart rate  Neurologic:   Mental status: GCS 10T, opens eyes to voice, follows commands  Sensory: unable to assess due to intubation  Motor: follows commands in all extremities   Able to squeeze bilateral hands, show fingers, able to pull in and push away on left, right not tested  Able to lift bilateral legs with good strength 4/5 on HF/HE bilaterally and can wiggle toes  Reflexes: 1+ and symmetric  negative wells, negative clonus    Lab Results:    Results from last 7 days  Lab Units 10/18/17  0438 10/17/17  0435 10/16/17  0446 10/15/17  0507   WBC Thousand/uL 8 28 9 47 8 12 10 49*   HEMOGLOBIN g/dL 12 9 13 0 13 1 12 9   HEMATOCRIT % 37 3 38 8 40 1 40 0   PLATELETS Thousands/uL 281 220 162 142*   NEUTROS PCT %  --  76* 67 74   MONOS PCT %  --  13* 15* 12   MONO PCT MAN % 14*  --   --   --        Results from last 7 days  Lab Units 10/18/17  0438 10/17/17  1828 10/17/17  0435 10/16/17  0446 10/15/17  0507  10/14/17  0426   SODIUM mmol/L 137 137 134* 138 136  < > 138   POTASSIUM mmol/L 5 6* 3 9 4 1 3 7 3 6  < > 3 7   CHLORIDE mmol/L 102  --  100 100 100  < > 101   CO2 mmol/L 29  --  27 30 30  < > 32   BUN mg/dL 16  --  11 8 8  < > 10   CREATININE mg/dL 0 51*  --  0 57* 0 57* 0 50*  < > 0 56*   CALCIUM mg/dL 8 8  --  8 8 8 6 8 0*  < > 7 9*   TOTAL PROTEIN g/dL 7 4  --   --   --  6 5  --  6 2*   BILIRUBIN TOTAL mg/dL 0 83  --   --   --  0 92  --  0 62   ALK PHOS U/L 79  --   --   --  66  --  49   ALT U/L 66  --   --   --  85*  --  90*   AST U/L 69*  --   --   --  73*  --  83*   GLUCOSE RANDOM mg/dL 121  --  125 97 106  < > 97   < > = values in this interval not displayed      Results from last 7 days  Lab Units 10/18/17  0438 10/17/17  0435 10/16/17  0446   MAGNESIUM mg/dL 2 6 2 2 2 4       Results from last 7 days  Lab Units 10/18/17  0438 10/17/17  0435 10/16/17  0446   PHOSPHORUS mg/dL 3 6 3 0 3 8       Results from last 7 days  Lab Units 10/12/17  0537 10/11/17  1951   INR  0 96 0 84*   PTT seconds 28 29     No results found for: TROPONINT  ABG:  Lab Results   Component Value Date    PHART 7 403 10/12/2017    BUG1XER 42 0 10/12/2017    PO2ART 160 2 (H) 10/12/2017    [id number] 25 6 10/12/2017    BEART 0 7 10/12/2017    SOURCE Line, Arterial 10/12/2017       Imaging Studies: I have personally reviewed pertinent reports  and I have personally reviewed pertinent films in PACS    EKG, Pathology, and Other Studies: I have personally reviewed pertinent reports        VTE  Prophylaxis: Sequential compression device (Venodyne)  and Heparin

## 2017-10-18 NOTE — RESTORATIVE TECHNICIAN NOTE
Restorative Specialist Mobility Note       Activity: Turn, Other (Comment) (range of motion)  Level of Assistance: Dependent, patient does less than 25%        Ambulation Response:  Tolerated fairly well (agitated)  Repositioned: Supine (left with respiratory to get extubated)           Range of Motion: All extremities     Anti-Embolism Device Off: Other (Comment) (getting extubated)

## 2017-10-18 NOTE — SPEECH THERAPY NOTE
Speech Language/Pathology  Pt extubated, order received, chart reviewed, spoke w/ nsg  Pt w/ significant coughing, harsh voice, bringing up mod amt secretions    Will f/u first thing in am

## 2017-10-18 NOTE — NUTRITION
Await PO diet advancement  If unable to safely advance PO diet in next 24 hrs then consider an alternate means of nutrition support and reconsult RD for recs  Consider check Vit B12 and Folate

## 2017-10-18 NOTE — SOCIAL WORK
Late Entry - CM faxed PASRR and [id number] to [facility] due to pt having no insurance and possible need for STR at d/c

## 2017-10-18 NOTE — RESPIRATORY THERAPY NOTE
RT Ventilator Management Note  Will Chirinos 50 y o  male MRN: [id number]  Unit/Bed#: ICU 02 Encounter: [id number]      Daily Screen  Patient safety screen outcome[de-identified] Passed  Spont breathing trial % for 30 min: Yes  Spont breathing trial outcome[de-identified] Failed  Spont breathing trial reason failed: RR < 8 bpm  Previous settings resumed: Yes      Physical Exam:   Assessment Type: Assess only  Respiratory Pattern: Assisted  Chest Assessment: Chest expansion symmetrical  Bilateral Breath Sounds: Coarse  Suction: ET Tube  O2 Device: vent    Resp Comments: Pt was trialed on PSV settings at this time  Pt had apnic episodes  Pt was placed back on previous settings  Pt is tolerating AC/PC settings  No resp distress noted  Will continue to monitor throughout shift

## 2017-10-19 ENCOUNTER — APPOINTMENT (INPATIENT)
Dept: RADIOLOGY | Facility: HOSPITAL | Age: 52
DRG: 021 | End: 2017-10-19
Payer: COMMERCIAL

## 2017-10-19 LAB
ALBUMIN SERPL BCP-MCNC: 2.9 G/DL (ref 3.5–5)
ALP SERPL-CCNC: 91 U/L (ref 46–116)
ALT SERPL W P-5'-P-CCNC: 54 U/L (ref 12–78)
ANION GAP SERPL CALCULATED.3IONS-SCNC: 8 MMOL/L (ref 4–13)
AST SERPL W P-5'-P-CCNC: 37 U/L (ref 5–45)
BASOPHILS # BLD AUTO: 0.06 THOUSANDS/ΜL (ref 0–0.1)
BASOPHILS NFR BLD AUTO: 1 % (ref 0–1)
BILIRUB SERPL-MCNC: 0.8 MG/DL (ref 0.2–1)
BUN SERPL-MCNC: 14 MG/DL (ref 5–25)
CALCIUM SERPL-MCNC: 9.1 MG/DL (ref 8.3–10.1)
CHLORIDE SERPL-SCNC: 101 MMOL/L (ref 100–108)
CO2 SERPL-SCNC: 26 MMOL/L (ref 21–32)
CREAT SERPL-MCNC: 0.52 MG/DL (ref 0.6–1.3)
EOSINOPHIL # BLD AUTO: 0.07 THOUSAND/ΜL (ref 0–0.61)
EOSINOPHIL NFR BLD AUTO: 1 % (ref 0–6)
ERYTHROCYTE [DISTWIDTH] IN BLOOD BY AUTOMATED COUNT: 12.3 % (ref 11.6–15.1)
GFR SERPL CREATININE-BSD FRML MDRD: 124 ML/MIN/1.73SQ M
GLUCOSE SERPL-MCNC: 102 MG/DL (ref 65–140)
HCT VFR BLD AUTO: 37.4 % (ref 36.5–49.3)
HGB BLD-MCNC: 12.7 G/DL (ref 12–17)
LYMPHOCYTES # BLD AUTO: 1.06 THOUSANDS/ΜL (ref 0.6–4.47)
LYMPHOCYTES NFR BLD AUTO: 9 % (ref 14–44)
MAGNESIUM SERPL-MCNC: 2.6 MG/DL (ref 1.6–2.6)
MCH RBC QN AUTO: 33.9 PG (ref 26.8–34.3)
MCHC RBC AUTO-ENTMCNC: 34 G/DL (ref 31.4–37.4)
MCV RBC AUTO: 100 FL (ref 82–98)
MONOCYTES # BLD AUTO: 2.25 THOUSAND/ΜL (ref 0.17–1.22)
MONOCYTES NFR BLD AUTO: 20 % (ref 4–12)
NEUTROPHILS # BLD AUTO: 7.69 THOUSANDS/ΜL (ref 1.85–7.62)
NEUTS SEG NFR BLD AUTO: 69 % (ref 43–75)
NRBC BLD AUTO-RTO: 0 /100 WBCS
PHOSPHATE SERPL-MCNC: 3.7 MG/DL (ref 2.7–4.5)
PLATELET # BLD AUTO: 271 THOUSANDS/UL (ref 149–390)
PMV BLD AUTO: 10.3 FL (ref 8.9–12.7)
POTASSIUM SERPL-SCNC: 3.7 MMOL/L (ref 3.5–5.3)
PROT SERPL-MCNC: 7.5 G/DL (ref 6.4–8.2)
RBC # BLD AUTO: 3.75 MILLION/UL (ref 3.88–5.62)
SODIUM SERPL-SCNC: 135 MMOL/L (ref 136–145)
WBC # BLD AUTO: 11.35 THOUSAND/UL (ref 4.31–10.16)

## 2017-10-19 PROCEDURE — 92610 EVALUATE SWALLOWING FUNCTION: CPT

## 2017-10-19 PROCEDURE — 85025 COMPLETE CBC W/AUTO DIFF WBC: CPT | Performed by: STUDENT IN AN ORGANIZED HEALTH CARE EDUCATION/TRAINING PROGRAM

## 2017-10-19 PROCEDURE — 94668 MNPJ CHEST WALL SBSQ: CPT

## 2017-10-19 PROCEDURE — G8987 SELF CARE CURRENT STATUS: HCPCS

## 2017-10-19 PROCEDURE — G8978 MOBILITY CURRENT STATUS: HCPCS

## 2017-10-19 PROCEDURE — 83735 ASSAY OF MAGNESIUM: CPT | Performed by: STUDENT IN AN ORGANIZED HEALTH CARE EDUCATION/TRAINING PROGRAM

## 2017-10-19 PROCEDURE — 97167 OT EVAL HIGH COMPLEX 60 MIN: CPT

## 2017-10-19 PROCEDURE — 94640 AIRWAY INHALATION TREATMENT: CPT

## 2017-10-19 PROCEDURE — G8979 MOBILITY GOAL STATUS: HCPCS

## 2017-10-19 PROCEDURE — 74000 HB X-RAY EXAM OF ABDOMEN (SINGLE ANTEROPOSTERIOR VIEW): CPT

## 2017-10-19 PROCEDURE — G8988 SELF CARE GOAL STATUS: HCPCS

## 2017-10-19 PROCEDURE — 84100 ASSAY OF PHOSPHORUS: CPT | Performed by: STUDENT IN AN ORGANIZED HEALTH CARE EDUCATION/TRAINING PROGRAM

## 2017-10-19 PROCEDURE — 94760 N-INVAS EAR/PLS OXIMETRY 1: CPT

## 2017-10-19 PROCEDURE — 71010 HB CHEST X-RAY 1 VIEW FRONTAL (PORTABLE): CPT

## 2017-10-19 PROCEDURE — 80053 COMPREHEN METABOLIC PANEL: CPT | Performed by: STUDENT IN AN ORGANIZED HEALTH CARE EDUCATION/TRAINING PROGRAM

## 2017-10-19 PROCEDURE — 97163 PT EVAL HIGH COMPLEX 45 MIN: CPT

## 2017-10-19 RX ORDER — ACETAMINOPHEN 650 MG/1
650 SUPPOSITORY RECTAL EVERY 6 HOURS PRN
Status: DISCONTINUED | OUTPATIENT
Start: 2017-10-19 | End: 2017-10-20

## 2017-10-19 RX ORDER — DIAZEPAM 5 MG/ML
10 INJECTION, SOLUTION INTRAMUSCULAR; INTRAVENOUS ONCE
Status: COMPLETED | OUTPATIENT
Start: 2017-10-19 | End: 2017-10-19

## 2017-10-19 RX ORDER — GINSENG 100 MG
1 CAPSULE ORAL 2 TIMES DAILY
Status: DISPENSED | OUTPATIENT
Start: 2017-10-19 | End: 2017-10-22

## 2017-10-19 RX ADMIN — LABETALOL 20 MG/4 ML (5 MG/ML) INTRAVENOUS SYRINGE 10 MG: at 09:18

## 2017-10-19 RX ADMIN — CHLORHEXIDINE GLUCONATE 15 ML: 1.2 RINSE ORAL at 20:38

## 2017-10-19 RX ADMIN — CHLORHEXIDINE GLUCONATE 15 ML: 1.2 RINSE ORAL at 09:17

## 2017-10-19 RX ADMIN — LEVALBUTEROL HYDROCHLORIDE 1.25 MG: 1.25 SOLUTION, CONCENTRATE RESPIRATORY (INHALATION) at 14:13

## 2017-10-19 RX ADMIN — LORAZEPAM 1 MG: 2 INJECTION INTRAMUSCULAR; INTRAVENOUS at 00:31

## 2017-10-19 RX ADMIN — HEPARIN SODIUM 5000 UNITS: 5000 INJECTION, SOLUTION INTRAVENOUS; SUBCUTANEOUS at 17:26

## 2017-10-19 RX ADMIN — IPRATROPIUM BROMIDE 0.5 MG: 0.5 SOLUTION RESPIRATORY (INHALATION) at 14:13

## 2017-10-19 RX ADMIN — DEXMEDETOMIDINE HYDROCHLORIDE 0.7 MCG/KG/HR: 100 INJECTION, SOLUTION INTRAVENOUS at 21:02

## 2017-10-19 RX ADMIN — FAMOTIDINE 20 MG: 10 INJECTION, SOLUTION INTRAVENOUS at 20:38

## 2017-10-19 RX ADMIN — DEXMEDETOMIDINE HYDROCHLORIDE 0.7 MCG/KG/HR: 100 INJECTION, SOLUTION INTRAVENOUS at 00:30

## 2017-10-19 RX ADMIN — LORAZEPAM 1 MG: 2 INJECTION INTRAMUSCULAR; INTRAVENOUS at 22:49

## 2017-10-19 RX ADMIN — DIAZEPAM 10 MG: 5 INJECTION, SOLUTION INTRAMUSCULAR; INTRAVENOUS at 01:05

## 2017-10-19 RX ADMIN — LORAZEPAM 1 MG: 2 INJECTION INTRAMUSCULAR; INTRAVENOUS at 12:55

## 2017-10-19 RX ADMIN — DEXMEDETOMIDINE HYDROCHLORIDE 0.7 MCG/KG/HR: 100 INJECTION, SOLUTION INTRAVENOUS at 08:19

## 2017-10-19 RX ADMIN — THIAMINE HYDROCHLORIDE 100 MG: 100 INJECTION, SOLUTION INTRAMUSCULAR; INTRAVENOUS at 09:14

## 2017-10-19 RX ADMIN — OXAZEPAM 15 MG: 15 CAPSULE, GELATIN COATED ORAL at 17:27

## 2017-10-19 RX ADMIN — QUETIAPINE FUMARATE 25 MG: 25 TABLET ORAL at 22:33

## 2017-10-19 RX ADMIN — FENTANYL CITRATE 50 MCG: 50 INJECTION INTRAMUSCULAR; INTRAVENOUS at 03:34

## 2017-10-19 RX ADMIN — THIAMINE HYDROCHLORIDE 100 MG: 100 INJECTION, SOLUTION INTRAMUSCULAR; INTRAVENOUS at 11:16

## 2017-10-19 RX ADMIN — LABETALOL 20 MG/4 ML (5 MG/ML) INTRAVENOUS SYRINGE 10 MG: at 20:36

## 2017-10-19 RX ADMIN — IPRATROPIUM BROMIDE 0.5 MG: 0.5 SOLUTION RESPIRATORY (INHALATION) at 19:09

## 2017-10-19 RX ADMIN — LEVETIRACETAM 500 MG: 100 INJECTION, SOLUTION INTRAVENOUS at 09:11

## 2017-10-19 RX ADMIN — FAMOTIDINE 20 MG: 10 INJECTION, SOLUTION INTRAVENOUS at 09:17

## 2017-10-19 RX ADMIN — SODIUM CHLORIDE, SODIUM GLUCONATE, SODIUM ACETATE, POTASSIUM CHLORIDE AND MAGNESIUM CHLORIDE 100 ML/HR: 526; 502; 368; 37; 30 INJECTION, SOLUTION INTRAVENOUS at 03:27

## 2017-10-19 RX ADMIN — SENNOSIDES 8.6 MG: 8.6 TABLET, FILM COATED ORAL at 22:33

## 2017-10-19 RX ADMIN — OXAZEPAM 15 MG: 15 CAPSULE, GELATIN COATED ORAL at 22:33

## 2017-10-19 RX ADMIN — BACITRACIN ZINC 1 SMALL APPLICATION: 500 OINTMENT TOPICAL at 17:26

## 2017-10-19 RX ADMIN — LEVALBUTEROL HYDROCHLORIDE 1.25 MG: 1.25 SOLUTION, CONCENTRATE RESPIRATORY (INHALATION) at 09:00

## 2017-10-19 RX ADMIN — LORAZEPAM 1 MG: 2 INJECTION INTRAMUSCULAR; INTRAVENOUS at 20:40

## 2017-10-19 RX ADMIN — LORAZEPAM 1 MG: 2 INJECTION INTRAMUSCULAR; INTRAVENOUS at 04:19

## 2017-10-19 RX ADMIN — LEVALBUTEROL HYDROCHLORIDE 1.25 MG: 1.25 SOLUTION, CONCENTRATE RESPIRATORY (INHALATION) at 19:09

## 2017-10-19 RX ADMIN — IPRATROPIUM BROMIDE 0.5 MG: 0.5 SOLUTION RESPIRATORY (INHALATION) at 09:00

## 2017-10-19 RX ADMIN — LEVETIRACETAM 500 MG: 100 INJECTION, SOLUTION INTRAVENOUS at 20:38

## 2017-10-19 RX ADMIN — NICOTINE 21 MG: 21 PATCH, EXTENDED RELEASE TRANSDERMAL at 09:17

## 2017-10-19 RX ADMIN — DEXMEDETOMIDINE HYDROCHLORIDE 0.5 MCG/KG/HR: 100 INJECTION, SOLUTION INTRAVENOUS at 15:29

## 2017-10-19 RX ADMIN — HEPARIN SODIUM 5000 UNITS: 5000 INJECTION, SOLUTION INTRAVENOUS; SUBCUTANEOUS at 06:05

## 2017-10-19 RX ADMIN — DEXMEDETOMIDINE HYDROCHLORIDE 0.7 MCG/KG/HR: 100 INJECTION, SOLUTION INTRAVENOUS at 04:21

## 2017-10-19 RX ADMIN — HEPARIN SODIUM 5000 UNITS: 5000 INJECTION, SOLUTION INTRAVENOUS; SUBCUTANEOUS at 22:33

## 2017-10-19 NOTE — RESTORATIVE TECHNICIAN NOTE
Restorative Specialist Mobility Note       Activity: Other (Comment) (range of motion and bed exercises)  Level of Assistance: Moderate assist, patient does 50-74%  Assistive Device: None     Ambulation Response: Tolerated well (faded in and out of sleep)           Range of Motion: Active, All extremities  Anti-Embolism Device On:  Bilateral, Sequential compression devices, below knee

## 2017-10-19 NOTE — PHYSICAL THERAPY NOTE
PHYSICAL THERAPY EVALUATION      10/19/17 1205   Note Type   Note type Eval only   Pain Assessment   Pain Assessment FLACC   Pain Rating: FLACC (Rest) - Face 0   Pain Rating: FLACC (Rest) - Legs 0   Pain Rating: FLACC (Rest) - Activity 0   Pain Rating: FLACC (Rest) - Cry 0   Pain Rating: FLACC (Rest) - Consolability 0   Score: FLACC (Rest) 0   Pain Rating: FLACC (Activity) - Face 0   Pain Rating: FLACC (Activity) - Legs 0   Pain Rating: FLACC (Activity) - Activity 0   Pain Rating: FLACC (Activity) - Cry 0   Pain Rating: FLACC (Activity) - Consolability 0   Score: FLACC (Activity) 0   Home Living   Type of Home House   Home Layout Two level;Bed/bath upstairs   Additional Comments Pt lives in a 2 story house with 7 LOKI and bed and bath upstairs  No DMEs    Prior Function   Level of [place] Independent with ADLs and functional mobility   Lives With (girlfriend )   Receives Help From Friend(s)   ADL Assistance Independent   IADLs Independent   Vocational Unemployed   Comments pt lives with girlfriend and PTA was I with all ADLs, IADLs and mobility    Restrictions/Precautions   Weight Bearing Precautions Per Order No   Other Precautions Bed Alarm; Impulsive; Restraints;Multiple lines;Telemetry;O2;Fall Risk   General   Family/Caregiver Present No   RUE Assessment   RUE Assessment (grossly 2/5 )   LUE Assessment   LUE Assessment (grossly 2/5)   RLE Assessment   RLE Assessment (grossly 2/5)   LLE Assessment   LLE Assessment (grossly 2/5)   Bed Mobility   Rolling R 2  Maximal assistance   Additional items Assist x 1; Increased time required;Verbal cues;LE management   Rolling L 2  Maximal assistance   Additional items Assist x 2; Increased time required;LE management;Verbal cues   Supine to Sit 2  Maximal assistance   Additional items Assist x 2; Increased time required;Verbal cues;LE management   Sit to Supine 2  Maximal assistance   Additional items Assist x 2; Increased time required;Verbal cues;LE management Transfers   Sit to Stand 2  Maximal assistance   Additional items Assist x 2; Increased time required;Verbal cues   Stand to Sit 2  Maximal assistance   Additional items Assist x 2; Increased time required;Verbal cues   Ambulation/Elevation   Gait pattern Not appropriate; Not tested   Balance   Static Sitting Poor   Static Standing Poor -   Endurance Deficit   Endurance Deficit Yes   Endurance Deficit Description decreased activity tolerance    Activity Tolerance   Activity Tolerance Treatment limited secondary to medical complications (Comment)   Medical Staff Made Aware OT Gi Lyon)   Nurse Made Aware Appropriate to treat per RN: Melodie Padron   Assessment   Prognosis Fair   Problem List Decreased strength;Decreased range of motion;Decreased endurance; Impaired balance;Decreased mobility; Decreased safety awareness   Assessment PT completed eval of a 50year old male with a cc of frontal headache and double vision x 1 week  CT of head identified SAH with intraventricuar hemorrhage extension  EVD placed 10/12/17, now removed  Pt initially intubated for airway protection, extubated this morning  Current medical and physical instabilities include telemetry, multiple lines, restraints (b/l ue mitts) continuous pulse ox monitoring, condom catheter, Bp, 4 L of O2, bed alarm, and a regression in functional status from baseline  PMHx is significant for ETOH abuse and R RC tear; personal factors include bed/bath upstairs  PTA pt was I with all ADLs, IADLs and mobility  Pt lives with girlfriend in a 2 SH with 7 LOKI and bed and bath upstairs  No DMEs  During evaluation, pt required max ax1 with rolling L and R (each performed once)  Pt required max ax2 with supine to sit and sit to supine  Pt sat EOB for 10 minutes requiring mod/max ax1  Pt performed sit to stand and stand to sit requiring max ax2 with knee block  Evaluation concluded with pt in bed in chair position with bed alarm activated   Current impairments include decreased strength, decreased static sitting balance, decreased UE strength, and decreased activity tolerance  D/c recommendation to post acute IP rehab when medically appropriate  Pt will benefit from skilled INPT PT services this admission in order to achieve maximal function and safety  Barriers to Discharge Inaccessible home environment   Goals   Patient Goals "to go outside"   LTG Expiration Date 11/02/17   Long Term Goal #1 10-14 days pt will 1) perform bed mobility min ax1 2) perform sit to stand and stand to sit min ax1 3) ambulate 40 feet mod ax1 4) perform sit pivot to drop arm chair min ax1 5) perform stand pivot mod ax1 6) improve activity tolerance to 30 minutes; 7) Improve b/l le strength by 1/2 grade   Treatment Day 0   Plan   Treatment/Interventions Functional transfer training;LE strengthening/ROM; Therapeutic exercise; Endurance training;Patient/family training;Equipment eval/education; Bed mobility;Gait training;Spoke to nursing   PT Frequency 5x/wk   Recommendation   Recommendation Post acute IP rehab   PT - OK to Discharge Yes  (to post acute IP rehab )   Barthel Index   Feeding 0   Bathing 0   Grooming Score 0   Dressing Score 0   Bladder Score 0   Bowels Score 0   Toilet Use Score 0   Transfers (Bed/Chair) Score 5   Mobility (Level Surface) Score 0   Stairs Score 0   Barthel Index Score 5     Jumana Jones

## 2017-10-19 NOTE — PLAN OF CARE
Problem: PHYSICAL THERAPY ADULT  Goal: Performs mobility at highest level of function for planned discharge setting  See evaluation for individualized goals  Treatment/Interventions: Functional transfer training, LE strengthening/ROM, Therapeutic exercise, Endurance training, Patient/family training, Equipment eval/education, Bed mobility, Gait training, Spoke to nursing          See flowsheet documentation for full assessment, interventions and recommendations  Sandra Killian   Prognosis: Fair  Problem List: Decreased strength, Decreased range of motion, Decreased endurance, Impaired balance, Decreased mobility, Decreased safety awareness  Assessment: PT completed eval of a 50year old male with a cc of frontal headache and double vision x 1 week  CT of head identified SAH with intraventricuar hemorrhage extension  EVD placed 10/12/17, now removed  Pt initially intubated for airway protection, extubated this morning  Current medical and physical instabilities include telemetry, multiple lines, restraints (b/l ue mitts) continuous pulse ox monitoring, condom catheter, Bp, 4 L of O2, bed alarm, and a regression in functional status from baseline  PMHx is significant for ETOH abuse and R RC tear; personal factors include bed/bath upstairs  PTA pt was I with all ADLs, IADLs and mobility  Pt lives with girlfriend in a 2 SH with 7 LOKI and bed and bath upstairs  No DMEs  During evaluation, pt required max ax1 with rolling L and R (each performed once)  Pt required max ax2 with supine to sit and sit to supine  Pt sat EOB for 10 minutes requiring mod/max ax1  Pt performed sit to stand and stand to sit requiring max ax2 with knee block  Evaluation concluded with pt in bed in chair position with bed alarm activated  Current impairments include decreased strength, decreased static sitting balance, decreased UE strength, and decreased activity tolerance  D/c recommendation to post acute IP rehab when medically appropriate  Pt will benefit from skilled INPT PT services this admission in order to achieve maximal function and safety  Barriers to Discharge: Inaccessible home environment     Recommendation: (S) Post acute IP rehab     PT - OK to Discharge: (S) Yes (to post acute IP rehab )    See flowsheet documentation for full assessment       Candace Omalley

## 2017-10-19 NOTE — SOCIAL WORK
Cm spoke with Bee Harris from [facility] who reports that he will be out today to assess pt  Documents requested, printed for Bee Harris  Bedside RN made aware of same       Reviewed and agree with above documentation - NANDA Foote

## 2017-10-19 NOTE — PLAN OF CARE
Problem: OCCUPATIONAL THERAPY ADULT  Goal: Performs self-care activities at highest level of function for planned discharge setting  See evaluation for individualized goals  Treatment Interventions: ADL retraining, Visual perceptual retraining, Functional transfer training, UE strengthening/ROM, Endurance training, Cognitive reorientation, Patient/family training, Equipment evaluation/education, Neuromuscular reeducation, Fine motor coordination activities, Compensatory technique education, Continued evaluation, Energy conservation, Activityengagement          See flowsheet documentation for full assessment, interventions and recommendations  Limitation: Decreased ADL status, Decreased UE ROM, Decreased UE strength, Decreased Safe judgement during ADL, Decreased cognition, Decreased endurance, Decreased sensation, Visual deficit, Decreased fine motor control, Decreased self-care trans, Decreased high-level ADLs, Mood limitation (BALANCE, PAIN, MEDICAL STATUS, WEAKNESS, DECONDITIONING)  Prognosis: Fair  Assessment: PT IS A 51 YO M ADMIT AS TRANSFER FROM [facility]  PT INITIALLY PRESENTED W/ DIPLOPIA/DYSMETRIA/HA  CT BRAIN REVEALED SAH W/ IVH EXTENSION AND PT TRANSFERRED TO [facility] FOR NEUROSX EVALUATION  PT ADD'L UNDERGOING W/U FOR ENCEPHALOPATHY, ETOH WITHDRAWAL 2* ALCOHOL ABUSE, HYDROCEPHALUS, R MCA PSEUDOANEURYSM AND ACUTE RESPIRATORY FAILURE  PT IS NOW S/P EVD 10/12  PT EXTUBATED 10/18  PT W/ PMH SIGNIFICANT ONLY FOR ETOH ABUSE  PT IS FROM HOME W/ S/O AND FRIENDS AND REPORTS BASELINE INDEPENDENCE IN ADLS/IADLS/DRIVING  NO DME USE AT BASELINE AND UNKNOWN H/O FALLS  CURRENTLY PT REQUIRING MAX A UB ADLS, MAX A LB ADLS, MAX A X2 BED MOBILITY AND MAX A X2 SIT>STAND TRANSFERS W/ B/L LE KNEE BLOCKING   PT ATTEMPTING TO SHUFFLE FEET UPON ACHIEVING STANCE W/ MAX A X2 HOWEVER UNABLE TO COMPLETE FORMAL MOBILITY THIS AM  PT NOTED W/ R INATTENTION/NEGLECT, L LATERAL HEAD TILT W/ L GAZE PREFERENCE, IMPAIRED BODY SPATIAL AWARENESS, B/L UE IMPAIRED ROM/STRENGTH/COORDINATION/ATAXIA/APRAXIA/PHYSCOMOTOR SLOWING, ANDTRUNK INSTABILITY WITH L LATERAL LEAN  PT ADD'L LIMITED 2* IMPAIRED BALANCE, ACTIVITY TOLERANCE, PAIN, MEDICAL STATUS, GENERALIZED WEAKNESS/DECONDITIONING, ADL IMPAIRMENTS AS WELL AS NOTED IMPAIRED ATTENTION, INSIGHT, SAFETY, JUDGEMENT, ORIENTATION AND PROCESSING  FROM OT PERSPECTIVE, PT WILL BENEFIT FROM CONTINUED OT SERVICES IN AN INPT REHAB SETTING PENDING FURTHER PROGRESS AND MEDICAL STABILITY  RECOMMEND PM&R CONSULT AT THIS TIME  WILL CONTINUE TO FOLLOW PT 3-5X/WEEK IN ORDER TO MEET THE BELOW DESCRIBED GOALS IN 7-10 DAYS     Recommendation: (S) Physiatry Consult (STICKY NOTE WRITTEN TO PHYSICIANS AND CM AWARE)  OT Discharge Recommendation: Short Term Rehab  OT - OK to Discharge:  (TO STR AT THIS TIME)

## 2017-10-19 NOTE — SOCIAL WORK
CM called and spoke with pt's mother Eva Diamond that PT and OT recommendation is acute IP rehab for pt  Eva Diamond agreeable to send referral to [facility] and to other SNF facilities that is close to home  Referral to [facility],  [address] [place] [address] and rehab and [address] in [facility]  CM will follow

## 2017-10-19 NOTE — PLAN OF CARE
Problem: SLP ADULT - SWALLOWING, IMPAIRED  Goal: Initial SLP swallow eval performed  Outcome: Completed Date Met: 10/19/17

## 2017-10-19 NOTE — PROGRESS NOTES
Patient remains extremely agitated, dr Jackelin Gaytan aware, one time order for 10 mg of valium given

## 2017-10-19 NOTE — SPEECH THERAPY NOTE
Speech/Language Pathology  Assessment    Patient Name: Miranda Cochran  CSCER'N Date: 10/19/2017     Problem List  Patient Active Problem List   Diagnosis    Subarachnoid hemorrhage ([facility] [address] )    Headache    Alcohol abuse    Nicotine dependence    Intraventricular hemorrhage (HCC)    Pseudoaneurysm (HCC)    Thrombocytopenia (HCC)    Hypoalbuminemia    Leukocytosis     Past Medical History  Past Medical History:   Diagnosis Date    Alcoholism /alcohol abuse ([facility] [address] )      50 y  o  male who presents as a tx from [facility] for [facility], patient came to [facility] c/o double vision x1 day  He was also c/o frontal headaches for the last few weeks for which he has been taking aleve w/o relief  He is a daily alcohol drinker  Patient denies falls/head injury  No other complaints  Patient was intoxicated on arrival to [facility] w/ alcohol level of 330  CT head was + for subarachnoid hemorrhage w/ intraventricular extension  Platt Baird I  Brooks IV   On arrival to [facility] patient w/o complaints, states that double vision is improving, denies headache, lightheadedness, blurry vision, numbness or tingling in arms and legs  Patient is an alcoholic w;/ history of withdrawal, multiple rehab admissions, drinks about 1 case of beer daily, heavy tobacco use       CT Head-Subarachnoid hemorrhage accumulating within the 3rd ventricle, frontal horn right lateral ventricle and temporal horn left lateral ventricle   Findings raise suspicion for ruptured aneurysm    Pt intubated 10/12/17 - 10/18/17  Reason for consult:  R/o aspiration  Determine safest and least restrictive diet  New neuro event  Precautions:  Aspiration    Current diet:  NPO  Premorbid diet[de-identified]  Presumed regular  Previous VBS:  None noted  O2 requirement:  NC  Voice/Speech:  APhonic, pt w/ difficulty mouthing words 2* weakness  Social:  Pt resides with friends and his g/f Alpesh Petit   Follows commands:           For single commands but fatigues easily & cannot keep eyes open Cognitive Status:  Pt lethargic, needs stim to remain awake  Oriented to self  Oral [facility] exam:  Full dentition-?able any missing  Full symmetry-w/ significant weakness of lips/tongue, able to protrude & lateraliz but w/ reduced ROM        Items administered:  Puree,  honey thick liquid, by tsp    Oral stage:  Lip closure: present but reduced  Mastication: na  Bolus formation: reduced, weak  Bolus control: fair  Transfer: labored  Oral residue: min to none  Oral processing is labored, weak  Pharyngeal stage:  Swallow promptness: fairly prompt  Laryngeal rise: poor, mod reduced  Some mild improvement as trials progressed   Wet voice: difficulty to assess given aphonia  Throat clear: w/ puree, ht  Cough: delayed w/ ht, x1 w/ first tsp puree  Secondary swallows: pt uses mult swallows w/ puree      Esophageal stage:  No s/s reported    Summary:  Pt presents w/ severe oropharyngeal dysphagia at this time w/ weak oral processing, reduced hyolaryngeal excursion & suspected poor airway protection 2* intubation  Pt is aphonic, cough is weak but w/ time can clear  Recommendations:  Keep npo for now  Meds: via tube or iv  Positioning:Upright  Speech to f/u    Results d/w:  Pt, family, physician, dietician    Consider consult w/:  GI  Nutrition    Goal(s):    Pt will tolerate least restrictive diet w/out s/s aspiration or oral/pharyngeal difficulties

## 2017-10-19 NOTE — PROGRESS NOTES
Dr Geetha Ruiz and PA up to suture ventric puncture site after notified of its dislodgement  Pt tolerated procedure well

## 2017-10-19 NOTE — OCCUPATIONAL THERAPY NOTE
[address]  Evaluation     Patient Name: Baron Signs  QBAVH'P Date: 10/19/2017  Problem List  Patient Active Problem List   Diagnosis    Subarachnoid hemorrhage ([address] )    Headache    Alcohol abuse    Nicotine dependence    Intraventricular hemorrhage (HCC)    Pseudoaneurysm (HCC)    Thrombocytopenia (HCC)    Hypoalbuminemia    Leukocytosis     Past Medical History  Past Medical History:   Diagnosis Date    Alcoholism /alcohol abuse [facility])      Past Surgical History  History reviewed  No pertinent surgical history  10/19/17 1200   Note Type   Note type Eval/Treat   Restrictions/Precautions   Weight Bearing Precautions Per Order No   Other Precautions Fall Risk;Pain;Bed Alarm;Multiple lines;Telemetry; Restraints;O2;Impulsive;Aspiration   Pain Assessment   Pain Assessment FLACC   Pain Rating: FLACC (Rest) - Face 0   Pain Rating: FLACC (Rest) - Legs 0   Pain Rating: FLACC (Rest) - Activity 0   Pain Rating: FLACC (Rest) - Cry 0   Pain Rating: FLACC (Rest) - Consolability 0   Score: FLACC (Rest) 0   Pain Rating: FLACC (Activity) - Face 1   Pain Rating: FLACC (Activity) - Legs 1   Pain Rating: FLACC (Activity) - Activity 1   Pain Rating: FLACC (Activity) - Cry 1   Pain Rating: FLACC (Activity) - Consolability 1   Score: FLACC (Activity) 5   Home Living   Type of Home House   Home Layout Two level;Bed/bath upstairs   Additional Comments PER CM NOTES, PT RESIDES WITH S/O AND FRIENDS IN A 2 STORY HOME  Prior Function   Level of [place] Independent with ADLs and functional mobility   Lives With Significant other   Receives Help From Friend(s)   ADL Assistance Independent   IADLs Independent   Falls in the last 6 months (UNKNOWN)   Vocational Unemployed   Lifestyle   Autonomy PER CM, PT W/ BASELINE INDEPENDENCE IN ADLS/IADLS/DRIVING  NO DME USE AT BASELINE  QUESTIONABLE FALL HX  Reciprocal Relationships PT RESIDES WITH S/O  QUESTIONABLE LEVEL OF SUPPORT      Service to Others PT IS UNEMPLOYED PER CM  PT 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  Intrinsic Gratification PT ENJOYS SPENDING TIME W/ FRIENDS  Psychosocial   Psychosocial (WDL) X   Patient Behaviors/Mood Withdrawn   Subjective   Subjective "I FEEL MOY**Y"   ADL   Where Assessed Edge of bed   Eating Assistance 2  Maximal Assistance   Grooming Assistance 2  Maximal Assistance   UB Bathing Assistance 2  Maximal Assistance   LB Bathing Assistance 2  Maximal Assistance   UB Dressing Assistance 2  Maximal Assistance   LB Dressing Assistance 2  Maximal [address]  2  Maximal Assistance   Bed Mobility   Rolling L 2  Maximal assistance   Additional items Assist x 2; Increased time required;Verbal cues;LE management   Supine to Sit 2  Maximal assistance   Additional items Assist x 2; Increased time required;Verbal cues;LE management   Sit to Supine 2  Maximal assistance   Additional items Assist x 2; Increased time required;Verbal cues;LE management   Transfers   Sit to Stand 2  Maximal assistance   Additional items Assist x 2; Increased time required;Verbal cues   Stand to Sit 2  Maximal assistance   Additional items Assist x 2; Increased time required;Verbal cues   Functional Mobility   Additional Comments PT ATTEMPTING TO SHUFFLE LE'S UPON ACHEIVING STANCE HOWEVER UNABLE TO COMPLETE FULL STEPS/MOBILITY THIS AM  WILL REQUIRE FURTHER ASSESSMENT      Balance   Static Sitting Poor   Dynamic Sitting Poor -   Static Standing Poor -   Dynamic Standing Poor -   Ambulatory Zero   Activity Tolerance   Activity Tolerance Treatment limited secondary to medical complications (Comment)   Nurse Made Aware F/U W/ RN ARTIE   RUE Assessment   RUE Assessment X  (IMPAIRED COORDINATION/STRENGTH/ROM, MMT 2/5)   LUE Assessment   LUE Assessment X  (IMPAIRED COORDINATION/STRENGTH/ROM, MMT 2/5)   Hand Function   Gross Motor Coordination Impaired  (PSYCHOMOTOR SLOWING)   Fine Motor Coordination Impaired  (PSYCHOMOTOR SLOWING) Sensation   Light Touch Partial deficits in the RUE;Partial deficits in the LUE   Sharp/Dull Partial deficits in the RUE;Partial deficits in the LUE   Stereognosis Partial deficits in the RUE;Partial deficits in the LUE   Proprioception   Proprioception Partial deficits in the RUE;Partial deficits in the LUE   Vision-Basic Assessment   Current Vision ([place])   Vision - Complex Assessment   Ocular Range of Motion [facility]   Head Position Head turned; Head tilt  (HEAD TURN/TILT TO L SIDE)   Tracking Decreased smoothness of horizontal tracking;Decreased smoothness of vertical tracking; Requires cues, head turns, or add eye shifts to track   Saccades Unable to test secondary to decreased visual attention   Acuity Able to read clock/calendar on wall without difficulty; Able to read employee name badge without difficulty   Visual Screen Results Right sided shannon-inattention   Perception   Inattention/Neglect Cues to attend right visual field;Cues to attend to right side of body;Cues to maintain midline in sitting;Cues to maintain midline in standing   Perseveration Perseverates during ADLs   Cognition   Overall Cognitive Status Impaired   Arousal/Participation Responsive   Attention Difficulty attending to directions   Orientation Level Oriented to person;Oriented to place; Disoriented to time;Disoriented to situation   Memory Decreased recall of precautions;Decreased recall of recent events;Decreased short term memory;Decreased recall of biographical information   Following Commands Follows one step commands with increased time or repetition   Comments PT ABLE TO STATE NAME, PLACE AND GROSSLY ORIENTED TO TIME  PT DOES DEMONSTRATE IMPAIRED ATTENTION, INSIGHT, SAFETY, JUDGEMENT AND PROCESSING   WILL CONTINUE TO ASSESS THROUGH FUNCTIONAL/FORMAL ASSESSMENT  Assessment   Limitation Decreased ADL status; Decreased UE ROM; Decreased UE strength;Decreased Safe judgement during ADL;Decreased cognition;Decreased endurance;Decreased sensation;Visual deficit; Decreased fine motor control;Decreased self-care trans;Decreased high-level ADLs;Mood limitation  (BALANCE, PAIN, MEDICAL STATUS, WEAKNESS, DECONDITIONING)   Prognosis Fair   Assessment PT IS A 49 YO M ADMIT AS TRANSFER FROM [facility]  PT INITIALLY PRESENTED W/ DIPLOPIA/DYSMETRIA/HA  CT BRAIN REVEALED SAH W/ IVH EXTENSION AND PT TRANSFERRED TO [facility] FOR NEUROSX EVALUATION  PT ADD'L UNDERGOING W/U FOR ENCEPHALOPATHY, ETOH WITHDRAWAL 2* ALCOHOL ABUSE, HYDROCEPHALUS, R MCA PSEUDOANEURYSM AND ACUTE RESPIRATORY FAILURE  PT IS NOW S/P EVD 10/12  PT EXTUBATED 10/18  PT W/ PMH SIGNIFICANT ONLY FOR ETOH ABUSE  PT IS FROM HOME W/ S/O AND FRIENDS AND REPORTS BASELINE INDEPENDENCE IN ADLS/IADLS/DRIVING  NO DME USE AT BASELINE AND UNKNOWN H/O FALLS  CURRENTLY PT REQUIRING MAX A UB ADLS, MAX A LB ADLS, MAX A X2 BED MOBILITY AND MAX A X2 SIT>STAND TRANSFERS W/ B/L LE KNEE BLOCKING  PT ATTEMPTING TO SHUFFLE FEET UPON ACHIEVING STANCE W/ MAX A X2 HOWEVER UNABLE TO COMPLETE FORMAL MOBILITY THIS AM  PT NOTED W/ R INATTENTION/NEGLECT, L LATERAL HEAD TILT W/ L GAZE PREFERENCE, IMPAIRED BODY SPATIAL AWARENESS, B/L UE IMPAIRED ROM/STRENGTH/COORDINATION/ATAXIA/APRAXIA/PHYSCOMOTOR SLOWING, ANDTRUNK INSTABILITY WITH L LATERAL LEAN  PT ADD'L LIMITED 2* IMPAIRED BALANCE, ACTIVITY TOLERANCE, PAIN, MEDICAL STATUS, GENERALIZED WEAKNESS/DECONDITIONING, ADL IMPAIRMENTS AS WELL AS NOTED IMPAIRED ATTENTION, INSIGHT, SAFETY, JUDGEMENT, ORIENTATION AND PROCESSING  FROM OT PERSPECTIVE, PT WILL BENEFIT FROM CONTINUED OT SERVICES IN AN INPT REHAB SETTING PENDING FURTHER PROGRESS AND MEDICAL STABILITY  RECOMMEND PM&R CONSULT AT THIS TIME  WILL CONTINUE TO FOLLOW PT 3-5X/WEEK IN ORDER TO MEET THE BELOW DESCRIBED GOALS IN 7-10 DAYS  Goals   Patient Goals PT WOULD LIKE TO GO OUTSIDE   LTG Time Frame 7-10   Long Term Goal #1 PLEASE SEE BELOW DESCRIBED GOALS     Plan   Treatment Interventions ADL retraining;Visual perceptual retraining;Functional transfer training;UE strengthening/ROM; Endurance training;Cognitive reorientation;Patient/family training;Equipment evaluation/education; Neuromuscular reeducation; Fine motor coordination activities; Compensatory technique education;Continued evaluation; Energy conservation; Activityengagement   Goal Expiration Date 10/29/17   OT Frequency 3-5x/wk   Recommendation   Recommendation Physiatry Consult  (STICKY NOTE WRITTEN TO PHYSICIANS AND CM AWARE)   OT Discharge Recommendation Short Term Rehab   OT - OK to Discharge (TO [facility] AT THIS TIME)   Barthel Index   Feeding 0   Bathing 0   Grooming Score 0   Dressing Score 0   Bladder Score 0   Bowels Score 5   Toilet Use Score 0   Transfers (Bed/Chair) Score 5   Mobility (Level Surface) Score 0   Stairs Score 0   Barthel Index Score 10   Modified [place] Scale   Modified [place] Scale 4     GOALS TO BE MET IN 7-10 DAYS:    1) Pt will increase bed mobility to MOD I and transfer EOB to participate in functional activity with G tolerance and balance  2) Pt will improve functional transfers to MOD I on/off all surfaces using DME PRN w/ G balance/safety including toileting  3) Pt will increase independence in all ADLS to MOD I with G balance sitting upright in chair  4) Pt will complete toileting w/ MOD I w/ G hygiene/thoroughness using DME PRN  5) Pt will improve activity tolerance to G for min 30 min txment sessions  6) Pt will participate in light grooming task with MOD I using setup standing at sink ~3-5mins with G safety and balance  7) Pt will engage in ongoing cognitive assessment(S) w/ G participation to A w/ safe d/c planning/recommendations  8) Pt will follow 100% simple 2 step commands and be A&O x4 consistently with environmental cues to increase activity participation to G     9) Pt will improve attention to familiar tasks ~3-5mins with minimal CUES      10) Pt will ID 3 positive coping strategies/ social supports with MOD I to assist in maintaining relapse prevention  11) Pt will improve attention to re-integration of L side during functional tasks with Mod Multi Modal cueing     12) Pt will participate in fine motor coordination/ strengthening/ dexterity exercises to G to G participation in functional activity  13) Pt will tolerate mod manual NDT facilitation t/o hemibody during fx'l I/ADL/leisure tasks w/ mod I to improve functional engagement increase neuroplastic recovery  14) Pt will improve UB fx'l use/ROM to [facility] and strength 1/2 MMT via AROM/AAROM/PROM in all planes as tolerated s/p skilled education of HEP w/ mod I w/o cues for carryover  15) Pt will engage in ongoing  assessments, screens, and activities t/o fx'l I/ADL/leisure tasks w/ G participation and mod I to A w/ adaptation and accommodations or rule out visual perceptual impairments      DOCUMENTATION COMPLETED BY Theresa Ruby MS, OTR/L

## 2017-10-19 NOTE — PROGRESS NOTES
Patient extremely restless and agitated, dr Yamilex Leal at the bedside, order obtained to start precedex drip

## 2017-10-19 NOTE — PROGRESS NOTES
Progress Note - Critical Care   Schneider Pronto 50 y o  male MRN: [id number]  Unit/Bed#: ICU 02 Encounter: [id number]    Assessment/Plan:      50year-old male initially presented to [facility] 10/11 w/ double vision and alcohol intoxication, found on CT to have intraventricular hemorrhage with subarachnoid extension, intubated and transferred to [facility] ICU, s/p EVD placement 10/12  IR angiogram w/ MCA pseudoaneurysm, infectious work-up so far negative  Extubated successfully 10/18       Post-bleed day #9  EVD day #8    a-line/condomcath    Patient Active Problem List    Diagnosis Date Noted    Thrombocytopenia ([facility] ) 10/15/2017    Hypoalbuminemia 10/15/2017    Leukocytosis 10/15/2017    Intraventricular hemorrhage ([facility] ) 10/14/2017    Pseudoaneurysm ([address] ) 10/14/2017    Subarachnoid hemorrhage ([facility] ) 10/12/2017    Headache 10/12/2017    Alcohol abuse 10/12/2017    Nicotine dependence 10/12/2017        Neuro: IVH w/ subarachnoid extension (H/H 1, mod fritz 4) s/p EVD placement   IR cerebral angiogram 10/12 w/ 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, per neurosurg unlikely responsible for hemorrhage, infectious workup for mycotic etiology  Post-bleed day #9, post EVD-day#8  - Agitation/alcohol w/d: this AM (10/19) on precedex 0 7, ON requiring additional  [id number], ymahfv80g1, Brtafu5p2  [id number], seroquel 25qHS not given since extubation as PO, ST to see early this AM    - 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 mycotic pseudoaneurysm: BCx (10/12): NG5D, TTE (10/12): EF 60%, G1DD, normal RV size/systolic function, IVC dilated   - SBP goal <140  - Neurochecks q4H  - Neurosurg following        CV:   - A-line in place  - SBP goal <140, SBP trending 120s-150s on Norvasc 5mg QD  - TTE: see "neuro"      Lung:   - Continues to have persistent secretions, hypophonia  Strong cough  Airway clearance protocol     - ABG (10/12): 7 4/42/160/25 6, base excess 0 7     Gi:  - NPO, f/u swallow eval  - GI PPx: Pepcid 20mg BID       FEN:   - Replete lytes PRN     :   - Condom cath  - BUN/creat 15/0 52  - U/o 1275cc/24H, +2 6L/24H, clinically euvolemic      ID:   - Afebrile overnight, WBC wnl  - Repeat BCx (10/15): NG4D   - Infectious workup in process, see "neuro"      Heme:   - Thrombocytopenia: resolved   - Hgb stable  - DvtPpx: SCDs, subqH     Endo: Maintain euglycemia, sugars trending 90s-120s                Msk/Skin: Skin ulcer prophylaxis     Disposition: ICU    Chief Complaint: Wants restraints removed    HPI/24hr events: Pulled out EVD  Physical Exam:   Physical Exam   Constitutional: No distress  Eyes: Pupils are equal, round, and reactive to light  Cardiovascular: Normal rate and regular rhythm  Pulmonary/Chest: Effort normal and breath sounds normal    Abdominal: Soft  Bowel sounds are normal    Musculoskeletal: He exhibits no edema  Neurological:   Sedated confused  Hypophonia  Eyes open   Follows commands   Skin: He is not diaphoretic  Vitals:    10/19/17 0300 10/19/17 0400 10/19/17 0500 10/19/17 0600   BP: 161/87 (!) 171/98 165/98 156/95   Pulse: 84 96 78 74   Resp: 19 (!) 27 22 (!) 32   Temp:  99 9 °F (37 7 °C)     TempSrc:  Oral     SpO2: 96% 95% 92% 94%   Weight:       Height:         Arterial Line BP: 150/93  Arterial Line MAP (mmHg): 112 mmHg    Temperature:   Temp (24hrs), Av 2 °F (37 3 °C), Min:98 °F (36 7 °C), Max:101 2 °F (38 4 °C)    Current: Temperature: 99 9 °F (37 7 °C)    Weights:   IBW: 77 6 kg    Body mass index is 23 59 kg/m²    Weight (last 2 days)     None          Hemodynamic Monitoring:  N/A     Non-Invasive/Invasive Ventilation Settings:  Respiratory    Lab Data (Last 4 hours)    None         O2/Vent Data (Last 4 hours)    None              No results found for: PHART, GSK7JVT, PO2ART, SDX5CCB, S5KOQIMA, BEART, SOURCE    Intake and Outputs:  I/O       10/17 0701 - 10/18 0700 10/18 07 - 10/19 0700    I V  (mL/kg) 557 8 (7 1) 1552 3 (19 7)    NG/     IV Piggyback 500 100    Feedings 1200 100    Total Intake(mL/kg) 2427 8 (30 8) 1752 3 (22 2)    Urine (mL/kg/hr) 1050 (0 6) 2375 (1 3)    Drains 121 (0 1) 185 (0 1)    Stool  0 (0)    Total Output 1171 2560    Net +1256 8 -807 7          Unmeasured Urine Occurrence 2 x     Unmeasured Stool Occurrence  2 x          Nutrition:        Diet Orders            Start     Ordered    10/18/17 1218  Diet NPO; Sips of clear liquids  Diet effective now     Question Answer Comment   Diet Type NPO    NPO Except: Sips of clear liquids    RD to adjust diet per protocol? No        10/18/17 1217            Labs:     Results from last 7 days  Lab Units 10/18/17  0438 10/17/17  0435 10/16/17  0446 10/15/17  0507   WBC Thousand/uL 8 28 9 47 8 12 10 49*   HEMOGLOBIN g/dL 12 9 13 0 13 1 12 9   HEMATOCRIT % 37 3 38 8 40 1 40 0   PLATELETS Thousands/uL 281 220 162 142*   NEUTROS PCT %  --  76* 67 74   MONOS PCT %  --  13* 15* 12   MONO PCT MAN % 14*  --   --   --       Results from last 7 days  Lab Units 10/18/17  0957 10/18/17  0438 10/17/17  1828 10/17/17  0435  10/15/17  0507  10/14/17  0426   SODIUM mmol/L 138 137 137 134*  < > 136  < > 138   POTASSIUM mmol/L 4 1 5 6* 3 9 4 1  < > 3 6  < > 3 7   CHLORIDE mmol/L 104 102  --  100  < > 100  < > 101   CO2 mmol/L 29 29  --  27  < > 30  < > 32   BUN mg/dL 15 16  --  11  < > 8  < > 10   CREATININE mg/dL 0 52* 0 51*  --  0 57*  < > 0 50*  < > 0 56*   CALCIUM mg/dL 8 8 8 8  --  8 8  < > 8 0*  < > 7 9*   TOTAL PROTEIN g/dL  --  7 4  --   --   --  6 5  --  6 2*   BILIRUBIN TOTAL mg/dL  --  0 83  --   --   --  0 92  --  0 62   ALK PHOS U/L  --  79  --   --   --  66  --  49   ALT U/L  --  66  --   --   --  85*  --  90*   AST U/L  --  69*  --   --   --  73*  --  83*   GLUCOSE RANDOM mg/dL 121 121  --  125  < > 106  < > 97   < > = values in this interval not displayed      Results from last 7 days  Lab Units 10/18/17  0438 10/17/17  0435 10/16/17  0446   MAGNESIUM mg/dL 2 6 2 2 2 4       Results from last 7 days  Lab Units 10/18/17  0438 10/17/17  0435 10/16/17  0446   PHOSPHORUS mg/dL 3 6 3 0 3 8              No results found for: TROPONINI    Imaging:  I have personally reviewed pertinent reports  Micro:  Lab Results   Component Value Date    BLOODCX No Growth at 72 hrs  10/15/2017    BLOODCX No Growth at 72 hrs  10/15/2017    BLOODCX No Growth After 5 Days  10/12/2017       Allergies: No Known Allergies    Medications:   Scheduled Meds:  acetaminophen 650 mg Rectal Q6H   amLODIPine 5 mg Oral Daily   chlorhexidine 15 mL Swish & Spit Q12H [facility] & half-way   famotidine 20 mg Intravenous W55I [facility] & half-way   folic acid IVPB 1 mg Intravenous Once   heparin (porcine) 5,000 Units Subcutaneous Q8H [facility] & half-way   ipratropium 0 5 mg Nebulization TID   levalbuterol 1 25 mg Nebulization TID   levETIRAcetam 500 mg Intravenous Q12H [facility] & half-way   magnesium hydroxide 30 mL Oral Daily   nicotine 21 mg Transdermal Daily   oxazepam 15 mg Oral Q8H [facility] & half-way   polyethylene glycol 17 g Oral Daily   QUEtiapine 25 mg Oral HS   senna 1 tablet Oral HS   thiamine 100 mg Intravenous Daily     Continuous Infusions:  dexmedetomidine 0 1-0 7 mcg/kg/hr Last Rate: 0 7 mcg/kg/hr (10/19/17 0421)   multi-electrolyte 100 mL/hr Last Rate: 100 mL/hr (10/19/17 0327)     PRN Meds:    fentanyl citrate (PF) 50 mcg Q2H PRN   labetalol 10 mg Q4H PRN   LORazepam 1 mg Q2H PRN   ondansetron 4 mg Q6H PRN       VTE Pharmacologic Prophylaxis: Heparin  VTE Mechanical Prophylaxis: sequential compression device    Invasive lines and devices: Invasive Devices     Peripheral Intravenous Line            Peripheral IV 10/17/17 Left Arm 1 day    Peripheral IV 10/18/17 Right Forearm 1 day          Drain            Ventriculostomy/Subdural Ventricular drainage catheter Right Parietal region 6 days                   Counseling / Coordination of Care  Total Critical Care time spent 30min minutes excluding procedures, teaching and family updates    30min     Code Status: Level 1 - Full Code     Portions of the record may have been created with voice recognition software  Occasional wrong word or "sound a like" substitutions may have occurred due to the inherent limitations of voice recognition software  Read the chart carefully and recognize, using context, where substitutions have occurred       Natalee Goins MD

## 2017-10-19 NOTE — PROGRESS NOTES
Progress Note - Neurosurgery   Julia Heredia 50 y o  male MRN: [id number]  Unit/Bed#: ICU 02 Encounter: [id number]    Assessment:  1  Subarachnoid hemorrhage with intraventricular hemorrhage extension  2  Placement of EVD (10/12/2017)  3  Platt Baird score 1; Valdes 4  4  Right MCA fusiform aneurysm   5  Alcohol abuse  6  Nicotine dependency     Plan: 52M presented with alcohol intoxication & dependency c/o visual disturbances, and headaches  CT showed SAH with IVH extension  He was intubated for airway protection and an EVD was placed  · Exam: GCS 14-15  Awakes easily by voice, EOMI, PERRL  LAZCANO with decreased tone in BUE  Strength in BUE 4/5 with biceps/triceps,  5-/5  Able to follow commands  Negative wells or clonus  Speech is a whisper, clear but hard to understand  · Imaging: personally reviewed and reviewed by attending on 10/13/2017  · 10/16 - CT head wo: 1) Decreasing volume of blood products and 3rd ventricle as well as mild decrease in ventricuomegaly  · CT head without contrast tomorrow or STAT CT head if decline in GCS >2 pts/1 hr  · ICP's overnight were 2-6 with EVD output of 30 cc overnight  · 10/19 - EVD was accidentally dislodged by patient  Drain site closed by Dr Remington Naqvi   · Apply small amount of bacitracin bid x 3 days   · Continue Keppra 500mg every 12 hours   · DVT ppx: SCDs, subcutaneous heparin  · PT/OT: recommendation of rehab  · Pain/Sedation: Precedex 0 7mcg/kg/hr, as needed fentanyl 50 mcg q1h, as needed ativan 1mg q2 hr   · Required one dose of valium at 0105, fentanyl 50 mcg at 0334 and one dose of ativan at 0419  · SBP goal <140  Patient has been getting agitated overnight, causing increase in BP into 170's/90's   Continue to monitor  · Na 135 and K+ 3 7 - continue to trend  · WBC 11 35 compared to 8 28, Tmax of 99 9 on 10/19 at 0400  · Blood cultures collected 10/12 x3 - no growth after 5 days (final report)  · Blood cultures collected 10/15 x2  - no growth  At 72 hours (preliminary)  · CSF culture - no growth (final result), protein 42  · Aggressive respiratory care, CXR ordered for today   · Continue management per primary team   · Attempt extubation when able  Resp: was trial on PSV setting but had apneic episodes and placed back on previous setting and tolerating AC/PC settings  · Continue to monitor examination  Call with questions or concerns  Subjective/Objective   Chief Complaint: "I have a headache"    Subjective: Patient is able to whisper that he has a headache, located in the front of his head  He denies chest pain, or SOB    Objective: sitting with head of bed elevated  Mild respiratory distress  I/O       10/17 0701 - 10/18 0700 10/18 0701 - 10/19 0700 10/19 0701 - 10/20 0700    P  O        I V  (mL/kg) 557 8 (7 1) 1552 3 (19 7) 682 8 (8 7)    NG/      IV Piggyback 500 100 150    Feedings 1200 100     Total Intake(mL/kg) 2427 8 (30 8) 1752 3 (22 2) 832 8 (10 6)    Urine (mL/kg/hr) 1050 (0 6) 2375 (1 3) 400 (0 9)    Drains 121 (0 1) 185 (0 1)     Stool  0 (0)     Total Output 1171 2560 400    Net +1256 8 -807 7 +432 8           Unmeasured Urine Occurrence 2 x      Unmeasured Stool Occurrence  2 x           Invasive Devices     Peripheral Intravenous Line            Peripheral IV 10/17/17 Left Arm 2 days    Peripheral IV 10/18/17 Right Forearm 1 day                Physical Exam:  Vitals: Blood pressure 162/98, pulse 80, temperature 98 9 °F (37 2 °C), resp  rate (!) 33, height 6' (1 829 m), weight 78 9 kg (173 lb 15 1 oz), SpO2 94 %  ,Body mass index is 23 59 kg/m²  General appearance: drowsy, but easily awaken by voice, appears stated age, cooperative and no distress  Head: Normocephalic, Incisions are nonerythematous, nonedematous **  Eyes: EOMI, PERRL  Lungs: +deep cough, tachypnea placed on breathing treatment  Heart: regular heart rate  Abdomen: soft, no guarding on palpation     Neurologic:   Mental status: Alert, oriented x3, Speech is whispers, clear but hard to understand  Cranial nerves: grossly intact (Cranial nerves II-XII)  Sensory: shakes head "yes" when asked if he can feel his hands and toes  Motor: moving all extremities with decreased tones in BUE  Strength with BUE biceps/triceps: 4/5, : 5-/5  Strength in BLE: DF/PF: 5/5   Reflexes: 1+ and symmetric  negative wells, negative clonus    Lab Results:    Results from last 7 days  Lab Units 10/19/17  0606 10/18/17  0438 10/17/17  0435 10/16/17  0446   WBC Thousand/uL 11 35* 8 28 9 47 8 12   HEMOGLOBIN g/dL 12 7 12 9 13 0 13 1   HEMATOCRIT % 37 4 37 3 38 8 40 1   PLATELETS Thousands/uL 271 281 220 162   NEUTROS PCT % 69  --  76* 67   MONOS PCT % 20*  --  13* 15*   MONO PCT MAN %  --  14*  --   --        Results from last 7 days  Lab Units 10/19/17  0606 10/18/17  0957 10/18/17  0438  10/15/17  0507   SODIUM mmol/L 135* 138 137  < > 136   POTASSIUM mmol/L 3 7 4 1 5 6*  < > 3 6   CHLORIDE mmol/L 101 104 102  < > 100   CO2 mmol/L 26 29 29  < > 30   BUN mg/dL 14 15 16  < > 8   CREATININE mg/dL 0 52* 0 52* 0 51*  < > 0 50*   CALCIUM mg/dL 9 1 8 8 8 8  < > 8 0*   TOTAL PROTEIN g/dL 7 5  --  7 4  --  6 5   BILIRUBIN TOTAL mg/dL 0 80  --  0 83  --  0 92   ALK PHOS U/L 91  --  79  --  66   ALT U/L 54  --  66  --  85*   AST U/L 37  --  69*  --  73*   GLUCOSE RANDOM mg/dL 102 121 121  < > 106   < > = values in this interval not displayed  Results from last 7 days  Lab Units 10/19/17  0606 10/18/17  0438 10/17/17  0435   MAGNESIUM mg/dL 2 6 2 6 2 2       Results from last 7 days  Lab Units 10/19/17  0606 10/18/17  0438 10/17/17  0435   PHOSPHORUS mg/dL 3 7 3 6 3 0         No results found for: TROPONINT  ABG:  Lab Results   Component Value Date    PHART 7 403 10/12/2017    SND1QQN 42 0 10/12/2017    PO2ART 160 2 (H) 10/12/2017    ODV9JPW 25 6 10/12/2017    BEART 0 7 10/12/2017    SOURCE Line, Arterial 10/12/2017       Imaging Studies: I have personally reviewed pertinent reports     and I have personally reviewed pertinent films in PACS    EKG, Pathology, and Other Studies: I have personally reviewed pertinent reports        VTE  Prophylaxis: Sequential compression device (Venodyne)  and Heparin

## 2017-10-20 ENCOUNTER — APPOINTMENT (INPATIENT)
Dept: RADIOLOGY | Facility: HOSPITAL | Age: 52
DRG: 021 | End: 2017-10-20
Payer: COMMERCIAL

## 2017-10-20 LAB
ANION GAP SERPL CALCULATED.3IONS-SCNC: 8 MMOL/L (ref 4–13)
BACTERIA BLD CULT: NORMAL
BACTERIA BLD CULT: NORMAL
BASOPHILS # BLD MANUAL: 0 THOUSAND/UL (ref 0–0.1)
BASOPHILS NFR MAR MANUAL: 0 % (ref 0–1)
BUN SERPL-MCNC: 14 MG/DL (ref 5–25)
CALCIUM SERPL-MCNC: 9.3 MG/DL (ref 8.3–10.1)
CHLORIDE SERPL-SCNC: 101 MMOL/L (ref 100–108)
CO2 SERPL-SCNC: 29 MMOL/L (ref 21–32)
CREAT SERPL-MCNC: 0.54 MG/DL (ref 0.6–1.3)
EOSINOPHIL # BLD MANUAL: 0 THOUSAND/UL (ref 0–0.4)
EOSINOPHIL NFR BLD MANUAL: 0 % (ref 0–6)
ERYTHROCYTE [DISTWIDTH] IN BLOOD BY AUTOMATED COUNT: 12.3 % (ref 11.6–15.1)
GFR SERPL CREATININE-BSD FRML MDRD: 122 ML/MIN/1.73SQ M
GIANT PLATELETS BLD QL SMEAR: PRESENT
GLUCOSE SERPL-MCNC: 106 MG/DL (ref 65–140)
HCT VFR BLD AUTO: 39.8 % (ref 36.5–49.3)
HGB BLD-MCNC: 13.4 G/DL (ref 12–17)
LYMPHOCYTES # BLD AUTO: 0.78 THOUSAND/UL (ref 0.6–4.47)
LYMPHOCYTES # BLD AUTO: 8 % (ref 14–44)
MAGNESIUM SERPL-MCNC: 2.7 MG/DL (ref 1.6–2.6)
MCH RBC QN AUTO: 33.3 PG (ref 26.8–34.3)
MCHC RBC AUTO-ENTMCNC: 33.7 G/DL (ref 31.4–37.4)
MCV RBC AUTO: 99 FL (ref 82–98)
METAMYELOCYTES NFR BLD MANUAL: 1 % (ref 0–1)
MONOCYTES # BLD AUTO: 1.57 THOUSAND/UL (ref 0–1.22)
MONOCYTES NFR BLD: 16 % (ref 4–12)
NEUTROPHILS # BLD MANUAL: 6.38 THOUSAND/UL (ref 1.85–7.62)
NEUTS BAND NFR BLD MANUAL: 1 % (ref 0–8)
NEUTS SEG NFR BLD AUTO: 64 % (ref 43–75)
NRBC BLD AUTO-RTO: 0 /100 WBCS
PHOSPHATE SERPL-MCNC: 4.1 MG/DL (ref 2.7–4.5)
PLATELET # BLD AUTO: 333 THOUSANDS/UL (ref 149–390)
PLATELET BLD QL SMEAR: ADEQUATE
PMV BLD AUTO: 10.6 FL (ref 8.9–12.7)
POTASSIUM SERPL-SCNC: 3.4 MMOL/L (ref 3.5–5.3)
RBC # BLD AUTO: 4.02 MILLION/UL (ref 3.88–5.62)
SODIUM SERPL-SCNC: 138 MMOL/L (ref 136–145)
VARIANT LYMPHS # BLD AUTO: 10 %
WBC # BLD AUTO: 9.81 THOUSAND/UL (ref 4.31–10.16)

## 2017-10-20 PROCEDURE — 94668 MNPJ CHEST WALL SBSQ: CPT | Performed by: SOCIAL WORKER

## 2017-10-20 PROCEDURE — 94762 N-INVAS EAR/PLS OXIMTRY CONT: CPT

## 2017-10-20 PROCEDURE — 97532 HB COGNITIVE SKILLS DEVELOPMENT: CPT

## 2017-10-20 PROCEDURE — 85027 COMPLETE CBC AUTOMATED: CPT | Performed by: STUDENT IN AN ORGANIZED HEALTH CARE EDUCATION/TRAINING PROGRAM

## 2017-10-20 PROCEDURE — 94668 MNPJ CHEST WALL SBSQ: CPT

## 2017-10-20 PROCEDURE — 83735 ASSAY OF MAGNESIUM: CPT | Performed by: STUDENT IN AN ORGANIZED HEALTH CARE EDUCATION/TRAINING PROGRAM

## 2017-10-20 PROCEDURE — 94640 AIRWAY INHALATION TREATMENT: CPT

## 2017-10-20 PROCEDURE — 94640 AIRWAY INHALATION TREATMENT: CPT | Performed by: SOCIAL WORKER

## 2017-10-20 PROCEDURE — 80048 BASIC METABOLIC PNL TOTAL CA: CPT | Performed by: STUDENT IN AN ORGANIZED HEALTH CARE EDUCATION/TRAINING PROGRAM

## 2017-10-20 PROCEDURE — 84100 ASSAY OF PHOSPHORUS: CPT | Performed by: STUDENT IN AN ORGANIZED HEALTH CARE EDUCATION/TRAINING PROGRAM

## 2017-10-20 PROCEDURE — 97530 THERAPEUTIC ACTIVITIES: CPT

## 2017-10-20 PROCEDURE — 97116 GAIT TRAINING THERAPY: CPT

## 2017-10-20 PROCEDURE — 85007 BL SMEAR W/DIFF WBC COUNT: CPT | Performed by: STUDENT IN AN ORGANIZED HEALTH CARE EDUCATION/TRAINING PROGRAM

## 2017-10-20 PROCEDURE — 97535 SELF CARE MNGMENT TRAINING: CPT

## 2017-10-20 PROCEDURE — 94760 N-INVAS EAR/PLS OXIMETRY 1: CPT

## 2017-10-20 PROCEDURE — 92526 ORAL FUNCTION THERAPY: CPT

## 2017-10-20 PROCEDURE — 70450 CT HEAD/BRAIN W/O DYE: CPT

## 2017-10-20 RX ORDER — AMLODIPINE BESYLATE 10 MG/1
10 TABLET ORAL DAILY
Status: DISCONTINUED | OUTPATIENT
Start: 2017-10-20 | End: 2017-10-22 | Stop reason: HOSPADM

## 2017-10-20 RX ORDER — FOLIC ACID 1 MG/1
1 TABLET ORAL DAILY
Status: DISCONTINUED | OUTPATIENT
Start: 2017-10-21 | End: 2017-10-22 | Stop reason: HOSPADM

## 2017-10-20 RX ORDER — ACETAMINOPHEN 160 MG/5ML
650 SUSPENSION, ORAL (FINAL DOSE FORM) ORAL EVERY 8 HOURS PRN
Status: DISCONTINUED | OUTPATIENT
Start: 2017-10-20 | End: 2017-10-22 | Stop reason: HOSPADM

## 2017-10-20 RX ORDER — THIAMINE MONONITRATE (VIT B1) 100 MG
100 TABLET ORAL DAILY
Status: DISCONTINUED | OUTPATIENT
Start: 2017-10-21 | End: 2017-10-22 | Stop reason: HOSPADM

## 2017-10-20 RX ORDER — LORAZEPAM 2 MG/ML
1 INJECTION INTRAMUSCULAR EVERY 4 HOURS PRN
Status: DISCONTINUED | OUTPATIENT
Start: 2017-10-20 | End: 2017-10-22

## 2017-10-20 RX ORDER — LEVETIRACETAM 500 MG/1
500 TABLET ORAL EVERY 12 HOURS SCHEDULED
Status: DISCONTINUED | OUTPATIENT
Start: 2017-10-20 | End: 2017-10-22 | Stop reason: HOSPADM

## 2017-10-20 RX ORDER — QUETIAPINE FUMARATE 25 MG/1
50 TABLET, FILM COATED ORAL
Status: DISCONTINUED | OUTPATIENT
Start: 2017-10-20 | End: 2017-10-21

## 2017-10-20 RX ORDER — POTASSIUM CHLORIDE 20MEQ/15ML
20 LIQUID (ML) ORAL ONCE
Status: COMPLETED | OUTPATIENT
Start: 2017-10-20 | End: 2017-10-20

## 2017-10-20 RX ORDER — QUETIAPINE FUMARATE 25 MG/1
25 TABLET, FILM COATED ORAL DAILY
Status: DISCONTINUED | OUTPATIENT
Start: 2017-10-20 | End: 2017-10-21

## 2017-10-20 RX ORDER — FOLIC ACID 1 MG/1
1 TABLET ORAL DAILY
Status: DISCONTINUED | OUTPATIENT
Start: 2017-10-20 | End: 2017-10-20

## 2017-10-20 RX ORDER — THIAMINE MONONITRATE (VIT B1) 100 MG
100 TABLET ORAL DAILY
Status: DISCONTINUED | OUTPATIENT
Start: 2017-10-20 | End: 2017-10-20

## 2017-10-20 RX ORDER — POTASSIUM CHLORIDE 14.9 MG/ML
20 INJECTION INTRAVENOUS ONCE
Status: COMPLETED | OUTPATIENT
Start: 2017-10-20 | End: 2017-10-20

## 2017-10-20 RX ORDER — POTASSIUM CHLORIDE 20 MEQ/1
20 TABLET, EXTENDED RELEASE ORAL ONCE
Status: DISCONTINUED | OUTPATIENT
Start: 2017-10-20 | End: 2017-10-20

## 2017-10-20 RX ADMIN — ACETAMINOPHEN 650 MG: 160 SUSPENSION ORAL at 18:41

## 2017-10-20 RX ADMIN — OXAZEPAM 15 MG: 15 CAPSULE, GELATIN COATED ORAL at 14:26

## 2017-10-20 RX ADMIN — OXAZEPAM 15 MG: 15 CAPSULE, GELATIN COATED ORAL at 05:09

## 2017-10-20 RX ADMIN — IPRATROPIUM BROMIDE 0.5 MG: 0.5 SOLUTION RESPIRATORY (INHALATION) at 13:32

## 2017-10-20 RX ADMIN — OXAZEPAM 15 MG: 15 CAPSULE, GELATIN COATED ORAL at 20:54

## 2017-10-20 RX ADMIN — HEPARIN SODIUM 5000 UNITS: 5000 INJECTION, SOLUTION INTRAVENOUS; SUBCUTANEOUS at 14:25

## 2017-10-20 RX ADMIN — POLYETHYLENE GLYCOL 3350 17 G: 17 POWDER, FOR SOLUTION ORAL at 09:10

## 2017-10-20 RX ADMIN — LABETALOL 20 MG/4 ML (5 MG/ML) INTRAVENOUS SYRINGE 10 MG: at 05:41

## 2017-10-20 RX ADMIN — LEVETIRACETAM 500 MG: 500 TABLET ORAL at 09:10

## 2017-10-20 RX ADMIN — LEVETIRACETAM 500 MG: 500 TABLET ORAL at 20:54

## 2017-10-20 RX ADMIN — QUETIAPINE FUMARATE 50 MG: 25 TABLET, FILM COATED ORAL at 22:00

## 2017-10-20 RX ADMIN — POTASSIUM CHLORIDE 20 MEQ: 200 INJECTION, SOLUTION INTRAVENOUS at 09:11

## 2017-10-20 RX ADMIN — NICOTINE 21 MG: 21 PATCH, EXTENDED RELEASE TRANSDERMAL at 09:10

## 2017-10-20 RX ADMIN — DEXMEDETOMIDINE HYDROCHLORIDE 0.5 MCG/KG/HR: 100 INJECTION, SOLUTION INTRAVENOUS at 01:38

## 2017-10-20 RX ADMIN — AMLODIPINE BESYLATE 10 MG: 10 TABLET ORAL at 09:10

## 2017-10-20 RX ADMIN — BACITRACIN ZINC 1 SMALL APPLICATION: 500 OINTMENT TOPICAL at 09:11

## 2017-10-20 RX ADMIN — FOLIC ACID 1 MG: 1 TABLET ORAL at 09:11

## 2017-10-20 RX ADMIN — Medication 100 MG: at 09:11

## 2017-10-20 RX ADMIN — QUETIAPINE FUMARATE 25 MG: 25 TABLET ORAL at 09:11

## 2017-10-20 RX ADMIN — POTASSIUM CHLORIDE 20 MEQ: 20 SOLUTION ORAL at 09:12

## 2017-10-20 RX ADMIN — DEXMEDETOMIDINE HYDROCHLORIDE 0.5 MCG/KG/HR: 100 INJECTION, SOLUTION INTRAVENOUS at 07:25

## 2017-10-20 RX ADMIN — LEVALBUTEROL HYDROCHLORIDE 1.25 MG: 1.25 SOLUTION, CONCENTRATE RESPIRATORY (INHALATION) at 13:32

## 2017-10-20 RX ADMIN — LEVALBUTEROL HYDROCHLORIDE 1.25 MG: 1.25 SOLUTION, CONCENTRATE RESPIRATORY (INHALATION) at 07:31

## 2017-10-20 RX ADMIN — IPRATROPIUM BROMIDE 0.5 MG: 0.5 SOLUTION RESPIRATORY (INHALATION) at 20:02

## 2017-10-20 RX ADMIN — LEVALBUTEROL HYDROCHLORIDE 1.25 MG: 1.25 SOLUTION, CONCENTRATE RESPIRATORY (INHALATION) at 20:02

## 2017-10-20 RX ADMIN — HEPARIN SODIUM 5000 UNITS: 5000 INJECTION, SOLUTION INTRAVENOUS; SUBCUTANEOUS at 05:09

## 2017-10-20 RX ADMIN — MAGNESIUM HYDROXIDE 30 ML: 400 SUSPENSION ORAL at 09:10

## 2017-10-20 RX ADMIN — HEPARIN SODIUM 5000 UNITS: 5000 INJECTION, SOLUTION INTRAVENOUS; SUBCUTANEOUS at 20:54

## 2017-10-20 RX ADMIN — QUETIAPINE FUMARATE 50 MG: 25 TABLET, FILM COATED ORAL at 20:55

## 2017-10-20 RX ADMIN — IPRATROPIUM BROMIDE 0.5 MG: 0.5 SOLUTION RESPIRATORY (INHALATION) at 07:31

## 2017-10-20 NOTE — PROGRESS NOTES
Progress Note - ICU Transfer to Step down/[facility]  surg  - Anu Ora 50 y o  male MRN: [id number]    Unit/Bed#: ICU 02 Encounter: [id number]      Code Status: Level 1 - Full Code    Date of ICU admission: 10/11/12    Reason for ICU adm: Intraventricular bleed w/ subarachnoid extension     Active problems:   Patient Active Problem List   Diagnosis    Subarachnoid hemorrhage (HCC)    Headache    Alcohol abuse    Nicotine dependence    Intraventricular hemorrhage (HCC)    Pseudoaneurysm (HCC)    Thrombocytopenia (HCC)    Hypoalbuminemia    Leukocytosis       Summary of clinical course: The patient is a 53-year-old man who initially presented to [facility] 10/11 with double vision and alcohol intoxication, found on CT to have an intraventricular hemorrhage with subarachnoid extension  He had an alcohol level at admission of 330  He was intubated and transferred to [facility] ICU for neurosurgical evaluation and further management  On arrival the the [facility] ICU he had a right frontal EVD placed and was continued on keppra for seizure prophylaxis  Hypertension managed on propofol gtt and PRN labetalol to maintain SBP <140  On cerebral angiography he was found to have a right MCA fusiform aneurysm over parietal convexity, which per neurosurgery was unlikely the etiology of the acute bleed  However, there was some suspicion that the aneurysm identified on angiogram 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    Infectious work-up performed; CSF/blood cx negative  TTE EF 09%, systolic function normal, G1DD  Due to his history of alcohol withdrawal he required prolonged sedation  When holding sedation he became notably agitated and and tried to pull out his endotracheal tube and EVD  Intubation and sedation was continued during period of acute withdrawal  Propofol weaned and the patient was successfully extubated 10/18   After intubation he continued to saturate well but required pulmonary toileting and frequent suctioning to clear his secretions  He continued to be agitated, required precedex gtt and intermittent ativan/fenatnyl, handstrap restraints  He pulled his EVD out on 10/18 overnight, which was closed by neurosurgery without complication the next day  Most recent CT head showed decreasing intraventricular hemorrhage layering within the occipital horns with resolution of the hemorrhage seen in the 3rd ventricle      He remains on serax 15mg q8H, seroquel 25mg Am, 50mg QHS, PRN ativan for agitation/withdrawal      Recent or scheduled procedures:  EVD placement       Outstanding/pending diagnostics: --    Hospital discharge planning:  Pending

## 2017-10-20 NOTE — RESTORATIVE TECHNICIAN NOTE
Restorative Specialist Mobility Note       Activity: Ambulate in room, Chair (refer to PT/OT notes for further assessment)  Ambulation Response: Tolerated well  Repositioned: Sitting, Up in chair (with chair alarm, posey mitts and posey belt)           Range of Motion: Active, All extremities  Anti-Embolism Device On:  Bilateral, Sequential compression devices, below knee

## 2017-10-20 NOTE — PROGRESS NOTES
[facility] Unit Transfer Note  Unit/Bed # @DBLINK (DIANE,59709)@ Encounter: [id number]  SOD Team A          Courtney Espinosa 50 y o  male [id number]      Active Hospital Problems: Principal Problem:    Subarachnoid hemorrhage ([address] )  Active Problems:    Headache    Alcohol abuse    Nicotine dependence    Intraventricular hemorrhage (HCC)    Pseudoaneurysm (HCC)    Thrombocytopenia (HCC)    Hypoalbuminemia    Leukocytosis    1  SAH with IVH extension   - s/p intubation, venticulostomy, pt now extubated   - most recent CT head today showed decreasing intraventricular hemorrhage layering in the occipital horns   - PMR saw the patient and recommended continuing PT/OT, patient will require TBI rehab   - concern for possible mycotic aneurysm, however workup has been negative including blood cultures CSF culture and echo  Patient has been afebrile with no leukocytosis  - neurosurgery has signed off and recommended outpatient follow-up   - continue Keppra 500 mg q 12h for 1 week for seizure prophylaxis   - if declines in GCS greater than 2 points over 1 hour, stat head CT   - maintain SBP under 140, patient is on Norvasc 10 mg p  o  daily, and also labetalol p r n  for SBP greater than 140  - Tylenol for pain  2  Alcohol abuse   - patient says he has been drinking a case of beer per day up until his admission on the 12th   - is required intubation and sedation during his admission due to agitation  He presently exhibits no symptoms of withdrawal, including normal heart rate, no anxiety, no tremors, no nystagmus      -continue serax and seroquel as scheduled and Ativan as needed   - continue folate, thiamine  3  Hypophonia   - patient continues to have so speech since extubation  - speech and swallow evaluation performed after removal of care of head, patient has passed trial with puree, soft solids, with improved swallows       - continue with dysphagia 2 mechanical soft, honey thick liquid diet    VTE Pharmacologic Prophylaxis: Heparin  VTE Mechanical Prophylaxis: sequential compression device    Disposition: Patient requires Level 2 Step Down     Hospital Course: The patient is a 61-year-old male who initially presented to [facility] with complaints of a few weeks of frontal headaches and 1 day of double vision  On arrival he was intoxicated, his vital signs are stable Ms  physical exam was unremarkable  CT head was performed which was positive for subarachnoid hemorrhage with intraventricular extension  He was transferred to [facility] and admitted to the ICU for blood pressure control and neurosurgical evaluation  A cerebral angiography was performed which showed a right MCA pseudoaneurysm although it is a was unlikely to be the source of the bleed  He was started on Keppra for seizure prophylaxis  Because of agitation, worsening encephalopathy he was intubated and sedated for airway protection  Due to non clear etiology for his hemorrhage evaluation for mycotic aneurysm was performed including blood cultures and echo which were both negative  A ventriculostomy was placed due to hydrocephalus  On the 18th his ventriculostomy was removed and he was extubated on the 19th  He did require keofed tube after failing swallow evaluation  Today he continued to improve in regards to his agitation, and also passed a swallowing evaluation which allowed the keofed  tube to be discontinued  Serial CT head evaluations have demonstrated decreasing hemorrhage  He has required continued Precedex as well as Ativan for continued agitation  PMR saw and evaluated the patient and recommended continuing physical therapy with future TBI rehab  He was transferred to the floors in stable condition  Subjective: The patient is feeling well at this time  He only complains of a mild headache    Otherwise he has no complaints, specifically denies chest pain, shortness of breath, abdominal pain, blurry vision, nausea, vomiting, diarrhea  He says that he has been progressing with physical therapy as well as tolerating his diet  He tells me that he has no medical issues, does not take any medications, has not seen a physician in a long time  He admits to drinking a case of beer per day  He says he has used multiple drugs including intravenously, however none recently  He smokes 1 pack per day  He lives at home with his significant other and her family, and says that his home life is stable  Objective:   Vitals:    10/20/17 1000 10/20/17 1200 10/20/17 1601 10/20/17 1808   BP: 144/85 143/82 149/98    Pulse: 78 86 93    Resp: 22 (!) 26 (!) 30    Temp:  98 8 °F (37 1 °C) 98 7 °F (37 1 °C)    TempSrc:  Oral Oral    SpO2: 97% 97% 94% 92%   Weight:       Height:         I/O last 24 hours: In: 2592 5 [I V :1314 5; NG/GT:150; IV Piggyback:350; Feedings:778]  Out: 2725 [Urine:2725]    /98   Pulse 93   Temp 98 7 °F (37 1 °C) (Oral)   Resp (!) 30   Ht 6' (1 829 m)   Wt 78 9 kg (173 lb 15 1 oz)   SpO2 92%   BMI 23 59 kg/m²   General appearance: alert, appears stated age and cooperative  Head:    Lungs: clear to auscultation bilaterally  Heart: regular rate and rhythm, S1, S2 normal, no murmur, click, rub or gallop  Abdomen: soft, non-tender; bowel sounds normal; no masses,  no organomegaly  Extremities: extremities normal, atraumatic, no cyanosis or edema  Neurologic:  Patient is awake and alert  He has a soft speech that he attributes to being status post intubation  He is oriented to person, place, and time  He follows commands appropriately  Cranial nerves 2-12 are intact  Light touch sensation is intact throughout his face and bilateral upper and lower extremities  Motor strength is 5/5 bilaterally    Finger-to-nose testing is normal       Eugenia Ortiz MD   PGY1

## 2017-10-20 NOTE — PROGRESS NOTES
Progress Note - Neurosurgery   Jacquie Rivera 50 y o  male MRN: [id number]  Unit/Bed#: ICU 02 Encounter: [id number]    Assessment:  1  Subarachnoid hemorrhage with intraventricular hemorrhage extension  2  Placement of EVD (10/12/2017)  3  Platt Baird score 1; Valdes 4  4  Right MCA fusiform aneurysm   5  Alcohol abuse  6  Nicotine dependency     Plan: 52M presented with alcohol intoxication & dependency c/o visual disturbances, and headaches  CT showed SAH with IVH extension  He was intubated for airway protection and an EVD was placed  · Exam: AAOx3  LAZCANO with generalized weakness  EOMI  Speech is soft, whisper like  Downward PD due to weakness  Finger to nose intact  Negative wells and clonus  · Imaging: personally reviewed and reviewed by attending on 10/13/2017  · 10/16 - CT head wo: 1) Decreasing volume of blood products and 3rd ventricle as well as mild decrease in ventricuomegaly  · 10/20 - CT head wo: 1) status post removal of right frontal shunt catheter  Small amount of air within the left frontal horn  Decreasing intraventricular hemorrhage layering within the occipital horns with resolution of the hemorrhage seen in the 3rd ventricle   · STAT CT head if decline in GCS >2 pts/1 hr  · EVD removed accidentally by patient on 10/19  Sutured drain site closed by Dr Monae Cotton  · Continue Keppra 500mg every 12 hours x 1 week   · DVT ppx: SCDs, subcutaneous heparin  · Pain/Sedation: Precedex 0 3mcg/kg/hr, as needed ativan 1mg q4h  · PT/OT recommend rehab  PMR consult placed  · SBP goal <140  · Na 137 and K+ 5 6 - continue to trend  · WBC 9 81 compared to 8 28, Tmax of 99 9   · Blood cultures collected 10/12 x3 - no growth after 5 days (final report)  · Blood cultures collected 10/15 x2  - no growth after 4 days  · CSF culture - no growth (final result), protein 42  · Continue medical management per primary team   · No anticipated nsx intervention at this time   S/O follow up outpatient for IVH and pseudoaneursym  Call with questions or concerns  Subjective/Objective   Chief Complaint: "I have a headache"    Subjective: Patient admits to having a frontal headache that is currently rated 5 out of 10  He denies any photophobia or phonophobia  He denies any dizziness, change in vision, chest pain, SOB, abdominal pain/N/V  He admits to appreciating weakness     Objective: sleeping in chair, NAD    I/O       10/18 0701 - 10/19 0700 10/19 0701 - 10/20 0700 10/20 0701 - 10/21 0700    I V  (mL/kg) 1552 3 (19 7) 1118 4 (14 2)     NG/GT       IV Piggyback 100 250     Feedings 100 678     Total Intake(mL/kg) 1752 3 (22 2) 2046 4 (25 9)     Urine (mL/kg/hr) 2375 (1 3) 2100 (1 1)     Drains 185 (0 1)      Stool 0 (0) 0 (0)     Total Output 2560 2100      Net -807 7 -53 6             Unmeasured Urine Occurrence  1 x     Unmeasured Stool Occurrence 2 x 0 x           Invasive Devices     Peripheral Intravenous Line            Peripheral IV 10/17/17 Left Arm 3 days    Peripheral IV 10/19/17 Right;Upper Arm less than 1 day          Drain            External Urinary Catheter Small less than 1 day    NG/OG/Enteral Tube Enteral Feeding Tube Right nares less than 1 day                Physical Exam:  Vitals: Blood pressure 162/98, pulse 72, temperature 98 °F (36 7 °C), temperature source Oral, resp  rate (!) 28, height 6' (1 829 m), weight 78 9 kg (173 lb 15 1 oz), SpO2 96 %  ,Body mass index is 23 59 kg/m²  Hemodynamic Monitoring: MAP: Arterial Line MAP (mmHg): 112 mmHg    General appearance: sleeping in chair, easily wakes with voice, appears stated age, cooperative and mild respiratory distress  Head: Normocephalic, right frontal incisions are nonerythematous, nonedematous, no tenderness on palpation  Eyes: EOMI, PERRL  Lungs: short, shallow breaths, tachypnea appreciated  No audible wheezing heard  Deep coughing presents  Heart: regular heart rate  Abdomen: soft, no tenderness or guarding on palpation     Neurologic: Mental status: Alert, oriented x3, thought content appropriate  Speech is a whisper, soft, but able to make out  Able to repeat  3/3 object recognition, 2/3 delayed object recognition  Unable to perform serial 7's but able to perform simple math of 2+2  Cranial nerves: grossly intact (Cranial nerves II-XII)  Facial symmetry at rest and with expression  Tongue is midline  Sensory: normal to LT in BUE and BLE  Motor: moving all extremities with generalized weakness worse proximally  BUE: shoulder shrug: 3/5, deltoid: 3/5, Biceps: 4/5, triceps: 3/5, : 4/5  BLE: HF: 4/5, HE: 3/5, KF/KE: 5-/5, DF/PF: 5-/5  Reflexes: 2+ and symmetric  negative wells, negative clonus  Coordination: finger to nose normal bilaterally, +downward drift bilaterally likely 2/2 to weakness  3/3 JPS intact  Lab Results:    Results from last 7 days  Lab Units 10/20/17  0505 10/19/17  0606 10/18/17  0438 10/17/17  0435 10/16/17  0446   WBC Thousand/uL 9 81 11 35* 8 28 9 47 8 12   HEMOGLOBIN g/dL 13 4 12 7 12 9 13 0 13 1   HEMATOCRIT % 39 8 37 4 37 3 38 8 40 1   PLATELETS Thousands/uL 333 271 281 220 162   NEUTROS PCT %  --  69  --  76* 67   MONOS PCT %  --  20*  --  13* 15*   MONO PCT MAN % 16*  --  14*  --   --        Results from last 7 days  Lab Units 10/20/17  0505 10/19/17  0606 10/18/17  0957 10/18/17  0438  10/15/17  0507   SODIUM mmol/L 138 135* 138 137  < > 136   POTASSIUM mmol/L 3 4* 3 7 4 1 5 6*  < > 3 6   CHLORIDE mmol/L 101 101 104 102  < > 100   CO2 mmol/L 29 26 29 29  < > 30   BUN mg/dL 14 14 15 16  < > 8   CREATININE mg/dL 0 54* 0 52* 0 52* 0 51*  < > 0 50*   CALCIUM mg/dL 9 3 9 1 8 8 8 8  < > 8 0*   TOTAL PROTEIN g/dL  --  7 5  --  7 4  --  6 5   BILIRUBIN TOTAL mg/dL  --  0 80  --  0 83  --  0 92   ALK PHOS U/L  --  91  --  79  --  66   ALT U/L  --  54  --  66  --  85*   AST U/L  --  37  --  69*  --  73*   GLUCOSE RANDOM mg/dL 106 102 121 121  < > 106   < > = values in this interval not displayed      Results from

## 2017-10-20 NOTE — DISCHARGE INSTRUCTIONS
Do not take any blood thinning medications (ie  No Advil  No motrin  No ibuprofen  No Aleve  No Aspirin  No fishoil  No heparin  No antiplatelet / no anticoagulation medication)  Refrain from activity that increases chance of trauma to head or falls  Recommend you take fall precaution  No strenuous activity or sports  Return to hospital Emergency Room if you experience worsening / new headache, nausea/vomiting, speech/vision change, seizure, confusion / mental status change, weakness, or other neurological changes

## 2017-10-20 NOTE — OCCUPATIONAL THERAPY NOTE
Occupational Therapy Progress Note      Patient Name: Amelia BROWN Date: 10/20/2017    Problem List:   Patient Active Problem List   Diagnosis    Subarachnoid hemorrhage ([address] )    Headache    Alcohol abuse    Nicotine dependence    Intraventricular hemorrhage (HCC)    Pseudoaneurysm (HCC)    Thrombocytopenia (HCC)    Hypoalbuminemia    Leukocytosis           10/20/17 1505   Restrictions/Precautions   Weight Bearing Precautions Per Order No   Other Precautions Fall Risk;Pain;Cognitive; Chair Alarm; Restraints;Multiple lines;Telemetry; Impulsive;Aspiration   General   Family/Caregiver Present MOTHER PRESENT DURING TREATMENT SESSION  Lifestyle   Reciprocal Relationships PT RESIDES W/ S/O  REPORTS 2 LOCAL CHILDREN  Service to Others PT REPORTS HE IS AN  HOWEVER HAS BEEN WORKING FOR HIS BROTHER'S "[employer] BUSINESS MOST RECENTLY  Pain Assessment   Pain Assessment No/denies pain   Pain Score No Pain   ADL   Where Assessed Chair   Eating Assistance 4  Minimal Assistance   Eating Deficit Setup; Impulsive;Verbal cueing;Supervision/safety; Increased time to complete   UB Dressing Assistance 3  Moderate Assistance   UB Dressing Deficit Setup; Thread RUE; Thread LUE; Increased time to complete;Supervision/safety; Impulsive   Toileting Assistance  2  Maximal Assistance   Toileting Deficit Setup; Impulsive;Verbal cueing;Supervison/safety; Increased time to complete   Functional Standing Tolerance   Time 3 MINUTES   Activity FREDERICK CARE   Comments MOD A X2   Bed Mobility   Rolling R 3  Moderate assistance   Additional items Assist x 1; Increased time required;Verbal cues   Rolling L 3  Moderate assistance   Additional items Assist x 1; Increased time required;Verbal cues   Supine to Sit (PT OOB IN CHAIR UPON ARRIVAL  )   Sit to Supine 2  Maximal assistance   Additional items Assist x 2; Increased time required;Verbal cues;LE management   Transfers   Sit to Stand 3  Moderate assistance   Additional items Assist x 2; Increased time required;Verbal cues; Impulsive   Stand to Sit 3  Moderate assistance   Additional items Assist x 2; Increased time required;Verbal cues; Impulsive   Functional Mobility   Functional Mobility 3  Moderate assistance   Additional Comments ASSIST X2 W/ HHA -- PT TOOK FEW STEPS FROM ROOM CHAIR TO EOB W/ MOD A X2  RETROPULSIVE  Additional items Hand hold assistance   Cognition   Overall Cognitive Status Impaired   Arousal/Participation Responsive   Attention Difficulty attending to directions   Orientation Level Oriented to person;Oriented to place;Oriented to time;Disoriented to situation   Memory Decreased recall of precautions;Decreased recall of recent events;Decreased short term memory;Decreased recall of biographical information   Following Commands Follows one step commands with increased time or repetition   Comments PT ENGAGES IN CONVERSATION HOWEVER NOTED W/ CONTINUED DEFICITS IN ATTENTION ,INSIGHT, SAFETY, JUDGEMENT, PROCESSING AND ORIENTATION  PLEASE SEE BELOW FOR DETAILS  Activity Tolerance   Activity Tolerance Patient limited by fatigue;Treatment limited secondary to medical complications (Comment)   Medical Staff Made Aware F/U W/ CM ALEC AND KAYLEN GLOVER   Assessment   Assessment PT WAS SEEN FOR OT TXMENT THIS PM FOCUSED ON EATING TASK, COGNITIVE RE-ORIENTATION, TOILETING, FREDERICK CARE, FUNCTIONAL TRANSFERS, SHORT DISTANCE MOBILITY AND BED MOBILITY  PT REQUESTING TO EAT LUNCH AND COMPLETED TASK W/ THERAPIST THIS PM FOR ANALYSIS OF UE ROM/COORDINATION/ATAXIA AS WELL AS COGNITIVE CAPABILITIES  PT NOTED W/ IMPROVED TRUNK CONTROL AND MIDLINE POSITIONING OF NECK THIS PM SITTING IN ROOM CHAIR  PT COMPLETED EATING W/ MIN A W/ MAX VERBAL CUES FOR SAFETY INCLUDING SPEED AND TECHNIQUE AS PT ATTEMPTING TO EAT FOOD AT FAST AND UNSAFE SPEEDS  PT DEMONSTRATING IMPROVED FMC/GMC W/ USE OF UTENSILS AND OPENING OF JUICE CARTON   PT ENGAGED IN COGNITIVE RE-ORIENTATION W/ QUESTIONS SUCH AS: WHERE ARE WE, WHY ARE YOU HERE, WHAT IS THE DATE, DO YOU HAVE CHILDREN, HOW OLD ARE THEY, WHAT DO THEY DO FOR WORK, WHAT IS YOUR HOME ADDRESS, WHAT HOLIDAY IS AT THE END OF THIS MONTH, WHAT DO YOU DO FOR WORK  PT ENGAGES IN CONVERSATION W/O CUES TO MAINTAIN ATTENTION HOWEVER DEMONSTRATES POOR ACCURACY WITH QUESTION RESPONSES AS MOTHER PRESENT AND ABLE TO VERIFY ANSWERS  EX: PT UNABLE TO IDENTIFY H[employer] AS A HOLIDAY AT THE END OF THE MONTH AND INSTEAD ANSWERED [employer] AND UNABLE TO IDENTIFY HIS CHILDREN'S JOBS  MID EATING TASK PT REPORTED HE URGENTLY NEEDED TO HAVE A BM  PT W/ INCONTINENCE REQUIRING MAX A FOR FREDERICK CARE  PT REQUIRED MOD A X2 TO COMPLETE SIT>STAND AND MOD A X2 TO TAKE FEW STEPS FROM EOB>CHAIR W/ HHA  PT RETURNED TO SUPINE W/ MAX A X2 AND COMPLETED ROLLING W/ MOD A X1 TO ADJUST BED LINENS  PT REMAINS LIMITED 2* IMPAIRED BALANCE, ACTIVITY TOLERANCE, MEDICAL STATUS, WEAKNESS, DECONDITIONING, ADL IMPAIRMENTS AND NOTED COGNITIVE DEFICITS  CONTINUE TO RECOMMEND INPT REHAB AT D/C  WILL CONTINUE TO FOLLOW  Plan   Treatment Interventions ADL retraining;Visual perceptual retraining;Functional transfer training;UE strengthening/ROM; Endurance training;Cognitive reorientation;Patient/family training;Equipment evaluation/education; Neuromuscular reeducation; Fine motor coordination activities; Compensatory technique education;Continued evaluation; Energy conservation; Activityengagement   Goal Expiration Date 10/29/17   Treatment Day 1   OT Frequency 3-5x/wk   Recommendation   Recommendation Physiatry Consult   OT Discharge Recommendation Short Term Rehab   OT - OK to Discharge (TO [facility] AT THIS TIME)   Barthel Index   Feeding 0   Bathing 0   Grooming Score 0   Dressing Score 0   Bladder Score 0   Bowels Score 0   Toilet Use Score 0   Transfers (Bed/Chair) Score 5   Mobility (Level Surface) Score 0   Stairs Score 0   Barthel Index Score 5   Modified Wilian Scale   Modified Wilian Scale 4     DOCUMENTATION COMPLETED BY Carrie Garcia MS, OTR/L

## 2017-10-20 NOTE — PHYSICAL THERAPY NOTE
Physical Therapy Treatment Note     10/20/17 1411   Pain Assessment   Pain Assessment 0-10   Pain Score No Pain   Restrictions/Precautions   Weight Bearing Precautions Per Order No   Other Precautions Cognitive; Chair Alarm; Bed Alarm; Restraints;Multiple lines;Telemetry;O2;Fall Risk   General   Chart Reviewed Yes   Family/Caregiver Present Yes   Cognition   Overall Cognitive Status Impaired   Arousal/Participation Responsive   Attention Difficulty attending to directions   Orientation Level Oriented to person   Memory Unable to assess   Following Commands Follows one step commands inconsistently   Subjective   Subjective awake, soft spoken and confused  cooperative w/ session w/ re-direction throughout   Transfers   Sit to Stand 3  Moderate assistance   Additional items Assist x 2   Stand to Sit 3  Moderate assistance   Additional items Assist x 2   Additional Comments stood x 3-4 min to help nurse's aide clean pt due to bowel incontinence  posterior LOB w/ same   Ambulation/Elevation   Gait pattern (ataxia, posterior LOB, short step length)   Gait Assistance 2  Maximal assist   Additional items Assist x 2   Assistive Device (HHA of 2)   Distance 2-3' forward, then 2-3' back to chair   Balance   Static Sitting Poor   Dynamic Sitting Poor -   Static Standing Poor -   Dynamic Standing Poor -   Ambulatory Poor -   Endurance Deficit   Endurance Deficit Yes   Endurance Deficit Description fatigue, weakness, cog status   Activity Tolerance   Activity Tolerance Patient limited by fatigue;Treatment limited secondary to medical complications (Comment)   Nurse Made Aware yes   Assessment   Prognosis Good   Problem List Decreased strength;Decreased endurance;Decreased mobility; Impaired balance;Decreased coordination;Decreased safety awareness;Decreased cognition; Impaired judgement   Assessment Pt seen for session, PT 1:1 activity x 15 min for setup, transfers, standing trial, minimal gait, repositioning p session    awake, willing to participate  remains confused  less assist needed for transfers/standing today but has heavy posterior LOB  able to initiate gait  remains appropriate for rehab at d/c   Goals   Patient Goals none stated   LTG Expiration Date 11/02/17   Treatment Day 1   Plan   Treatment/Interventions Functional transfer training;LE strengthening/ROM; Therapeutic exercise; Endurance training;Patient/family training;Equipment eval/education; Bed mobility;Gait training   Progress Progressing toward goals   PT Frequency 5x/wk  (and 1x/weekend)   Recommendation   Recommendation (recommend rehab at d/c)   PT - OK to Discharge Yes  (to rehab when stable)   Evan Mojica PT, DPT CSRS

## 2017-10-20 NOTE — SPEECH THERAPY NOTE
Speech Language/Pathology                             SLP  Note  Patient Name: Jennie Viramontes  SPKDU'K Date: 10/20/2017     Subjective:  "Oh food!"  Pt awake, alert, in chair  Objective:  Pt remains NPO, now has Keofeed  Continues to be aphonic but cough is strong  Pt trialed w/ puree, soft solids, ht by tsp/cup, nt by tsp/cup  Functional oral manipulation & transfers  Swallows are mildly improved today, increased promptness  Hyolaryngeal rise is reduced upon palpation  +throat clear & coughing noted w/ nt by tsp/cup  Delayed coughing noted w/ puree following the first 3 tsps then there was no overt s/s aspiration  Pt took 1 whole piece cracker, adequate mastication & transfer, mult swallows but no overt coughing  Assessment:  Pt presents w/ mildly improved swallow function  Appropriate to begin conservative diet w/ supervision  Plan/Recommendations:  1  Begin Level 2, ht liquids  2  Supervise, slow pt down

## 2017-10-20 NOTE — RESTORATIVE TECHNICIAN NOTE
Restorative Specialist Mobility Note       Activity: Stand at bedside  Ambulation Response: Tolerated well  Repositioned: Sitting, Up in chair (with chair alarm, posey mitts and posey belt)           Range of Motion: Active, All extremities  Anti-Embolism Device On:  Bilateral, Sequential compression devices, below knee

## 2017-10-20 NOTE — PLAN OF CARE
Problem: OCCUPATIONAL THERAPY ADULT  Goal: Performs self-care activities at highest level of function for planned discharge setting  See evaluation for individualized goals  Treatment Interventions: ADL retraining, Visual perceptual retraining, Functional transfer training, UE strengthening/ROM, Endurance training, Cognitive reorientation, Patient/family training, Equipment evaluation/education, Neuromuscular reeducation, Fine motor coordination activities, Compensatory technique education, Continued evaluation, Energy conservation, Activityengagement          See flowsheet documentation for full assessment, interventions and recommendations  Outcome: Progressing  Limitation: Decreased ADL status, Decreased UE ROM, Decreased UE strength, Decreased Safe judgement during ADL, Decreased cognition, Decreased endurance, Decreased sensation, Visual deficit, Decreased fine motor control, Decreased self-care trans, Decreased high-level ADLs, Mood limitation (BALANCE, PAIN, MEDICAL STATUS, WEAKNESS, DECONDITIONING)  Prognosis: Fair  Assessment: PT WAS SEEN FOR OT TXMENT THIS PM FOCUSED ON EATING TASK, COGNITIVE RE-ORIENTATION, TOILETING, FREDERICK CARE, FUNCTIONAL TRANSFERS, SHORT DISTANCE MOBILITY AND BED MOBILITY  PT REQUESTING TO EAT LUNCH AND COMPLETED TASK W/ THERAPIST THIS PM FOR ANALYSIS OF UE ROM/COORDINATION/ATAXIA AS WELL AS COGNITIVE CAPABILITIES  PT NOTED W/ IMPROVED TRUNK CONTROL AND MIDLINE POSITIONING OF NECK THIS PM SITTING IN ROOM CHAIR  PT COMPLETED EATING W/ MIN A W/ MAX VERBAL CUES FOR SAFETY INCLUDING SPEED AND TECHNIQUE AS PT ATTEMPTING TO EAT FOOD AT FAST AND UNSAFE SPEEDS  PT DEMONSTRATING IMPROVED FMC/GMC W/ USE OF UTENSILS AND OPENING OF JUICE CARTON   PT ENGAGED IN COGNITIVE RE-ORIENTATION W/ QUESTIONS SUCH AS: WHERE ARE WE, WHY ARE YOU HERE, WHAT IS THE DATE, DO YOU HAVE CHILDREN, HOW OLD ARE THEY, WHAT DO THEY DO FOR WORK, WHAT IS YOUR HOME ADDRESS, WHAT HOLIDAY IS AT THE END OF THIS MONTH, WHAT DO YOU DO FOR WORK  PT ENGAGES IN CONVERSATION W/O CUES TO MAINTAIN ATTENTION HOWEVER DEMONSTRATES POOR ACCURACY WITH QUESTION RESPONSES AS MOTHER PRESENT AND ABLE TO VERIFY ANSWERS  EX: PT UNABLE TO IDENTIFY [employer] AS A HOLIDAY AT THE END OF THE MONTH AND INSTEAD ANSWERED [employer] AND UNABLE TO IDENTIFY HIS CHILDREN'S JOBS  MID EATING TASK PT REPORTED HE URGENTLY NEEDED TO HAVE A BM  PT W/ INCONTINENCE REQUIRING MAX A FOR FREDERICK CARE  PT REQUIRED MOD A X2 TO COMPLETE SIT>STAND AND MOD A X2 TO TAKE FEW STEPS FROM EOB>CHAIR W/ HHA  PT RETURNED TO [place] W/ MAX A X2 AND COMPLETED ROLLING W/ MOD A X1 TO ADJUST BED LINENS  PT REMAINS LIMITED 2* IMPAIRED BALANCE, ACTIVITY TOLERANCE, MEDICAL STATUS, WEAKNESS, DECONDITIONING, ADL IMPAIRMENTS AND NOTED COGNITIVE DEFICITS  CONTINUE TO RECOMMEND INPT REHAB AT D/C  WILL CONTINUE TO FOLLOW     Recommendation: Physiatry Consult  OT Discharge Recommendation: Short Term Rehab  OT - OK to Discharge:  (TO [facility] AT THIS TIME)

## 2017-10-20 NOTE — PROGRESS NOTES
Progress Note - Critical Care   Arley Mccoy 50 y o  male MRN: [id number]  Unit/Bed#: ICU 02 Encounter: [id number]    Assessment/Plan:     74-year-old male initially presented to [facility] 10/11 w/ double vision and alcohol intoxication, found on CT to have intraventricular hemorrhage with subarachnoid extension, intubated and transferred to [facility] ICU, s/p EVD placement 10/12  IR angiogram w/ MCA pseudoaneurysm, infectious work-up so far negative  Extubated successfully 10/18, EVD out 10/19      Post-bleed day #10    a-line/condomcath    Patient Active Problem List    Diagnosis Date Noted    Thrombocytopenia ([facility] ) 10/15/2017    Hypoalbuminemia 10/15/2017    Leukocytosis 10/15/2017    Intraventricular hemorrhage ([facility] ) 10/14/2017    Pseudoaneurysm ([facility] ) 10/14/2017    Subarachnoid hemorrhage ([facility] ) 10/12/2017    Headache 10/12/2017    Alcohol abuse 10/12/2017    Nicotine dependence 10/12/2017     Neuro: IVH w/ subarachnoid extension (H/H 1, mod fritz 4) s/p EVD placement   IR cerebral angiogram 10/12 w/ 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, per neurosurg unlikely responsible for hemorrhage, infectious workup for mycotic etiology  Post-bleed day #10   - EVD removed/closed 10/19  - 14 [facility] 10/20: S/p removal of right frontal shunt catheter   Small amount of air within the left frontal horn   Decreasing intraventricular hemorrhage layering within the occipital horns with resolution of the hemorrhage seen in the 3rd ventricle  Stable brain parenchyma  - Agitation/alcohol w/d: this AM (10/20) on precedex 0 5, continued to be agitated overnight, received ativan 1mgx2 (x3 last 24)  Attempt to wean precedex, continue serax 15mg q8H, Ativan 1mg q4H PRN  Increase Seroquel 25mg Am, 50 mg qHS  - 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 mycotic pseudoaneurysm: BCx (10/12):  NG5D, TTE (10/12): EF 60%, G1DD, normal RV size/systolic function, IVC dilated   - SBP goal <140  - Neurochecks q4H  - Neurosurg following      CV:   - A-line in place   - SBP goal <140, SBP trending 150s-160s on Norvasc 5mg Qd, required labetalol 10mgx2, increase to Norvasc 10qd    Lung:   - Continues to have persistent secretions, hypophonia  Strong cough  Airway clearance protocol  - ABG (10/12): 7 4/42/160/25 6, base excess 0 7     GI:  - GI PPx: Pepcid 20mg BID     FEN:   - Replete lytes PRN  -      :   - Condom cath  - BUN/creat 15/0 52  - U/o 2100cc/24H, +-0/053L/24H, clinically euvolemic      ID:   - Afebrile overnight, WBC wnl  - Repeat BCx (10/15): NG4D   - Infectious workup in process, see "neuro"      Heme:   - Thrombocytopenia: resolved   - Hgb stable  - DvtPpx: SCDs, subqH     Endo: Maintain euglycemia, sugars trending 90s-120s                Msk/Skin: Skin ulcer prophylaxis    Chief Complaint: Wants restraints off    HPI/24hr events: Continues to have persistent agitation    Physical Exam:   Physical Exam   Constitutional: He is oriented to person, place, and time  No distress  HENT:   Head: Normocephalic and atraumatic  EVD drain site CDI   Eyes: EOM are normal  Pupils are equal, round, and reactive to light  Neck: Normal range of motion  Cardiovascular: Normal rate and regular rhythm  Pulmonary/Chest: Effort normal and breath sounds normal    Abdominal: Soft  Bowel sounds are normal    Neurological: He is alert and oriented to person, place, and time  Hyophonic   Skin: Skin is warm and dry  He is not diaphoretic         Vitals:    10/20/17 0316 10/20/17 0416 10/20/17 0516 10/20/17 0616   BP: 151/91 149/91 (!) 163/101 162/98   Pulse: 70 68 72 72   Resp: (!) 34 (!) 28 (!) 29 (!) 28   Temp:  98 °F (36 7 °C)     TempSrc:  Oral     SpO2: 100% 100% 98% 95%   Weight:       Height:         Arterial Line BP: 150/93  Arterial Line MAP (mmHg): 112 mmHg    Temperature:   Temp (24hrs), Av 7 °F (37 1 °C), Min:97 9 °F (36 6 °C), Max:99 8 °F (37 7 °C)    Current: Temperature: 98 °F (36 7 °C)    Weights:   IBW: 77 6 kg    Body mass index is 23 59 kg/m²  Weight (last 2 days)     None             Non-Invasive/Invasive Ventilation Settings:  Respiratory    Lab Data (Last 4 hours)    None         O2/Vent Data (Last 4 hours)    None              No results found for: PHART, FXQ7DWW, PO2ART, VIB5MBJ, W9QMLUOR, BEART, SOURCE      Intake and Outputs:  I/O       10/18 0701 - 10/19 0700 10/19 0701 - 10/20 0700    I V  (mL/kg) 1552 3 (19 7) 1118 4 (14 2)    IV Piggyback 100 250    Feedings 100 678    Total Intake(mL/kg) 1752 3 (22 2) 2046 4 (25 9)    Urine (mL/kg/hr) 2375 (1 3) 2100 (1 1)    Drains 185 (0 1)     Stool 0 (0) 0 (0)    Total Output 2560 2100    Net -807 7 -53 6          Unmeasured Urine Occurrence  1 x    Unmeasured Stool Occurrence 2 x 0 x        Nutrition:        Diet Orders            Start     Ordered    10/19/17 1503  Diet Enteral/Parenteral; Tube Feeding No Oral Diet; Jevity 1 2 Rob; 50  Diet effective now     Comments:  50cc / hour   Question Answer Comment   Diet Type Enteral/Parenteral    Enteral/Parenteral Tube Feeding No Oral Diet    Tube Feeding Formula: Jevity 1 2 Rob    Tube Feeding Continuous rate (mL/hr): 50    RD to adjust diet per protocol?  No        10/19/17 1502          Labs:     Results from last 7 days  Lab Units 10/20/17  0505 10/19/17  0606 10/18/17  0438 10/17/17  0435 10/16/17  0446   WBC Thousand/uL 9 81 11 35* 8 28 9 47 8 12   HEMOGLOBIN g/dL 13 4 12 7 12 9 13 0 13 1   HEMATOCRIT % 39 8 37 4 37 3 38 8 40 1   PLATELETS Thousands/uL 333 271 281 220 162   NEUTROS PCT %  --  69  --  76* 67   MONOS PCT %  --  20*  --  13* 15*   MONO PCT MAN %  --   --  14*  --   --       Results from last 7 days  Lab Units 10/20/17  0505 10/19/17  0606 10/18/17  0957 10/18/17  0438  10/15/17  0507   SODIUM mmol/L 138 135* 138 137  < > 136   POTASSIUM mmol/L 3 4* 3 7 4 1 5 6*  < > 3 6   CHLORIDE mmol/L 101 101 104 102  < > 100   CO2 mmol/L 29 26 29 29  < > 30   BUN mg/dL 14 14 15 16  < > 8   CREATININE mg/dL 0 54* 0 52* 0 52* 0 51*  < > 0 50*   CALCIUM mg/dL 9 3 9 1 8 8 8 8  < > 8 0*   TOTAL PROTEIN g/dL  --  7 5  --  7 4  --  6 5   BILIRUBIN TOTAL mg/dL  --  0 80  --  0 83  --  0 92   ALK PHOS U/L  --  91  --  79  --  66   ALT U/L  --  54  --  66  --  85*   AST U/L  --  37  --  69*  --  73*   GLUCOSE RANDOM mg/dL 106 102 121 121  < > 106   < > = values in this interval not displayed  Results from last 7 days  Lab Units 10/20/17  0505 10/19/17  0606 10/18/17  0438   MAGNESIUM mg/dL 2 7* 2 6 2 6       Results from last 7 days  Lab Units 10/20/17  0505 10/19/17  0606 10/18/17  0438   PHOSPHORUS mg/dL 4 1 3 7 3 6              No results found for: TROPONINI    Imaging:  I have personally reviewed pertinent reports  Micro:  Lab Results   Component Value Date    BLOODCX No Growth After 4 Days  10/15/2017    BLOODCX No Growth After 4 Days  10/15/2017    BLOODCX No Growth After 5 Days   10/12/2017       Allergies: No Known Allergies    Medications:   Scheduled Meds:  amLODIPine 5 mg Oral Daily   bacitracin 1 small application Topical BID   chlorhexidine 15 mL Swish & Spit Q12H [address]   famotidine 20 mg Intravenous K93I [address]   folic acid IVPB 1 mg Intravenous Once   heparin (porcine) 5,000 Units Subcutaneous Q8H [address]   ipratropium 0 5 mg Nebulization TID   levalbuterol 1 25 mg Nebulization TID   levETIRAcetam 500 mg Intravenous Q12H [address]   magnesium hydroxide 30 mL Oral Daily   nicotine 21 mg Transdermal Daily   oxazepam 15 mg Oral Q8H [address]   polyethylene glycol 17 g Oral Daily   QUEtiapine 25 mg Oral HS   senna 1 tablet Oral HS   thiamine (VITAMIN B1) 50 mL IVPB 100 mg Intravenous Q24H     Continuous Infusions:  dexmedetomidine 0 1-0 7 mcg/kg/hr Last Rate: 0 5 mcg/kg/hr (10/20/17 0138)     PRN Meds:    acetaminophen 650 mg Q6H PRN   labetalol 10 mg Q4H PRN   LORazepam 1 mg Q2H PRN   ondansetron 4 mg Q6H PRN       VTE Pharmacologic Prophylaxis: Heparin  VTE Mechanical Prophylaxis: sequential compression device    Invasive lines and devices: Invasive Devices     Peripheral Intravenous Line            Peripheral IV 10/17/17 Left Arm 2 days    Peripheral IV 10/19/17 Right;Upper Arm less than 1 day          Drain            External Urinary Catheter Small less than 1 day    NG/OG/Enteral Tube Enteral Feeding Tube Right nares less than 1 day                   Counseling / Coordination of Care  Total time spent today 30min minutes  Greater than 50% of total time was spent with the patient and / or family counseling and / or coordination of care  A description of the counseling / coordination of care: 30min     Code Status: Level 1 - Full Code     Portions of the record may have been created with voice recognition software  Occasional wrong word or "sound a like" substitutions may have occurred due to the inherent limitations of voice recognition software  Read the chart carefully and recognize, using context, where substitutions have occurred       Andres Guaman MD

## 2017-10-20 NOTE — CONSULTS
PHYSICAL MEDICINE AND REHABILITATION CONSULT NOTE  Flory Thompson 50 y o  male MRN: [id number]  Unit/Bed#: ICU 02 Encounter: [id number]    Requested by (Physician/Service): Samara Mancini MD  Reason for Consultation:  Assessment of rehabilitation needs  Reason for Hospitalization:  Subarachnoid hemorrhage ([facility] ) [I60 9]    History of Present Illness:  Flory Thompson is a 50 y o  male who  has a past medical history of Alcoholism /alcohol abuse ([facility] )  who presented to the [address] [place] [address] Road with c/o double vision, headaches  On admission, noted to have elevated alcohol level  He denied trauma at the time  CTOH demonstrated [facility] with concern for aneurysm  CTA head and neck deomonstrated small left cavernous ICA aneurysm, as well as redistribution of hemorrhage  Patient underwent IR cerebral angiography, which demonstrated a distal right MCA pseudoaneurysm, which was believed to be unrelated to patient's hemorrhage  Patient was eventually intubated and sedated for airway protection  Patient was noted to have hydrocephalus on imaging, and EVD was placed on 10/12  He had fevers, as high as 102 2, for which infectious workup was initiated  Blood cultures negative x3, CSF cultures negative  Echo was negative for vegetations  Extubated on 10/18/17  Patient with non-violent restraints as he self-removed EVD  Due to history of ETOH use, patient requiring Ativan, now on precedex drip  Patient seen and examined while in chair  Wearing posey vest  Endorses headache  No N/V  No blurry vision, no double vision  PM&R consulted for rehabilitation recommendations  Restrictions include:  non-violent restraints    Hospital Complications/Comorbidities:   Complications: As above  Comorbidities: As above    Morbid Obesity (BMI > 40) No     Last Weight Last BMI   Wt Readings from Last 1 Encounters:   10/12/17 78 9 kg (173 lb 15 1 oz)    Body mass index is 23 59 kg/m²       Functional History:     Prior to Admission:     Functional Status: Patient was independent with mobility/ambulation, transfers, ADL's, IADL's  Present:  Physical Therapy Occupational Therapy Speech Therapy   Bed Mobility   Rolling R 2  Maximal assistance   Additional items Assist x 1; Increased time required;Verbal cues;LE management   Rolling L 2  Maximal assistance   Additional items Assist x 2; Increased time required;LE management;Verbal cues   Supine to Sit 2  Maximal assistance   Additional items Assist x 2; Increased time required;Verbal cues;LE management   Sit to Supine 2  Maximal assistance   Additional items Assist x 2; Increased time required;Verbal cues;LE management   Transfers   Sit to Stand 2  Maximal assistance   Additional items Assist x 2; Increased time required;Verbal cues   Stand to Sit 2  Maximal assistance   Additional items Assist x 2; Increased time required;Verbal cues      Eating Assistance 2  Maximal Assistance   Grooming Assistance 2  Maximal Assistance   UB Bathing Assistance 2  Maximal Assistance   LB Bathing Assistance 2  Maximal Assistance   UB Dressing Assistance 2  Maximal Assistance   LB Dressing Assistance 2  Maximal Assistance   Toileting Assistance  2  Maximal Assistance   Bed Mobility   Rolling L 2  Maximal assistance   Additional items Assist x 2; Increased time required;Verbal cues;LE management   Supine to Sit 2  Maximal assistance   Additional items Assist x 2; Increased time required;Verbal cues;LE management   Sit to Supine 2  Maximal assistance   Additional items Assist x 2; Increased time required;Verbal cues;LE management   Transfers   Sit to Stand 2  Maximal assistance   Additional items Assist x 2; Increased time required;Verbal cues   Stand to Sit 2  Maximal assistance   Additional items Assist x 2; Increased time required;Verbal cues      NPO, with kedofed     Past Medical History:   Past Surgical History:   Family History:     Past Medical History:   Diagnosis Date    Alcoholism /alcohol abuse ([facility] ) History reviewed  No pertinent surgical history  History reviewed  No pertinent family history         Medications:    Current Facility-Administered Medications:     acetaminophen (TYLENOL) oral suspension 650 mg, 650 mg, Oral, Q8H PRN, Rashel Vail MD    amLODIPine (NORVASC) tablet 10 mg, 10 mg, Oral, Daily, Gilberto Castillo MD, 10 mg at 10/20/17 0910    bacitracin topical ointment 1 small application, 1 small application, Topical, BID, Claudio Nolasco PA-C, 1 small application at 23/76/01 0911    [START ON 01/28/8888] folic acid (FOLVITE) tablet 1 mg, 1 mg, Oral, Daily, Rashel Vail MD    heparin (porcine) subcutaneous injection 5,000 Units, 5,000 Units, Subcutaneous, Q8H [address], Stephanie Alvarado PA-C, 5,000 Units at 10/20/17 0509    ipratropium (ATROVENT) 0 02 % inhalation solution 0 5 mg, 0 5 mg, Nebulization, TID, Carlito Amato MD, 0 5 mg at 10/20/17 1332    labetalol (NORMODYNE) injection 10 mg, 10 mg, Intravenous, Q4H PRN, Franck Abraham MD, 10 mg at 10/20/17 0541    levalbuterol ([place]) inhalation solution 1 25 mg, 1 25 mg, Nebulization, TID, Carlito Amato MD, 1 25 mg at 10/20/17 1332    levETIRAcetam (KEPPRA) tablet 500 mg, 500 mg, Oral, Q12H [address], Jeniffer Carrera MD, 500 mg at 10/20/17 0910    LORazepam (ATIVAN) 2 mg/mL injection 1 mg, 1 mg, Intravenous, Q4H PRN, Gilberto Castillo MD    nicotine (NICODERM CQ) 21 mg/24 hr TD 24 hr patch 21 mg, 21 mg, Transdermal, Daily, Franck Abraham MD, 21 mg at 10/20/17 0910    ondansetron (ZOFRAN) injection 4 mg, 4 mg, Intravenous, Q6H PRN, Franck Abraham MD    oxazepam (SERAX) capsule 15 mg, 15 mg, Oral, Q8H [address], Gilberto Castillo MD, 15 mg at 10/20/17 0509    QUEtiapine (SEROquel) tablet 25 mg, 25 mg, Oral, Daily, Gilberto Castillo MD, 25 mg at 10/20/17 0911    QUEtiapine (SEROquel) tablet 50 mg, 50 mg, Oral, HS, Gilberto Castillo MD  Dana Rosas  [START ON 10/21/2017] thiamine (VITAMIN B1) tablet 100 mg, 100 mg, Oral, Daily, Rashel Vail MD    Allergies:   No Known Allergies     Social History:    Social History     Social History    Marital status: Single     Spouse name: N/A    Number of children: N/A    Years of education: N/A     Social History Main Topics    Smoking status: Current Every Day Smoker     Packs/day: 1 50    Smokeless tobacco: Never Used    Alcohol use 18 0 oz/week     30 Cans of beer per week    Drug use:      Types: Marijuana, Cocaine    Sexual activity: Not Asked     Other Topics Concern    None     Social History Narrative    None        Lula Sosa is single and Lives with: lives with an adult   He lives in [facility]) single family home  The living area: bedroom on 2nd floor and bathroom on 2nd floor  Equipment in home: None  There 6 - 10 steps to enter the home  Patient/family's goals: Unknown  Unknown whether patient will have supervision/physical assistance at home; per patient, GF works, mother may be able to provide assistance  Review of Systems: A 10-point review of systems was performed  Negative except as listed above       Physical Exam:  Vital Signs:      Temp:  [97 9 °F (36 6 °C)-99 8 °F (37 7 °C)] 98 8 °F (37 1 °C)  HR:  [68-92] 86  Resp:  [17-41] 26  BP: (141-188)/() 144/85   Intake/Output Summary (Last 24 hours) at 10/20/17 1356  Last data filed at 10/20/17 1157   Gross per 24 hour   Intake          1759 66 ml   Output             2050 ml   Net          -290 34 ml        Laboratory:      Lab Results   Component Value Date    HGB 13 4 10/20/2017    HGB 15 7 02/20/2015    HCT 39 8 10/20/2017    HCT 46 0 02/20/2015    WBC 9 81 10/20/2017    WBC 7 19 02/20/2015     Lab Results   Component Value Date    BUN 14 10/20/2017    BUN 8 02/20/2015     10/20/2017     02/20/2015    K 3 4 (L) 10/20/2017    K 4 6 02/20/2015     10/20/2017     02/20/2015    GLUCOSE 106 10/20/2017    GLUCOSE 98 02/20/2015    CREATININE 0 54 (L) 10/20/2017    CREATININE 0 69 02/20/2015     Lab Results   Component Value Date    PROTIME 12 8 10/12/2017    PROTIME 11 9 02/20/2015    INR 0 96 10/12/2017    INR 0 93 02/20/2015        General: alert, no apparent distress, cooperative and comfortable  Head: sutures in place, no drainage noted  Eye: Normal external eye, conjunctiva, lids   Ears: Normal external ears  Nose: Kedofed in place  Pharynx: Dental Hygiene adequate  Normal buccal mucosa  Pulmonary: no retractions, chest expansion normal  Abdomen: soft, nontender, nondistended, no masses or organomegaly  Skin/Extremity: no rashes, no erythema, no peripheral edema  Neurologic: awake, alert, oriented to person, states it is 1997, but will self-correct to 2017, knows he is at [place] [address] but in [place]  Musculoskeletal - Strength:   Right  Left  Site  Right  Left  Site    3 3  S Ab: Shoulder Abductors  5  5  HF: Hip Flexors    5 5  EF: Elbow Flexors  5 5 KF: Knee Flexors    5  5  EE: Elbow Extensors  5  5  KE: Knee Extensors    5  5  WE: Wrist Extensors  5  5  DR: Dorsi Flexors    5  5  FF: Finger Flexors  5  5  PF: Plantar Flexors    5  5  HI: Hand Intrinsics  5  5  EHL: Extensor Hallucis Longus     Imaging: Reviewed  Cta Head And Neck W Wo Contrast    Result Date: 10/12/2017  Impression: Although CTA demonstrates mild multifocal vascular ectasia and, an incidental, inconsequential tiny left cavernous ICA aneurysm, CTA does not offer cause of patient's 3rd ventricular hemorrhage  Redistribution of intraventricular hemorrhage  Based on appearance of the cistern of the lamina terminalis and floor of the 3rd ventricle and, minor asymmetry of the hemorrhage extending into the left diencephalon, query hypertensive thalamic hemorrhage  which gained access to the posterior 3rd ventricle   ##imslh##imslh Workstation performed: [id number]     Xr Chest Portable    Result Date: 10/15/2017  Impression: No acute consolidation congestion Workstation performed: [id number]     Xr Chest Portable    Result Date: 10/14/2017  Impression: Lines and tubes are stable  No acute cardiopulmonary disease  Workstation performed: [id number]     Xr Chest Portable    Result Date: 10/12/2017  Impression: Endogastric tube within the stomach  Endotracheal tube projecting 5 8 cm above the rebecca  Workstation performed: [id number]     Ct Head Wo Contrast    Result Date: 10/16/2017  Impression: Decreasing volume of blood products and 3rd ventricle as well as mild decrease in ventriculomegaly  Workstation performed: [id number]     Ct Head Wo Contrast    Result Date: 10/13/2017  Impression: 1  No significant interval change in intraventricular blood products in the 3rd and lateral ventricles  No significant change in ventriculomegaly  2   Postsurgical changes after ventriculostomy catheter placement  Workstation performed: [id number]     Ct Head Wo Contrast    Result Date: 10/12/2017  Impression: Redistribution of subarachnoid intraventricular hemorrhage as above  Again no mass effect or midline shift  Workstation performed: [id number]     Ct Head Without Contrast    Result Date: 10/11/2017  Impression: Subarachnoid hemorrhage accumulating within the 3rd ventricle, frontal horn right lateral ventricle and temporal horn left lateral ventricle  Findings raise suspicion for ruptured aneurysm  ##phoslh##phoslh ##cfslh I personally discussed this result with [address] on 10/11/2017 8:10 PM  ## Workstation performed: [id number]     Mri Brain W Wo Contrast    Result Date: 10/13/2017  Impression: Obstructive hydrocephalus secondary to hemorrhage within the 3rd ventricle  Dilated lateral and 3rd ventricles noted with transependymal flow CSF  Right transverse frontal ventriculostomy catheter terminates in the body of the right lateral ventricle  Ventricular size is similar to the CT performed one day earlier  Moderate chronic microangiopathic changes are noted   Workstation performed: [id number]     Ir Cerebral Angiography / Intervention    Result Date: 10/16/2017  Impression: Distal right MCA pseudoaneurysm measuring 7 5 x 3 75 mm, as above  This is not the source of the patient's hemorrhage  Workstation performed: [id number]       Assessment and Recommendations:  Huong Mcclain is a 50 y o  male who presented to the [address] [place], [place] [address] with c/o double vision, headaches  Found to have [address] with concern for aneurysm  Also found to have a distal right MCA pseudoaneurysm  EVD placed on hydrocephalus, which patient self-removed  Now extubated, on precedex drip and kedofed  Impairments:  Impaired functional mobility and ability to perform ADL's  Impaired cognition and speech    Recommendations:  - Chart reviewed  - Imaging reviewed   - Continue PT/OT/SLP while inpatient  - Attempted to contact patients mother and daughter, unable to reach either  Unclear at this time as to what type of support patient will have at home  - Patient not appropriate for rehabilitation at this time given need for kedofed and precedex gtt  Once removed, will likely require TBI rehabilitation  CM/SW to clarify support at home  Thank you for allowing the PM&R service to participate in the care of this patient  We will continue to follow Shalonda Crews progress with you  Please do not hesitate to call with questions or concerns    ** Please Note: Fluency Direct voice to text software may have been used in the creation of this document   **

## 2017-10-20 NOTE — PLAN OF CARE
Problem: PHYSICAL THERAPY ADULT  Goal: Performs mobility at highest level of function for planned discharge setting  See evaluation for individualized goals  Treatment/Interventions: Functional transfer training, LE strengthening/ROM, Therapeutic exercise, Endurance training, Patient/family training, Equipment eval/education, Bed mobility, Gait training, Spoke to nursing          See flowsheet documentation for full assessment, interventions and recommendations  Natacha Mora    Outcome: Progressing  Prognosis: Good  Problem List: Decreased strength, Decreased endurance, Decreased mobility, Impaired balance, Decreased coordination, Decreased safety awareness, Decreased cognition, Impaired judgement  Assessment: Pt seen for session, PT 1:1 activity x 15 min for setup, transfers, standing trial, minimal gait, repositioning p session  awake, willing to participate  remains confused  less assist needed for transfers/standing today but has heavy posterior LOB  able to initiate gait  remains appropriate for rehab at d/c  Barriers to Discharge: Inaccessible home environment     Recommendation:  (recommend rehab at d/c)     PT - OK to Discharge: (S) Yes (to rehab when stable)    See flowsheet documentation for full assessment

## 2017-10-21 PROBLEM — F14.10 COCAINE ABUSE (HCC): Chronic | Status: ACTIVE | Noted: 2017-10-21

## 2017-10-21 PROBLEM — R13.10 DYSPHAGIA: Status: ACTIVE | Noted: 2017-10-21

## 2017-10-21 PROBLEM — F10.20 ALCOHOL DEPENDENCE (HCC): Chronic | Status: ACTIVE | Noted: 2017-10-12

## 2017-10-21 PROBLEM — F12.10 MARIJUANA ABUSE: Chronic | Status: ACTIVE | Noted: 2017-10-21

## 2017-10-21 PROBLEM — D69.6 THROMBOCYTOPENIA (HCC): Status: RESOLVED | Noted: 2017-10-15 | Resolved: 2017-10-21

## 2017-10-21 PROBLEM — I72.9 PSEUDOANEURYSM (HCC): Chronic | Status: ACTIVE | Noted: 2017-10-14

## 2017-10-21 LAB
ALBUMIN SERPL BCP-MCNC: 3.1 G/DL (ref 3.5–5)
ALP SERPL-CCNC: 99 U/L (ref 46–116)
ALT SERPL W P-5'-P-CCNC: 111 U/L (ref 12–78)
ANION GAP SERPL CALCULATED.3IONS-SCNC: 9 MMOL/L (ref 4–13)
AST SERPL W P-5'-P-CCNC: 121 U/L (ref 5–45)
BASOPHILS # BLD MANUAL: 0.21 THOUSAND/UL (ref 0–0.1)
BASOPHILS NFR MAR MANUAL: 2 % (ref 0–1)
BILIRUB SERPL-MCNC: 0.6 MG/DL (ref 0.2–1)
BUN SERPL-MCNC: 17 MG/DL (ref 5–25)
CALCIUM SERPL-MCNC: 9.3 MG/DL (ref 8.3–10.1)
CHLORIDE SERPL-SCNC: 103 MMOL/L (ref 100–108)
CO2 SERPL-SCNC: 25 MMOL/L (ref 21–32)
CREAT SERPL-MCNC: 0.6 MG/DL (ref 0.6–1.3)
EOSINOPHIL # BLD MANUAL: 0.41 THOUSAND/UL (ref 0–0.4)
EOSINOPHIL NFR BLD MANUAL: 4 % (ref 0–6)
ERYTHROCYTE [DISTWIDTH] IN BLOOD BY AUTOMATED COUNT: 12.2 % (ref 11.6–15.1)
GFR SERPL CREATININE-BSD FRML MDRD: 117 ML/MIN/1.73SQ M
GLUCOSE SERPL-MCNC: 88 MG/DL (ref 65–140)
HCT VFR BLD AUTO: 40.1 % (ref 36.5–49.3)
HGB BLD-MCNC: 13.7 G/DL (ref 12–17)
LYMPHOCYTES # BLD AUTO: 1.34 THOUSAND/UL (ref 0.6–4.47)
LYMPHOCYTES # BLD AUTO: 13 % (ref 14–44)
MAGNESIUM SERPL-MCNC: 2.6 MG/DL (ref 1.6–2.6)
MCH RBC QN AUTO: 33.7 PG (ref 26.8–34.3)
MCHC RBC AUTO-ENTMCNC: 34.2 G/DL (ref 31.4–37.4)
MCV RBC AUTO: 99 FL (ref 82–98)
METAMYELOCYTES NFR BLD MANUAL: 2 % (ref 0–1)
MONOCYTES # BLD AUTO: 0.93 THOUSAND/UL (ref 0–1.22)
MONOCYTES NFR BLD: 9 % (ref 4–12)
MYELOCYTES NFR BLD MANUAL: 4 % (ref 0–1)
NEUTROPHILS # BLD MANUAL: 6.27 THOUSAND/UL (ref 1.85–7.62)
NEUTS SEG NFR BLD AUTO: 61 % (ref 43–75)
NRBC BLD AUTO-RTO: 0 /100 WBCS
PHOSPHATE SERPL-MCNC: 4.2 MG/DL (ref 2.7–4.5)
PLATELET # BLD AUTO: 447 THOUSANDS/UL (ref 149–390)
PLATELET BLD QL SMEAR: ADEQUATE
PMV BLD AUTO: 10.9 FL (ref 8.9–12.7)
POIKILOCYTOSIS BLD QL SMEAR: PRESENT
POTASSIUM SERPL-SCNC: 3.4 MMOL/L (ref 3.5–5.3)
PROT SERPL-MCNC: 8 G/DL (ref 6.4–8.2)
RBC # BLD AUTO: 4.06 MILLION/UL (ref 3.88–5.62)
RBC MORPH BLD: PRESENT
SODIUM SERPL-SCNC: 137 MMOL/L (ref 136–145)
VARIANT LYMPHS # BLD AUTO: 5 %
WBC # BLD AUTO: 10.28 THOUSAND/UL (ref 4.31–10.16)

## 2017-10-21 PROCEDURE — 97110 THERAPEUTIC EXERCISES: CPT

## 2017-10-21 PROCEDURE — 97530 THERAPEUTIC ACTIVITIES: CPT

## 2017-10-21 PROCEDURE — 85027 COMPLETE CBC AUTOMATED: CPT | Performed by: STUDENT IN AN ORGANIZED HEALTH CARE EDUCATION/TRAINING PROGRAM

## 2017-10-21 PROCEDURE — 94640 AIRWAY INHALATION TREATMENT: CPT

## 2017-10-21 PROCEDURE — 83735 ASSAY OF MAGNESIUM: CPT | Performed by: STUDENT IN AN ORGANIZED HEALTH CARE EDUCATION/TRAINING PROGRAM

## 2017-10-21 PROCEDURE — 97535 SELF CARE MNGMENT TRAINING: CPT

## 2017-10-21 PROCEDURE — 94760 N-INVAS EAR/PLS OXIMETRY 1: CPT

## 2017-10-21 PROCEDURE — 84100 ASSAY OF PHOSPHORUS: CPT | Performed by: STUDENT IN AN ORGANIZED HEALTH CARE EDUCATION/TRAINING PROGRAM

## 2017-10-21 PROCEDURE — 80053 COMPREHEN METABOLIC PANEL: CPT | Performed by: STUDENT IN AN ORGANIZED HEALTH CARE EDUCATION/TRAINING PROGRAM

## 2017-10-21 PROCEDURE — 85007 BL SMEAR W/DIFF WBC COUNT: CPT | Performed by: STUDENT IN AN ORGANIZED HEALTH CARE EDUCATION/TRAINING PROGRAM

## 2017-10-21 PROCEDURE — 94762 N-INVAS EAR/PLS OXIMTRY CONT: CPT

## 2017-10-21 PROCEDURE — 94668 MNPJ CHEST WALL SBSQ: CPT

## 2017-10-21 PROCEDURE — 97116 GAIT TRAINING THERAPY: CPT

## 2017-10-21 RX ORDER — OXAZEPAM 10 MG
10 CAPSULE ORAL EVERY 12 HOURS SCHEDULED
Status: COMPLETED | OUTPATIENT
Start: 2017-10-21 | End: 2017-10-22

## 2017-10-21 RX ORDER — QUETIAPINE FUMARATE 25 MG/1
25 TABLET, FILM COATED ORAL
Status: DISCONTINUED | OUTPATIENT
Start: 2017-10-21 | End: 2017-10-22 | Stop reason: HOSPADM

## 2017-10-21 RX ORDER — OXAZEPAM 10 MG
10 CAPSULE ORAL DAILY
Status: DISCONTINUED | OUTPATIENT
Start: 2017-10-22 | End: 2017-10-22

## 2017-10-21 RX ORDER — OXAZEPAM 10 MG
10 CAPSULE ORAL EVERY 12 HOURS SCHEDULED
Status: DISCONTINUED | OUTPATIENT
Start: 2017-10-21 | End: 2017-10-21

## 2017-10-21 RX ADMIN — HEPARIN SODIUM 5000 UNITS: 5000 INJECTION, SOLUTION INTRAVENOUS; SUBCUTANEOUS at 05:23

## 2017-10-21 RX ADMIN — OXAZEPAM 10 MG: 10 CAPSULE, GELATIN COATED ORAL at 18:30

## 2017-10-21 RX ADMIN — IPRATROPIUM BROMIDE 0.5 MG: 0.5 SOLUTION RESPIRATORY (INHALATION) at 07:25

## 2017-10-21 RX ADMIN — LEVETIRACETAM 500 MG: 500 TABLET ORAL at 21:53

## 2017-10-21 RX ADMIN — AMLODIPINE BESYLATE 10 MG: 10 TABLET ORAL at 11:21

## 2017-10-21 RX ADMIN — BACITRACIN ZINC 1 SMALL APPLICATION: 500 OINTMENT TOPICAL at 18:29

## 2017-10-21 RX ADMIN — IPRATROPIUM BROMIDE 0.5 MG: 0.5 SOLUTION RESPIRATORY (INHALATION) at 19:27

## 2017-10-21 RX ADMIN — Medication 100 MG: at 11:21

## 2017-10-21 RX ADMIN — LEVALBUTEROL HYDROCHLORIDE 1.25 MG: 1.25 SOLUTION, CONCENTRATE RESPIRATORY (INHALATION) at 07:25

## 2017-10-21 RX ADMIN — NICOTINE 21 MG: 21 PATCH, EXTENDED RELEASE TRANSDERMAL at 11:21

## 2017-10-21 RX ADMIN — ACETAMINOPHEN 650 MG: 160 SUSPENSION ORAL at 22:53

## 2017-10-21 RX ADMIN — LEVALBUTEROL HYDROCHLORIDE 1.25 MG: 1.25 SOLUTION, CONCENTRATE RESPIRATORY (INHALATION) at 19:27

## 2017-10-21 RX ADMIN — LEVETIRACETAM 500 MG: 500 TABLET ORAL at 11:21

## 2017-10-21 RX ADMIN — LEVALBUTEROL HYDROCHLORIDE 1.25 MG: 1.25 SOLUTION, CONCENTRATE RESPIRATORY (INHALATION) at 13:35

## 2017-10-21 RX ADMIN — BACITRACIN ZINC 1 SMALL APPLICATION: 500 OINTMENT TOPICAL at 11:24

## 2017-10-21 RX ADMIN — FOLIC ACID 1 MG: 1 TABLET ORAL at 11:22

## 2017-10-21 RX ADMIN — OXAZEPAM 15 MG: 15 CAPSULE, GELATIN COATED ORAL at 05:23

## 2017-10-21 RX ADMIN — IPRATROPIUM BROMIDE 0.5 MG: 0.5 SOLUTION RESPIRATORY (INHALATION) at 13:35

## 2017-10-21 NOTE — OCCUPATIONAL THERAPY NOTE
Occupational Therapy Treatment Note:       10/21/17 1330   Restrictions/Precautions   Weight Bearing Precautions Per Order No   Other Precautions Impulsive   Pain Assessment   Pain Score No Pain   ADL   LB Dressing Assistance 4  Minimal Assistance   LB Dressing Deficit Pull up over hips   LB Dressing Comments DOn/Doff socks I, Min A to sturdy to pull over hips in stance  Functional Standing Tolerance   Time 5 mins   Activity static standing   Bed Mobility   Supine to Sit 5  Supervision   Sit to Supine 5  Supervision   Transfers   Sit to Stand 4  Minimal assistance   Stand to Sit 4  Minimal assistance   Functional Mobility   Functional Mobility 4  Minimal assistance   Additional items Rolling walker   Cognition   Overall Cognitive Status Impaired   Arousal/Participation Alert   Attention Attends with cues to redirect   Orientation Level Oriented X4   Memory Decreased recall of recent events   Following Commands Follows one step commands with increased time or repetition   Activity Tolerance   Activity Tolerance Patient tolerated treatment well   Medical Staff Made Aware NSG aware   Assessment   Assessment Pt was seen this date for OT tx focusing on dressing tasks funcitonal mobility and activity tolerance  Pt is min A for LB dressing for struding in stance  Pt is impulsive throughout tx session and presents with incoordination during functional mobility  Min A required throughout transfers rw level  Doff/don socks S  Pt would beneift from continued OT tx to improve overall functional mobility  Continue see pt as able with current POC     Plan   Goal Expiration Date 10/29/17   Recommendation   OT Discharge Recommendation Short Term [facility]

## 2017-10-21 NOTE — PROGRESS NOTES
IM Residency Progress Note   Unit/Bed#: PPHP 730-01 Encounter: [id number]  SOD Team A      Courtney Espinosa 50 y o  male [id number]    Hospital Stay Days: 9      Assessment/Plan:    Principal Problem:    Subarachnoid hemorrhage (HCC)  Active Problems:    Headache    Alcohol abuse    Nicotine dependence    Intraventricular hemorrhage (HCC)    Pseudoaneurysm (HCC)    Thrombocytopenia (HCC)    Hypoalbuminemia    Leukocytosis    1  Subarachnoid hemorrhage with intraventricular extension   - status post intubation, ventriculostomy, patient is now extubated  Cereals head CT showed decreasing intraventricular hemorrhage    - PMR evaluated the patient recommended continuing PT/OT and he will require TBI rehab once medically stable  - infectious workup has been negative   - follow up outpatient with Neurosurgery   - continue [address] for seizure prophylaxis   - Goal blood pressure under 341 systolic, continue Norvasc 10 mg p o  daily, labetalol p r n    - continue PT/OT, now the patient is off keofed tube and Precedex will await PMR recommendations on rehab  2  Alcohol abuse   - patient admits to drinking a case of beer per day, last drink was 9 days ago prior to admission   - he did require intubation and sedation in the ICU secondary to agitation, he is now extubated and is receiving scheduled Serax  We will decrease the dose in space at the frequency of the serax in order to wean him off it as he has been calm without symptoms of withdrawal and has not required any of his p r n  Ativan  3   Hypophonia   - status post intubation patient is hypophonic speech with difficulty swallowing  He was evaluated by speech and Swallow which recommended dysphagia 2 mechanical soft with honey thick liquid diet  He tolerated this well yesterday and this morning was able to eat his entire breakfast and states that he would like solid food  Will ask for re-evaluation for advancement of diet    4   Nicotine dependence   - patient admits to being a pack per day smoker for the last 30+ years   - continue nicotine patch    Disposition:  Pending placement in inpatient rehab for TBI  Subjective:   Patient seen and examined  He has no complaints today  Per nursing he was somewhat restless overnight however did not require any Ativan  He tolerated his diet well and since he is ready for more solid foods  He says that he is "ready to get out of here  Vitals: Temp (24hrs), Av 8 °F (37 1 °C), Min:98 2 °F (36 8 °C), Max:99 5 °F (37 5 °C)  Current: Temperature: 98 5 °F (36 9 °C)  Vitals:    10/20/17 2351 10/21/17 0308 10/21/17 0726 10/21/17 0825   BP: 147/93 137/91  131/76   Pulse: 88 (!) 22  103   Resp:   18   Temp: 98 9 °F (37 2 °C) 98 2 °F (36 8 °C)  98 5 °F (36 9 °C)   TempSrc: Oral Oral  Oral   SpO2: 93% 92% 94% 93%   Weight:       Height:        Body mass index is 23 59 kg/m²  I/O last 24 hours: In: 666 1 [P O :120; I V :196 1; NG/GT:150; IV Piggyback:100; Feedings:100]  Out: 1125 [Urine:1125]      Physical Exam: /76   Pulse 103   Temp 98 5 °F (36 9 °C) (Oral)   Resp 18   Ht 6' (1 829 m)   Wt 78 9 kg (173 lb 15 1 oz)   SpO2 93%   BMI 23 59 kg/m²   General appearance: alert, appears stated age and cooperative  Head: Area of healing from his ventriculostomy  Lungs: clear to auscultation bilaterally  Heart: regular rate and rhythm, S1, S2 normal, no murmur, click, rub or gallop  Abdomen: soft, non-tender; bowel sounds normal; no masses,  no organomegaly  Extremities: extremities normal, atraumatic, no cyanosis or edema  Neurologic:  Patient is awake and alert  He remains hypophonic  Cranial nerves 2-12 are intact  Light touch sensation is intact bilaterally throughout  Strength 5/5 bilaterally upper and lower extremities    Finger-nose testing normal   Rapid alternating movements normal     Invasive Devices     Peripheral Intravenous Line            Peripheral IV 10/19/17 Right;Upper Arm 1 day Drain            External Urinary Catheter Small 1 day                          Labs:   Recent Results (from the past 24 hour(s))   CBC and differential    Collection Time: 10/21/17  5:15 AM   Result Value Ref Range    WBC 10 28 (H) 4 31 - 10 16 Thousand/uL    RBC 4 06 3 88 - 5 62 Million/uL    Hemoglobin 13 7 12 0 - 17 0 g/dL    Hematocrit 40 1 36 5 - 49 3 %    MCV 99 (H) 82 - 98 fL    MCH 33 7 26 8 - 34 3 pg    MCHC 34 2 31 4 - 37 4 g/dL    RDW 12 2 11 6 - 15 1 %    MPV 10 9 8 9 - 12 7 fL    Platelets 151 (H) 515 - 390 Thousands/uL    nRBC 0 /100 WBCs   Comprehensive metabolic panel    Collection Time: 10/21/17  5:15 AM   Result Value Ref Range    Sodium 137 136 - 145 mmol/L    Potassium 3 4 (L) 3 5 - 5 3 mmol/L    Chloride 103 100 - 108 mmol/L    CO2 25 21 - 32 mmol/L    Anion Gap 9 4 - 13 mmol/L    BUN 17 5 - 25 mg/dL    Creatinine 0 60 0 60 - 1 30 mg/dL    Glucose 88 65 - 140 mg/dL    Calcium 9 3 8 3 - 10 1 mg/dL     (H) 5 - 45 U/L     (H) 12 - 78 U/L    Alkaline Phosphatase 99 46 - 116 U/L    Total Protein 8 0 6 4 - 8 2 g/dL    Albumin 3 1 (L) 3 5 - 5 0 g/dL    Total Bilirubin 0 60 0 20 - 1 00 mg/dL    eGFR 117 ml/min/1 73sq m   Magnesium    Collection Time: 10/21/17  5:15 AM   Result Value Ref Range    Magnesium 2 6 1 6 - 2 6 mg/dL   Phosphorus    Collection Time: 10/21/17  5:15 AM   Result Value Ref Range    Phosphorus 4 2 2 7 - 4 5 mg/dL   Manual Differential(PHLEBS Do Not Order)    Collection Time: 10/21/17  5:15 AM   Result Value Ref Range    Segmented % 61 43 - 75 %    Lymphocytes % 13 (L) 14 - 44 %    Monocytes % 9 4 - 12 %    Eosinophils % 4 0 - 6 %    Basophils % 2 (H) 0 - 1 %    Metamyelocytes% 2 (H) 0 - 1 %    Myelocytes % 4 (H) 0 - 1 %    Atypical Lymphocytes % 5 (H) <=0 %    Absolute Neutrophils 6 27 1 85 - 7 62 Thousand/uL    Lymphocytes Absolute 1 34 0 60 - 4 47 Thousand/uL    Monocytes Absolute 0 93 0 00 - 1 22 Thousand/uL    Eosinophils Absolute 0 41 (H) 0 00 - 0 40 Thousand/uL    Basophils Absolute 0 21 (H) 0 00 - 0 10 Thousand/uL    Total Counted      RBC Morphology Present     Poikilocytes Present     Platelet Estimate Adequate Adequate       Radiology Results: I have personally reviewed pertinent reports  Other Diagnostic Testing:   I have personally reviewed pertinent reports          Active Meds:   Current Facility-Administered Medications   Medication Dose Route Frequency    acetaminophen (TYLENOL) oral suspension 650 mg  650 mg Oral Q8H PRN    amLODIPine (NORVASC) tablet 10 mg  10 mg Oral Daily    bacitracin topical ointment 1 small application  1 small application Topical BID    folic acid (FOLVITE) tablet 1 mg  1 mg Oral Daily    heparin (porcine) subcutaneous injection 5,000 Units  5,000 Units Subcutaneous Q8H [address]    ipratropium (ATROVENT) 0 02 % inhalation solution 0 5 mg  0 5 mg Nebulization TID    labetalol (NORMODYNE) injection 10 mg  10 mg Intravenous Q4H PRN    levalbuterol (XOPENEX) inhalation solution 1 25 mg  1 25 mg Nebulization TID    levETIRAcetam (KEPPRA) tablet 500 mg  500 mg Oral Q12H [address]    LORazepam (ATIVAN) 2 mg/mL injection 1 mg  1 mg Intravenous Q4H PRN    nicotine (NICODERM CQ) 21 mg/24 hr TD 24 hr patch 21 mg  21 mg Transdermal Daily    ondansetron (ZOFRAN) injection 4 mg  4 mg Intravenous Q6H PRN    oxazepam (SERAX) capsule 15 mg  15 mg Oral Q8H [address]    QUEtiapine (SEROquel) tablet 25 mg  25 mg Oral Daily    QUEtiapine (SEROquel) tablet 50 mg  50 mg Oral HS    thiamine (VITAMIN B1) tablet 100 mg  100 mg Oral Daily         VTE Pharmacologic Prophylaxis: Sequential compression device (Venodyne)   VTE Mechanical Prophylaxis: sequential compression device    Miguel Angel Waddell MD  [id number]

## 2017-10-21 NOTE — PHYSICAL THERAPY NOTE
Physical Therapy Progress Note   10/21/17 0920   Pain Assessment   Pain Assessment 0-10   Pain Score No Pain   Effect of Pain on Daily Activities PROGRESSING MOBILITY   Patient's Stated Pain Goal No pain   Response to Interventions ANXIOUS TO MBOILIZE  Multiple Pain Sites No   Pain Rating: FLACC (Rest) - Face 0   Pain Rating: FLACC (Rest) - Legs 0   Pain Rating: FLACC (Rest) - Activity 0   Pain Rating: FLACC (Rest) - Cry 0   Pain Rating: FLACC (Rest) - Consolability 0   Score: FLACC (Rest) 0   Pain Rating: FLACC (Activity) - Face 0   Pain Rating: FLACC (Activity) - Legs 0   Pain Rating: FLACC (Activity) - Activity 0   Pain Rating: FLACC (Activity) - Cry 0   Pain Rating: FLACC (Activity) - Consolability 0   Score: FLACC (Activity) 0   Restrictions/Precautions   Weight Bearing Precautions Per Order No   Other Precautions Impulsive; Chair Alarm; Bed Alarm; Fall Risk   General   Chart Reviewed Yes   Response to Previous Treatment Patient with no complaints from previous session  Family/Caregiver Present No   Cognition   Overall Cognitive Status Impaired   Arousal/Participation Alert; Cooperative   Attention Other (Comment)  (HIGHLY IMPULSIVE)   Orientation Level Oriented X4   Memory Decreased recall of precautions   Following Commands Follows one step commands with increased time or repetition   Subjective   Subjective THE PT  REPORTS " I HAVE OT LIVE MY LIFE DIFFERENTLY FROM NOW ON "   Bed Mobility   Rolling R 5  Supervision   Additional items Assist x 1; Impulsive  (BALAISTIC MOVEMENTS)   Rolling L 5  Supervision   Additional items Assist x 1; Impulsive  (BALISTIC MOVEMENTS )   Supine to Sit 5  Supervision   Additional items Assist x 1; Impulsive  (ONGOING BALISTIC MOVEMENTS )   Sit to Supine 5  Supervision   Additional items Assist x 1;Verbal cues   Transfers   Sit to Stand 4  Minimal assistance   Additional items Assist x 1; Impulsive;Verbal cues  (BALISTIC MOVEMENTS + RETROPULSION )   Stand to Sit 4  Minimal assistance   Additional items Assist x 1; Impulsive;Verbal cues  (BALISTIC MOVEMENTS )   Ambulation/Elevation   Gait pattern Improper Weight shift;Narrow BELINDA; Inconsistent corie;Retropulsion; Excessively slow   Gait Assistance 4  Minimal assist   Additional items Assist x 1;Verbal cues; Tactile cues  (POSTURE/PACING )   Assistive Device Rolling walker   Distance 90'X2  (SEATED REST BETWEEN WALKS )   Stair Management Assistance Not tested   Balance   Static Sitting Fair -   Static Standing Poor  (WITH ROLLER WALKER)   Ambulatory Poor +   Endurance Deficit   Endurance Deficit Yes   Endurance Deficit Description GENERALIZED DECONDITIONING  Activity Tolerance   Activity Tolerance Patient limited by fatigue   Nurse Made Aware SPOKE WITH FLACO   Exercises   THR Supine;15 reps;AROM; Bilateral  (BALISTIC MOVEMENTS )   Assessment   Prognosis Good   Problem List Decreased endurance; Impaired balance;Decreased mobility; Decreased coordination;Decreased cognition; Impaired judgement;Decreased safety awareness;Decreased skin integrity   Assessment ATTEMPTS AT INTERVENTION THIS A M  DEMONSTRATED ONGOING IMPULSIVITY AND BALAISITC MOVEMENTS WITH TASKS ASKED OF PT  TO PERFORM  POOR CARRYOVER TO INSTRUCTION ESPECIALLY SAFETY RELATED  AT THIS TIME, HE REMAINS A SIGNIFICANT FALL RISK AND WILL REQUIRE CONTINUED REHAB UPON D/C  Barriers to Discharge Inaccessible home environment   Goals   Patient Goals TO FEEL BETTER   LTG Expiration Date 11/02/17   Treatment Day 2   Plan   Treatment/Interventions Functional transfer training;LE strengthening/ROM; Therapeutic exercise; Endurance training;Bed mobility;Gait training; Compensatory technique education   Progress Progressing toward goals   PT Frequency 5x/wk; Weekend  (1X/WEEKEND)   Recommendation   Recommendation (REHAB)   Equipment Recommended (TO BE DETERMINED )   PT - OK to Discharge ([address] )   Additional Comments BED ALARM ACTIVATED UPON COMPLETION OF TREATMENT SESSION  10/21/17 0912   Pain Assessment   Pain Assessment 0-10   Pain Score No Pain   Effect of Pain on Daily Activities PROGRESSING MOBILITY   Patient's Stated Pain Goal No pain   Response to Interventions ANXIOUS TO MBOILIZE  Multiple Pain Sites No   Pain Rating: FLACC (Rest) - Face 0   Pain Rating: FLACC (Rest) - Legs 0   Pain Rating: FLACC (Rest) - Activity 0   Pain Rating: FLACC (Rest) - Cry 0   Pain Rating: FLACC (Rest) - Consolability 0   Score: FLACC (Rest) 0   Pain Rating: FLACC (Activity) - Face 0   Pain Rating: FLACC (Activity) - Legs 0   Pain Rating: FLACC (Activity) - Activity 0   Pain Rating: FLACC (Activity) - Cry 0   Pain Rating: FLACC (Activity) - Consolability 0   Score: FLACC (Activity) 0   Restrictions/Precautions   Weight Bearing Precautions Per Order No   Other Precautions Impulsive; Chair Alarm; Bed Alarm; Fall Risk   General   Chart Reviewed Yes   Response to Previous Treatment Patient with no complaints from previous session  Family/Caregiver Present No   Cognition   Overall Cognitive Status Impaired   Arousal/Participation Alert; Cooperative   Attention Other (Comment)  (HIGHLY IMPULSIVE)   Orientation Level Oriented X4   Memory Decreased recall of precautions   Following Commands Follows one step commands with increased time or repetition   Subjective   Subjective THE PT  REPORTS " I HAVE OT LIVE MY LIFE DIFFERENTLY FROM NOW ON "   Bed Mobility   Rolling R 5  Supervision   Additional items Assist x 1; Impulsive  (BALAISTIC MOVEMENTS)   Rolling L 5  Supervision   Additional items Assist x 1; Impulsive  (BALISTIC MOVEMENTS )   Supine to Sit 5  Supervision   Additional items Assist x 1; Impulsive  (ONGOING BALISTIC MOVEMENTS )   Sit to Supine 5  Supervision   Additional items Assist x 1;Verbal cues   Transfers   Sit to Stand 4  Minimal assistance   Additional items Assist x 1; Impulsive;Verbal cues  (BALISTIC MOVEMENTS + RETROPULSION )   Stand to Sit 4  Minimal assistance   Additional items Assist x 1; Impulsive;Verbal cues  (BALISTIC MOVEMENTS )   Ambulation/Elevation   Gait pattern Improper Weight shift;Narrow BELINDA; Inconsistent corie;Retropulsion; Excessively slow   Gait Assistance 4  Minimal assist   Additional items Assist x 1;Verbal cues; Tactile cues  (POSTURE/PACING )   Assistive Device Rolling walker   Distance 90'X2  (SEATED REST BETWEEN WALKS )   Stair Management Assistance Not tested   Balance   Static Sitting Fair -   Static Standing Poor  (WITH ROLLER WALKER)   Ambulatory Poor +   Endurance Deficit   Endurance Deficit Yes   Endurance Deficit Description GENERALIZED DECONDITIONING  Activity Tolerance   Activity Tolerance Patient limited by fatigue   Nurse Made Aware SPOKE WITH FLACO   Exercises   THR Supine;15 reps;AROM; Bilateral  (BALISTIC MOVEMENTS )   Assessment   Prognosis Good   Problem List Decreased endurance; Impaired balance;Decreased mobility; Decreased coordination;Decreased cognition; Impaired judgement;Decreased safety awareness;Decreased skin integrity   Assessment ATTEMPTS AT INTERVENTION THIS A M  DEMONSTRATED ONGOING IMPULSIVITY AND BALAISITC MOVEMENTS WITH TASKS ASKED OF PT  TO PERFORM  POOR CARRYOVER TO INSTRUCTION ESPECIALLY SAFETY RELATED  AT THIS TIME, HE REMAINS A SIGNIFICANT FALL RISK AND WILL REQUIRE CONTINUED REHAB UPON D/C  Barriers to Discharge Inaccessible home environment   Goals   Patient Goals TO FEEL BETTER   LTG Expiration Date 11/02/17   Treatment Day 2   Plan   Treatment/Interventions Functional transfer training;LE strengthening/ROM; Therapeutic exercise; Endurance training;Bed mobility;Gait training; Compensatory technique education   Progress Progressing toward goals   PT Frequency 5x/wk; Weekend  (1X/WEEKEND)   Recommendation   Recommendation (REHAB)   Equipment Recommended (TO BE DETERMINED )   PT - OK to Discharge ([address] )   Additional Comments BED ALARM ACTIVATED UPON COMPLETION OF TREATMENT SESSION       Paulette Juarez PTA

## 2017-10-21 NOTE — PLAN OF CARE
Problem: PHYSICAL THERAPY ADULT  Goal: Performs mobility at highest level of function for planned discharge setting  See evaluation for individualized goals  Treatment/Interventions: Functional transfer training, LE strengthening/ROM, Therapeutic exercise, Endurance training, Patient/family training, Equipment eval/education, Bed mobility, Gait training, Spoke to nursing          See flowsheet documentation for full assessment, interventions and recommendations  Debbie Granado    Outcome: Progressing  Prognosis: Good  Problem List: Decreased endurance, Impaired balance, Decreased mobility, Decreased coordination, Decreased cognition, Impaired judgement, Decreased safety awareness, Decreased skin integrity  Assessment: ATTEMPTS AT INTERVENTION THIS A M  DEMONSTRATED ONGOING IMPULSIVITY AND BALAISITC MOVEMENTS WITH TASKS ASKED OF PT  TO PERFORM  POOR CARRYOVER TO INSTRUCTION ESPECIALLY SAFETY RELATED  AT THIS TIME, HE REMAINS A SIGNIFICANT FALL RISK AND WILL REQUIRE CONTINUED REHAB UPON D/C  Barriers to Discharge: Inaccessible home environment     Recommendation:  (REHAB)     PT - OK to Discharge: (S)  ([address] [facility] )    See flowsheet documentation for full assessment     Maribel Devries, PTA

## 2017-10-21 NOTE — PLAN OF CARE
Problem: OCCUPATIONAL THERAPY ADULT  Goal: Performs self-care activities at highest level of function for planned discharge setting  See evaluation for individualized goals  Treatment Interventions: ADL retraining, Visual perceptual retraining, Functional transfer training, UE strengthening/ROM, Endurance training, Cognitive reorientation, Patient/family training, Equipment evaluation/education, Neuromuscular reeducation, Fine motor coordination activities, Compensatory technique education, Continued evaluation, Energy conservation, Activityengagement          See flowsheet documentation for full assessment, interventions and recommendations  Outcome: Progressing  Limitation: Decreased ADL status, Decreased UE ROM, Decreased UE strength, Decreased Safe judgement during ADL, Decreased cognition, Decreased endurance, Decreased sensation, Visual deficit, Decreased fine motor control, Decreased self-care trans, Decreased high-level ADLs, Mood limitation (BALANCE, PAIN, MEDICAL STATUS, WEAKNESS, DECONDITIONING)  Prognosis: Fair  Assessment: Pt was seen this date for OT tx focusing on dressing tasks funcitonal mobility and activity tolerance  Pt is min A for LB dressing for struding in stance  Pt is impulsive throughout tx session and presents with incoordination during functional mobility  Min A required throughout transfers rw level  Doff/don socks S  Pt would beneift from continued OT tx to improve overall functional mobility  Continue see pt as able with current POC    Recommendation: Physiatry Consult  OT Discharge Recommendation: Short Term Rehab  OT - OK to Discharge:  (TO STR AT [address])  TONIE Jo

## 2017-10-22 VITALS
SYSTOLIC BLOOD PRESSURE: 135 MMHG | TEMPERATURE: 98 F | BODY MASS INDEX: 23.56 KG/M2 | DIASTOLIC BLOOD PRESSURE: 94 MMHG | HEART RATE: 87 BPM | OXYGEN SATURATION: 93 % | WEIGHT: 173.94 LBS | RESPIRATION RATE: 18 BRPM | HEIGHT: 72 IN

## 2017-10-22 LAB
ERYTHROCYTE [DISTWIDTH] IN BLOOD BY AUTOMATED COUNT: 12.3 % (ref 11.6–15.1)
HBV CORE AB SER QL: NORMAL
HBV CORE IGM SER QL: NORMAL
HBV SURFACE AG SER QL: NORMAL
HCT VFR BLD AUTO: 39.4 % (ref 36.5–49.3)
HCV AB SER QL: NORMAL
HGB BLD-MCNC: 13.6 G/DL (ref 12–17)
MCH RBC QN AUTO: 33.7 PG (ref 26.8–34.3)
MCHC RBC AUTO-ENTMCNC: 34.5 G/DL (ref 31.4–37.4)
MCV RBC AUTO: 98 FL (ref 82–98)
PLATELET # BLD AUTO: 543 THOUSANDS/UL (ref 149–390)
PMV BLD AUTO: 10.9 FL (ref 8.9–12.7)
POTASSIUM SERPL-SCNC: 3.9 MMOL/L (ref 3.5–5.3)
RBC # BLD AUTO: 4.03 MILLION/UL (ref 3.88–5.62)
WBC # BLD AUTO: 10.55 THOUSAND/UL (ref 4.31–10.16)

## 2017-10-22 PROCEDURE — 86803 HEPATITIS C AB TEST: CPT | Performed by: STUDENT IN AN ORGANIZED HEALTH CARE EDUCATION/TRAINING PROGRAM

## 2017-10-22 PROCEDURE — 94760 N-INVAS EAR/PLS OXIMETRY 1: CPT

## 2017-10-22 PROCEDURE — 94668 MNPJ CHEST WALL SBSQ: CPT

## 2017-10-22 PROCEDURE — 85027 COMPLETE CBC AUTOMATED: CPT | Performed by: INTERNAL MEDICINE

## 2017-10-22 PROCEDURE — 87340 HEPATITIS B SURFACE AG IA: CPT | Performed by: STUDENT IN AN ORGANIZED HEALTH CARE EDUCATION/TRAINING PROGRAM

## 2017-10-22 PROCEDURE — 94640 AIRWAY INHALATION TREATMENT: CPT

## 2017-10-22 PROCEDURE — 86704 HEP B CORE ANTIBODY TOTAL: CPT | Performed by: STUDENT IN AN ORGANIZED HEALTH CARE EDUCATION/TRAINING PROGRAM

## 2017-10-22 PROCEDURE — 86705 HEP B CORE ANTIBODY IGM: CPT | Performed by: STUDENT IN AN ORGANIZED HEALTH CARE EDUCATION/TRAINING PROGRAM

## 2017-10-22 PROCEDURE — 84132 ASSAY OF SERUM POTASSIUM: CPT | Performed by: INTERNAL MEDICINE

## 2017-10-22 RX ORDER — FOLIC ACID 1 MG/1
1 TABLET ORAL DAILY
Qty: 30 TABLET | Refills: 0 | Status: SHIPPED | OUTPATIENT
Start: 2017-10-23

## 2017-10-22 RX ORDER — AMLODIPINE BESYLATE 10 MG/1
10 TABLET ORAL DAILY
Qty: 30 TABLET | Refills: 0 | Status: SHIPPED | OUTPATIENT
Start: 2017-10-23

## 2017-10-22 RX ORDER — LANOLIN ALCOHOL/MO/W.PET/CERES
100 CREAM (GRAM) TOPICAL DAILY
Qty: 30 TABLET | Refills: 0 | Status: SHIPPED | OUTPATIENT
Start: 2017-10-23

## 2017-10-22 RX ORDER — LEVETIRACETAM 500 MG/1
500 TABLET ORAL EVERY 12 HOURS SCHEDULED
Qty: 5 TABLET | Refills: 0 | Status: SHIPPED | OUTPATIENT
Start: 2017-10-22 | End: 2017-11-09

## 2017-10-22 RX ORDER — NICOTINE 21 MG/24HR
1 PATCH, TRANSDERMAL 24 HOURS TRANSDERMAL DAILY
Qty: 28 PATCH | Refills: 0 | Status: SHIPPED | OUTPATIENT
Start: 2017-10-23 | End: 2017-11-09

## 2017-10-22 RX ADMIN — OXAZEPAM 10 MG: 10 CAPSULE, GELATIN COATED ORAL at 10:15

## 2017-10-22 RX ADMIN — NICOTINE 21 MG: 21 PATCH, EXTENDED RELEASE TRANSDERMAL at 10:12

## 2017-10-22 RX ADMIN — AMLODIPINE BESYLATE 10 MG: 10 TABLET ORAL at 10:14

## 2017-10-22 RX ADMIN — LEVALBUTEROL HYDROCHLORIDE 1.25 MG: 1.25 SOLUTION, CONCENTRATE RESPIRATORY (INHALATION) at 07:48

## 2017-10-22 RX ADMIN — IPRATROPIUM BROMIDE 0.5 MG: 0.5 SOLUTION RESPIRATORY (INHALATION) at 13:16

## 2017-10-22 RX ADMIN — FOLIC ACID 1 MG: 1 TABLET ORAL at 10:14

## 2017-10-22 RX ADMIN — Medication 100 MG: at 10:14

## 2017-10-22 RX ADMIN — IPRATROPIUM BROMIDE 0.5 MG: 0.5 SOLUTION RESPIRATORY (INHALATION) at 07:48

## 2017-10-22 RX ADMIN — LEVALBUTEROL HYDROCHLORIDE 1.25 MG: 1.25 SOLUTION, CONCENTRATE RESPIRATORY (INHALATION) at 13:16

## 2017-10-22 RX ADMIN — LEVETIRACETAM 500 MG: 500 TABLET ORAL at 10:14

## 2017-10-22 NOTE — DISCHARGE SUMMARY
[facility] CENTRAL Discharge Summary - Jacqueline Espinosa 50 y o  male MRN: [id number]    Sarah Roman BE [facility] 7 Room / Bed: [facility]-01 Encounter: [id number]    BRIEF OVERVIEW    Admitting Provider: Zack Martinez MD  Discharge Provider: Kim Shelton MD  Primary Care Physician at Discharge: No PCP, contact information given for [facility]    Discharge To:  Gregory Montilla / Family Member Name: NA    Admission Date: 10/11/2017     Discharge Date: 10/22/17    Primary Discharge Diagnosis  Principal Problem:    Subarachnoid hemorrhage [facility])  Active Problems:    Headache    Alcohol dependence ([facility] [address] )    Nicotine dependence    Intraventricular hemorrhage (HCC)    Pseudoaneurysm (HCC)    Hypoalbuminemia    Leukocytosis    Dysphagia    Marijuana abuse    Cocaine abuse  Resolved Problems: Thrombocytopenia [facility] )      Other Problems Addressed: none    Consulting Providers   Neurosurgery - Drema Osgood, PA-C    Physical medicine and Rehabilitation - Manuela Abdi MD    Therapeutic Operative Procedures Performed  EVD placement 10/12/17    Diagnostic Procedures Performed  Cta Head And Neck W Wo Contrast    Result Date: 10/12/2017  Impression: Although CTA demonstrates mild multifocal vascular ectasia and, an incidental, inconsequential tiny left cavernous ICA aneurysm, CTA does not offer cause of patient's 3rd ventricular hemorrhage  Redistribution of intraventricular hemorrhage  Based on appearance of the cistern of the lamina terminalis and floor of the 3rd ventricle and, minor asymmetry of the hemorrhage extending into the left diencephalon, query hypertensive thalamic hemorrhage  which gained access to the posterior 3rd ventricle  Xr Chest Portable    Result Date: 10/15/2017  Impression: No acute consolidation congestion    Xr Chest Portable    Result Date: 10/14/2017  Impression: Lines and tubes are stable  No acute cardiopulmonary disease      Xr Chest Portable    Result Date: 10/12/2017  Impression: Endogastric tube within the stomach  Endotracheal tube projecting 5 8 cm above the rebecca  Ct Head Wo Contrast    Result Date: 10/16/2017  Impression: Decreasing volume of blood products and 3rd ventricle as well as mild decrease in ventriculomegaly  Ct Head Wo Contrast    Result Date: 10/13/2017  Impression: 1  No significant interval change in intraventricular blood products in the 3rd and lateral ventricles  No significant change in ventriculomegaly  2   Postsurgical changes after ventriculostomy catheter placement  Ct Head Wo Contrast    Result Date: 10/12/2017  Impression: Redistribution of subarachnoid intraventricular hemorrhage as above  Again no mass effect or midline shift  Ct Head Without Contrast    Result Date: 10/11/2017  Impression: Subarachnoid hemorrhage accumulating within the 3rd ventricle, frontal horn right lateral ventricle and temporal horn left lateral ventricle  Findings raise suspicion for ruptured aneurysm  Mri Brain W Wo Contrast    Result Date: 10/13/2017  Impression: Obstructive hydrocephalus secondary to hemorrhage within the 3rd ventricle  Dilated lateral and 3rd ventricles noted with transependymal flow CSF  Right transverse frontal ventriculostomy catheter terminates in the body of the right lateral ventricle  Ventricular size is similar to the CT performed one day earlier  Moderate chronic microangiopathic changes are noted    Ir Cerebral Angiography / Intervention    Result Date: 10/16/2017  Impression: Distal right MCA pseudoaneurysm measuring 7 5 x 3 75 mm, as above  This is not the source of the patient's hemorrhage  Discharge Disposition: [address]  Discharged With Lines: no    Test Results Pending at Discharge: none    Outpatient Follow-Up  Patient does not have PCP, [facility] contact information provided    Patient left AMA and is forgoing inpatient rehabilitation  Follow up with consulting providers  Neurosurgery 11/2/17 at 1pm  Active Issues Requiring Follow-up   none    Code Status: Level 1 - Full Code  Advance Directive and Living Will: <no information>  Power of :    POLST:      Medications   Amlodipine 48DQ daily  Folic acid 1mg tablet daily  Levitiracetam 500mg tablet BID for 5 days  Nicotine patch  Thiamine 100mg tablet daily    Allergies  No Known Allergies  Discharge Diet: regular diet  Activity restrictions: N/A since patient left [address] [place] [address]  The patient is a 50year old male with history of alcohol abuse who presented to as [facility] on 10/11/2017 with double vision and alcohol intoxication  On CT scan subarachnoid hemorrhage was visualized with intra ventricular extension  Upon admission he had alcohol level of 330  The patient was intubated for airway protection and he was transferred to [facility] ICU for Neurosurgery evaluation and escalation of care  Upon arrival to [facility] he had right frontal EVD placed and was put on Keppra for seizure prophylaxis  The patient's hypertension was controlled on propofol gtt in p r n  labetalol to maintain SBP less than 140  Cerebral angiography showed 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, which per Neurosurgery, was on likely the etiology of the acute hemorrhage  There was, however, some 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  Infectious workup was performed including CSF culture and bloods culture which were negative  Transthoracic echocardiogram showed LVEF 60% with normal systolic function and L5JH  During this time  Patient had signs of acute withdrawal most notably for agitation and trying to pull out his ET tube in ED when sedation was reduced  Eventually, propofol was weaned and the patient was successfully extubated on 10/18    After extubation, patient continued to be agitated and required Precedex GTT and intermittent Ativan/Patanol doses as well as physical restraints  The patient pulled out his EVD on 10/18 overnight and this was closed by neuro surgery the following day without complication  Repeat CT of the head showed decreasing intraventricular hemorrhage layering within the occipital horns with resolution of the hemorrhage seen in the 3rd ventricle  Patient was maintained on scheduled benzodiazepines to prevent any further symptoms of withdrawal   He was transferred out of the ICU on 10/20/17  PM&R were consulted who recommended the patient would likely require TBI rehabilitation once appropriate for discharge  The patient did well on the med surge floor and his benzodiazepines were weaned off completely  The patient was evaluated on the morning of 10/22/2017 and he reportedly had some balance problems but his ambulation was improving  At that time the plan was for patient to be placed in rehabilitation and he was agreeable to this plan  However, later that day patient was visited by his son  During the visit patient became more agitated and told nursing staff he was leaving  He packed all of his belongings and was waiting by the elevator when the SOD service was notified  The on call SOD residents immediately went to the patient's bed side to discuss his intention to leave  We explained the risks of leaving against medical advice that the patient was still on medication for seizure prophylaxis  It was explained to the patient that he was not ready for discharge home directly from the hospital due to balance problems  He was told the risks of leaving against medical advice could include seizures, falls or in the worse case, death  The patient verbalized understanding of these risks and stated he still wanted to leave  The patient signed AMA form and he was given prescriptions for his continue medications  He was also given contact information for [facility] to establish care with a PCP      Subarachnoid hemorrhage with intraventricular extension - serial CT head imaging showed decreasing intraventricular hemorrhage  S/p EVD removed 10/18   - continue keppra for seizure ppx for 5 more days per neurosurgery's recommendation  - Goal BP under 959 systolic, continue amlodipine with prescription provided  - patient refused inpatient rehab  - patient should have OP CT head 2-3 days prior to follow up with Neurosurgery  - follow up appointment with Neurosurgery Damian Gandhi PA-C on 11/2/2017 at 1:00pm     Alcohol abuse  - prescriptions for thiamine and folate provided     Hypophonia - secondary to intubation  - patient reassured this will take time to improve     Nicotine dependence  - nicotine replacement therapy prescription provided  -  cessation    Presenting Problem/History of Present Illness  Principal Problem:    Subarachnoid hemorrhage ([address] )  Active Problems:    Headache    Alcohol dependence ([address] )    Nicotine dependence    Intraventricular hemorrhage (HCC)    Pseudoaneurysm (HCC)    Hypoalbuminemia    Leukocytosis    Dysphagia    Marijuana abuse    Cocaine abuse  Resolved Problems: Thrombocytopenia ([address] )        Other Pertinent Test Results  none    Discharge Condition: fair      Discharge  Statement   I spent 30 minutes minutes discharging the patient  This time was spent on the day of discharge  I had direct contact with the patient on the day of discharge  Additional documentation is required if more than 30 minutes were spent on discharge

## 2017-10-27 NOTE — SOCIAL WORK
Paperwork: ANA and CLARENCE received from [address] [place] [address] on Aging  CM sent to medical records to be scanned in pt chart

## 2017-11-02 ENCOUNTER — TRANSCRIBE ORDERS (OUTPATIENT)
Dept: ADMINISTRATIVE | Facility: HOSPITAL | Age: 52
End: 2017-11-02

## 2017-11-02 ENCOUNTER — GENERIC CONVERSION - ENCOUNTER (OUTPATIENT)
Dept: OTHER | Facility: OTHER | Age: 52
End: 2017-11-02

## 2017-11-02 DIAGNOSIS — I67.1 CEREBRAL ANEURYSM, NONRUPTURED: Primary | ICD-10-CM

## 2017-11-02 DIAGNOSIS — I67.1 NONRUPTURED CEREBRAL ANEURYSM: ICD-10-CM

## 2017-11-02 DIAGNOSIS — Z86.79 PERSONAL HISTORY OF CARDIOVASCULAR DISORDER: ICD-10-CM

## 2017-11-02 DIAGNOSIS — Z86.79 PERSONAL HISTORY OF OTHER DISEASES OF THE CIRCULATORY SYSTEM (CODE): ICD-10-CM

## 2017-11-09 ENCOUNTER — HOSPITAL ENCOUNTER (INPATIENT)
Facility: HOSPITAL | Age: 52
LOS: 5 days | Discharge: HOME | DRG: 720 | End: 2017-11-14
Attending: EMERGENCY MEDICINE | Admitting: INTERNAL MEDICINE
Payer: COMMERCIAL

## 2017-11-09 ENCOUNTER — APPOINTMENT (EMERGENCY)
Dept: RADIOLOGY | Facility: HOSPITAL | Age: 52
DRG: 720 | End: 2017-11-09
Payer: COMMERCIAL

## 2017-11-09 ENCOUNTER — APPOINTMENT (INPATIENT)
Dept: CT IMAGING | Facility: HOSPITAL | Age: 52
DRG: 720 | End: 2017-11-09
Payer: COMMERCIAL

## 2017-11-09 DIAGNOSIS — J18.9 RIGHT LOWER LOBE PNEUMONIA: ICD-10-CM

## 2017-11-09 DIAGNOSIS — J18.9 HCAP (HEALTHCARE-ASSOCIATED PNEUMONIA): ICD-10-CM

## 2017-11-09 DIAGNOSIS — A41.9 SEPSIS, DUE TO UNSPECIFIED ORGANISM: Primary | ICD-10-CM

## 2017-11-09 DIAGNOSIS — Z72.0 TOBACCO ABUSE: ICD-10-CM

## 2017-11-09 PROBLEM — R04.2 HEMOPTYSIS: Status: ACTIVE | Noted: 2017-11-09

## 2017-11-09 PROBLEM — F10.21 HISTORY OF ALCOHOLISM (HCC): Status: ACTIVE | Noted: 2017-11-09

## 2017-11-09 PROBLEM — Z86.79 HISTORY OF SUBARACHNOID HEMORRHAGE: Status: ACTIVE | Noted: 2017-11-09

## 2017-11-09 LAB
ALBUMIN SERPL BCP-MCNC: 3.2 G/DL (ref 3.5–5)
ALP SERPL-CCNC: 117 U/L (ref 46–116)
ALT SERPL W P-5'-P-CCNC: 44 U/L (ref 12–78)
ANION GAP SERPL CALCULATED.3IONS-SCNC: 11 MMOL/L (ref 4–13)
APTT PPP: 34 SECONDS (ref 23–35)
AST SERPL W P-5'-P-CCNC: 25 U/L (ref 5–45)
ATRIAL RATE: 115 BPM
BASE EXCESS BLDA CALC-SCNC: 6 MMOL/L (ref -2–3)
BASOPHILS # BLD AUTO: 0.05 THOUSANDS/ΜL (ref 0–0.1)
BASOPHILS NFR BLD AUTO: 0 % (ref 0–1)
BILIRUB DIRECT SERPL-MCNC: 0.33 MG/DL (ref 0–0.2)
BILIRUB SERPL-MCNC: 1.2 MG/DL (ref 0.2–1)
BUN SERPL-MCNC: 7 MG/DL (ref 5–25)
CA-I BLD-SCNC: 1.11 MMOL/L (ref 1.12–1.32)
CALCIUM SERPL-MCNC: 9.6 MG/DL (ref 8.3–10.1)
CHLORIDE SERPL-SCNC: 97 MMOL/L (ref 100–108)
CO2 SERPL-SCNC: 27 MMOL/L (ref 21–32)
CREAT SERPL-MCNC: 0.77 MG/DL (ref 0.6–1.3)
EOSINOPHIL # BLD AUTO: 0.14 THOUSAND/ΜL (ref 0–0.61)
EOSINOPHIL NFR BLD AUTO: 1 % (ref 0–6)
ERYTHROCYTE [DISTWIDTH] IN BLOOD BY AUTOMATED COUNT: 12.5 % (ref 11.6–15.1)
GFR SERPL CREATININE-BSD FRML MDRD: 106 ML/MIN/1.73SQ M
GLUCOSE SERPL-MCNC: 124 MG/DL (ref 65–140)
GLUCOSE SERPL-MCNC: 124 MG/DL (ref 65–140)
HCO3 BLDA-SCNC: 29.3 MMOL/L (ref 24–30)
HCT VFR BLD AUTO: 40.3 % (ref 36.5–49.3)
HCT VFR BLD CALC: 45 % (ref 36.5–49.3)
HGB BLD-MCNC: 13.8 G/DL (ref 12–17)
HGB BLDA-MCNC: 15.3 G/DL (ref 12–17)
INR PPP: 1.05 (ref 0.86–1.16)
L PNEUMO1 AG UR QL IA.RAPID: NEGATIVE
LACTATE SERPL-SCNC: 1.2 MMOL/L (ref 0.5–2)
LYMPHOCYTES # BLD AUTO: 1.08 THOUSANDS/ΜL (ref 0.6–4.47)
LYMPHOCYTES NFR BLD AUTO: 4 % (ref 14–44)
MCH RBC QN AUTO: 32.2 PG (ref 26.8–34.3)
MCHC RBC AUTO-ENTMCNC: 34.2 G/DL (ref 31.4–37.4)
MCV RBC AUTO: 94 FL (ref 82–98)
MONOCYTES # BLD AUTO: 1.9 THOUSAND/ΜL (ref 0.17–1.22)
MONOCYTES NFR BLD AUTO: 8 % (ref 4–12)
NEUTROPHILS # BLD AUTO: 22.27 THOUSANDS/ΜL (ref 1.85–7.62)
NEUTS SEG NFR BLD AUTO: 87 % (ref 43–75)
NT-PROBNP SERPL-MCNC: 304 PG/ML
P AXIS: 60 DEGREES
PCO2 BLD: 30 MMOL/L (ref 21–32)
PCO2 BLD: 36.6 MM HG (ref 42–50)
PH BLD: 7.51 [PH] (ref 7.3–7.4)
PLATELET # BLD AUTO: 355 THOUSANDS/UL (ref 149–390)
PMV BLD AUTO: 10.1 FL (ref 8.9–12.7)
PO2 BLD: 36 MM HG (ref 35–45)
POTASSIUM BLD-SCNC: 3.9 MMOL/L (ref 3.5–5.3)
POTASSIUM SERPL-SCNC: 3.5 MMOL/L (ref 3.5–5.3)
PR INTERVAL: 146 MS
PROT SERPL-MCNC: 8.3 G/DL (ref 6.4–8.2)
PROTHROMBIN TIME: 14 SECONDS (ref 12.1–14.4)
QRS AXIS: 75 DEGREES
QRSD INTERVAL: 98 MS
QT INTERVAL: 326 MS
QTC INTERVAL: 450 MS
RBC # BLD AUTO: 4.28 MILLION/UL (ref 3.88–5.62)
S PNEUM AG UR QL: NEGATIVE
SAO2 % BLD FROM PO2: 75 % (ref 95–98)
SODIUM BLD-SCNC: 135 MMOL/L (ref 136–145)
SODIUM SERPL-SCNC: 135 MMOL/L (ref 136–145)
SPECIMEN SOURCE: ABNORMAL
T WAVE AXIS: 44 DEGREES
TROPONIN I SERPL-MCNC: <0.02 NG/ML
TSH SERPL DL<=0.05 MIU/L-ACNC: 1.55 UIU/ML (ref 0.36–3.74)
VENTRICULAR RATE: 115 BPM
WBC # BLD AUTO: 25.44 THOUSAND/UL (ref 4.31–10.16)

## 2017-11-09 PROCEDURE — 87040 BLOOD CULTURE FOR BACTERIA: CPT | Performed by: EMERGENCY MEDICINE

## 2017-11-09 PROCEDURE — 71275 CT ANGIOGRAPHY CHEST: CPT

## 2017-11-09 PROCEDURE — 82947 ASSAY GLUCOSE BLOOD QUANT: CPT

## 2017-11-09 PROCEDURE — 85025 COMPLETE CBC W/AUTO DIFF WBC: CPT | Performed by: EMERGENCY MEDICINE

## 2017-11-09 PROCEDURE — 80076 HEPATIC FUNCTION PANEL: CPT | Performed by: EMERGENCY MEDICINE

## 2017-11-09 PROCEDURE — 84484 ASSAY OF TROPONIN QUANT: CPT | Performed by: EMERGENCY MEDICINE

## 2017-11-09 PROCEDURE — 83605 ASSAY OF LACTIC ACID: CPT | Performed by: EMERGENCY MEDICINE

## 2017-11-09 PROCEDURE — 85730 THROMBOPLASTIN TIME PARTIAL: CPT | Performed by: EMERGENCY MEDICINE

## 2017-11-09 PROCEDURE — 80048 BASIC METABOLIC PNL TOTAL CA: CPT | Performed by: EMERGENCY MEDICINE

## 2017-11-09 PROCEDURE — 83880 ASSAY OF NATRIURETIC PEPTIDE: CPT | Performed by: EMERGENCY MEDICINE

## 2017-11-09 PROCEDURE — 84443 ASSAY THYROID STIM HORMONE: CPT | Performed by: EMERGENCY MEDICINE

## 2017-11-09 PROCEDURE — 82803 BLOOD GASES ANY COMBINATION: CPT

## 2017-11-09 PROCEDURE — 71020 HB CHEST X-RAY 2VW FRONTAL&LATL: CPT

## 2017-11-09 PROCEDURE — 87798 DETECT AGENT NOS DNA AMP: CPT | Performed by: PHYSICIAN ASSISTANT

## 2017-11-09 PROCEDURE — 87081 CULTURE SCREEN ONLY: CPT | Performed by: PHYSICIAN ASSISTANT

## 2017-11-09 PROCEDURE — 85610 PROTHROMBIN TIME: CPT | Performed by: EMERGENCY MEDICINE

## 2017-11-09 PROCEDURE — 84295 ASSAY OF SERUM SODIUM: CPT

## 2017-11-09 PROCEDURE — 87449 NOS EACH ORGANISM AG IA: CPT | Performed by: PHYSICIAN ASSISTANT

## 2017-11-09 PROCEDURE — 99285 EMERGENCY DEPT VISIT HI MDM: CPT

## 2017-11-09 PROCEDURE — 93005 ELECTROCARDIOGRAM TRACING: CPT | Performed by: EMERGENCY MEDICINE

## 2017-11-09 PROCEDURE — 93005 ELECTROCARDIOGRAM TRACING: CPT

## 2017-11-09 PROCEDURE — 96374 THER/PROPH/DIAG INJ IV PUSH: CPT

## 2017-11-09 PROCEDURE — 85014 HEMATOCRIT: CPT

## 2017-11-09 PROCEDURE — 70450 CT HEAD/BRAIN W/O DYE: CPT

## 2017-11-09 PROCEDURE — 84132 ASSAY OF SERUM POTASSIUM: CPT

## 2017-11-09 PROCEDURE — 87070 CULTURE OTHR SPECIMN AEROBIC: CPT | Performed by: PHYSICIAN ASSISTANT

## 2017-11-09 PROCEDURE — 82330 ASSAY OF CALCIUM: CPT

## 2017-11-09 PROCEDURE — 94760 N-INVAS EAR/PLS OXIMETRY 1: CPT

## 2017-11-09 PROCEDURE — 94664 DEMO&/EVAL PT USE INHALER: CPT

## 2017-11-09 PROCEDURE — 36415 COLL VENOUS BLD VENIPUNCTURE: CPT | Performed by: EMERGENCY MEDICINE

## 2017-11-09 PROCEDURE — 87205 SMEAR GRAM STAIN: CPT | Performed by: PHYSICIAN ASSISTANT

## 2017-11-09 RX ORDER — ALBUTEROL SULFATE 2.5 MG/3ML
2.5 SOLUTION RESPIRATORY (INHALATION) EVERY 6 HOURS PRN
Status: DISCONTINUED | OUTPATIENT
Start: 2017-11-09 | End: 2017-11-11

## 2017-11-09 RX ORDER — SODIUM CHLORIDE 9 MG/ML
125 INJECTION, SOLUTION INTRAVENOUS CONTINUOUS
Status: DISCONTINUED | OUTPATIENT
Start: 2017-11-09 | End: 2017-11-10

## 2017-11-09 RX ORDER — ACETAMINOPHEN 325 MG/1
650 TABLET ORAL EVERY 8 HOURS SCHEDULED
Status: DISCONTINUED | OUTPATIENT
Start: 2017-11-09 | End: 2017-11-14 | Stop reason: HOSPADM

## 2017-11-09 RX ORDER — BENZONATATE 100 MG/1
100 CAPSULE ORAL 3 TIMES DAILY
Status: DISCONTINUED | OUTPATIENT
Start: 2017-11-09 | End: 2017-11-11

## 2017-11-09 RX ORDER — HYDROCODONE POLISTIREX AND CHLORPHENIRAMINE POLISTIREX 10; 8 MG/5ML; MG/5ML
5 SUSPENSION, EXTENDED RELEASE ORAL EVERY 12 HOURS PRN
Status: DISCONTINUED | OUTPATIENT
Start: 2017-11-09 | End: 2017-11-14 | Stop reason: HOSPADM

## 2017-11-09 RX ORDER — FOLIC ACID 1 MG/1
1 TABLET ORAL DAILY
Status: DISCONTINUED | OUTPATIENT
Start: 2017-11-09 | End: 2017-11-14 | Stop reason: HOSPADM

## 2017-11-09 RX ORDER — AMLODIPINE BESYLATE 10 MG/1
10 TABLET ORAL DAILY
Status: DISCONTINUED | OUTPATIENT
Start: 2017-11-09 | End: 2017-11-14 | Stop reason: HOSPADM

## 2017-11-09 RX ORDER — THIAMINE MONONITRATE (VIT B1) 100 MG
100 TABLET ORAL DAILY
Status: DISCONTINUED | OUTPATIENT
Start: 2017-11-09 | End: 2017-11-14 | Stop reason: HOSPADM

## 2017-11-09 RX ORDER — GUAIFENESIN 600 MG
600 TABLET, EXTENDED RELEASE 12 HR ORAL EVERY 12 HOURS SCHEDULED
Status: DISCONTINUED | OUTPATIENT
Start: 2017-11-09 | End: 2017-11-11

## 2017-11-09 RX ORDER — ONDANSETRON 2 MG/ML
4 INJECTION INTRAMUSCULAR; INTRAVENOUS EVERY 4 HOURS PRN
Status: DISCONTINUED | OUTPATIENT
Start: 2017-11-09 | End: 2017-11-14 | Stop reason: HOSPADM

## 2017-11-09 RX ORDER — NICOTINE 21 MG/24HR
1 PATCH, TRANSDERMAL 24 HOURS TRANSDERMAL DAILY
Status: DISCONTINUED | OUTPATIENT
Start: 2017-11-09 | End: 2017-11-14 | Stop reason: HOSPADM

## 2017-11-09 RX ADMIN — SODIUM CHLORIDE 1000 ML: 0.9 INJECTION, SOLUTION INTRAVENOUS at 11:34

## 2017-11-09 RX ADMIN — CEFEPIME HYDROCHLORIDE 2000 MG: 2 INJECTION, POWDER, FOR SOLUTION INTRAVENOUS at 21:55

## 2017-11-09 RX ADMIN — ACETAMINOPHEN 650 MG: 325 TABLET ORAL at 15:55

## 2017-11-09 RX ADMIN — NICOTINE 1 PATCH: 14 PATCH TRANSDERMAL at 15:56

## 2017-11-09 RX ADMIN — GUAIFENESIN 600 MG: 600 TABLET, EXTENDED RELEASE ORAL at 21:54

## 2017-11-09 RX ADMIN — SODIUM CHLORIDE 125 ML/HR: 0.9 INJECTION, SOLUTION INTRAVENOUS at 15:56

## 2017-11-09 RX ADMIN — ACETAMINOPHEN 650 MG: 325 TABLET ORAL at 21:54

## 2017-11-09 RX ADMIN — BENZONATATE 100 MG: 100 CAPSULE ORAL at 21:54

## 2017-11-09 RX ADMIN — SODIUM CHLORIDE 1000 ML: 0.9 INJECTION, SOLUTION INTRAVENOUS at 11:47

## 2017-11-09 RX ADMIN — VANCOMYCIN HYDROCHLORIDE 1250 MG: 1 INJECTION, POWDER, LYOPHILIZED, FOR SOLUTION INTRAVENOUS at 12:55

## 2017-11-09 RX ADMIN — BENZONATATE 100 MG: 100 CAPSULE ORAL at 15:56

## 2017-11-09 RX ADMIN — CEFEPIME 2000 MG: 2 INJECTION, POWDER, FOR SOLUTION INTRAMUSCULAR; INTRAVENOUS at 11:57

## 2017-11-09 RX ADMIN — IOHEXOL 85 ML: 350 INJECTION, SOLUTION INTRAVENOUS at 16:51

## 2017-11-09 RX ADMIN — GUAIFENESIN 600 MG: 600 TABLET, EXTENDED RELEASE ORAL at 15:55

## 2017-11-09 NOTE — RESPIRATORY THERAPY NOTE
RT Protocol Note  Naty Armenta 50 y o  male MRN: [id number]  Unit/Bed#: -01 Encounter: [id number]    Assessment    Principal Problem:    HCAP (healthcare-associated pneumonia)  Active Problems:    Sepsis ([place] [address] )    Tobacco abuse    Hemoptysis    History of subarachnoid hemorrhage    History of alcoholism ([place] [address] )      Home Pulmonary Medications:  NONE     Past Medical History:   Diagnosis Date    Alcoholism /alcohol abuse ([place] [address] )     Hypertension     ICH (intracerebral hemorrhage) ([place] [address] )      Social History     Social History    Marital status: Single     Spouse name: N/A    Number of children: N/A    Years of education: N/A     Social History Main Topics    Smoking status: Current Every Day Smoker     Packs/day: 0 25     Years: 35 00    Smokeless tobacco: Never Used      Comment: 1ppd x 35 years, cut fown to 1/4 pack recently    Alcohol use No      Comment: sober since stroke 10/10/17    Drug use: No      Comment: sober since 10/10/17    Sexual activity: Not Asked     Other Topics Concern    None     Social History Narrative    None       Subjective    Subjective Data: Pt states that he has moderate pain and dificulty taking deep breaths  Objective    Physical Exam:   Assessment Type: Assess only  General Appearance: Awake  Respiratory Pattern: Normal, Dyspnea with exertion  Chest Assessment: Chest expansion symmetrical  Bilateral Breath Sounds: Diminished, Rhonchi  O2 Device: RA   Subjective Data: Pt states that he has moderate pain and dificulty taking deep breaths  Vitals:  Blood pressure 130/86, pulse (!) 111, temperature 98 2 °F (36 8 °C), temperature source Oral, resp  rate 18, height 6' (1 829 m), weight 74 5 kg (164 lb 3 9 oz), SpO2 95 %  Imaging and other studies: I have personally reviewed pertinent reports  O2 Device: RA      Plan    Respiratory Plan: No distress/Pulmonary history        Resp Comments: Pt has not pulm Hx and does not use nebs at home   pt will be continued on PRN Q6 ALB as ordred  pt currently not in distress

## 2017-11-09 NOTE — SEPSIS NOTE
Sepsis Note   Arley Field 50 y o  male MRN: [id number]  Unit/Bed#: ED 24 Encounter: [id number]            Initial Sepsis Screening     Row Name 11/09/17 1211 11/09/17 1145             Is the patient's history suggestive of a new or worsening infection?  (!)  Yes (Proceed)  -DA       Suspected source of infection   pneumonia  -DA       Are two or more of the following signs & symptoms of infection both present and new to the patient?  (!)  Yes (Proceed)  -DA       Indicate SIRS criteria   Tachycardia > 90 bpm;Leukocytosis (WBC > 18777 IJL)  -DA       If the answer is yes to both questions, suspicion of sepsis is present  [de-identified]         If severe sepsis is present AND tissue hypoperfusion perists in the hour after fluid resuscitation or lactate > 4, the patient meets criteria for SEPTIC SHOCK           Are any of the following organ dysfunction criteria present within 6 hours of suspected infection and SIRS criteria that are NOT considered to be chronic conditions? No  -DA         Organ dysfunction           Date of presentation of severe sepsis           Time of presentation of severe sepsis           Tissue hypoperfusion persists in the hour after crystalloid fluid administration, evidenced, by either:           Was hypotension present within one hour of the conclusion of crystalloid fluid administration?   Samira Arceo       Date of presentation of septic shock           Time of presentation of septic shock             User Key  (r) = Recorded By, (t) = Taken By, (c) = Cosigned By    [address] Name Provider Type    YELITZA Hurd DO Physician

## 2017-11-09 NOTE — CONSULTS
Consultation - Pulmonary Medicine   Baron Meza 50 y o  male MRN: [id number]  Unit/Bed#: -01 Encounter: [id number]      Assessment/Plan:    Abnormal CXR: RLL Opacity with leukocytosis - HCAP with some concern for aspiration   Continue cefepime for now  Monitor temperatures and WBCs  Sputum culture and urine for strep/legionella sent while I was in the room  Follow-up blood cultures  MRSA culture and Influenza/RSV PCR pending  Check CTA chest to further evaluate lung parenchyma, as well as check for PE given recent prolonged hospitalization without continual chemical DVT prophylaxis due to [address]  Continue Mucinex and Tessalon  Pleuritic Chest Pain with Hemoptysis   Pain management per primary team    CTA as above  NPO after midnight in case of worsening hemoptysis requiring bronchoscopy  If hemoptysis worsens, call critical care AP  Recent SAH with Intraventricular Extension   CT head ordered for today for routine evaluation  Outpatient neurosurgery follow-up  If + PE, will need to discuss with neurosurgery role of AC  Recent Hypophonia S/P Intubation   Per patient, has no residual dysphagia and tolerates normal diet  Active Tobacco Abuse   NRT and smoking cessation  May benefit from outpatient PFTs  Outpatient follow-up and repeat imaging pending hospital course  History of Present Illness   Physician Requesting Consult: Pascual Rosario MD  Reason for Consult / Principal Problem: RLL Pneumonia  Hx and PE limited by: None  Chief Complaint: "I had this horrible pain in my side and back "  HPI: Baron Meza is a 50 y o   male who presented to [address] [place] [address] with complaints of right-sided chest/back pain associated with breathing and cough  His pain is sharp and stabbing in nature and started Monday, worsening over the last few days  He developed a cough productive of clear mucous with some hemoptysis last night   He denies fevers, chills, or night sweats  He denies BLE pain or edema  He was recently hospitalized at [facility] and was on the ventilator for a period of time  He was also seen by Speech then for hypophonia, but was discharged with an unrestricted diet  He denies any dysphagia since leaving the hospital  He has never been diagnosed with any lung disease or used any inhaled therapies in the past  He does report that he had yellow sputum while hospitalized, but it stopped shortly after  Inpatient consult to Pulmonology  Consult performed by: Piper Booth ordered by: [facility]        Review of Systems   All other systems reviewed and are negative  A full 12-point review of systems was completed and is negative except for those outlined in the HPI  Historical Information   Past Medical History:   Diagnosis Date    Alcoholism /alcohol abuse ([facility] [address] )     Cerebral aneurysm     Hypertension     [facility]racere[facility] [place]      History reviewed  No pertinent surgical history  Social History   History   Alcohol Use No     Comment: sober since stroke 10/10/17     History   Drug Use No     Comment: sober since 10/10/17     History   Smoking Status    Current Every Day Smoker    Packs/day: 1 00    Years: 37 00    Start date: [employer]AL [employer] 75 Never Used     Comment: 1ppd x 37 years, cut fown to 1/4 pack recently     Occupational History: Not working since hospitalization but previously worked in maintenance  Lives with his girlfriend and her family      Family History:   Family History   Problem Relation Age of Onset    Diabetes Mother     Hypertension Mother     Diabetes Father     Hypertension Father     Stroke Father     Stroke Brother        Meds/Allergies   all current active meds have been reviewed, pertinent pulmonary meds have been reviewed, current meds:   Current Facility-Administered Medications   Medication Dose Route Frequency    acetaminophen (TYLENOL) tablet 650 mg  650 mg Oral Q8H [address]    albuterol inhalation solution 2 5 mg  2 5 mg Nebulization Q6H PRN    amLODIPine (NORVASC) tablet 10 mg  10 mg Oral Daily    benzonatate (TESSALON PERLES) capsule 100 mg  100 mg Oral TID    cefepime (MAXIPIME) 2,000 mg in dextrose 5 % 50 mL IVPB  2,000 mg Intravenous S15G    folic acid (FOLVITE) tablet 1 mg  1 mg Oral Daily    guaiFENesin (MUCINEX) 12 hr tablet 600 mg  600 mg Oral Q12H [address]    hydrocodone-chlorpheniramine polistirex (TUSSIONEX) 10-8 mg/5 mL ER suspension 5 mL  5 mL Oral Q12H PRN    nicotine (NICODERM CQ) 14 mg/24hr TD 24 hr patch 1 patch  1 patch Transdermal Daily    ondansetron (ZOFRAN) injection 4 mg  4 mg Intravenous Q4H PRN    sodium chloride 0 9 % infusion  125 mL/hr Intravenous Continuous    thiamine (VITAMIN B1) tablet 100 mg  100 mg Oral Daily    and PTA meds:   Prior to Admission Medications   Prescriptions Last Dose Informant Patient Reported? Taking? amLODIPine (NORVASC) 10 mg tablet   No Yes   Sig: Take 1 tablet by mouth daily   folic acid (FOLVITE) 1 mg tablet   No Yes   Sig: Take 1 tablet by mouth daily   thiamine 100 MG tablet   No Yes   Sig: Take 1 tablet by mouth daily      Facility-Administered Medications: None       No Known Allergies    Objective   Vitals: Blood pressure 130/86, pulse (!) 111, temperature 98 2 °F (36 8 °C), temperature source Oral, resp  rate 18, height 6' (1 829 m), weight 74 5 kg (164 lb 3 9 oz), SpO2 95 %  RA,Body mass index is 22 28 kg/m²      Intake/Output Summary (Last 24 hours) at 11/09/17 1514  Last data filed at 11/09/17 1254   Gross per 24 hour   Intake             2050 ml   Output                0 ml   Net             2050 ml     Invasive Devices     Peripheral Intravenous Line            Peripheral IV 11/09/17 Right Antecubital less than 1 day    Peripheral IV 11/09/17 Right Forearm less than 1 day          Drain            External Urinary Catheter Small 20 days                Physical Exam   Constitutional: He is oriented to person, place, and time  He appears well-developed and well-nourished  He is cooperative  Non-toxic appearance  No distress  HENT:   Head: Normocephalic and atraumatic  Mouth/Throat: No oropharyngeal exudate  Eyes: EOM are normal  No scleral icterus  Neck: Neck supple  No JVD present  No tracheal deviation present  Cardiovascular: Normal rate, regular rhythm, S1 normal and S2 normal   Exam reveals no gallop and no friction rub  No murmur heard  Pulmonary/Chest: Effort normal  No accessory muscle usage or stridor  No tachypnea  No respiratory distress  He has decreased breath sounds in the right lower field  He has no wheezes  He has no rhonchi  He has no rales  He exhibits no tenderness  Abdominal: Soft  Bowel sounds are normal  He exhibits no distension  There is no tenderness  There is no rebound and no guarding  Musculoskeletal: He exhibits no edema or tenderness  Neurological: He is alert and oriented to person, place, and time  He has normal strength  GCS eye subscore is 4  GCS verbal subscore is 5  GCS motor subscore is 6  Skin: Skin is warm and dry  No abrasion, no ecchymosis, no lesion and no rash noted  He is not diaphoretic  No cyanosis or erythema  Nails show no clubbing  Psychiatric: He has a normal mood and affect   His speech is normal and behavior is normal        Lab Results:   BNP: 304    CBC:   Lab Results   Component Value Date    WBC 25 44 (H) 11/09/2017    HGB 15 3 11/09/2017    HCT 40 3 11/09/2017    MCV 94 11/09/2017     11/09/2017    MCH 32 2 11/09/2017    MCHC 34 2 11/09/2017    RDW 12 5 11/09/2017    MPV 10 1 11/09/2017   , CMP:   Lab Results   Component Value Date     (L) 11/09/2017    K 3 5 11/09/2017    CL 97 (L) 11/09/2017    CO2 27 11/09/2017    ANIONGAP 11 11/09/2017    BUN 7 11/09/2017    CREATININE 0 77 11/09/2017    GLUCOSE 124 11/09/2017    CALCIUM 9 6 11/09/2017    AST 25 11/09/2017    ALT 44 11/09/2017    ALKPHOS 117 (H) 11/09/2017    PROT 8 3 (H) 11/09/2017    ALBUMIN 3 2 (L) 11/09/2017    BILITOT 1 20 (H) 11/09/2017    EGFR 106 11/09/2017   , PT/INR:   Lab Results   Component Value Date    INR 1 05 11/09/2017   , Troponin:   Lab Results   Component Value Date    TROPONINI <0 02 11/09/2017       Imaging Studies: I have personally reviewed pertinent reports   , I have personally reviewed pertinent films in PACS and CXR shows RLL opacity    EKG, Pathology, and Other Studies: Unable to view in computer, but per ED note EKG today was ST rate 115 no ectopy  Pulmonary Results (PFTs, PSG): None    VTE Prophylaxis: Sequential compression device (Venodyne)  and RX contraindicated due to: recent SAH/hemoptysis    Code Status: Level 1 - Full Code    None    Portions of the record may have been created with voice recognition software  Occasional wrong word or "sound a like" substitutions may have occurred due to the inherent limitations of voice recognition software  Read the chart carefully and recognize, using context, where substitutions have occurred

## 2017-11-09 NOTE — ED PROVIDER NOTES
History  Chief Complaint   Patient presents with    Rib Pain     right sided rib/flank pain started "monday or tuesday"  denies urinary symptoms or fevers at home  describes resp symptoms/congestion, pain with inspiration  d/c'd recently from [place] for a stroke 10/10/17  History provided by:  Patient  Chest Pain   Pain location:  R lateral chest  Pain quality: sharp    Pain radiates to:  Does not radiate  Pain radiates to the back: no    Pain severity:  Moderate  Onset quality:  Gradual  Duration:  2 days  Timing:  Constant  Progression:  Worsening  Chronicity:  New  Context: breathing    Context: no movement and not raising an arm    Relieved by:  Nothing  Worsened by:  Nothing tried  Ineffective treatments:  None tried  Associated symptoms: cough, palpitations and shortness of breath    Associated symptoms: no abdominal pain, no diaphoresis, no dizziness, no fever, no headache, no nausea, no numbness, no syncope, not vomiting and no weakness    Risk factors: no coronary artery disease, no high cholesterol and no hypertension        Prior to Admission Medications   Prescriptions Last Dose Informant Patient Reported? Taking? amLODIPine (NORVASC) 10 mg tablet   No Yes   Sig: Take 1 tablet by mouth daily   folic acid (FOLVITE) 1 mg tablet   No Yes   Sig: Take 1 tablet by mouth daily   thiamine 100 MG tablet   No Yes   Sig: Take 1 tablet by mouth daily      Facility-Administered Medications: None       Past Medical History:   Diagnosis Date    Alcoholism /alcohol abuse ([facility] [address] )     Stroke [facility])        History reviewed  No pertinent surgical history  History reviewed  No pertinent family history  I have reviewed and agree with the history as documented      Social History   Substance Use Topics    Smoking status: Current Every Day Smoker     Packs/day: 0 25    Smokeless tobacco: Never Used    Alcohol use No      Comment: sober since stroke 10/10/17        Review of Systems   Constitutional: Negative for activity change, chills, diaphoresis and fever  HENT: Negative for congestion, sinus pressure and sore throat  Eyes: Negative for pain and visual disturbance  Respiratory: Positive for cough and shortness of breath  Negative for chest tightness, wheezing and stridor  Cardiovascular: Positive for chest pain and palpitations  Negative for syncope  Gastrointestinal: Negative for abdominal distention, abdominal pain, constipation, diarrhea, nausea and vomiting  Genitourinary: Negative for dysuria and frequency  Musculoskeletal: Negative for neck pain and neck stiffness  Skin: Negative for rash  Neurological: Negative for dizziness, speech difficulty, weakness, light-headedness, numbness and headaches  Physical Exam  ED Triage Vitals   Temperature Pulse Respirations Blood Pressure SpO2   11/09/17 1057 11/09/17 1053 11/09/17 1053 11/09/17 1053 11/09/17 1053   97 8 °F (36 6 °C) (!) 125 18 137/94 95 %      Temp Source Heart Rate Source Patient Position - Orthostatic VS BP Location FiO2 (%)   11/09/17 1053 11/09/17 1053 -- 11/09/17 1053 --   Oral Monitor  Right arm       Pain Score       --                  Orthostatic Vital Signs  Vitals:    11/09/17 1053   BP: 137/94   Pulse: (!) 125       Physical Exam   Constitutional: He is oriented to person, place, and time  He appears well-developed  He appears distressed  HENT:   Head: Normocephalic and atraumatic  Eyes: Pupils are equal, round, and reactive to light  Neck: Normal range of motion  Neck supple  No tracheal deviation present  Cardiovascular: Normal rate, regular rhythm, normal heart sounds and intact distal pulses  No murmur heard  Pulmonary/Chest: Breath sounds normal  No stridor  He is in respiratory distress  He exhibits tenderness  Abdominal: Soft  He exhibits no distension  There is no tenderness  There is no rebound and no guarding  No CVA tenderness     Musculoskeletal: Normal range of motion  Neurological: He is alert and oriented to person, place, and time  Skin: Skin is warm and dry  He is not diaphoretic  No erythema  No pallor  Psychiatric: He has a normal mood and affect  Vitals reviewed  ED Medications  Medications   sodium chloride 0 9 % bolus 1,000 mL (1,000 mL Intravenous New Bag 11/9/17 1134)   cefepime (MAXIPIME) 2 g/50 mL dextrose IVPB (2,000 mg Intravenous New Bag 11/9/17 1157)   vancomycin (VANCOCIN) 1,250 mg in sodium chloride 0 9 % 250 mL IVPB (not administered)   sodium chloride 0 9 % bolus 1,000 mL (1,000 mL Intravenous New Bag 11/9/17 1147)       Diagnostic Studies  Results Reviewed     Procedure Component Value Units Date/Time    Hepatic function panel [[id number]]  (Abnormal) Collected:  11/09/17 1111    Lab Status:  Final result Specimen:  Blood from Arm, Right Updated:  11/09/17 1155     Total Bilirubin 1 20 (H) mg/dL      Bilirubin, Direct 0 33 (H) mg/dL      Alkaline Phosphatase 117 (H) U/L      AST 25 U/L      ALT 44 U/L      Total Protein 8 3 (H) g/dL      Albumin 3 2 (L) g/dL     Blood culture #2 [[id number]] Collected:  11/09/17 1151    Lab Status: In process Specimen:  Blood from Arm, Left Updated:  11/09/17 1155    Blood culture #1 [[id number]] Collected:  11/09/17 1148    Lab Status: In process Specimen:  Blood from Arm, Right Updated:  11/09/17 1155    TSH [[id number]]  (Normal) Collected:  11/09/17 1111    Lab Status:  Final result Specimen:  Blood from Arm, Right Updated:  11/09/17 1149     TSH 3RD GENERATON 1 553 uIU/mL     Narrative:         Patients undergoing fluorescein dye angiography may retain small amounts of fluorescein in the body for 48-72 hours post procedure  Samples containing fluorescein can produce falsely depressed TSH values  If the patient had this procedure,a specimen should be resubmitted post fluorescein clearance      B-type natriuretic peptide [[id number]]  (Abnormal) Collected:  11/09/17 1111    Lab Status:  Final result Specimen:  Blood from Arm, Right Updated:  11/09/17 1149     NT-proBNP 304 (H) pg/mL     Lactic acid, plasma [[id number]]  (Normal) Collected:  11/09/17 1111    Lab Status:  Final result Specimen:  Blood from Arm, Right Updated:  11/09/17 1143     LACTIC ACID 1 2 mmol/L     Narrative:         Result may be elevated if tourniquet was used during collection  Basic metabolic panel [[id number]]  (Abnormal) Collected:  11/09/17 1111    Lab Status:  Final result Specimen:  Blood from Arm, Right Updated:  11/09/17 1142     Sodium 135 (L) mmol/L      Potassium 3 5 mmol/L      Chloride 97 (L) mmol/L      CO2 27 mmol/L      Anion Gap 11 mmol/L      BUN 7 mg/dL      Creatinine 0 77 mg/dL      Glucose 124 mg/dL      Calcium 9 6 mg/dL      eGFR 106 ml/min/1 73sq m     Narrative:         National Kidney Disease Education Program recommendations are as follows:  GFR calculation is accurate only with a steady state creatinine  Chronic Kidney disease less than 60 ml/min/1 73 sq  meters  Kidney failure less than 15 ml/min/1 73 sq  meters  Troponin I [[id number]]  (Normal) Collected:  11/09/17 1111    Lab Status:  Final result Specimen:  Blood from Arm, Right Updated:  11/09/17 1142     Troponin I <0 02 ng/mL     Narrative:         [employer] Chemistry analyzer 99% cutoff is > 0 04 ng/mL in network labs    o cTnI 99% cutoff is useful only when applied to patients in the clinical setting of myocardial ischemia  o cTnI 99% cutoff should be interpreted in the context of clinical history, ECG findings and possibly cardiac imaging to establish correct diagnosis  o cTnI 99% cutoff may be suggestive but clearly not indicative of a coronary event without the clinical setting of myocardial ischemia      Protime-INR [[id number]]  (Normal) Collected:  11/09/17 1111    Lab Status:  Final result Specimen:  Blood from Arm, Right Updated:  11/09/17 1135     Protime 14 0 seconds      INR 1 05    APTT [[id number]]  (Normal) Collected:  11/09/17 1111    Lab Status:  Final result Specimen:  Blood from Arm, Right Updated:  11/09/17 1135     PTT 34 seconds     Narrative: Therapeutic Heparin Range = 60-90 seconds    POCT Blood Gas (CG8+) [[id number]]  (Abnormal) Collected:  11/09/17 1123    Lab Status:  Final result Updated:  11/09/17 1127     ph, Elian ISTAT 7 511 (HH)     pCO2, Elian i-STAT 36 6 (L) mm HG      pO2, Elian i-STAT 36 0 mm HG      BE, i-STAT 6 (H) mmol/L      HCO3, Elian i-STAT 29 3 mmol/L      CO2, i-STAT 30 mmol/L      O2 Sat, i-STAT 75 (L) %      SODIUM, I-STAT 135 (L) mmol/l      Potassium, i-STAT 3 9 mmol/L      Calcium, Ionized i-STAT 1 11 (L) mmol/L      Hct, i-STAT 45 %      Hgb, i-STAT 15 3 g/dl      Glucose, i-STAT 124 mg/dl      Specimen Type VENOUS    CBC and differential [[id number]]  (Abnormal) Collected:  11/09/17 1111    Lab Status:  Final result Specimen:  Blood from Arm, Right Updated:  11/09/17 1120     WBC 25 44 (H) Thousand/uL      RBC 4 28 Million/uL      Hemoglobin 13 8 g/dL      Hematocrit 40 3 %      MCV 94 fL      MCH 32 2 pg      MCHC 34 2 g/dL      RDW 12 5 %      MPV 10 1 fL      Platelets 855 Thousands/uL      Neutrophils Relative 87 (H) %      Lymphocytes Relative 4 (L) %      Monocytes Relative 8 %      Eosinophils Relative 1 %      Basophils Relative 0 %      Neutrophils Absolute 22 27 (H) Thousands/µL      Lymphocytes Absolute 1 08 Thousands/µL      Monocytes Absolute 1 90 (H) Thousand/µL      Eosinophils Absolute 0 14 Thousand/µL      Basophils Absolute 0 05 Thousands/µL                  XR chest 2 views   Final Result by Vandana Patiño DO (11/09 1134)      New right lower lobe airspace opacity suggesting atelectasis or pneumonia  Correlate with clinical scenario and follow-up chest films until resolution  Atelectatic changes in the right middle lobe        ##[id number]         Workstation performed: [id number]                    Procedures  ED ECG Documentation Only  Date/Time: 11/9/2017 11:03 AM  Performed by: Lit Irizarry J  Authorized by: Michelle Thibodeaux     ECG reviewed by me, the ED Provider: yes    Patient location:  ED  Previous ECG:     Previous ECG:  Compared to current    Comparison ECG info:  10 12 17    Similarity:  No change  Interpretation:     Interpretation: abnormal    Rate:     ECG rate:  115    ECG rate assessment: tachycardic    Rhythm:     Rhythm: sinus rhythm    Ectopy:     Ectopy: none    QRS:     QRS axis:  Normal    QRS intervals:  Normal  Conduction:     Conduction: normal    ST segments:     ST segments:  Non-specific  T waves:     T waves: non-specific    CriticalCare Time  Performed by: Michelle Thibodeaux  Authorized by: Michelle Thibodeaux     Critical care provider statement:     Critical care time (minutes):  435    Critical care time was exclusive of:  Separately billable procedures and treating other patients    Critical care was necessary to treat or prevent imminent or life-threatening deterioration of the following conditions:  Sepsis    Critical care was time spent personally by me on the following activities:  Obtaining history from patient or surrogate, development of treatment plan with patient or surrogate, discussions with consultants, evaluation of patient's response to treatment, examination of patient, ordering and performing treatments and interventions, ordering and review of laboratory studies, ordering and review of radiographic studies, re-evaluation of patient's condition and review of old charts           Phone Contacts  ED Phone Contact    ED Course  ED Course as of Nov 09 1212   Thu Nov 09, 2017   1144 Blood work showing leukocytosis x-ray concerning for right lower lobe pneumonia  , this clinically fits the patient's son area, will treat with cefepime and Vanco due to recent admission    To cover for hospital-acquired bacteria, patient does meet sepsis criteria    1210 Repeat examinations heart rate decreasing patient responding well to fluid, x-ray showing pneumonia, will admit the \[facility] [place] [facility]\"" for IV antibiotics  Initial Sepsis Screening     Row Name 11/09/17 1211 11/09/17 1145             Is the patient's history suggestive of a new or worsening infection?  (!)  Yes (Proceed)  -DA       Suspected source of infection   pneumonia  -DA       Are two or more of the following signs & symptoms of infection both present and new to the patient?  (!)  Yes (Proceed)  -DA       Indicate SIRS criteria   Tachycardia > 90 bpm;Leukocytosis (WBC > 39933 IJL)  -DA       If the answer is yes to both questions, suspicion of sepsis is present  [de-identified]         If severe sepsis is present AND tissue hypoperfusion perists in the hour after fluid resuscitation or lactate > 4, the patient meets criteria for SEPTIC SHOCK           Are any of the following organ dysfunction criteria present within 6 hours of suspected infection and SIRS criteria that are NOT considered to be chronic conditions? No  -DA         Organ dysfunction           Date of presentation of severe sepsis           Time of presentation of severe sepsis           Tissue hypoperfusion persists in the hour after crystalloid fluid administration, evidenced, by either:           Was hypotension present within one hour of the conclusion of crystalloid fluid administration?   Crissie Cayce       Date of presentation of septic shock           Time of presentation of septic shock             User Key  (r) = Recorded By, (t) = Taken By, (c) = Cosigned By    [address] Name Provider Type    YELITZA Dixon,  Physician           Default Flowsheet Data (last 720 hours)      Sepsis Reassessment     Row Name 11/09/17 1211                   Volume Status and Tissue Perfusion Post Fluid Resuscitation- Must Document ALL of the Following:    Vital Signs Reviewed Yes  -DA        Cardio Normal S1/S2  -DA        Pulmonary Normal effort  -DA        Capillary Refill Brisk  -DA        Peripheral Pulses Radial  -DA        Peripheral Pulse +2 -DA        Skin Warm  -DA           *OR*   Intensive Monitoring- Must Document Two * of the Following Four *:    Vital Signs Reviewed          * Central Venous Pressure (CVP or RAP)          * Central Venous Oxygen (SVO2, ScvO2 or Oxygen saturation via central catheter)          * Bedside Cardiovascular US in IVC diameter and % collapse          * Passive Leg Raise OR Crystalloid Challenge            User Key  (r) = Recorded By, (t) = Taken By, (c) = Cosigned By    Initials Name Provider Type    YELITZA Vega DO Physician                MDM  Number of Diagnoses or Management Options  Right lower lobe pneumonia [facility]): new and requires workup  Sepsis, due to unspecified organism [facility]): new and requires workup  Diagnosis management comments: 68-year-old male presents to right lateral rib pain  Patient just discharged from hospital having a stroke  Patient was on a ventilator  Pain started afterDischarge  Mild shortness of breath particularly with exertion  DDx includes but is not limited to:   Pulmonary embolism, pneumonia, tracheobronchitis, deconditioning      Initial ED Plan:   Blood work, CTA of the chest, disposition pending ED workup       Amount and/or Complexity of Data Reviewed  Clinical lab tests: ordered and reviewed  Tests in the radiology section of CPT®: ordered and reviewed  Decide to obtain previous medical records or to obtain history from someone other than the patient: yes  Obtain history from someone other than the patient: yes  Review and summarize past medical records: yes  Independent visualization of images, tracings, or specimens: yes    Patient Progress  Patient progress: improved    The patient presented with a condition in which there was a high probability of imminent or life-threatening deterioration, and critical care services (excluding separately billable procedures) totalled 30-74 minutes          Disposition  Final diagnoses:   Sepsis, due to unspecified organism [facility])   Right lower lobe pneumonia ([facility] [address] )     Time reflects when diagnosis was documented in both MDM as applicable and the Disposition within this note     Time User Action Codes Description Comment    11/9/2017 12:08 PM Radhika Solar Add [A41 9] Sepsis, due to unspecified organism ([facility] [address] )     11/9/2017 12:08 PM Radhika Solar Add [J18 1] Right lower lobe pneumonia [facility])       ED Disposition     ED Disposition Condition Comment    Admit  Case was discussed with Dr Keagan Richard and the patient's admission status was agreed to be Admission Status: inpatient status to the service of Dr Keagan Richard   Follow-up Information    None       Patient's Medications   Discharge Prescriptions    No medications on file     No discharge procedures on file      ED Provider  Electronically Signed by           Terell Abdi DO  11/09/17 8547

## 2017-11-09 NOTE — PLAN OF CARE
Problem: PAIN - ADULT  Goal: Verbalizes/displays adequate comfort level or baseline comfort level  Interventions:  - Encourage patient to monitor pain and request assistance  - Assess pain using appropriate pain scale  - Administer analgesics based on type and severity of pain and evaluate response  - Implement non-pharmacological measures as appropriate and evaluate response  - Consider cultural and social influences on pain and pain management  - Notify physician/advanced practitioner if interventions unsuccessful or patient reports new pain  Outcome: Progressing      Problem: INFECTION - ADULT  Goal: Absence or prevention of progression during hospitalization  INTERVENTIONS:  - Assess and monitor for signs and symptoms of infection  - Monitor lab/diagnostic results  - Monitor all insertion sites, i e  indwelling lines, tubes, and drains  - Monitor endotracheal (as able) and nasal secretions for changes in amount and color  - [place] appropriate cooling/warming therapies per order  - Administer medications as ordered  - Instruct and encourage patient and family to use good hand hygiene technique  - Identify and instruct in appropriate isolation precautions for identified infection/condition  Outcome: Progressing      Problem: Knowledge Deficit  Goal: Patient/family/caregiver demonstrates understanding of disease process, treatment plan, medications, and discharge instructions  Complete learning assessment and assess knowledge base    Interventions:  - Provide teaching at level of understanding  - Provide teaching via preferred learning methods  Outcome: Progressing      Problem: RESPIRATORY - ADULT  Goal: Achieves optimal ventilation and oxygenation  INTERVENTIONS:  - Assess for changes in respiratory status  - Assess for changes in mentation and behavior  - Position to facilitate oxygenation and minimize respiratory effort  - Oxygen administration by appropriate delivery method based on oxygen saturation (per order) or ABGs  - Initiate smoking cessation education as indicated  - Encourage broncho-pulmonary hygiene including cough, deep breathe, Incentive Spirometry  - Assess the need for suctioning and aspirate as needed  - Assess and instruct to report SOB or any respiratory difficulty  - Respiratory Therapy support as indicated  Outcome: Progressing      Problem: METABOLIC, FLUID AND ELECTROLYTES - ADULT  Goal: Electrolytes maintained within normal limits  INTERVENTIONS:  - Monitor labs and assess patient for signs and symptoms of electrolyte imbalances  - Administer electrolyte replacement as ordered  - Monitor response to electrolyte replacements, including repeat lab results as appropriate  - Instruct patient on fluid and nutrition as appropriate  Outcome: Progressing

## 2017-11-09 NOTE — H&P
History and Physical - [address] [place] [address] Internal Medicine    Patient Information: Jose Joshi 50 y o  male MRN: [id number]  Unit/Bed#: -01 Encounter: [id number]  Admitting Physician: Flori Mccullough PA-C  PCP: No primary care provider on file  Date of Admission:  11/09/17    Assessment/Plan:    Hospital Problem List:     Principal Problem:    HCAP (healthcare-associated pneumonia)  Active Problems:    Sepsis ([facility] )    Hemoptysis    Tobacco abuse    History of subarachnoid hemorrhage    History of alcoholism ([facility] )      Plan for the Primary Problem(s):  · HCAP, possible gram-negative sydney pneumonia- patient with right lower lobe pneumonia with associated lower chest pain and dyspnea  He was hospitalized for 11 days within the past month (and had been intubated) and also has longstanding history of tobacco use although no formal diagnosis of COPD  As such he meets criteria to receive IV cefepime  He received a dose of vancomycin in the emergency department  It is reasonable to do a MRSA swab but his risk of MRSA at this point is lower  Doubt aspiration  Will obtain blood cultures, sputum culture, Legionella and strep pneumo for urine  Supportive oxygen as needed as his O2 sat at this time is 90% on room air  · Hemoptysis-likely due to localized irritation from significant coughing however also concerning given his underlying tobacco abuse  Will check CT scan of the chest  Would ask for pulmonology evaluation and avoid all blood thinners  Will provide medication for cough  · Sepsis present on admission due to HCAP-including tachycardia and leukocytosis  Continue aggressive IV fluids    Plan for Additional Problems:   · Tobacco abuse-cessation, nicotine patch; patient should have outpatient pulmonary function tests once he is improved post discharge  · History of recent subarachnoid hemorrhage with intraventricular extension requiring EVD-stable per last visit with Neurosurgery    Due for a CT scan of the head tonight which we will perform here  · History of alcoholism-patient has been sober since last hospitalization and is no longer experiencing any withdrawal    VTE Prophylaxis: Pharmacologic VTE Prophylaxis contraindicated due to hemoptysis  / sequential compression device   Code Status: full code per discussion  POLST: POLST is not applicable to this patient    Anticipated Length of Stay:  Patient will be admitted on an Inpatient basis with an anticipated length of stay of  Greater than 2 midnights  Justification for Hospital Stay: sepsis/HCAP    Total Time for Visit, including Counseling / Coordination of Care: 1 hour  Greater than 50% of this total time spent on direct patient counseling and coordination of care  Chief Complaint:   Rib pain and coughing up blood    History of Present Illness:    Angel Perea is a 50 y o  male who presents with 3-4 days of cough with significant right-sided chest pain and coughing up blood  Patient reports that earlier in the week he started noticing increasing cough with phlegm  On several occasions the phlegm was mixed with blood and on 1 occasion he had a fairly sizable blood clot which he expectorated  He began also noticing worsening pain in the right side of his chest and ribs particularly during coughing fits and with taking a deep breath  He has been taking Aleve for this pain  He reports he had pneumonia once before and felt this was similar  He denies any fever or chills at home and has not had any ill contacts  However, of note he was admitted to [address] [place] [address] from October 11th through the 22nd for a subarachnoid hemorrhage  He did leave [place] but has been feeling well since, with no further issues with headache or balance  Review of Systems:    Review of Systems   Constitutional: Positive for activity change  Negative for appetite change, chills, diaphoresis, fatigue, fever and unexpected weight change     HENT: Negative for congestion, postnasal drip, rhinorrhea, sinus pressure, sore throat and trouble swallowing  Respiratory: Positive for cough and shortness of breath (He noted shortness of breath with activity)  Negative for choking, chest tightness, wheezing and stridor  Cardiovascular: Positive for chest pain  Negative for palpitations and leg swelling  He is aware of his heart beating fast   He has right-sided rib/chest pain   Gastrointestinal: Negative for abdominal distention, abdominal pain, blood in stool, constipation, diarrhea, nausea and vomiting  He had mild dyspepsia yesterday   Genitourinary: Negative for difficulty urinating, dysuria, flank pain and hematuria  Musculoskeletal: Negative for arthralgias, back pain and gait problem  Skin: Negative for color change, pallor, rash and wound  Neurological: Positive for headaches (Patient reports he has only had very minor headaches recently)  Negative for dizziness, tremors, seizures, syncope, facial asymmetry, speech difficulty, weakness, light-headedness and numbness  Psychiatric/Behavioral: Negative for agitation and confusion  Past Medical and Surgical History:     Past Medical History:   Diagnosis Date    Alcoholism /alcohol abuse ([place] [id number] )     Hypertension     ICH (intracerebral hemorrhage) ([place] [id number] )        History reviewed  No pertinent surgical history  Meds/Allergies:    Prior to Admission medications    Medication Sig Start Date End Date Taking? Authorizing Provider   amLODIPine (NORVASC) 10 mg tablet Take 1 tablet by mouth daily 10/23/17  Yes Marsha Najera DO   folic acid (FOLVITE) 1 mg tablet Take 1 tablet by mouth daily 10/23/17  Yes Marsha Najera DO   thiamine 100 MG tablet Take 1 tablet by mouth daily 10/23/17  Yes Marsha Najera DO           nicotine (NICODERM CQ) 21 mg/24 hr TD 24 hr patch Place 1 patch on the skin daily 10/23/17 11/9/17  Marsha Najera DO     I have reviewed home medications with patient personally      Allergies: No Known Allergies    Social History:     Marital Status: Single   Occupation:  Previously worked on heavy machinery  Patient Pre-hospital Living Situation:   Patient Pre-hospital Level of Mobility: no issues  Patient Pre-hospital Diet Restrictions:   Substance Use History:   History   Alcohol Use No     Comment: sober since stroke 10/10/17     History   Smoking Status    Current Every Day Smoker    Packs/day: 0 25    Years: 35 00   Smokeless Tobacco    Never Used     Comment: 1ppd x 35 years, cut fown to 1/4 pack recently     History   Drug Use No     Comment: sober since 10/10/17       Family History:    non-contributory    Physical Exam:     Vitals:   Blood Pressure: 130/86 (11/09/17 1333)  Pulse: (!) 111 (11/09/17 1333)  Temperature: 98 2 °F (36 8 °C) (11/09/17 1333)  Temp Source: Oral (11/09/17 1333)  Respirations: 18 (11/09/17 1333)  Height: 6' (182 9 cm) (11/09/17 1333)  Weight - Scale: 74 5 kg (164 lb 3 9 oz) (11/09/17 1333)  SpO2: 94 % (11/09/17 1333)    Physical Exam   Constitutional: He appears well-developed and well-nourished  No distress  HENT:   Head: Normocephalic and atraumatic  Eyes: Conjunctivae are normal  Right eye exhibits no discharge  Left eye exhibits no discharge  No scleral icterus  Neck: Neck supple  Cardiovascular: Regular rhythm and normal heart sounds  No murmur heard  Sinus tachycardia   Pulmonary/Chest: No stridor  No respiratory distress  He has no wheezes  He has no rales  Mild dyspnea appreciated at times during conversation  Frequent moist cough noted  Witnessed patient spit up a small blood clot  Right lower lobe rhonchi noted   Abdominal: Soft  Bowel sounds are normal  He exhibits no distension  There is no tenderness  There is no rebound and no guarding  Musculoskeletal: He exhibits no edema, tenderness or deformity  Neurological: He is alert  Awake alert and interactive  He follows commands appropriately and no focal deficits noted     Skin: Skin is warm and dry  No rash noted  He is not diaphoretic  No erythema  No pallor  Psychiatric: He has a normal mood and affect  His behavior is normal  Judgment and thought content normal    Very pleasant and cooperative   Vitals reviewed  Additional Data:     Lab Results: I have personally reviewed pertinent reports  Results from last 7 days  Lab Units 11/09/17  1123 11/09/17  1111   WBC Thousand/uL  --  25 44*   HEMOGLOBIN g/dL  --  13 8   I STAT HEMOGLOBIN g/dl 15 3  --    HEMATOCRIT %  --  40 3   PLATELETS Thousands/uL  --  355   NEUTROS PCT %  --  87*   LYMPHS PCT %  --  4*   MONOS PCT %  --  8   EOS PCT %  --  1       Results from last 7 days  Lab Units 11/09/17  1123 11/09/17  1111   SODIUM mmol/L  --  135*   POTASSIUM mmol/L  --  3 5   CHLORIDE mmol/L  --  97*   CO2 mmol/L  --  27   BUN mg/dL  --  7   CREATININE mg/dL  --  0 77   CALCIUM mg/dL  --  9 6   TOTAL PROTEIN g/dL  --  8 3*   BILIRUBIN TOTAL mg/dL  --  1 20*   ALK PHOS U/L  --  117*   ALT U/L  --  44   AST U/L  --  25   GLUCOSE RANDOM mg/dL  --  124   GLUCOSE, ISTAT mg/dl 124  --        Results from last 7 days  Lab Units 11/09/17  1111   INR  1 05       Imaging: I have personally reviewed pertinent reports  Cta Head And Neck W Wo Contrast    Result Date: 10/12/2017  Narrative: CTA NECK AND BRAIN WITH AND WITHOUT CONTRAST INDICATION: Sudden onset unilateral Alfa's syndrome/headache COMPARISON:   CT performed one day earlier TECHNIQUE:  Routine CT imaging of the Brain without contrast   Post contrast imaging was performed after administration of iodinated contrast through the neck and brain  Post contrast axial 0 625 mm images timed to opacify the arterial system  3D rendering was performed on an independent workstation  MIP reconstructions performed  Coronal reconstructions were performed of the noncontrast portion of the brain  Radiation dose length product (DLP) for this visit:  1659 69 mGy-cm     This examination, like all CT scans performed in the [facility], was performed utilizing techniques to minimize radiation dose exposure, including the use of iterative reconstruction and automated exposure control  IV Contrast:  85 mL of iohexol (OMNIPAQUE)     IMAGE QUALITY:   Diagnostic FINDINGS: NONCONTRAST BRAIN PARENCHYMA:  Redistribution of intraventricular hemorrhage  No new hemorrhage  Blood within the left temporal horn has a fallen into the gravity dependent occipital horn  Minor dilution of blood within the 3rd ventricle  Small clot along the lateral margin of the anterior right ventricular body is unchanged slightly in density  Diffuse generalized volume loss greater than would be expected for age  Minor vascular ectasia  VENTRICLES AND EXTRA-AXIAL SPACES:  Mildly prominent VISUALIZED ORBITS AND PARANASAL SINUSES:  Mucosal disease right maxillary sinus  CALVARIUM AND EXTRACRANIAL SOFT TISSUES:   Normal  CERVICAL VASCULATURE AORTIC ARCH AND GREAT VESSELS:  Normal aortic arch and great vessel origins  Normal visualized subclavian vessels  RIGHT VERTEBRAL ARTERY CERVICAL SEGMENT:  Normal origin  The vessel is normal in caliber throughout the neck  LEFT VERTEBRAL ARTERY CERVICAL SEGMENT:  Normal origin  The vessel is normal in caliber throughout the neck  RIGHT EXTRACRANIAL CAROTID SEGMENT:  Normal caliber common carotid artery  Normal bifurcation and cervical internal carotid artery  No stenosis or dissection  LEFT EXTRACRANIAL CAROTID SEGMENT:  Normal caliber common carotid artery  Normal bifurcation and cervical internal carotid artery  No stenosis or dissection  NASCET criteria was used to determine the degree of internal carotid artery diameter stenosis  INTRACRANIAL VASCULATURE INTERNAL CAROTID ARTERIES:  Normal enhancement of the intracranial portions of the internal carotid arteries  Normal ophthalmic artery origins  Normal ICA terminus     Less than 2 mm thin, windsock collection of contrast extends medially from the medial  contour of the mid left cavernous ICA  Series 5 image 88  This could represent a small inconsequential aneurysm  ANTERIOR CIRCULATION:  Left A1 is dominant, both are patent, anterior communicating artery normal  MIDDLE CEREBRAL ARTERY CIRCULATION:  Minor vascular ectasia affecting distal M1 segments, right greater than left  No Focal aneurysm  DISTAL VERTEBRAL ARTERIES:  Distal vertebral arteries patent, vertebrobasilar junction normal   Minor segmental ectasia of the proximal intradural vertebral arteries  The bilateral AICA/PICA origins normal  BASILAR ARTERY:  Basilar artery is normal in caliber  Normal superior cerebellar arteries  Right superior cerebellar artery is duplicated  Minor vertebrobasilar dolichoectasia  Indentation of the floor of the 3rd ventricle  The blood is located more  posteriorly, at the level of the posterior body of the 3rd ventricle  No blood in the cistern of the lamina terminalis  Lamina terminalis appears intact  POSTERIOR CEREBRAL ARTERIES: Both posterior cerebral arteries arises from the basilar tip  Both arteries demonstrate normal enhancement  DURAL VENOUS SINUSES:  Deep and superficial sinuses patent  Internal cerebral veins, basal veins of Sebas intact  No thrombus  NON VASCULAR ANATOMY BONY STRUCTURES:  No acute osseous abnormality  SOFT TISSUES OF THE NECK:  Normal         THORACIC INLET:  Unremarkable  Impression: Although CTA demonstrates mild multifocal vascular ectasia and, an incidental, inconsequential tiny left cavernous ICA aneurysm, CTA does not offer cause of patient's 3rd ventricular hemorrhage  Redistribution of intraventricular hemorrhage  Based on appearance of the cistern of the lamina terminalis and floor of the 3rd ventricle and, minor asymmetry of the hemorrhage extending into the left diencephalon, query hypertensive thalamic hemorrhage  which gained access to the posterior 3rd ventricle  ##[email or web address]##imslh Workstation performed: [id number]     Xr Chest Portable    Result Date: 10/19/2017  Narrative: CHEST INDICATION:  Keofeed tube placement COMPARISON:  Chest x-ray of 10/15/2017 VIEWS:   AP frontal IMAGES:  2 FINDINGS:  Feeding tube tip overlies the mid esophageal region  Cardiomediastinal silhouette appears unremarkable  There is question of developing infiltrate in the right midlung  No pleural effusion  Multiple chronic appearing right sided rib fractures noted  Impression: 1  Feeding tube tip overlies the mid esophageal region  This was subsequently repositioned on a following abdomen series  2   Possible developing infiltrate in the right midlung  ##[id number]##sigslh Workstation performed: [id number]     Xr Chest Portable    Result Date: 10/15/2017  Narrative: CHEST INDICATION:  Fever COMPARISON:  October 14, 2017 VIEWS:   AP frontal IMAGES:  1 FINDINGS:  The ET tube and feeding tube are in appropriate position Cardiomediastinal silhouette appears unremarkable  The lungs are clear  No pneumothorax or pleural effusion  Old left rib fracture seen Old right rib fracture seen    Impression: No acute consolidation congestion Workstation performed: [id number]     Xr Chest Portable    Result Date: 10/14/2017  Narrative: CHEST  PORTABLE INDICATION: intubation  History taken directly from the electronic ordering system  COMPARISON:  Chest x-ray performed on 10/12/2017 VIEWS:   AP frontal; IMAGES:  1 FINDINGS: Lines and tubes are unchanged  The cardiomediastinal silhouette is stable  The lungs are clear  No pleural effusions  Healing bilateral posterior rib fractures  Impression: Lines and tubes are stable  No acute cardiopulmonary disease    Workstation performed: [id number]     Xr Chest Portable    Result Date: 10/12/2017  Narrative: CHEST INDICATION: Chest x-ray performed on 4/30/2015 COMPARISON: None VIEWS:  AP supine portable IMAGES:  1 FINDINGS:    Endotracheal tube projects 5 8 cm above the rebecca  Endogastric tube within the stomach  No focal consolidation  No pleural effusion or pneumothorax  Cardiac silhouette within normal limits  No displaced fractures are evident  Impression: Endogastric tube within the stomach  Endotracheal tube projecting 5 8 cm above the rebecca  Workstation performed: [id number]     Xr Chest 2 Views    Result Date: 11/9/2017  Narrative: CHEST INDICATION:  Right rib pain, mild hypoxia  COMPARISON:  10/19/2017, CT chest, abdomen and pelvis 2/20/2015 VIEWS:  Frontal and lateral projections IMAGES:  2 FINDINGS:     Cardiomediastinal silhouette appears unremarkable  There has been interval development of increased opacity in the right lower lobe suggesting atelectasis or pneumonia  Additional linear and flame-shaped opacity in the right middle lobe on lateral view favoring atelectasis  There is slight elevation of  the right diaphragm  The left lung is clear  Normal pulmonary vasculature  No pneumothorax  Old bilateral rib fractures  Impression: New right lower lobe airspace opacity suggesting atelectasis or pneumonia  Correlate with clinical scenario and follow-up chest films until resolution  Atelectatic changes in the right middle lobe  ##[id number]##[id number] Workstation performed: [id number]     Xr Abdomen 1 View Kub    Result Date: 10/19/2017  Narrative: ABDOMEN INDICATION:  Keofeed tube placement  COMPARISON:  None  VIEWS:  AP supine of the upper to mid abdomen  IMAGES:  2 FINDINGS: There is a nonobstructive bowel gas pattern  Feeding tube tip overlies the distal stomach  No discernible free air on this supine study  Upright or left lateral decubitus imaging is more sensitive to detect subtle free air in the appropriate setting  No pathologic calcifications or soft tissue masses  Visualized lung bases are clear  Multiple chronic appearing right-sided rib fractures noted  Impression: 1  Feeding tube tip overlies the distal stomach   Workstation performed: [id number]     Ct Head Wo Contrast    Result Date: 10/20/2017  Narrative: CT BRAIN - WITHOUT CONTRAST INDICATION:  Follow-up hemorrhage  COMPARISON:  10/16/2017 TECHNIQUE:  CT examination of the brain was performed  In addition to axial images, coronal reformatted images were created and submitted for interpretation  Radiation dose length product (DLP) for this visit:  1046 1 mGy-cm   This examination, like all CT scans performed in the [facility], was performed utilizing techniques to minimize radiation dose exposure, including the use of iterative  reconstruction and automated exposure control  IMAGE QUALITY:  Diagnostic  FINDINGS:  PARENCHYMA:  Right frontal shunt catheter has been removed  Shunt catheter tract is seen within the frontal lobe with minimal adjacent low density  Small amount of air within the ventricular system is new, likely iatrogenic  There is a small amount of hemorrhage layering posteriorly within the occipital horns, decreased since prior exam   No new hemorrhage is identified  Stable microangiopathic change throughout the cerebral hemispheres  Stable basilar cisterns, brainstem and cerebellum  VENTRICLES AND EXTRA-AXIAL SPACES:  Grossly stable lateral ventricles  Persistent septum cavum pellucidum and interpositum  VISUALIZED ORBITS AND PARANASAL SINUSES:  Unremarkable orbits  Mucosal thickening of the maxillary sinuses and ethmoid air cells  Trace left mastoid effusion  CALVARIUM AND EXTRACRANIAL SOFT TISSUES:   Normal      Impression: Status post removal of right frontal shunt catheter  Small amount of air within the left frontal horn  Decreasing intraventricular hemorrhage layering within the occipital horns with resolution of the hemorrhage seen in the 3rd ventricle  Stable brain parenchyma   Workstation performed: [id number]     Ct Head Wo Contrast    Result Date: 10/16/2017  Narrative: CT BRAIN - WITHOUT CONTRAST INDICATION:  Follow-up of any follow-up of intracranial hemorrhage  COMPARISON:  Head CT dated 10/13/2017 and 10/12/2017  TECHNIQUE:  CT examination of the brain was performed  In addition to axial images, coronal reformatted images were created and submitted for interpretation  Radiation dose length product (DLP) for this visit:  1133 mGy-cm   This examination, like all CT scans performed in the [facility], was performed utilizing techniques to minimize radiation dose exposure, including the use of iterative reconstruction and automated exposure control  IMAGE QUALITY:  Diagnostic  FINDINGS:  PARENCHYMA:  Mild confluent periventricular white matter hypodensity consistent with chronic microangiopathic changes  No acute infarct  No parenchymal hemorrhage  VENTRICLES AND EXTRA-AXIAL SPACES:  Slightly decreased blood products in the 3rd ventricle  Unchanged volume of layering blood products in the occipital horns of the lateral ventricles  Blood is decreasing in density  No new foci of extra-axial hemorrhage  Right frontal ventricular ostomy catheter in place with the tip at the central right lateral ventricle  3rd and lateral ventricles are slightly less distended than on prior  No herniation VISUALIZED ORBITS AND PARANASAL SINUSES:  Mild mucosal disease in the maxillary sinuses  Orbits are normal  CALVARIUM AND EXTRACRANIAL SOFT TISSUES:  Endotracheal and enteric tubes again seen, partially visualized  Post surgical changes at the skull  No displaced or depressed fractures  Impression: Decreasing volume of blood products and 3rd ventricle as well as mild decrease in ventriculomegaly  Workstation performed: [id number]     Ct Head Wo Contrast    Result Date: 10/13/2017  Narrative: CT BRAIN - WITHOUT CONTRAST INDICATION:  HCP and EVD  COMPARISON:  Head CT dated 10/12/2017  TECHNIQUE:  CT examination of the brain was performed    In addition to axial images, coronal reformatted images were created and submitted for interpretation  Radiation dose length product (DLP) for this visit:  1134 49 mGy-cm   This examination, like all CT scans performed in the [facility], was performed utilizing techniques to minimize radiation dose exposure, including the use of iterative reconstruction and automated exposure control  IMAGE QUALITY:  Diagnostic  FINDINGS:  VENTRICLES AND EXTRA-AXIAL SPACES: Right frontal ventriculostomy catheter with tip in the right lateral ventricle  Rounded hyperdensity in the 3rd ventricle measures 16 mm x 13 mm on image 22  No significant change in size from most recent prior  Ventriculomegaly is, likewise, unchanged  Amount of blood products layering in the occipital horns of lateral ventricles appear slightly increased from prior, though this is likely due to redistribution rather than true increase  No herniation  BRAIN PARENCHYMA: No parenchymal hemorrhage  No acute infarct  No mass  VISUALIZED ORBITS AND PARANASAL SINUSES:  Mild mucosal disease in the sinuses without air-fluid levels  Very small mastoid effusions  Air-fluid levels are normal  CALVARIUM AND EXTRACRANIAL SOFT TISSUES: The patient is intubated  Postsurgical changes of the skull  Overlying postsurgical soft tissue swelling and scalp  No displaced or depressed skull fractures  Impression: 1  No significant interval change in intraventricular blood products in the 3rd and lateral ventricles  No significant change in ventriculomegaly  2   Postsurgical changes after ventriculostomy catheter placement  Workstation performed: [id number]     Ct Head Wo Contrast    Result Date: 10/12/2017  Narrative: CT BRAIN - WITHOUT CONTRAST INDICATION:  Acute visual defect  Photophobia  COMPARISON:  CT dated 10/11/2017 TECHNIQUE:  CT examination of the brain was performed  In addition to axial images, coronal reformatted images were created and submitted for interpretation    Radiation dose length product (DLP) for this visit: 2092 84 mGy-cm   This examination, like all CT scans performed in the [facility], was performed utilizing techniques to minimize radiation dose exposure, including the use of iterative reconstruction and automated exposure control  IMAGE QUALITY:  Somewhat limited by patient motion  FINDINGS:  PARENCHYMA:  No intracranial mass, mass effect or midline shift  No CT signs of acute infarction     Redistribution of subarachnoid blood dependently within the 3rd ventricle as well as within the occipital horns of the lateral ventricles, right greater than left  There is some dependent hemorrhage noted within the temporal horns, right greater than left  There is some hemorrhage noted within the 4th ventricle as well VENTRICLES AND EXTRA-AXIAL SPACES:  Normal for patient's age  VISUALIZED ORBITS AND PARANASAL SINUSES:  Mild sinus disease noted  CALVARIUM AND EXTRACRANIAL SOFT TISSUES:   Normal      Impression: Redistribution of subarachnoid intraventricular hemorrhage as above  Again no mass effect or midline shift  Workstation performed: [id number]     Ct Head Without Contrast    Result Date: 10/11/2017  Narrative: CT BRAIN - WITHOUT CONTRAST INDICATION:  Double vision and headache COMPARISON:  None  TECHNIQUE:  CT examination of the brain was performed  In addition to axial images, coronal reformatted images were created and submitted for interpretation  Radiation dose length product (DLP) for this visit:  1046 mGy-cm   This examination, like all CT scans performed in the [facility], was performed utilizing techniques to minimize radiation dose exposure, including the use of iterative reconstruction and automated exposure control  IMAGE QUALITY:  Diagnostic  FINDINGS:  PARENCHYMA:  Decreased attenuation is noted in the supratentorial white matter demonstrating an appearance most consistent with mild microangiopathic change  No intracranial mass, mass effect or midline shift    No CT signs of acute infarction  There is hyperdensity accumulating within the frontal horn right lateral ventricle, 3rd ventricle, and temporal horn left lateral ventricle  Findings are consistent with intraventricular subarachnoid hemorrhage and raise suspicion for ruptured aneurysm  Minimal blood products suprasellar cistern  VENTRICLES AND EXTRA-AXIAL SPACES:  Enlargement of ventricles and extra-axial CSF spaces consistent with cerebral and cerebellar atrophy  VISUALIZED ORBITS AND PARANASAL SINUSES:  Minimal sinus disease  CALVARIUM AND EXTRACRANIAL SOFT TISSUES:   Normal      Impression: Subarachnoid hemorrhage accumulating within the 3rd ventricle, frontal horn right lateral ventricle and temporal horn left lateral ventricle  Findings raise suspicion for ruptured aneurysm  ##phoslh##phoslh ##cfslh I personally discussed this result with [email or web address] on 10/11/2017 8:10 PM  ## Workstation performed: [id number]     Mri Brain W Wo Contrast    Result Date: 10/13/2017  Narrative: MRI BRAIN WITH AND WITHOUT CONTRAST INDICATION:  Hemorrhage  Change in mental status  COMPARISON:  CT brain performed earlier the same day TECHNIQUE: Sagittal T1, axial T2, axial FLAIR, axial T1, axial Kane, axial diffusion  Sagittal and axial T1 postcontrast   Axial BRAVO post contrast with coronal reformats  IV Contrast:  7 mL of Gadobutrol injection (SINGLE-DOSE)  IMAGE QUALITY:   Diagnostic  FINDINGS: BRAIN PARENCHYMA:  There is a right transverse frontal shunt catheter terminating in the frontal horn of the right lateral ventricle  There is hydrocephalus noted with dilated lateral ventricles and 3rd ventricle with abnormal FLAIR and T2 signal in the  periventricular lesions compatible with transient minimal flow of CSF  There are blood products identified within the 3rd ventricle, and layering the occipital horns of the lateral ventricles  No evidence of recent infarct    Postcontrast imaging of the brain demonstrates no abnormal enhancement  There are foci of T2 and FLAIR signal abnormality in the periventricular and subcortical white matter which are typical for microvascular disease in patients of this age group  VENTRICLES:  Mild hydrocephalus involving the lateral and 3rd ventricles  SELLA AND PITUITARY GLAND:  Normal  ORBITS:  Normal  PARANASAL SINUSES:  Bilateral mastoid air cell opacification  Mild maxillary sinus mucosal thickening  VASCULATURE:  Evaluation of the major intracranial vasculature demonstrates appropriate flow voids  CALVARIUM AND SKULL BASE:  Normal  EXTRACRANIAL SOFT TISSUES:  Normal      Impression: Obstructive hydrocephalus secondary to hemorrhage within the 3rd ventricle  Dilated lateral and 3rd ventricles noted with transependymal flow CSF  Right transverse frontal ventriculostomy catheter terminates in the body of the right lateral ventricle  Ventricular size is similar to the CT performed one day earlier  Moderate chronic microangiopathic changes are noted  Workstation performed: [id number]     Ir Cerebral Angiography / Intervention    Result Date: 10/16/2017  Narrative: PROCEDURE: IR CEREBRAL ANGIOGRAPHY / INTERVENTION  HISTORY: Intraventricular hemorrhage  FLUOROSCOPY TIME:  25 4 MINUTES minutes total  COMPARISON: CTA on 10/12/17 OPERATORS: Fanny  VESSELS SELECTED: Right common carotid artery; right external carotid artery; right internal carotid artery; left common carotid artery; right vertebral artery; left vertebral artery; right femoral artery  COMPLICATIONS: None  CONTRAST: 136 mL of iodixanol (VISIPAQUE) mL of Visipaque given intra-arterially  Nonionic contrast was utilized for the patient's safety  ANESTHESIA: The patient was administered general endotracheal anesthesia under the care and supervision of the attending anesthesiologist   TECHNIQUE: The procedure, its risks, benefits, and alternatives were discussed in detail with the patient and his mother   Risks include but are not limited to: bleeding, infection, nerve injury, vessel injury, groin hematoma, pain, radiation exposure, alopecia, renal toxicity, renal failure, and allergic reaction to contrast, stroke, blindness, coma, paralysis, death, and the need for additional treatments  All questions were answered and no guarantees were provided  After informed consent the patient was brought into the angiography suite and placed supine on the angiographic table  The right groin was prepped and draped using sterile technique  The skin overlying the right femoral artery was locally anesthetized with Lidocaine  Ultrasound guidance was used to evaluate the right groin site and patency of the right femoral artery was noted  Using standard micropuncture kit with ultrasound guidance under realtime visualization the micropuncture needle was advanced  into the right femoral artery, intravascular location of the needle tip was confirmed on ultrasound and documented in PACS  A small skin incision was made, and a 5-[place] sheath was placed  Using an 0 038" Glide Wire and 5-[place] Berenstein catheter, the above vessels were selectively catheterized for angiography  At the completion of the procedure, the catheter and sheath were removed and hemostasis in the right groin obtained by manual compression for 20 minutes  No new neurological deficits or complications were encountered during or immediately following the procedure  FINDINGS: A bovine aortic arch is noted, a normal anatomic variant  RIGHT COMMON CAROTID ARTERY, RIGHT INTERNAL CAROTID ARTERY, RIGHT EXTERNAL CAROTID ARTERY INJECTION, HEAD VIEWS: There is an irregular dilatation of a distal right middle cerebral artery M4 branch bifurcation measuring 7 5 x 3 75 mm (length x height) overlying the right parietal cortex  Remainder of the right MCA territory demonstrates minimal multifocal narrowing, compatible with atherosclerotic disease   Otherwise unremarkable angiographic appearance of the right supraclinoid internal carotid artery, right middle cerebral artery, and right anterior cerebral artery  The anterior communicating and posterior communicating arteries are present  The angiogram has normal parenchymal and venous phases  No evidence of high grade focal intracranial stenosis or vascular malformation  [place]athy [place] LEFT COMMON CAROTID ARTERY INJECTION, HEAD VIEWS: Minimal atherosclerotic disease, otherwise unremarkable angiographic appearance of the left supraclinoid internal carotid artery, left middle cerebral artery, and left anterior cerebral artery  There is a  left posterior communicating artery infundibulum  The angiogram has normal parenchymal and venous phases  No evidence of cerebral aneurysm, high grade focal intracranial stenosis, or vascular malformation  RIGHT VERTEBRAL ARTERY INJECTION, HEAD VIEWS:  There is mild atherosclerotic narrowing of the right vertebral artery with superimposed focal ectasia of mid V4 segment  Otherwise unremarkable angiographic appearance of the right distal V2, V3 and V4 segments, vertebrobasilar junction, as well as the basilar artery and its branches, including the bilateral posterior cerebral arteries  The right posterior inferior cerebellar artery is supplied by the right anterior inferior cerebellar artery  The angiogram has normal parenchymal and venous phases  No evidence of cerebral aneurysm, high grade focal intracranial stenosis, or vascular malformation  Dorathy [place] LEFT VERTEBRAL ARTERY INJECTION, HEAD VIEWS:  There is mild atherosclerotic narrowing of the left vertebral artery  Otherwise unremarkable angiographic appearance of the left distal V2, V3 and V4 segment, vertebrobasilar junction, as well as the basilar artery and its branches, including the bilateral posterior cerebral arteries  The left posterior inferior cerebellar artery is supplied by the left anterior inferior cerebellar artery  The angiogram has normal parenchymal and venous phases   No evidence of cerebral aneurysm, high grade focal intracranial stenosis, or vascular malformation  [place] RIGHT COMMON FEMORAL ARTERY: Single YOUNG oblique projection of the right common femoral artery was performed  Normal angiographic appearance of the femoral artery bifurcation with vessel suitable for placement of a vascular closure device  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

## 2017-11-10 ENCOUNTER — GENERIC CONVERSION - ENCOUNTER (OUTPATIENT)
Dept: OTHER | Facility: OTHER | Age: 52
End: 2017-11-10

## 2017-11-10 ENCOUNTER — APPOINTMENT (INPATIENT)
Dept: RADIOLOGY | Facility: HOSPITAL | Age: 52
DRG: 720 | End: 2017-11-10
Payer: COMMERCIAL

## 2017-11-10 ENCOUNTER — TRANSCRIBE ORDERS (OUTPATIENT)
Dept: ADMINISTRATIVE | Facility: HOSPITAL | Age: 52
End: 2017-11-10

## 2017-11-10 DIAGNOSIS — R93.89 ABNORMAL RADIOLOGICAL FINDINGS IN SKIN AND SUBCUTANEOUS TISSUE: Primary | ICD-10-CM

## 2017-11-10 DIAGNOSIS — J18.9 PNEUMONIA, ORGANISM UNSPECIFIED(486): ICD-10-CM

## 2017-11-10 PROBLEM — E87.6 HYPOKALEMIA: Status: ACTIVE | Noted: 2017-11-10

## 2017-11-10 LAB
ALBUMIN SERPL BCP-MCNC: 2.4 G/DL (ref 3.5–5)
ALP SERPL-CCNC: 109 U/L (ref 46–116)
ALT SERPL W P-5'-P-CCNC: 33 U/L (ref 12–78)
ANION GAP SERPL CALCULATED.3IONS-SCNC: 10 MMOL/L (ref 4–13)
APPEARANCE FLD: ABNORMAL
AST SERPL W P-5'-P-CCNC: 24 U/L (ref 5–45)
BASOPHILS # BLD AUTO: 0.03 THOUSANDS/ΜL (ref 0–0.1)
BASOPHILS NFR BLD AUTO: 0 % (ref 0–1)
BILIRUB SERPL-MCNC: 0.8 MG/DL (ref 0.2–1)
BUN SERPL-MCNC: 6 MG/DL (ref 5–25)
CALCIUM SERPL-MCNC: 9 MG/DL (ref 8.3–10.1)
CHLORIDE SERPL-SCNC: 104 MMOL/L (ref 100–108)
CO2 SERPL-SCNC: 25 MMOL/L (ref 21–32)
COLOR FLD: YELLOW
CREAT SERPL-MCNC: 0.7 MG/DL (ref 0.6–1.3)
EOSINOPHIL # BLD AUTO: 0.37 THOUSAND/ΜL (ref 0–0.61)
EOSINOPHIL NFR BLD AUTO: 2 % (ref 0–6)
ERYTHROCYTE [DISTWIDTH] IN BLOOD BY AUTOMATED COUNT: 12.8 % (ref 11.6–15.1)
FLUAV AG SPEC QL: NORMAL
FLUBV AG SPEC QL: NORMAL
GFR SERPL CREATININE-BSD FRML MDRD: 110 ML/MIN/1.73SQ M
GLUCOSE FLD-MCNC: 4 MG/DL
GLUCOSE SERPL-MCNC: 110 MG/DL (ref 65–140)
GRAM STN SPEC: NORMAL
HCT VFR BLD AUTO: 37.9 % (ref 36.5–49.3)
HGB BLD-MCNC: 12.8 G/DL (ref 12–17)
LDH FLD L TO P-CCNC: 1919 U/L
LDH SERPL-CCNC: 197 U/L (ref 81–234)
LYMPHOCYTES # BLD AUTO: 1.04 THOUSANDS/ΜL (ref 0.6–4.47)
LYMPHOCYTES NFR BLD AUTO: 6 %
LYMPHOCYTES NFR BLD AUTO: 6 % (ref 14–44)
MCH RBC QN AUTO: 32.2 PG (ref 26.8–34.3)
MCHC RBC AUTO-ENTMCNC: 33.8 G/DL (ref 31.4–37.4)
MCV RBC AUTO: 95 FL (ref 82–98)
MONO+MESO NFR FLD MANUAL: 0 %
MONOCYTES # BLD AUTO: 1.37 THOUSAND/ΜL (ref 0.17–1.22)
MONOCYTES NFR BLD AUTO: 4 %
MONOCYTES NFR BLD AUTO: 8 % (ref 4–12)
MRSA NOSE QL CULT: NORMAL
NEUTROPHILS # BLD AUTO: 15.12 THOUSANDS/ΜL (ref 1.85–7.62)
NEUTS SEG NFR BLD AUTO: 84 % (ref 43–75)
NEUTS SEG NFR BLD AUTO: 90 %
PH BODY FLUID: 6.5
PLATELET # BLD AUTO: 312 THOUSANDS/UL (ref 149–390)
PMV BLD AUTO: 10.6 FL (ref 8.9–12.7)
POTASSIUM SERPL-SCNC: 3.4 MMOL/L (ref 3.5–5.3)
PROT FLD-MCNC: 5.3 G/DL
PROT SERPL-MCNC: 6.9 G/DL (ref 6.4–8.2)
PROT SERPL-MCNC: 7.5 G/DL (ref 6.4–8.2)
RBC # BLD AUTO: 3.98 MILLION/UL (ref 3.88–5.62)
RSV B RNA SPEC QL NAA+PROBE: NORMAL
SITE: ABNORMAL
SODIUM SERPL-SCNC: 139 MMOL/L (ref 136–145)
TOTAL CELLS COUNTED SPEC: 100
WBC # BLD AUTO: 17.93 THOUSAND/UL (ref 4.31–10.16)
WBC # FLD MANUAL: 5164 /UL

## 2017-11-10 PROCEDURE — 71010 HB CHEST X-RAY 1 VIEW FRONTAL (PORTABLE): CPT

## 2017-11-10 PROCEDURE — 88305 TISSUE EXAM BY PATHOLOGIST: CPT | Performed by: NURSE PRACTITIONER

## 2017-11-10 PROCEDURE — 85025 COMPLETE CBC W/AUTO DIFF WBC: CPT | Performed by: PHYSICIAN ASSISTANT

## 2017-11-10 PROCEDURE — 83986 ASSAY PH BODY FLUID NOS: CPT | Performed by: NURSE PRACTITIONER

## 2017-11-10 PROCEDURE — 80053 COMPREHEN METABOLIC PANEL: CPT | Performed by: PHYSICIAN ASSISTANT

## 2017-11-10 PROCEDURE — 87116 MYCOBACTERIA CULTURE: CPT | Performed by: NURSE PRACTITIONER

## 2017-11-10 PROCEDURE — 84155 ASSAY OF PROTEIN SERUM: CPT | Performed by: NURSE PRACTITIONER

## 2017-11-10 PROCEDURE — 87070 CULTURE OTHR SPECIMN AEROBIC: CPT | Performed by: NURSE PRACTITIONER

## 2017-11-10 PROCEDURE — 83615 LACTATE (LD) (LDH) ENZYME: CPT | Performed by: NURSE PRACTITIONER

## 2017-11-10 PROCEDURE — 89051 BODY FLUID CELL COUNT: CPT | Performed by: NURSE PRACTITIONER

## 2017-11-10 PROCEDURE — 87205 SMEAR GRAM STAIN: CPT | Performed by: NURSE PRACTITIONER

## 2017-11-10 PROCEDURE — 87102 FUNGUS ISOLATION CULTURE: CPT | Performed by: NURSE PRACTITIONER

## 2017-11-10 PROCEDURE — 84157 ASSAY OF PROTEIN OTHER: CPT | Performed by: NURSE PRACTITIONER

## 2017-11-10 PROCEDURE — 87206 SMEAR FLUORESCENT/ACID STAI: CPT | Performed by: NURSE PRACTITIONER

## 2017-11-10 PROCEDURE — 88112 CYTOPATH CELL ENHANCE TECH: CPT | Performed by: NURSE PRACTITIONER

## 2017-11-10 PROCEDURE — 82945 GLUCOSE OTHER FLUID: CPT | Performed by: NURSE PRACTITIONER

## 2017-11-10 RX ORDER — SODIUM CHLORIDE 9 MG/ML
75 INJECTION, SOLUTION INTRAVENOUS CONTINUOUS
Status: DISCONTINUED | OUTPATIENT
Start: 2017-11-10 | End: 2017-11-11

## 2017-11-10 RX ORDER — POTASSIUM CHLORIDE 20 MEQ/1
40 TABLET, EXTENDED RELEASE ORAL ONCE
Status: COMPLETED | OUTPATIENT
Start: 2017-11-10 | End: 2017-11-10

## 2017-11-10 RX ADMIN — POTASSIUM CHLORIDE 40 MEQ: 1500 TABLET, EXTENDED RELEASE ORAL at 09:19

## 2017-11-10 RX ADMIN — CEFEPIME HYDROCHLORIDE 2000 MG: 2 INJECTION, POWDER, FOR SOLUTION INTRAVENOUS at 09:34

## 2017-11-10 RX ADMIN — GUAIFENESIN 600 MG: 600 TABLET, EXTENDED RELEASE ORAL at 09:18

## 2017-11-10 RX ADMIN — ACETAMINOPHEN 650 MG: 325 TABLET ORAL at 13:53

## 2017-11-10 RX ADMIN — BENZONATATE 100 MG: 100 CAPSULE ORAL at 09:18

## 2017-11-10 RX ADMIN — CEFEPIME HYDROCHLORIDE 2000 MG: 2 INJECTION, POWDER, FOR SOLUTION INTRAVENOUS at 21:13

## 2017-11-10 RX ADMIN — Medication 100 MG: at 09:18

## 2017-11-10 RX ADMIN — BENZONATATE 100 MG: 100 CAPSULE ORAL at 21:12

## 2017-11-10 RX ADMIN — SODIUM CHLORIDE 75 ML/HR: 0.9 INJECTION, SOLUTION INTRAVENOUS at 20:03

## 2017-11-10 RX ADMIN — BENZONATATE 100 MG: 100 CAPSULE ORAL at 15:10

## 2017-11-10 RX ADMIN — SODIUM CHLORIDE 125 ML/HR: 0.9 INJECTION, SOLUTION INTRAVENOUS at 00:28

## 2017-11-10 RX ADMIN — FOLIC ACID 1 MG: 1 TABLET ORAL at 09:18

## 2017-11-10 RX ADMIN — GUAIFENESIN 600 MG: 600 TABLET, EXTENDED RELEASE ORAL at 21:12

## 2017-11-10 RX ADMIN — SODIUM CHLORIDE 75 ML/HR: 0.9 INJECTION, SOLUTION INTRAVENOUS at 09:19

## 2017-11-10 RX ADMIN — NICOTINE 1 PATCH: 14 PATCH TRANSDERMAL at 09:27

## 2017-11-10 RX ADMIN — ACETAMINOPHEN 650 MG: 325 TABLET ORAL at 21:12

## 2017-11-10 RX ADMIN — AMLODIPINE BESYLATE 10 MG: 10 TABLET ORAL at 09:18

## 2017-11-10 NOTE — PLAN OF CARE
INFECTION - ADULT     Absence or prevention of progression during hospitalization Progressing        Knowledge Deficit     Patient/family/caregiver demonstrates understanding of disease process, treatment plan, medications, and discharge instructions Progressing        METABOLIC, FLUID AND ELECTROLYTES - ADULT     Electrolytes maintained within normal limits Progressing        PAIN - ADULT     Verbalizes/displays adequate comfort level or baseline comfort level Progressing        RESPIRATORY - ADULT     Achieves optimal ventilation and oxygenation Progressing

## 2017-11-10 NOTE — PROGRESS NOTES
Progress Note - Angela Winkler 50 y o  male MRN: [id number]    Unit/Bed#: -01 Encounter: [id number]        * HCAP (healthcare-associated pneumonia)   Assessment & Plan    · Clinically improved overnight  · Continue day 2 of cefepime IV for suspected GNR pneumonia  · Check ambulatory sats  · Continue cough supportive meds  · Sputum culture pending, urine for legionella/strep negative, flu negative        [facility] ([facility] )   Assessment & Plan    · Tachycardia and leukocytosis POA, due to HCAP  · Can reduce IVF rate        Hemoptysis   Assessment & Plan    · Appreciate involvement from pulmonology  · CT scan of the chest is negative for PE  · Given improvement in hemoptysis overnight, patient will not require a bronchoscopy today and his NPO status can be revoked        Hypokalemia   Assessment & Plan    · Mild hypokalemia-replete, can recheck in a few days        Tobacco abuse   Assessment & Plan    · Patient had already been cutting down his tobacco use pre-hospital  · Continue nicotine patch  · Follow up outpatient with pulmonology for formal PFTs to determine whether he has COPD        History of subarachnoid hemorrhage   Assessment & Plan    · Repeat CT scan of the head done last evening showed resolution  · Continue to follow up with Neurosurgery non urgently regarding underlying aneurysm        History of alcoholism ([facility] )   Assessment & Plan    · Patient has been since admission to [facility] in October            Pharmacologic: Pharmacologic VTE Prophylaxis contraindicated due to hemoptysis--can likely start lovenox later today if no further hemoptysis  Mechanical VTE Prophylaxis in Place: Yes    Patient Centered Rounds: I have performed bedside rounds with nursing staff today      Discussions with Specialists or Other Care Team Provider: [facility]--they are planning to drain the small loculated effusion for concern that it is infection    Education and Discussions with Family / Patient: 2 pm left message to update patient's son    Time Spent for Care: 25 mintes  More than 50% of total time spent on counseling and coordination of care as described above  Current Length of Stay: 1 day(s)    Current Patient Status: Inpatient   Certification Statement: The patient will continue to require additional inpatient hospital stay due to IV antibiotics for healthcare acquired pneumonia    Discharge Plan / Estimated Discharge Date:  Likely within the next 48 hours on oral Levaquin if continued improvement    Code Status: Level 1 - Full Code      Subjective:   Patient reports he feels about half-way better" with the pain he is experiencing in his chest from coughing  He states that he still has pain but that it is overall better  He reports his sputum is now clear and he has had no other events of coughing up blood  He has been out of bed and ambulating  He denies any new events overnight  Objective:     Vitals:   Temp (24hrs), Av 5 °F (36 9 °C), Min:97 8 °F (36 6 °C), Max:99 6 °F (37 6 °C)    HR:  [100-125] 108  Resp:  [16-18] 18  BP: (130-151)/(86-94) 146/91  SpO2:  [93 %-97 %] 93 %  Body mass index is 22 28 kg/m²  Input and Output Summary (last 24 hours): Intake/Output Summary (Last 24 hours) at 11/10/17 0839  Last data filed at 11/10/17 0753   Gross per 24 hour   Intake             2410 ml   Output             2700 ml   Net             -290 ml       Physical Exam:     Physical Exam   Constitutional: He appears well-developed and well-nourished  No distress  HENT:   Head: Normocephalic and atraumatic  Eyes: Conjunctivae are normal  Right eye exhibits no discharge  Left eye exhibits no discharge  No scleral icterus  Cardiovascular: Normal rate and regular rhythm  No murmur heard  Pulmonary/Chest: Effort normal  No respiratory distress  He has no wheezes  Patient is in no respiratory distress  No significant coughing or wheezing noted during my assessment today  Abdominal: Soft  Bowel sounds are normal  He exhibits no distension  Musculoskeletal: He exhibits no edema  Neurological: He is alert  Awake alert and interactive   Skin: Skin is warm and dry  No rash noted  He is not diaphoretic  No erythema  No pallor  Psychiatric: He has a normal mood and affect  His behavior is normal  Judgment and thought content normal    Very pleasant and cooperative   Nursing note and vitals reviewed        Additional Data:     Labs:      Results from last 7 days  Lab Units 11/10/17  0503   WBC Thousand/uL 17 93*   HEMOGLOBIN g/dL 12 8   HEMATOCRIT % 37 9   PLATELETS Thousands/uL 312   NEUTROS PCT % 84*   LYMPHS PCT % 6*   MONOS PCT % 8   EOS PCT % 2       Results from last 7 days  Lab Units 11/10/17  0503   SODIUM mmol/L 139   POTASSIUM mmol/L 3 4*   CHLORIDE mmol/L 104   CO2 mmol/L 25   BUN mg/dL 6   CREATININE mg/dL 0 70   CALCIUM mg/dL 9 0   TOTAL PROTEIN g/dL 6 9   BILIRUBIN TOTAL mg/dL 0 80   ALK PHOS U/L 109   ALT U/L 33   AST U/L 24   GLUCOSE RANDOM mg/dL 110       Results from last 7 days  Lab Units 11/09/17  1111   INR  1 05         Recent Cultures (last 7 days):       Results from last 7 days  Lab Units 11/09/17  1452 11/09/17  1449 11/09/17  1442   GRAM STAIN RESULT   --  2+ Epithelial cells per low power field  No polys seen  2+ Gram positive cocci in pairs  1+ Gram negative rods  Rare Gram positive rods  --    INFLUENZA A PCR   --   --  None Detected   INFLUENZA B PCR   --   --  None Detected   RSV PCR   --   --  None Detected   LEGIONELLA URINARY ANTIGEN  Negative  --   --        Last 24 Hours Medication List:     acetaminophen 650 mg Oral Q8H [address]   amLODIPine 10 mg Oral Daily   benzonatate 100 mg Oral TID   cefepime 2,000 mg Intravenous Q23J   folic acid 1 mg Oral Daily   guaiFENesin 600 mg Oral Q12H Count includes the [facility]   nicotine 1 patch Transdermal Daily   potassium chloride 40 mEq Oral Once   thiamine 100 mg Oral Daily        Today, Patient Was Seen By: Darian Stuart PA-C    ** Please Note: Dragon 360 Dictation voice to text software may have been used in the creation of this document   **

## 2017-11-10 NOTE — CASE MANAGEMENT
Initial Clinical Review    Admission: Date/Time/Statement: 11/9/17 @ 1210     Orders Placed This Encounter   Procedures    Inpatient Admission (expected length of stay for this patient is greater than two midnights)     Standing Status:   Standing     Number of Occurrences:   1     Order Specific Question:   Admitting Physician     Answer:   Dara Anthony [[id number]]     Order Specific Question:   Level of Care     Answer:   Med Surg [[id number]]     Order Specific Question:   Estimated length of stay     Answer:   More than 2 Midnights     Order Specific Question:   Certification     Answer:   I certify that inpatient services are medically necessary for this patient for a duration of greater than two midnights  See H&P and MD Progress Notes for additional information about the patient's course of treatment  ED: Date/Time/Mode of Arrival:   ED Arrival Information     Expected Arrival Acuity Means of Arrival Escorted By Service Admission Type    - 11/9/2017 10:37 Urgent Walk-In Self General Medicine Urgent    Arrival Complaint    flank pain          Chief Complaint:   Chief Complaint   Patient presents with    Rib Pain     right sided rib/flank pain started "monday or tuesday"  denies urinary symptoms or fevers at home  describes resp symptoms/congestion, pain with inspiration  d/c'd recently from [place] for a stroke 10/10/17  History of Illness: 50 y o  male who presents with 3-4 days of cough with significant right-sided chest pain and coughing up blood  Patient reports that earlier in the week he started noticing increasing cough with phlegm  On several occasions the phlegm was mixed with blood and on 1 occasion he had a fairly sizable blood clot which he expectorated  He began also noticing worsening pain in the right side of his chest and ribs particularly during coughing fits and with taking a deep breath  He has been taking Aleve for this pain   He reports he had pneumonia once before and felt this was similar  He denies any fever or chills at home and has not had any ill contacts  However, of note he was admitted to [facility] from October 11th through the 22nd for a subarachnoid hemorrhage  He did leave AMA but has been feeling well since, with no further issues with headache or balance       ED Vital Signs:   ED Triage Vitals   Temperature Pulse Respirations Blood Pressure SpO2   11/09/17 1057 11/09/17 1053 11/09/17 1053 11/09/17 1053 11/09/17 1053   97 8 °F (36 6 °C) (!) 125 18 137/94 95 %      Temp Source Heart Rate Source Patient Position - Orthostatic VS BP Location FiO2 (%)   11/09/17 1053 11/09/17 1053 11/09/17 1333 11/09/17 1053 --   Oral Monitor Sitting Right arm       Pain Score       11/09/17 1333       Worst Possible Pain        Wt Readings from Last 1 Encounters:   11/09/17 74 5 kg (164 lb 3 9 oz)     Abnormal Labs/Diagnostic Test Results: NT-proBNP 304    WBC Thousand/uL 25 44*   NEUTROS PCT % 87*   LYMPHS PCT % 4*     SODIUM mmol/L 135*   CHLORIDE mmol/L 97*   TOTAL PROTEIN g/dL 8 3*   BILIRUBIN TOTAL mg/dL 1 20*   ALK PHOS U/L 117*     CXR: New right lower lobe airspace opacity suggesting atelectasis or pneumonia   Atelectatic changes in the right middle lobe     EKG: Sinus Tachycardia      ED Treatment:   Medication Administration from 11/09/2017 1037 to 11/09/2017 1313       Date/Time Order Dose Route Action Action by Comments     11/09/2017 1254 sodium chloride 0 9 % bolus 1,000 mL 0 mL Intravenous Stopped Bronwyn Soto RN      11/09/2017 1134 sodium chloride 0 9 % bolus 1,000 mL 1,000 mL Intravenous New Bag Tim John      11/09/2017 1227 cefepime (MAXIPIME) 2 g/50 mL dextrose IVPB 0 mg Intravenous Stopped Bronwyn Soto RN      11/09/2017 1157 cefepime (MAXIPIME) 2 g/50 mL dextrose IVPB 2,000 mg Intravenous New Bag Tim John      11/09/2017 1255 vancomycin (VANCOCIN) 1,250 mg in sodium chloride 0 9 % 250 mL IVPB 1,250 mg Intravenous New 12[facility], RN      11/09/2017 1254 sodium chloride 0 9 % bolus 1,000 mL 0 mL Intravenous Stopped Anyi Redding RN      11/09/2017 1147 sodium chloride 0 9 % bolus 1,000 mL 1,000 mL Intravenous New Bag Brocket Leavens         Physical Exam   Constitutional: He appears well-developed and well-nourished  No distress  Cardiovascular: Regular rhythm and normal heart sounds  No murmur heard  Sinus tachycardia   Pulmonary/Chest: No stridor  No respiratory distress  He has no wheezes  He has no rales  Mild dyspnea appreciated at times during conversation  Frequent moist cough noted  Witnessed patient spit up a small blood clot  Right lower lobe rhonchi noted   Awake alert and interactive  He follows commands appropriately and no focal deficits noted  Very pleasant and cooperative     Past Medical/Surgical History:    Active Ambulatory Problems     Diagnosis Date Noted    [facility] ) 10/12/2017    Headache 10/12/2017    Alcohol dependence ([facility] ) 10/12/2017    Nicotine dependence 10/12/2017    Intraventricular hemorrhage ([facility] ) 10/14/2017    Pseudoaneurysm ([facility] ) 10/14/2017    Hypoalbuminemia 10/15/2017    Leukocytosis 10/15/2017    Dysphagia 10/21/2017    Marijuana abuse 10/21/2017    Cocaine abuse 10/21/2017     Resolved Ambulatory Problems     Diagnosis Date Noted    Thrombocytopenia ([facility] ) 10/15/2017     Past Medical History:   Diagnosis Date    Alcoholism /alcohol abuse ([facility] )     Cerebral aneurysm     Hypertension     [facility]CH [facility]racerebral hemorrhage) ([facility])        Admitting Diagnosis: Flank pain [R10 9]  Right lower lobe pneumonia (HCC) [J18 1]  Sepsis, due to unspecified organism ([facility] ) [A41 9]    Age/Sex: 50 y o  male    Assessment/Plan:     Hospital Problem List:      Principal Problem:    HCAP (healthcare-associated pneumonia)  Active Problems:    Sepsis ([facility] )    Hemoptysis    Tobacco abuse    History of subarachnoid hemorrhage    History of alcoholism ([facility] )        Plan for the Primary Problem(s):  · HCAP, possible gram-negative sydney pneumonia- patient with right lower lobe pneumonia with associated lower chest pain and dyspnea  He was hospitalized for 11 days within the past month (and had been intubated) and also has longstanding history of tobacco use although no formal diagnosis of COPD  As such he meets criteria to receive IV cefepime  He received a dose of vancomycin in the emergency department  It is reasonable to do a MRSA swab but his risk of MRSA at this point is lower  Doubt aspiration  Will obtain blood cultures, sputum culture, Legionella and strep pneumo for urine  Supportive oxygen as needed as his O2 sat at this time is 90% on room air  · Hemoptysis-likely due to localized irritation from significant coughing however also concerning given his underlying tobacco abuse  Will check CT scan of the chest  Would ask for pulmonology evaluation and avoid all blood thinners  Will provide medication for cough  · Sepsis present on admission due to HCAP-including tachycardia and leukocytosis  Continue aggressive IV fluids     Plan for Additional Problems:   · Tobacco abuse-cessation, nicotine patch; patient should have outpatient pulmonary function tests once he is improved post discharge  · History of recent subarachnoid hemorrhage with intraventricular extension requiring EVD-stable per last visit with Neurosurgery  Due for a CT scan of the head tonight which we will perform here  · History of alcoholism-patient has been sober since last hospitalization and is no longer experiencing any withdrawal     VTE Prophylaxis: Pharmacologic VTE Prophylaxis contraindicated due to hemoptysis  / sequential compression device   Code Status: full code per discussion  POLST: POLST is not applicable to this patient     Anticipated Length of Stay:  Patient will be admitted on an Inpatient basis with an anticipated length of stay of  Greater than 2 midnights     Justification for Hospital Stay: sepsis/HCAP        Admission Orders:  Inpatient/MedSurg  Consult Pulmonary r/e RLL pneumonia   Bilateral Sequential Compression Device  Blood/Sputum Cultures Pending  Nasal O2 @ 2L  NSS @ 75  Scheduled Meds:   acetaminophen 650 mg Oral Q8H [facility]   amLODIPine 10 mg Oral Daily   benzonatate 100 mg Oral TID   cefepime 2,000 mg Intravenous P77O   folic acid 1 mg Oral Daily   guaiFENesin 600 mg Oral Q12H [facility]   nicotine 1 patch Transdermal Daily   thiamine 100 mg Oral Daily

## 2017-11-10 NOTE — PROGRESS NOTES
Progress Note - Pulmonary   Wava Lowing 50 y o  male MRN: [id number]  Unit/Bed#: -01 Encounter: [id number]    Assessment/Plan:    Abnormal CXR/CT Chest: RLL Infiltrate with Small Right Pleural Effusion  - HCAP with some concern for aspiration              Continue cefepime given improvement in WBCs and no fevers  Monitor temperatures and WBCs  Follow-up sputum culture  Follow-up blood cultures  MRSA culture pending  Continue PRN Albuterol  Continue Mucinex and Tessalon  Will need diagnostic and therapeutic thoracentesis to R/O empyema today  Procedure explained to patient and he is agreeable  Dr Mart Osborne will obtain consent prior to procedure      Pleuritic Chest Pain (improved) with Hemoptysis (resolved)              Pain management per primary team               CTA negative for PE  Can start DVT prophylaxis after thoracentesis later today as okayed by SLIM (discussed this AM)     Recent SAH with Intraventricular Extension              CT head noted  Outpatient neurosurgery follow-up      Recent Hypophonia S/P Intubation              Per patient, has no residual dysphagia and tolerates normal diet      Active Tobacco Abuse              NRT and smoking cessation  May benefit from outpatient PFTs      Outpatient follow-up and repeat CT Chest ~ 8 weeks as per D/C instructions  RT to schedule outpatient PFTs ~ 6 weeks  D/W SLIM  Subjective:     Romayne Orem feels better today  Chest pain/pleurisy is 50% better  Hemoptysis resolved last evening  He is still coughing and has clear mucous  He denies SOB  Objective:     Vitals: Blood pressure 146/91, pulse (!) 108, temperature 98 3 °F (36 8 °C), temperature source Oral, resp  rate 18, height 6' (1 829 m), weight 74 5 kg (164 lb 3 9 oz), SpO2 93 %  RA,Body mass index is 22 28 kg/m²        Intake/Output Summary (Last 24 hours) at 11/10/17 0883  Last data filed at 11/10/17 0753   Gross per 24 hour   Intake             2410 ml   Output             2700 ml   Net             -290 ml       Invasive Devices     Peripheral Intravenous Line            Peripheral IV 11/09/17 Right Antecubital less than 1 day    Peripheral IV 11/09/17 Right Forearm less than 1 day          Drain            External Urinary Catheter Small 21 days                Physical Exam:     Physical Exam   Constitutional: He is oriented to person, place, and time  He appears well-developed and well-nourished  He is cooperative  Non-toxic appearance  No distress  HENT:   Head: Normocephalic and atraumatic  Mouth/Throat: No oropharyngeal exudate  Eyes: EOM are normal  No scleral icterus  Neck: Neck supple  No tracheal deviation present  Cardiovascular: Normal rate, regular rhythm, S1 normal and S2 normal   Exam reveals no gallop and no friction rub  No murmur heard  Pulmonary/Chest: Effort normal  No accessory muscle usage or stridor  No tachypnea  No respiratory distress  He has no decreased breath sounds  He has no wheezes  He has no rhonchi  He has rales in the right lower field  He exhibits no tenderness  Abdominal: Soft  Bowel sounds are normal  He exhibits no distension  There is no tenderness  There is no rebound and no guarding  Musculoskeletal: He exhibits no edema  Neurological: He is alert and oriented to person, place, and time  He has normal strength  GCS eye subscore is 4  GCS verbal subscore is 5  GCS motor subscore is 6  Skin: Skin is warm and dry  No rash noted  He is not diaphoretic  No cyanosis or erythema  Psychiatric: He has a normal mood and affect   His speech is normal and behavior is normal        Labs: Urine Strep/Legionella negative    Influenza/RSV PCR negative    Blood Cultures pending    Sputum Culture 2+ epithelial cells, no polys, 2+ GPC in pairs, 1+ GNR, rare GPR    CBC:   Lab Results   Component Value Date    WBC 17 93 (H) 11/10/2017    HGB 12 8 11/10/2017 HCT 37 9 11/10/2017    MCV 95 11/10/2017     11/10/2017    MCH 32 2 11/10/2017    MCHC 33 8 11/10/2017    RDW 12 8 11/10/2017    MPV 10 6 11/10/2017   , CMP:   Lab Results   Component Value Date     11/10/2017    K 3 4 (L) 11/10/2017     11/10/2017    CO2 25 11/10/2017    ANIONGAP 10 11/10/2017    BUN 6 11/10/2017    CREATININE 0 70 11/10/2017    GLUCOSE 110 11/10/2017    GLUCOSE 124 11/09/2017    CALCIUM 9 0 11/10/2017    AST 24 11/10/2017    ALT 33 11/10/2017    ALKPHOS 109 11/10/2017    PROT 6 9 11/10/2017    ALBUMIN 2 4 (L) 11/10/2017    BILITOT 0 80 11/10/2017    EGFR 110 11/10/2017       Imaging and other studies: I have personally reviewed pertinent reports   , I have personally reviewed pertinent films in PACS and CTA Chest shows no central PE, but does show minimal left basilar atelectasis, RLL infiltrate, and small right pleural effusion

## 2017-11-11 PROBLEM — E87.6 HYPOKALEMIA: Status: RESOLVED | Noted: 2017-11-10 | Resolved: 2017-11-11

## 2017-11-11 PROBLEM — J90 PLEURAL EFFUSION ON RIGHT: Status: ACTIVE | Noted: 2017-11-11

## 2017-11-11 LAB
BACTERIA SPT RESP CULT: NORMAL
BASOPHILS # BLD AUTO: 0.04 THOUSANDS/ΜL (ref 0–0.1)
BASOPHILS NFR BLD AUTO: 0 % (ref 0–1)
EOSINOPHIL # BLD AUTO: 0.38 THOUSAND/ΜL (ref 0–0.61)
EOSINOPHIL NFR BLD AUTO: 2 % (ref 0–6)
ERYTHROCYTE [DISTWIDTH] IN BLOOD BY AUTOMATED COUNT: 12.8 % (ref 11.6–15.1)
GRAM STN SPEC: NORMAL
HCT VFR BLD AUTO: 35.4 % (ref 36.5–49.3)
HGB BLD-MCNC: 12.2 G/DL (ref 12–17)
LYMPHOCYTES # BLD AUTO: 1.12 THOUSANDS/ΜL (ref 0.6–4.47)
LYMPHOCYTES NFR BLD AUTO: 7 % (ref 14–44)
MCH RBC QN AUTO: 32.4 PG (ref 26.8–34.3)
MCHC RBC AUTO-ENTMCNC: 34.5 G/DL (ref 31.4–37.4)
MCV RBC AUTO: 94 FL (ref 82–98)
MONOCYTES # BLD AUTO: 1.43 THOUSAND/ΜL (ref 0.17–1.22)
MONOCYTES NFR BLD AUTO: 9 % (ref 4–12)
NEUTROPHILS # BLD AUTO: 12.79 THOUSANDS/ΜL (ref 1.85–7.62)
NEUTS SEG NFR BLD AUTO: 82 % (ref 43–75)
PLATELET # BLD AUTO: 322 THOUSANDS/UL (ref 149–390)
PMV BLD AUTO: 10.4 FL (ref 8.9–12.7)
RBC # BLD AUTO: 3.77 MILLION/UL (ref 3.88–5.62)
WBC # BLD AUTO: 15.76 THOUSAND/UL (ref 4.31–10.16)

## 2017-11-11 PROCEDURE — 85025 COMPLETE CBC W/AUTO DIFF WBC: CPT | Performed by: PHYSICIAN ASSISTANT

## 2017-11-11 RX ORDER — BENZONATATE 100 MG/1
200 CAPSULE ORAL 3 TIMES DAILY
Status: DISCONTINUED | OUTPATIENT
Start: 2017-11-11 | End: 2017-11-14 | Stop reason: HOSPADM

## 2017-11-11 RX ORDER — GUAIFENESIN 600 MG
1200 TABLET, EXTENDED RELEASE 12 HR ORAL EVERY 12 HOURS SCHEDULED
Status: DISCONTINUED | OUTPATIENT
Start: 2017-11-11 | End: 2017-11-14 | Stop reason: HOSPADM

## 2017-11-11 RX ADMIN — AMLODIPINE BESYLATE 10 MG: 10 TABLET ORAL at 08:30

## 2017-11-11 RX ADMIN — ACETAMINOPHEN 650 MG: 325 TABLET ORAL at 05:20

## 2017-11-11 RX ADMIN — FOLIC ACID 1 MG: 1 TABLET ORAL at 08:30

## 2017-11-11 RX ADMIN — ACETAMINOPHEN 650 MG: 325 TABLET ORAL at 21:45

## 2017-11-11 RX ADMIN — GUAIFENESIN 1200 MG: 600 TABLET, EXTENDED RELEASE ORAL at 21:45

## 2017-11-11 RX ADMIN — BENZONATATE 200 MG: 100 CAPSULE ORAL at 21:45

## 2017-11-11 RX ADMIN — CEFEPIME HYDROCHLORIDE 2000 MG: 2 INJECTION, POWDER, FOR SOLUTION INTRAVENOUS at 09:50

## 2017-11-11 RX ADMIN — SODIUM CHLORIDE 75 ML/HR: 0.9 INJECTION, SOLUTION INTRAVENOUS at 08:32

## 2017-11-11 RX ADMIN — ACETAMINOPHEN 650 MG: 325 TABLET ORAL at 13:14

## 2017-11-11 RX ADMIN — ENOXAPARIN SODIUM 40 MG: 40 INJECTION SUBCUTANEOUS at 16:10

## 2017-11-11 RX ADMIN — BENZONATATE 200 MG: 100 CAPSULE ORAL at 15:00

## 2017-11-11 RX ADMIN — NICOTINE 1 PATCH: 14 PATCH TRANSDERMAL at 08:31

## 2017-11-11 RX ADMIN — CEFEPIME HYDROCHLORIDE 2000 MG: 2 INJECTION, POWDER, FOR SOLUTION INTRAVENOUS at 21:52

## 2017-11-11 RX ADMIN — Medication 100 MG: at 08:30

## 2017-11-11 RX ADMIN — BENZONATATE 100 MG: 100 CAPSULE ORAL at 08:30

## 2017-11-11 RX ADMIN — GUAIFENESIN 600 MG: 600 TABLET, EXTENDED RELEASE ORAL at 08:30

## 2017-11-11 NOTE — PLAN OF CARE
Problem: PAIN - ADULT  Goal: Verbalizes/displays adequate comfort level or baseline comfort level  Interventions:  - Encourage patient to monitor pain and request assistance  - Assess pain using appropriate pain scale  - Administer analgesics based on type and severity of pain and evaluate response  - Implement non-pharmacological measures as appropriate and evaluate response  - Consider cultural and social influences on pain and pain management  - Notify physician/advanced practitioner if interventions unsuccessful or patient reports new pain   Outcome: Progressing      Problem: INFECTION - ADULT  Goal: Absence or prevention of progression during hospitalization  INTERVENTIONS:  - Assess and monitor for signs and symptoms of infection  - Monitor lab/diagnostic results  - Monitor all insertion sites, i e  indwelling lines, tubes, and drains  - Monitor endotracheal (as able) and nasal secretions for changes in amount and color  - [place] appropriate cooling/warming therapies per order  - Administer medications as ordered  - Instruct and encourage patient and family to use good hand hygiene technique  - Identify and instruct in appropriate isolation precautions for identified infection/condition   Outcome: Progressing      Problem: Knowledge Deficit  Goal: Patient/family/caregiver demonstrates understanding of disease process, treatment plan, medications, and discharge instructions  Complete learning assessment and assess knowledge base    Interventions:  - Provide teaching at level of understanding  - Provide teaching via preferred learning methods   Outcome: Progressing      Problem: RESPIRATORY - ADULT  Goal: Achieves optimal ventilation and oxygenation  INTERVENTIONS:  - Assess for changes in respiratory status  - Assess for changes in mentation and behavior  - Position to facilitate oxygenation and minimize respiratory effort  - Oxygen administration by appropriate delivery method based on oxygen saturation (per order) or ABGs  - Initiate smoking cessation education as indicated  - Encourage broncho-pulmonary hygiene including cough, deep breathe, Incentive Spirometry  - Assess the need for suctioning and aspirate as needed  - Assess and instruct to report SOB or any respiratory difficulty  - Respiratory Therapy support as indicated   Outcome: Progressing      Problem: METABOLIC, FLUID AND ELECTROLYTES - ADULT  Goal: Electrolytes maintained within normal limits  INTERVENTIONS:  - Monitor labs and assess patient for signs and symptoms of electrolyte imbalances  - Administer electrolyte replacement as ordered  - Monitor response to electrolyte replacements, including repeat lab results as appropriate  - Instruct patient on fluid and nutrition as appropriate   Outcome: Progressing      Problem: Potential for Falls  Goal: Patient will remain free of falls  INTERVENTIONS:  - Assess patient frequently for physical needs  -  Identify cognitive and physical deficits and behaviors that affect risk of falls    -  [place] fall precautions as indicated by assessment   - Educate patient/family on patient safety including physical limitations  - Instruct patient to call for assistance with activity based on assessment  - Modify environment to reduce risk of injury  - Consider OT/PT consult to assist with strengthening/mobility   Outcome: Progressing

## 2017-11-11 NOTE — PROGRESS NOTES
Progress Note - Amelia Braxton 50 y o  male MRN: [id number]    Unit/Bed#: -01 Encounter: [id number]        * HCAP (healthcare-associated pneumonia)   Assessment & Plan    · Clinically improving  · Continue day 3 of cefepime IV for suspected GNR pneumonia  · Sputum culture pending, urine for legionella/strep negative, flu negative        Sepsis ([facility] )   Assessment & Plan    · Tachycardia and leukocytosis POA, due to HCAP  · Clinically improved  · Heplock IVF        Pleurisy with pleural effusion   Assessment & Plan    · S/p thoracentesis 11/10 with 300ml of fluid removed  · Pulmonary following  · Culture pending        Hemoptysis   Assessment & Plan    · Improved  · Continue to monitor  · Likely related to pneumonia        Tobacco abuse   Assessment & Plan    · Patient had already been cutting down his tobacco use pre-hospital  · Continue nicotine patch  · Follow up outpatient with pulmonology for formal PFTs to determine whether he has COPD        History of subarachnoid hemorrhage   Assessment & Plan    · Repeat CT scan of the head done last evening showed resolution  · Continue to follow up with Neurosurgery non urgently regarding underlying aneurysm        History of alcoholism ([facility] )   Assessment & Plan    · Patient has been since admission to [facility] in October                No new subjective & objective note has been filed under this hospital service since the last note was generated  Pharmacologic: Pharmacologic VTE Prophylaxis contraindicated due to hemoptysis  Mechanical VTE Prophylaxis in Place: Yes    Patient Centered Rounds: I have performed bedside rounds with nursing staff today  Discussions with Specialists or Other Care Team Provider:     Education and Discussions with Family / Patient: patient  Attempted to call son and daughter with update - no answer    Time Spent for Care: 30 minutes    More than 50% of total time spent on counseling and coordination of care as described above  Current Length of Stay: 2 day(s)    Current Patient Status: Inpatient   Certification Statement: The patient will continue to require additional inpatient hospital stay due to IV antibiotics    Discharge Plan / Estimated Discharge Date: likely next 24-48 hours      Code Status: Level 1 - Full Code      Subjective:   Still with chest pain, but less since thoracentesis  Still with productive cough  Abdominal muscle strain from cough    Objective:     Vitals:   Temp (24hrs), Av 7 °F (37 1 °C), Min:98 3 °F (36 8 °C), Max:99 1 °F (37 3 °C)    HR:  [] 93  Resp:  [18] 18  BP: (132-138)/(88-96) 133/96  SpO2:  [93 %-96 %] 95 %  Body mass index is 22 28 kg/m²  Input and Output Summary (last 24 hours): Intake/Output Summary (Last 24 hours) at 17 1320  Last data filed at 17 1316   Gross per 24 hour   Intake          2451 25 ml   Output             4200 ml   Net         -1748 75 ml       Physical Exam:     Physical Exam   Constitutional: He is oriented to person, place, and time  He appears well-developed  No distress  HENT:   Head: Normocephalic and atraumatic  Neck: Normal range of motion  Neck supple  Cardiovascular: Normal rate and regular rhythm  No murmur heard  Pulmonary/Chest: Effort normal  No respiratory distress  He has no wheezes  He has no rales  Decreased breath sounds right base   Abdominal: Soft  Bowel sounds are normal  He exhibits no distension  Musculoskeletal: He exhibits no edema  Neurological: He is alert and oriented to person, place, and time  No cranial nerve deficit  Skin: Skin is warm and dry  No rash noted  Psychiatric: He has a normal mood and affect         Additional Data:     Labs:      Results from last 7 days  Lab Units 17  0435   WBC Thousand/uL 15 76*   HEMOGLOBIN g/dL 12 2   HEMATOCRIT % 35 4*   PLATELETS Thousands/uL 322   NEUTROS PCT % 82*   LYMPHS PCT % 7*   MONOS PCT % 9   EOS PCT % 2       Results from last 7 days  Lab Units 11/10/17  1650 11/10/17  0503   SODIUM mmol/L  --  139   POTASSIUM mmol/L  --  3 4*   CHLORIDE mmol/L  --  104   CO2 mmol/L  --  25   BUN mg/dL  --  6   CREATININE mg/dL  --  0 70   CALCIUM mg/dL  --  9 0   TOTAL PROTEIN g/dL 7 5 6 9   BILIRUBIN TOTAL mg/dL  --  0 80   ALK PHOS U/L  --  109   ALT U/L  --  33   AST U/L  --  24   GLUCOSE RANDOM mg/dL  --  110       Results from last 7 days  Lab Units 11/09/17  1111   INR  1 05         Recent Cultures (last 7 days):       Results from last 7 days  Lab Units 11/10/17  1558 11/09/17  1452 11/09/17  1449 11/09/17  1442 11/09/17  1151 11/09/17  1148   BLOOD CULTURE   --   --   --   --  No Growth at 24 hrs  No Growth at 24 hrs  SPUTUM CULTURE   --   --  3+ Growth of   --   --   --    GRAM STAIN RESULT  4+ Polys  Rare Mononuclear Cells  No No bacteria seen  --  2+ Epithelial cells per low power field  No polys seen  2+ Gram positive cocci in pairs  1+ Gram negative rods  Rare Gram positive rods  --   --   --    INFLUENZA A PCR   --   --   --  None Detected  --   --    INFLUENZA B PCR   --   --   --  None Detected  --   --    RSV PCR   --   --   --  None Detected  --   --    LEGIONELLA URINARY ANTIGEN   --  Negative  --   --   --   --        Last 24 Hours Medication List:     acetaminophen 650 mg Oral Q8H [address]   amLODIPine 10 mg Oral Daily   benzonatate 200 mg Oral TID   cefepime 2,000 mg Intravenous T58B   folic acid 1 mg Oral Daily   guaiFENesin 1,200 mg Oral Q12H [address]   nicotine 1 patch Transdermal Daily   thiamine 100 mg Oral Daily        Today, Patient Was Seen By: June Sosa PA-C    ** Please Note: Dragon 360 Dictation voice to text software may have been used in the creation of this document   **

## 2017-11-12 PROBLEM — R04.2 HEMOPTYSIS: Status: RESOLVED | Noted: 2017-11-09 | Resolved: 2017-11-12

## 2017-11-12 LAB
ERYTHROCYTE [DISTWIDTH] IN BLOOD BY AUTOMATED COUNT: 12.9 % (ref 11.6–15.1)
HCT VFR BLD AUTO: 37.5 % (ref 36.5–49.3)
HGB BLD-MCNC: 12.7 G/DL (ref 12–17)
MCH RBC QN AUTO: 31.7 PG (ref 26.8–34.3)
MCHC RBC AUTO-ENTMCNC: 33.9 G/DL (ref 31.4–37.4)
MCV RBC AUTO: 94 FL (ref 82–98)
PLATELET # BLD AUTO: 399 THOUSANDS/UL (ref 149–390)
PMV BLD AUTO: 10.4 FL (ref 8.9–12.7)
RBC # BLD AUTO: 4.01 MILLION/UL (ref 3.88–5.62)
WBC # BLD AUTO: 14.75 THOUSAND/UL (ref 4.31–10.16)

## 2017-11-12 PROCEDURE — 85027 COMPLETE CBC AUTOMATED: CPT | Performed by: PHYSICIAN ASSISTANT

## 2017-11-12 RX ADMIN — FOLIC ACID 1 MG: 1 TABLET ORAL at 08:12

## 2017-11-12 RX ADMIN — Medication 100 MG: at 08:12

## 2017-11-12 RX ADMIN — BENZONATATE 200 MG: 100 CAPSULE ORAL at 15:32

## 2017-11-12 RX ADMIN — GUAIFENESIN 1200 MG: 600 TABLET, EXTENDED RELEASE ORAL at 08:12

## 2017-11-12 RX ADMIN — BENZONATATE 200 MG: 100 CAPSULE ORAL at 08:12

## 2017-11-12 RX ADMIN — GUAIFENESIN 1200 MG: 600 TABLET, EXTENDED RELEASE ORAL at 21:27

## 2017-11-12 RX ADMIN — ACETAMINOPHEN 650 MG: 325 TABLET ORAL at 21:27

## 2017-11-12 RX ADMIN — CEFEPIME HYDROCHLORIDE 2000 MG: 2 INJECTION, POWDER, FOR SOLUTION INTRAVENOUS at 21:27

## 2017-11-12 RX ADMIN — NICOTINE 1 PATCH: 14 PATCH TRANSDERMAL at 08:12

## 2017-11-12 RX ADMIN — CEFEPIME HYDROCHLORIDE 2000 MG: 2 INJECTION, POWDER, FOR SOLUTION INTRAVENOUS at 10:06

## 2017-11-12 RX ADMIN — BENZONATATE 200 MG: 100 CAPSULE ORAL at 21:27

## 2017-11-12 RX ADMIN — ACETAMINOPHEN 650 MG: 325 TABLET ORAL at 05:25

## 2017-11-12 RX ADMIN — ENOXAPARIN SODIUM 40 MG: 40 INJECTION SUBCUTANEOUS at 08:13

## 2017-11-12 RX ADMIN — AMLODIPINE BESYLATE 10 MG: 10 TABLET ORAL at 08:15

## 2017-11-12 NOTE — PLAN OF CARE
Problem: PAIN - ADULT  Goal: Verbalizes/displays adequate comfort level or baseline comfort level  Interventions:  - Encourage patient to monitor pain and request assistance  - Assess pain using appropriate pain scale  - Administer analgesics based on type and severity of pain and evaluate response  - Implement non-pharmacological measures as appropriate and evaluate response  - Consider cultural and social influences on pain and pain management  - Notify physician/advanced practitioner if interventions unsuccessful or patient reports new pain   Outcome: Progressing      Problem: INFECTION - ADULT  Goal: Absence or prevention of progression during hospitalization  INTERVENTIONS:  - Assess and monitor for signs and symptoms of infection  - Monitor lab/diagnostic results  - Monitor all insertion sites, i e  indwelling lines, tubes, and drains  - Monitor endotracheal (as able) and nasal secretions for changes in amount and color  - [place] appropriate cooling/warming therapies per order  - Administer medications as ordered  - Instruct and encourage patient and family to use good hand hygiene technique  - Identify and instruct in appropriate isolation precautions for identified infection/condition   Outcome: Progressing      Problem: Knowledge Deficit  Goal: Patient/family/caregiver demonstrates understanding of disease process, treatment plan, medications, and discharge instructions  Complete learning assessment and assess knowledge base    Interventions:  - Provide teaching at level of understanding  - Provide teaching via preferred learning methods   Outcome: Progressing      Problem: RESPIRATORY - ADULT  Goal: Achieves optimal ventilation and oxygenation  INTERVENTIONS:  - Assess for changes in respiratory status  - Assess for changes in mentation and behavior  - Position to facilitate oxygenation and minimize respiratory effort  - Oxygen administration by appropriate delivery method based on oxygen saturation (per order) or ABGs  - Initiate smoking cessation education as indicated  - Encourage broncho-pulmonary hygiene including cough, deep breathe, Incentive Spirometry  - Assess the need for suctioning and aspirate as needed  - Assess and instruct to report SOB or any respiratory difficulty  - Respiratory Therapy support as indicated   Outcome: Progressing      Problem: METABOLIC, FLUID AND ELECTROLYTES - ADULT  Goal: Electrolytes maintained within normal limits  INTERVENTIONS:  - Monitor labs and assess patient for signs and symptoms of electrolyte imbalances  - Administer electrolyte replacement as ordered  - Monitor response to electrolyte replacements, including repeat lab results as appropriate  - Instruct patient on fluid and nutrition as appropriate   Outcome: Progressing      Problem: Potential for Falls  Goal: Patient will remain free of falls  INTERVENTIONS:  - Assess patient frequently for physical needs  -  Identify cognitive and physical deficits and behaviors that affect risk of falls    -  [place] fall precautions as indicated by assessment   - Educate patient/family on patient safety including physical limitations  - Instruct patient to call for assistance with activity based on assessment  - Modify environment to reduce risk of injury  - Consider OT/PT consult to assist with strengthening/mobility   Outcome: Progressing

## 2017-11-12 NOTE — PROGRESS NOTES
Progress Note - Julia Heredia 50 y o  male MRN: [id number]    Unit/Bed#: -01 Encounter: [id number]         * HCAP (healthcare-associated pneumonia)   Assessment & Plan    · Pulmonary following   · Clinically improving  · Continue day 4 of cefepime IV for suspected GNR pneumonia  · Sputum culture pending, urine for legionella/strep negative, flu negative  · For repeat chest x-ray in a m  Sepsis ([facility] )   Assessment & Plan    · Tachycardia and leukocytosis POA, due to HCAP  · Clinically improved        Pleurisy with pleural effusion   Assessment & Plan    · S/p thoracentesis 11/10 with 300ml of fluid removed  · Pulmonary following  · Culture pending  · Clinically improving daily  Pain less today  Tobacco abuse   Assessment & Plan    · Patient had already been cutting down his tobacco use pre-hospital  · Continue nicotine patch  · Follow up outpatient with pulmonology for formal PFTs to determine whether he has COPD        History of alcoholism ([facility] )   Assessment & Plan    · No signs or symptoms of withdrawal                 No new subjective & objective note has been filed under this hospital service since the last note was generated  Pharmacologic: Enoxaparin (Lovenox)  Mechanical VTE Prophylaxis in Place: Yes    Patient Centered Rounds: I have performed bedside rounds with nursing staff today  Discussions with Specialists or Other Care Team Provider:     Education and Discussions with Family / Patient: patient    Time Spent for Care: 30 minutes  More than 50% of total time spent on counseling and coordination of care as described above      Current Length of Stay: 3 day(s)    Current Patient Status: Inpatient   Certification Statement: The patient will continue to require additional inpatient hospital stay due to IV antibiotics    Discharge Plan / Estimated Discharge Date:  Hopeful discharge 11/13, if cleared by Pulmonary      Code Status: Level 1 - Full Code      Subjective: Patient continues to improved today  Patient states his pain is less  Still with productive cough but subsiding  Denies shortness of breath  Ambulating in halls without difficulty  Objective:     Vitals:   Temp (24hrs), Av 5 °F (36 9 °C), Min:98 3 °F (36 8 °C), Max:98 8 °F (37 1 °C)    HR:  [] 92  Resp:  [18] 18  BP: (140-142)/(90-95) 142/95  SpO2:  [93 %-99 %] 93 %  Body mass index is 22 28 kg/m²  Input and Output Summary (last 24 hours): Intake/Output Summary (Last 24 hours) at 17 0992  Last data filed at 17 0737   Gross per 24 hour   Intake           943 75 ml   Output             2900 ml   Net         -1956 25 ml       Physical Exam:     Physical Exam   Constitutional: He is oriented to person, place, and time  He appears well-developed  No distress  HENT:   Head: Normocephalic and atraumatic  Neck: Normal range of motion  Neck supple  Cardiovascular: Normal rate and regular rhythm  No murmur heard  Pulmonary/Chest: Effort normal  No respiratory distress  He has no wheezes  He has no rales  Tubular breath sounds right base   Abdominal: Soft  Bowel sounds are normal  He exhibits no distension  Musculoskeletal: He exhibits no edema  Neurological: He is alert and oriented to person, place, and time  No cranial nerve deficit  Skin: Skin is warm and dry  No rash noted  Psychiatric: He has a normal mood and affect         Additional Data:     Labs:      Results from last 7 days  Lab Units 17  0437 17  0435   WBC Thousand/uL 14 75* 15 76*   HEMOGLOBIN g/dL 12 7 12 2   HEMATOCRIT % 37 5 35 4*   PLATELETS Thousands/uL 399* 322   NEUTROS PCT %  --  82*   LYMPHS PCT %  --  7*   MONOS PCT %  --  9   EOS PCT %  --  2       Results from last 7 days  Lab Units 11/10/17  1650 11/10/17  0503   SODIUM mmol/L  --  139   POTASSIUM mmol/L  --  3 4*   CHLORIDE mmol/L  --  104   CO2 mmol/L  --  25   BUN mg/dL  --  6   CREATININE mg/dL  --  0 70   CALCIUM mg/dL  -- 9  0   TOTAL PROTEIN g/dL 7 5 6 9   BILIRUBIN TOTAL mg/dL  --  0 80   ALK PHOS U/L  --  109   ALT U/L  --  33   AST U/L  --  24   GLUCOSE RANDOM mg/dL  --  110       Results from last 7 days  Lab Units 11/09/17  1111   INR  1 05         Recent Cultures (last 7 days):       Results from last 7 days  Lab Units 11/10/17  1559 11/10/17  1558 11/09/17  1452 11/09/17  1449 11/09/17  1442 11/09/17  1151 11/09/17  1148   BLOOD CULTURE   --   --   --   --   --  No Growth at 48 hrs  No Growth at 48 hrs  SPUTUM CULTURE   --   --   --  3+ Growth of   --   --   --    GRAM STAIN RESULT  2+ Polys  No bacteria seen 4+ Polys  Rare Mononuclear Cells  No No bacteria seen  --  2+ Epithelial cells per low power field  No polys seen  2+ Gram positive cocci in pairs  1+ Gram negative rods  Rare Gram positive rods  --   --   --    BODY FLUID CULTURE, STERILE  No growth  --   --   --   --   --   --    INFLUENZA A PCR   --   --   --   --  None Detected  --   --    INFLUENZA B PCR   --   --   --   --  None Detected  --   --    RSV PCR   --   --   --   --  None Detected  --   --    LEGIONELLA URINARY ANTIGEN   --   --  Negative  --   --   --   --        Last 24 Hours Medication List:     acetaminophen 650 mg Oral Q8H [address]   amLODIPine 10 mg Oral Daily   benzonatate 200 mg Oral TID   cefepime 2,000 mg Intravenous Q12H   enoxaparin 40 mg Subcutaneous O00H CARMITA   folic acid 1 mg Oral Daily   guaiFENesin 1,200 mg Oral Q12H [address]   nicotine 1 patch Transdermal Daily   thiamine 100 mg Oral Daily        Today, Patient Was Seen By: Yoana Hart PA-C    ** Please Note: Dragon 360 Dictation voice to text software may have been used in the creation of this document   **

## 2017-11-13 ENCOUNTER — APPOINTMENT (INPATIENT)
Dept: RADIOLOGY | Facility: HOSPITAL | Age: 52
DRG: 720 | End: 2017-11-13
Payer: COMMERCIAL

## 2017-11-13 PROCEDURE — 71020 HB CHEST X-RAY 2VW FRONTAL&LATL: CPT

## 2017-11-13 RX ADMIN — GUAIFENESIN 1200 MG: 600 TABLET, EXTENDED RELEASE ORAL at 21:24

## 2017-11-13 RX ADMIN — GUAIFENESIN 1200 MG: 600 TABLET, EXTENDED RELEASE ORAL at 08:07

## 2017-11-13 RX ADMIN — BENZONATATE 200 MG: 100 CAPSULE ORAL at 15:44

## 2017-11-13 RX ADMIN — ACETAMINOPHEN 650 MG: 325 TABLET ORAL at 14:15

## 2017-11-13 RX ADMIN — NICOTINE 1 PATCH: 14 PATCH TRANSDERMAL at 08:06

## 2017-11-13 RX ADMIN — ENOXAPARIN SODIUM 40 MG: 40 INJECTION SUBCUTANEOUS at 08:06

## 2017-11-13 RX ADMIN — CEFEPIME HYDROCHLORIDE 2000 MG: 2 INJECTION, POWDER, FOR SOLUTION INTRAVENOUS at 10:31

## 2017-11-13 RX ADMIN — BENZONATATE 200 MG: 100 CAPSULE ORAL at 08:06

## 2017-11-13 RX ADMIN — ACETAMINOPHEN 650 MG: 325 TABLET ORAL at 05:54

## 2017-11-13 RX ADMIN — CEFEPIME HYDROCHLORIDE 2000 MG: 2 INJECTION, POWDER, FOR SOLUTION INTRAVENOUS at 21:24

## 2017-11-13 RX ADMIN — BENZONATATE 200 MG: 100 CAPSULE ORAL at 21:24

## 2017-11-13 RX ADMIN — AMLODIPINE BESYLATE 10 MG: 10 TABLET ORAL at 08:06

## 2017-11-13 RX ADMIN — Medication 100 MG: at 08:07

## 2017-11-13 RX ADMIN — ACETAMINOPHEN 650 MG: 325 TABLET ORAL at 21:23

## 2017-11-13 RX ADMIN — FOLIC ACID 1 MG: 1 TABLET ORAL at 08:07

## 2017-11-13 NOTE — PROGRESS NOTES
Progress Note - Pulmonary   Randi Burt 50 y o  male MRN: [id number]  Unit/Bed#: -01 Encounter: [id number]    Assessment/Plan:    Abnormal CXR/CT Chest: RLL HCAP with Small Right Loculated, Exudative Pleural Effusion S/P thoracentesis 11/10/2017              Continue cefepime for now  Hopeful transition to Levaquin to complete course pending final cultures               Monitor temperatures and WBCs  Continue PRN Albuterol               Continue Mucinex and Tessalon  Follow-up fluid cytology  Given continued effusion on x-ray, may need further drainage      Pleuritic Chest Pain (improved) with Hemoptysis (resolved)              Pain management per primary team               CTA negative for PE      Recent SAH with Intraventricular Extension              Outpatient neurosurgery follow-up      Recent Hypophonia S/P Intubation              Per patient, has no residual dysphagia and tolerates normal diet      Active Tobacco Abuse              NRT and smoking cessation  Outpatient PFTs as scheduled      Outpatient follow-up and repeat CT Chest ~ 8 weeks as per D/C instructions  RT to schedule outpatient PFTs ~ 6 weeks  May need shorter interval x-ray depending on effusion      D/W SLIM  Will discuss with interventional radiology for possible further drainage of pleural effusion  Subjective:     Bryce Orourke feels better  He is eager to go home  He reports that his chest and flank pain is improved, but he still has some rib and abdominal muscular pain with coughing  He denies any hemoptysis  His sputum is now light yellow in color  He denies any shortness of breath  Objective:     Vitals: Blood pressure 131/90, pulse 98, temperature 98 1 °F (36 7 °C), temperature source Oral, resp  rate 18, height 6' (1 829 m), weight 74 5 kg (164 lb 3 9 oz), SpO2 94 %  Room air,Body mass index is 22 28 kg/m²        Intake/Output Summary (Last 24 hours) at 11/13/17 1018  Last data filed at 11/13/17 0900   Gross per 24 hour   Intake             1440 ml   Output             2250 ml   Net             -810 ml       Invasive Devices     Peripheral Intravenous Line            Peripheral IV 11/09/17 Right Forearm 3 days          Drain            External Urinary Catheter Small 24 days                Physical Exam:     Physical Exam   Constitutional: He is oriented to person, place, and time  He appears well-developed and well-nourished  He is cooperative  Non-toxic appearance  No distress  HENT:   Head: Normocephalic and atraumatic  Mouth/Throat: No oropharyngeal exudate  Eyes: EOM are normal  No scleral icterus  Neck: Neck supple  No tracheal deviation present  Cardiovascular: Normal rate, regular rhythm, S1 normal and S2 normal   Exam reveals no gallop and no friction rub  No murmur heard  Pulmonary/Chest: Effort normal  No accessory muscle usage or stridor  No tachypnea  No respiratory distress  He has decreased breath sounds in the right middle field and the right lower field  He has no wheezes  He has no rhonchi  He has no rales  He exhibits no tenderness  Abdominal: Soft  Bowel sounds are normal  He exhibits no distension  There is no tenderness  There is no rebound and no guarding  Musculoskeletal: He exhibits no edema  Neurological: He is alert and oriented to person, place, and time  He has normal strength  GCS eye subscore is 4  GCS verbal subscore is 5  GCS motor subscore is 6  Skin: Skin is warm and dry  No rash noted  He is not diaphoretic  No cyanosis or erythema  Psychiatric: He has a normal mood and affect   His speech is normal and behavior is normal        Labs:  Sputum culture shows mixed respiratory [place]    Pleural fluid culture shows 2+ polys with no bacteria    Pleural fluid cytology pending    Imaging and other studies: I have personally reviewed pertinent reports   , I have personally reviewed pertinent films in PACS and Chest x-ray shows continued bibasilar infiltrates with loculated right pleural effusion

## 2017-11-13 NOTE — PROGRESS NOTES
Progress Note - Karyn Stanley 50 y o  male MRN: [id number]    Unit/Bed#: -01 Encounter: [id number]        * HCAP (healthcare-associated pneumonia)   Assessment & Plan    · Pulmonary following   · Clinically improving  · Continue day 5 of cefepime IV for suspected GNR pneumonia, consider conversion to PO levaquin to complete course of 10-14 days? · Sputum culture =mixed respiratory [place]; urine for legionella/strep negative, flu negative  · Needs outpatient CT chest in 8 weeks        Pleurisy with pleural effusion   Assessment & Plan    · S/p thoracentesis 11/10 with 300ml of fluid removed  · Repeat chest x-ray done today shows ongoing right-sided loculated effusion, defer to pulmonology for further plan  · Culture pending  · Clinically improving daily- pain improving        Sepsis ([facility] )   Assessment & Plan    · Tachycardia and leukocytosis POA, due to HCAP  · Clinically improved, though leukocytosis is persisting        Tobacco abuse   Assessment & Plan    · Patient had already been cutting down his tobacco use pre-hospital  · Continue nicotine patch  · Follow up outpatient with pulmonology for formal PFTs to determine whether he has COPD        [facility] of [facility]ism ([facility] )   Assessment & Plan    · No signs or symptoms of withdrawal             Pharmacologic: Enoxaparin (Lovenox)  Mechanical VTE Prophylaxis in Place: No    Patient Centered Rounds: I have performed bedside rounds with nursing staff today  Discussions with Specialists or Other Care Team Provider: JANE    Education and Discussions with Family / Patient:     Time Spent for Care: 30 minutes  More than 50% of total time spent on counseling and coordination of care as described above      Current Length of Stay: 4 day(s)    Current Patient Status: Inpatient   Certification Statement: The patient will continue to require additional inpatient hospital stay due to loculated effusion    Discharge Plan / Estimated Discharge Date:       Code Status: Level 1 - Full Code      Subjective:   Patient reports his pain overall continues to improve and states it is "half as bad" as before, and mostly in his abdominal muscles when he coughs  He is no longer having any hemoptysis  His sputum is yellow  He is ambulating in the room  He is less short of breath and denies any wheezing and has not required any oxygen  He has no difficulty with bowel or bladder  Objective:     Vitals:   Temp (24hrs), Av 4 °F (36 9 °C), Min:98 1 °F (36 7 °C), Max:98 9 °F (37 2 °C)    HR:  [] 98  Resp:  [18] 18  BP: (126-131)/(90-95) 131/90  SpO2:  [94 %] 94 %  Body mass index is 22 28 kg/m²  Input and Output Summary (last 24 hours): Intake/Output Summary (Last 24 hours) at 17 0991  Last data filed at 17 0756   Gross per 24 hour   Intake             1180 ml   Output             2250 ml   Net            -1070 ml       Physical Exam:     Physical Exam   Constitutional: No distress  Thin, clinically nontoxic appearing   HENT:   Head: Normocephalic and atraumatic  Eyes: Conjunctivae are normal  Right eye exhibits no discharge  Left eye exhibits no discharge  No scleral icterus  Neck: Neck supple  Cardiovascular: Normal rate, regular rhythm and normal heart sounds  No murmur heard  Pulmonary/Chest: Effort normal  No respiratory distress  He has no wheezes  Decrease BS right lung base   Abdominal: Soft  Musculoskeletal: He exhibits no edema  Neurological: He is alert  Awake alert and interactive   Skin: Skin is warm and dry  No rash noted  He is not diaphoretic  No erythema  No pallor  Psychiatric: He has a normal mood and affect  Extremely pleasant and cooperative   Nursing note and vitals reviewed        Additional Data:     Labs:      Results from last 7 days  Lab Units 17  0437 17  0435   WBC Thousand/uL 14 75* 15 76*   HEMOGLOBIN g/dL 12 7 12 2   HEMATOCRIT % 37 5 35 4*   PLATELETS Thousands/uL 399* 322   NEUTROS PCT %  --  82*   LYMPHS PCT %  --  7*   MONOS PCT %  --  9   EOS PCT %  --  2       Results from last 7 days  Lab Units 11/10/17  1650 11/10/17  0503   SODIUM mmol/L  --  139   POTASSIUM mmol/L  --  3 4*   CHLORIDE mmol/L  --  104   CO2 mmol/L  --  25   BUN mg/dL  --  6   CREATININE mg/dL  --  0 70   CALCIUM mg/dL  --  9 0   TOTAL PROTEIN g/dL 7 5 6 9   BILIRUBIN TOTAL mg/dL  --  0 80   ALK PHOS U/L  --  109   ALT U/L  --  33   AST U/L  --  24   GLUCOSE RANDOM mg/dL  --  110       Results from last 7 days  Lab Units 11/09/17  1111   INR  1 05         Recent Cultures (last 7 days):       Results from last 7 days  Lab Units 11/10/17  1559 11/10/17  1558 11/09/17  1452 11/09/17  1449 11/09/17  1442 11/09/17  1151 11/09/17  1148   BLOOD CULTURE   --   --   --   --   --  No Growth at 72 hrs  No Growth at 72 hrs     SPUTUM CULTURE   --   --   --  3+ Growth of   --   --   --    GRAM STAIN RESULT  2+ Polys  No bacteria seen 4+ Polys  Rare Mononuclear Cells  No No bacteria seen  --  2+ Epithelial cells per low power field  No polys seen  2+ Gram positive cocci in pairs  1+ Gram negative rods  Rare Gram positive rods  --   --   --    BODY FLUID CULTURE, STERILE  No growth  --   --   --   --   --   --    INFLUENZA A PCR   --   --   --   --  None Detected  --   --    INFLUENZA B PCR   --   --   --   --  None Detected  --   --    RSV PCR   --   --   --   --  None Detected  --   --    LEGIONELLA URINARY ANTIGEN   --   --  Negative  --   --   --   --        Last 24 Hours Medication List:     acetaminophen 650 mg Oral Q8H [address]   amLODIPine 10 mg Oral Daily   benzonatate 200 mg Oral TID   cefepime 2,000 mg Intravenous Q12H   enoxaparin 40 mg Subcutaneous S48P CARMITA   folic acid 1 mg Oral Daily   guaiFENesin 1,200 mg Oral Q12H [facility]   nicotine 1 patch Transdermal Daily   thiamine 100 mg Oral Daily        Today, Patient Was Seen By: Terence Calle PA-C    ** Please Note: Dragon 360 Dictation voice to text software may have been used in the creation of this document   **

## 2017-11-13 NOTE — PLAN OF CARE
Problem: PAIN - ADULT  Goal: Verbalizes/displays adequate comfort level or baseline comfort level  Interventions:  - Encourage patient to monitor pain and request assistance  - Assess pain using appropriate pain scale  - Administer analgesics based on type and severity of pain and evaluate response  - Implement non-pharmacological measures as appropriate and evaluate response  - Consider cultural and social influences on pain and pain management  - Notify physician/advanced practitioner if interventions unsuccessful or patient reports new pain   Outcome: Progressing      Problem: INFECTION - ADULT  Goal: Absence or prevention of progression during hospitalization  INTERVENTIONS:  - Assess and monitor for signs and symptoms of infection  - Monitor lab/diagnostic results  - Monitor all insertion sites, i e  indwelling lines, tubes, and drains  - Monitor endotracheal (as able) and nasal secretions for changes in amount and color  - [place] appropriate cooling/warming therapies per order  - Administer medications as ordered  - Instruct and encourage patient and family to use good hand hygiene technique  - Identify and instruct in appropriate isolation precautions for identified infection/condition   Outcome: Progressing      Problem: Knowledge Deficit  Goal: Patient/family/caregiver demonstrates understanding of disease process, treatment plan, medications, and discharge instructions  Complete learning assessment and assess knowledge base    Interventions:  - Provide teaching at level of understanding  - Provide teaching via preferred learning methods   Outcome: Progressing      Problem: RESPIRATORY - ADULT  Goal: Achieves optimal ventilation and oxygenation  INTERVENTIONS:  - Assess for changes in respiratory status  - Assess for changes in mentation and behavior  - Position to facilitate oxygenation and minimize respiratory effort  - Oxygen administration by appropriate delivery method based on oxygen saturation (per order) or ABGs  - Initiate smoking cessation education as indicated  - Encourage broncho-pulmonary hygiene including cough, deep breathe, Incentive Spirometry  - Assess the need for suctioning and aspirate as needed  - Assess and instruct to report SOB or any respiratory difficulty  - Respiratory Therapy support as indicated   Outcome: Progressing      Problem: METABOLIC, FLUID AND ELECTROLYTES - ADULT  Goal: Electrolytes maintained within normal limits  INTERVENTIONS:  - Monitor labs and assess patient for signs and symptoms of electrolyte imbalances  - Administer electrolyte replacement as ordered  - Monitor response to electrolyte replacements, including repeat lab results as appropriate  - Instruct patient on fluid and nutrition as appropriate   Outcome: Progressing      Problem: Potential for Falls  Goal: Patient will remain free of falls  INTERVENTIONS:  - Assess patient frequently for physical needs  -  Identify cognitive and physical deficits and behaviors that affect risk of falls    -  [place] fall precautions as indicated by assessment   - Educate patient/family on patient safety including physical limitations  - Instruct patient to call for assistance with activity based on assessment  - Modify environment to reduce risk of injury  - Consider OT/PT consult to assist with strengthening/mobility   Outcome: Progressing

## 2017-11-14 VITALS
TEMPERATURE: 97.6 F | DIASTOLIC BLOOD PRESSURE: 90 MMHG | OXYGEN SATURATION: 95 % | HEIGHT: 72 IN | WEIGHT: 164.24 LBS | RESPIRATION RATE: 18 BRPM | HEART RATE: 103 BPM | SYSTOLIC BLOOD PRESSURE: 141 MMHG | BODY MASS INDEX: 22.25 KG/M2

## 2017-11-14 PROBLEM — A41.9 SEPSIS (HCC): Status: RESOLVED | Noted: 2017-11-09 | Resolved: 2017-11-14

## 2017-11-14 LAB
BACTERIA BLD CULT: NORMAL
BACTERIA BLD CULT: NORMAL
BACTERIA SPEC BFLD CULT: NO GROWTH
GRAM STN SPEC: NORMAL
GRAM STN SPEC: NORMAL

## 2017-11-14 RX ORDER — HYDROCODONE POLISTIREX AND CHLORPHENIRAMINE POLISTIREX 10; 8 MG/5ML; MG/5ML
5 SUSPENSION, EXTENDED RELEASE ORAL EVERY 12 HOURS PRN
Qty: 120 ML | Refills: 0 | Status: SHIPPED | OUTPATIENT
Start: 2017-11-14 | End: 2017-11-21

## 2017-11-14 RX ORDER — GUAIFENESIN 1200 MG/1
600 TABLET, EXTENDED RELEASE ORAL EVERY 12 HOURS SCHEDULED
Qty: 30 TABLET | Refills: 0 | Status: SHIPPED | OUTPATIENT
Start: 2017-11-14

## 2017-11-14 RX ORDER — NICOTINE 21 MG/24HR
1 PATCH, TRANSDERMAL 24 HOURS TRANSDERMAL DAILY
Qty: 28 PATCH | Refills: 0 | Status: SHIPPED | OUTPATIENT
Start: 2017-11-14 | End: 2017-11-14

## 2017-11-14 RX ORDER — GUAIFENESIN 1200 MG/1
600 TABLET, EXTENDED RELEASE ORAL EVERY 12 HOURS SCHEDULED
Qty: 30 TABLET | Refills: 0 | Status: SHIPPED | OUTPATIENT
Start: 2017-11-14 | End: 2017-11-14

## 2017-11-14 RX ORDER — BENZONATATE 200 MG/1
200 CAPSULE ORAL 3 TIMES DAILY PRN
Qty: 20 CAPSULE | Refills: 0 | Status: SHIPPED | OUTPATIENT
Start: 2017-11-14

## 2017-11-14 RX ORDER — LEVOFLOXACIN 750 MG/1
750 TABLET ORAL EVERY 24 HOURS
Qty: 1 TABLET | Refills: 0 | Status: SHIPPED | OUTPATIENT
Start: 2017-11-15 | End: 2017-11-16

## 2017-11-14 RX ORDER — NICOTINE 21 MG/24HR
1 PATCH, TRANSDERMAL 24 HOURS TRANSDERMAL DAILY
Qty: 28 PATCH | Refills: 0 | Status: SHIPPED | OUTPATIENT
Start: 2017-11-14

## 2017-11-14 RX ADMIN — GUAIFENESIN 1200 MG: 600 TABLET, EXTENDED RELEASE ORAL at 08:55

## 2017-11-14 RX ADMIN — NICOTINE 1 PATCH: 14 PATCH TRANSDERMAL at 08:57

## 2017-11-14 RX ADMIN — FOLIC ACID 1 MG: 1 TABLET ORAL at 08:55

## 2017-11-14 RX ADMIN — BENZONATATE 200 MG: 100 CAPSULE ORAL at 08:54

## 2017-11-14 RX ADMIN — ACETAMINOPHEN 650 MG: 325 TABLET ORAL at 05:04

## 2017-11-14 RX ADMIN — AMLODIPINE BESYLATE 10 MG: 10 TABLET ORAL at 08:56

## 2017-11-14 RX ADMIN — LEVOFLOXACIN 750 MG: 500 TABLET, FILM COATED ORAL at 08:55

## 2017-11-14 RX ADMIN — Medication 100 MG: at 08:55

## 2017-11-14 RX ADMIN — ENOXAPARIN SODIUM 40 MG: 40 INJECTION SUBCUTANEOUS at 08:56

## 2017-11-14 NOTE — INCIDENTAL FINDINGS
The following findings require follow up:  Radiographic finding   Finding: small liver nodule   Follow up required: your family doctor can help you get an MRI of the liver arranged as an outpatient   Follow up should be done within 2 months    Please notify the following clinician to assist with the follow up:   Dr Rosalind Shone

## 2017-11-14 NOTE — CASE MANAGEMENT
Continued Stay Review    Date: 2017     Vital Signs:Temp (24hrs), Av 4 °F (36 9 °C), Min:98 1 °F (36 7 °C), Max:98 9 °F (37 2 °C)     HR:  [] 98  Resp:  [18] 18  BP: (126-131)/(90-95) 131/90  SpO2:  [94 %] 94 %  Body mass index is 22 28 kg/m²  Medications:   Scheduled Meds:   acetaminophen 650 mg Oral Q8H [address]   amLODIPine 10 mg Oral Daily   benzonatate 200 mg Oral TID   enoxaparin 40 mg Subcutaneous L01I CARMITA   folic acid 1 mg Oral Daily   guaiFENesin 1,200 mg Oral Q12H CARMITA   levofloxacin 750 mg Oral Q24H   nicotine 1 patch Transdermal Daily   thiamine 100 mg Oral Daily     Continuous Infusions:    PRN Meds: not used  hydrocodone-chlorpheniramine polistirex    ondansetron    Abnormal Labs/Diagnostic Results: none    Age/Sex: 50 y o  male     Assessment/Plan:   HCAP (healthcare-associated pneumonia)   Assessment & Plan     · Pulmonary following   · Clinically improving  · Continue day 5 of cefepime IV for suspected GNR pneumonia, consider conversion to PO levaquin to complete course of 10-14 days?   · Sputum culture =mixed respiratory charlotte; urine for legionella/strep negative, flu negative  · Needs outpatient CT chest in 8 weeks       Pleurisy with pleural effusion   Assessment & Plan     · S/p thoracentesis 11/10 with 300ml of fluid removed  · Repeat chest x-ray done today shows ongoing right-sided loculated effusion, defer to pulmonology for further plan  · Culture pending  · Clinically improving daily- pain improving       Sepsis ([facility] )   Assessment & Plan     · Tachycardia and leukocytosis POA, due to HCAP  · Clinically improved, though leukocytosis is persisting       Tobacco abuse   Assessment & Plan     · Patient had already been cutting down his tobacco use pre-hospital  · Continue nicotine patch  · Follow up outpatient with pulmonology for formal PFTs to determine whether he has COPD       History of alcoholism ([facility] )   Assessment & Plan     · No signs or symptoms of withdrawal Discharge Plan: to be determined

## 2017-11-14 NOTE — DISCHARGE SUMMARY
Discharge Summary - [facility]    Patient Information: Lula Sosa 50 y o  male MRN: [id number]  Unit/Bed#: -01 Encounter: [id number]    Discharging Physician / Practitioner: Eris Reyes PA-C  PCP: No primary care provider on file  Admission Date: 11/9/2017  Discharge Date: 11/14/17    Reason for Admission:  Shortness of breath and cough    Discharge Diagnoses:     Principal Problem:    HCAP (healthcare-associated pneumonia)  Active Problems:    Pleurisy with pleural effusion    Tobacco abuse    History of alcoholism ([facility] )  Resolved Problems:    Sepsis ([facility] )    Hemoptysis    Hypokalemia      Consultations During Hospital Stay:  · Pulmonology    Procedures Performed:     · November 9, 2017 chest x-ray= new right lower lobe airspace opacity suggesting atelectasis or pneumonia  Old bilateral rib fractures  · November 9, 2017 CT scan of the head= there has been interval resolution of intraventricular hemorrhage  Mild mucosal thickening of bilateral maxillary sinuses  No acute intracranial abnormality  · CTA Chest=Right basilar opacity representing atelectasis and/or pneumonia  This needs to be followed up to radiographic resolution especially with history of hemoptysis  Small right pleural effusion  There is layering or loculated effusion identified along the posterior right major fissure  No filling defect identified in the pulmonary trunk, main pulmonary arteries or lobar pulmonary arteries  There is limited evaluation of the left lower lobe segmental/subsegmental arteries  Mediastinal and hilar lymphadenopathy as described above  Liver dome 1 cm hypodensity is of uncertain etiology and needs to be correlated with abdominal ultrasound or contrast enhanced abdominal MRI  There is hepatomegaly  · 11/10/17 portable CXR =Loculated right lung base pleural effusion and right lung base consolidation representing atelectasis or pneumonia    · 11/10/17 thoracentesis  · 11/13/17 CXR Right-sided effusion the majority of which appears to be loculated posteriorly as well as within the inferior aspect of the major fissure    Significant Findings / Test Results:     · See above    Incidental Findings:   · 1 cm liver hypodensity    Test Results Pending at Discharge (will require follow up):   · none     Outpatient Tests Requested:  · MRI liver non-emergent  · CXR 2 weeks  · CT CHEST 8 weeks    Complications:      Hospital Course:     Angel Perea is a 50 y o  male patient who originally presented to the hospital on 11/9/2017 due to shortness of breath and cough with bloody sputum  Patient had been hospitalized from October 11th through the 22nd at [address] [facility] for a subarachnoid hemorrhage and alcohol withdrawal   Fortunately he has been sober since that discharge and was not experiencing any withdrawal symptoms on this admission  Repeat CT scan of the head was also obtained to ensure that the subarachnoid hemorrhage had resolved  Since he returned home he had been having progressively increased chest pain and cough  Upon arrival to hospital here, CXR showed RLL pneumonia and he was started on broad spectrum antibiotics given his recent hospitalization  He received 5 days of IV cefepime and was transitioned to Levaquin 750 mg p  o  daily to complete his last 2 days upon discharge  Due to the hemoptysis, he had a CT scan of the chest which did rule out PE but showed pneumonia with loculated effusion  Pulmonology performed a thoracentesis but both the fluid from that sampling and also his sputum culture failed to grow any particular bacteria  Clinically he improved and the hemoptysis resolved  He is stable for discharge to home but was strongly advised to quit smoking and to follow up with pulmonology for repeat chest x-ray in 2 weeks and a repeat CT scan in 8 weeks    They did not feel that attempting to withdrawal any additional fluid by means of thoracentesis would be effective  Condition at Discharge: stable     Discharge Day Visit / Exam:     Subjective:  Patient reports he is feeling better overall  He does continue to occasionally have that right-sided rib pain when he coughs but overall has been feeling well and has had no new concerns  I did bring to his attention the fact that a temperature of 99 9° was recorded from last evening and the patient reports he was completely asymptomatic  He did not feel feverish or have chills and has not in any other way otherwise felt worse overnight  Vitals: Blood Pressure: 141/90 (11/14/17 0809)  Pulse: 103 (11/14/17 0809)  Temperature: 97 6 °F (36 4 °C) (11/14/17 0809)  Temp Source: Oral (11/14/17 0809)  Respirations: 18 (11/14/17 0809)  Height: 6' (182 9 cm) (11/09/17 1333)  Weight - Scale: 74 5 kg (164 lb 3 9 oz) (11/09/17 1333)  SpO2: 95 % (11/14/17 0809)  Exam:   Physical Exam   Constitutional: He appears well-developed and well-nourished  No distress  HENT:   Head: Normocephalic and atraumatic  Eyes: Conjunctivae are normal  Right eye exhibits no discharge  Left eye exhibits no discharge  No scleral icterus  Neck: Neck supple  Cardiovascular: Normal rate, regular rhythm and normal heart sounds  No murmur heard  Pulmonary/Chest: Effort normal  No respiratory distress  He has no wheezes  He has no rales  Mildly decreased breath sounds right lung base  No significant cough or wheeze noted  No dyspnea  Patient is not requiring oxygen  Sputum at bedside is noted to be more clear with only slight yellow tinge  Abdominal: Soft  Bowel sounds are normal  He exhibits no distension  There is tenderness (Mild tenderness noted below the right rib line with coughing)  Musculoskeletal: He exhibits no edema  Neurological: He is alert  Awake alert and interactive, no focal deficits   Skin: Skin is warm and dry  No rash noted  He is not diaphoretic  No erythema  Psychiatric: He has a normal mood and affect   His behavior is normal  Judgment and thought content normal    Very pleasant and cooperative       Discussion with Family:  Called patient's son with update    Discharge instructions/Information to patient and family:   See after visit summary for information provided to patient and family  Provisions for Follow-Up Care:  See after visit summary for information related to follow-up care and any pertinent home health orders  Disposition:     Home    For Discharges to [facility] SNF:   · Not Applicable to this Patient - Not Applicable to this Patient    Planned Readmission: none     Discharge Statement:  I spent 40 minutes discharging the patient  This time was spent on the day of discharge  I had direct contact with the patient on the day of discharge  Greater than 50% of the total time was spent examining patient, answering all patient questions, arranging and discussing plan of care with patient as well as directly providing post-discharge instructions  Additional time then spent on discharge activities  Bedside nursing rounds performed  Case discussed directly with pulmonology  Discharge Medications:  See after visit summary for reconciled discharge medications provided to patient and family        ** Please Note: This note has been constructed using a voice recognition system **

## 2017-11-14 NOTE — NURSING NOTE
AVS reviewed with pt  Prior to DC  Pt  Verbalized understanding  Prescription for Tuccinex provided to pt  All other prescriptions called into [facility] pharmacy  Pt  Received phone call from [facility] saying all medications were ready prior to DC  IV removed, no active bleeding  Pt  Escorted to waiting room by RN where he was met by his mother who would be driving him to his place of residence

## 2017-12-09 ENCOUNTER — HOSPITAL ENCOUNTER (EMERGENCY)
Facility: HOSPITAL | Age: 52
Discharge: HOME | End: 2017-12-09
Attending: EMERGENCY MEDICINE | Admitting: EMERGENCY MEDICINE
Payer: COMMERCIAL

## 2017-12-09 ENCOUNTER — APPOINTMENT (EMERGENCY)
Dept: RADIOLOGY | Facility: HOSPITAL | Age: 52
End: 2017-12-09
Payer: COMMERCIAL

## 2017-12-09 VITALS
WEIGHT: 169.31 LBS | BODY MASS INDEX: 22.96 KG/M2 | SYSTOLIC BLOOD PRESSURE: 178 MMHG | DIASTOLIC BLOOD PRESSURE: 101 MMHG | HEART RATE: 101 BPM | TEMPERATURE: 97.8 F | RESPIRATION RATE: 16 BRPM | OXYGEN SATURATION: 97 %

## 2017-12-09 DIAGNOSIS — M25.511 ACUTE PAIN OF RIGHT SHOULDER: Primary | ICD-10-CM

## 2017-12-09 PROCEDURE — 73030 X-RAY EXAM OF SHOULDER: CPT

## 2017-12-09 PROCEDURE — 99283 EMERGENCY DEPT VISIT LOW MDM: CPT

## 2017-12-09 RX ORDER — NAPROXEN SODIUM 550 MG/1
550 TABLET ORAL 2 TIMES DAILY WITH MEALS
Qty: 20 TABLET | Refills: 0 | Status: SHIPPED | OUTPATIENT
Start: 2017-12-09

## 2017-12-09 NOTE — ED PROVIDER NOTES
History  Chief Complaint   Patient presents with    Arm Pain     pt reports waking up 1 5 weeks ago with upper right arm pain mainly in right shoulder  denies any injury to site  This 53-year-old male presents today with right arm pain  Patient states pain began approximately three days ago when he woke from sleep  Patient denies any known history of injury to the arm, any swelling or discoloration to the arm  Patient denies any weakness or numbness to the arm  Patient is however complaining of pain from the shoulder to the elbow  Patient states pain is worse with movement, especially elevation  Patient states he has been taking Aleve at home for this with minimal improvement in pain  History provided by:  Patient   used: No    Arm Pain   Location:  Right upper arm  Severity:  Moderate  Onset quality:  Sudden  Duration:  3 days  Timing:  Constant  Progression:  Unchanged  Chronicity:  New  Relieved by:  Partially by Aleve  Worsened by: Movement  Associated symptoms: myalgias    Associated symptoms: no abdominal pain, no chest pain, no cough, no diarrhea, no ear pain, no fever, no headaches, no nausea, no rash, no shortness of breath, no sore throat, no vomiting and no wheezing        Prior to Admission Medications   Prescriptions Last Dose Informant Patient Reported? Taking?    Guaifenesin 1200 MG TB12   No No   Sig: Take 0 5 tablets by mouth every 12 (twelve) hours   amLODIPine (NORVASC) 10 mg tablet   No No   Sig: Take 1 tablet by mouth daily   benzonatate (TESSALON) 200 MG capsule   No No   Sig: Take 1 capsule by mouth 3 (three) times a day as needed for cough   folic acid (FOLVITE) 1 mg tablet   No No   Sig: Take 1 tablet by mouth daily   nicotine (NICODERM CQ) 14 mg/24hr TD 24 hr patch   No No   Sig: Place 1 patch on the skin daily   thiamine 100 MG tablet   No No   Sig: Take 1 tablet by mouth daily      Facility-Administered Medications: None       Past Medical History: Diagnosis Date    [facility]ism /al[facility] abuse ([facility] )     Cerebral aneurysm     Hypertension     ICH (intracerebral hemorrhage) (HCC)        Past Surgical History:   Procedure Laterality Date    SUBDURAL HEMATOMA EVACUATION VIA CRANIOTOMY         Family History   Problem Relation Age of Onset    Diabetes Mother     Hypertension Mother     Diabetes Father     Hypertension Father     Stroke Father     Stroke Brother      I have reviewed and agree with the history as documented  Social History   Substance Use Topics    Smoking status: Current Every Day Smoker     Packs/day: 1 00     Years: 37 00     Start date: 38    Smokeless tobacco: Never Used      Comment: 1ppd x 37 years, cut fown to 1/4 pack recently    Alcohol use No      Comment: sober since stroke 10/10/17        Review of Systems   Constitutional: Negative for activity change, appetite change, diaphoresis and fever  HENT: Negative for ear pain, facial swelling, sore throat, tinnitus and voice change  Eyes: Negative for photophobia, pain and redness  Respiratory: Negative for cough, chest tightness, shortness of breath and wheezing  Cardiovascular: Negative for chest pain, palpitations and leg swelling  Gastrointestinal: Negative for abdominal distention, abdominal pain, constipation, diarrhea, nausea and vomiting  Genitourinary: Negative for difficulty urinating, dysuria, flank pain, hematuria and urgency  Musculoskeletal: Positive for arthralgias and myalgias  Negative for back pain, gait problem and neck pain  Skin: Negative for rash and wound  Neurological: Negative for dizziness, syncope, speech difficulty, weakness, numbness and headaches  Psychiatric/Behavioral: Negative for agitation, behavioral problems and confusion         Physical Exam  ED Triage Vitals [12/09/17 1046]   Temperature Pulse Respirations Blood Pressure SpO2   97 8 °F (36 6 °C) 101 16 (!) 178/101 97 %      Temp Source Heart Rate Source Patient Position - Orthostatic VS BP Location FiO2 (%)   Oral Monitor -- -- --      Pain Score       Worst Possible Pain           Orthostatic Vital Signs  Vitals:    12/09/17 1046   BP: (!) 178/101   Pulse: 101       Physical Exam   Constitutional: He is oriented to person, place, and time  He appears well-developed and well-nourished  He is cooperative  No distress  HENT:   Head: Normocephalic and atraumatic  Mouth/Throat: Oropharynx is clear and moist    Eyes: EOM and lids are normal  Pupils are equal, round, and reactive to light  Right eye exhibits no discharge  Left eye exhibits no discharge  Right conjunctiva is not injected  Left conjunctiva is not injected  Neck: Trachea normal, normal range of motion, full passive range of motion without pain and phonation normal  Neck supple  Cardiovascular: Normal rate, regular rhythm, intact distal pulses and normal pulses  No murmur heard  Pulses:       Dorsalis pedis pulses are 2+ on the right side, and 2+ on the left side  Pulmonary/Chest: Effort normal and breath sounds normal    Abdominal: Soft  He exhibits no distension  There is no tenderness  Musculoskeletal: He exhibits no edema or deformity  Right shoulder: He exhibits decreased range of motion, tenderness and pain  He exhibits no bony tenderness, no swelling, no effusion, no deformity, no spasm and normal strength  Arms:  Neurological: He is alert and oriented to person, place, and time  He has normal strength  No cranial nerve deficit or sensory deficit  Coordination normal  GCS eye subscore is 4  GCS verbal subscore is 5  GCS motor subscore is 6  Skin: Skin is warm, dry and intact  Capillary refill takes less than 2 seconds  No rash noted  Psychiatric: He has a normal mood and affect  His speech is normal and behavior is normal    Vitals reviewed        ED Medications  Medications - No data to display    Diagnostic Studies  Results Reviewed     None                 XR shoulder 2+ views RIGHT   Final Result by Marcelo Ruiz DO (12/09 1136)      No acute osseous abnormality  Workstation performed: [id number]                    Procedures  Procedures       Phone Contacts  ED Phone Contact    ED Course  ED Course                                MDM  Number of Diagnoses or Management Options  Acute pain of right shoulder: new and requires workup  Diagnosis management comments: Patient has tenderness to his anterior shoulder consistent with the insertion of the pectoralis muscle  Patient then reveals that he had a prior pectoralis injury years ago, states this does feel similar  There is however no defect or weakness  I suspect this patient has a partial tear of his muscles there  Patient's x-rays unremarkable  Patient will be discharged home with a sling and prescription for Aleve  Amount and/or Complexity of Data Reviewed  Tests in the radiology section of CPT®: ordered and reviewed    Risk of Complications, Morbidity, and/or Mortality  Presenting problems: moderate  Diagnostic procedures: low  Management options: low    Patient Progress  Patient progress: stable    CritCare Time    Disposition  Final diagnoses:   Acute pain of right shoulder     Time reflects when diagnosis was documented in both MDM as applicable and the Disposition within this note     Time User Action Codes Description Comment    12/9/2017 11:43 AM Karen Carty Add [[id number]] Acute pain of right shoulder       ED Disposition     ED Disposition Condition Comment    Discharge  Will Chirinos discharge to home/self care  Condition at discharge: Good        Follow-up Information     Follow up With Specialties Details Why [facility] Call in 2 days For further evaluation and management of your shoulder pain   [address] [facility] [address] [phone]        Patient's Medications   Discharge Prescriptions    NAPROXEN SODIUM (ANAPROX) 550 MG TABLET    Take 1 tablet by mouth 2 (two) times a day with meals       Start Date: 12/9/2017 End Date: --       Order Dose: 550 mg       Quantity: 20 tablet    Refills: 0     No discharge procedures on file      ED Provider  Electronically Signed by           Alka Bland MD  12/09/17 1144

## 2017-12-09 NOTE — DISCHARGE INSTRUCTIONS
Shoulder Pain, Ambulatory Care   GENERAL INFORMATION:   Shoulder pain  is a common problem and can affect your daily activities  Pain can be caused by a problem within your shoulder  Shoulder pain may also be caused by pain that spreads to your shoulder from another part of your body  Seek immediate care for the following symptoms:   · Severe pain    · Inability to move your arm or shoulder    · Numbness or tingling in your shoulder or arm  Treatment for shoulder pain  may include any of the following:  · Acetaminophen  decreases pain and fever  It is available without a doctor's order  Ask how much to take and how often to take it  Follow directions  Acetaminophen can cause liver damage if not taken correctly  · NSAIDs  help decrease swelling and pain or fever  This medicine is available with or without a doctor's order  NSAIDs can cause stomach bleeding or kidney problems in certain people  If you take blood thinner medicine, always ask your healthcare provider if NSAIDs are safe for you  Always read the medicine label and follow directions  · A steroid injection  may help decrease pain and swelling  · Surgery  may be needed for long-term pain and loss of function  Manage your symptoms:   · Apply ice  on your shoulder for 20 to 30 minutes every 2 hours or as directed  Use an ice pack, or put crushed ice in a plastic bag  Cover it with a towel  Ice helps prevent tissue damage and decreases swelling and pain  · Apply heat if ice does not help your symptoms  Apply heat on your shoulder for 20 to 30 minutes every 2 hours for as many days as directed  Heat helps decrease pain and muscle spasms  · Go to physical or occupational therapy as directed  A physical therapist teaches you exercises to help improve movement and strength, and to decrease pain  An occupational therapist teaches you skills to help with your daily activities  Prevent shoulder pain:   · Stretch and strengthen your shoulder  Use proper technique during exercises and sports  · Limit activities as directed  Try to avoid repeated overhead movements  Follow up with your healthcare provider or orthopedist as directed:  Write down your questions so you remember to ask them during your visits  CARE AGREEMENT:   You have the right to help plan your care  Learn about your health condition and how it may be treated  Discuss treatment options with your caregivers to decide what care you want to receive  You always have the right to refuse treatment  The above information is an  only  It is not intended as medical advice for individual conditions or treatments  Talk to your doctor, nurse or pharmacist before following any medical regimen to see if it is safe and effective for you  © 2014 [address] is for End User's use only and may not be sold, redistributed or otherwise used for commercial purposes  All illustrations and images included in CareNotes® are the copyrighted property of A D A M , Inc  or [employer]venal [employer]bo[employer]

## 2017-12-11 LAB — FUNGUS SPEC CULT: NORMAL

## 2017-12-26 LAB
MYCOBACTERIUM SPEC CULT: NORMAL
RHODAMINE-AURAMINE STN SPEC: NORMAL

## 2018-01-05 DIAGNOSIS — R93.89 ABNORMAL FINDINGS ON DIAGNOSTIC IMAGING OF OTHER SPECIFIED BODY STRUCTURES: ICD-10-CM

## 2018-01-05 DIAGNOSIS — J18.9 PNEUMONIA: ICD-10-CM

## 2018-01-10 NOTE — PROCEDURES
Procedures by Brock Walker MD at 10/12/2017   7:08 PM      Author:  Brock Walker MD Service:  Neurosurgery Author Type:  Physician    Filed:  10/12/2017  7:16 PM Date of Service:  10/12/2017  7:08 PM Status:  Signed    :  Brock Walker MD (Physician)        Pre-procedure Diagnoses:       1  Hydrocephalus [G91 9]                Post-procedure Diagnoses:       1  Hydrocephalus [G91 9]                Procedures:       1  VENTRICULOSTOMY [[id number] (Custom)]                 Procedure:  Right frontal ventriculostomy    Surgeon:  Gianfranco Cobb MD    Assistant:  Vivi Curry, [facility]    Indications: This is a 82-year-old gentleman who presented with spontaneous intraventricular hemorrhage and hydrocephalus  Angiogram was done which was revealing of 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 most likely not responsible for his  hemorrhage  However given his poor exam radiographic evidence of hydrocephalus is determined the ventriculostomy would be useful in CSF diversion  The risks and benefits of the procedure were discussed with the family including bleeding, infection,  stroke, paralysis, and death  Complications:  None     Blood loss:   Minima    Antibiotics:  2g ancef given postprocedurally  Procedure Details: The right side of the head was shaved  Kocher's point was marked  Sterilely prepped with Chloro Prep  Draped sterilely  1cm incision made over Kochers' point  Scraped periosteum off  Small retractor  placed  Hand drill used for enrique hole  Dura incised with an 11 blade  Ventricular catheter passed successfully into ventricle on the third attempt  Brisk flow obtained under clear pressure  Clear  at first, then blood tinged  Tunneled 2 cm posteriorly  Incision closed with 2-0 nylon suture  Catheter secured in 3 places with 2-0 silk and catheter secured to connecter with 2-0 silk  Plan:  HCT ordered  2g Ancef q8 x3  Patient tolerated procedure well       I was present for the entire procedure and completed the critical portions  Farrukh [facility]    Oct 12 2017  7:17PM [facility] Standard Time

## 2018-01-15 ENCOUNTER — HOSPITAL ENCOUNTER (EMERGENCY)
Facility: HOSPITAL | Age: 53
Discharge: HOME | End: 2018-01-15
Attending: EMERGENCY MEDICINE
Payer: COMMERCIAL

## 2018-01-15 ENCOUNTER — HOSPITAL ENCOUNTER (OUTPATIENT)
Dept: CT IMAGING | Facility: HOSPITAL | Age: 53
Discharge: HOME | End: 2018-01-15
Payer: COMMERCIAL

## 2018-01-15 VITALS
HEART RATE: 122 BPM | DIASTOLIC BLOOD PRESSURE: 99 MMHG | OXYGEN SATURATION: 93 % | TEMPERATURE: 98 F | BODY MASS INDEX: 22.99 KG/M2 | RESPIRATION RATE: 16 BRPM | WEIGHT: 169.5 LBS | SYSTOLIC BLOOD PRESSURE: 156 MMHG

## 2018-01-15 DIAGNOSIS — J18.9 PNEUMONIA: ICD-10-CM

## 2018-01-15 DIAGNOSIS — R93.89 ABNORMAL FINDINGS ON DIAGNOSTIC IMAGING OF OTHER SPECIFIED BODY STRUCTURES: ICD-10-CM

## 2018-01-15 DIAGNOSIS — M25.511 CHRONIC RIGHT SHOULDER PAIN: Primary | ICD-10-CM

## 2018-01-15 DIAGNOSIS — G89.29 CHRONIC RIGHT SHOULDER PAIN: Primary | ICD-10-CM

## 2018-01-15 PROCEDURE — 99283 EMERGENCY DEPT VISIT LOW MDM: CPT

## 2018-01-15 PROCEDURE — 71250 CT THORAX DX C-: CPT

## 2018-01-15 RX ORDER — NAPROXEN 500 MG/1
500 TABLET ORAL 2 TIMES DAILY WITH MEALS
Qty: 20 TABLET | Refills: 0 | Status: SHIPPED | OUTPATIENT
Start: 2018-01-15

## 2018-01-15 NOTE — PROCEDURES
Procedures by Denilson Lee DO at 11/10/2017   4:40 PM      Author:  Denilson Lee DO Service:  Pulmonology Author Type:  Physician    Filed:  11/10/2017  4:45 PM Date of Service:  11/10/2017  4:40 PM Status:  Signed    :  Denilson Lee DO (Physician)        Procedure Orders:       1  Thoracentesis [[id number]] ordered by Denilson Lee DO at 11/10/17 1642                 Post-procedure Diagnoses:       1  Right lower lobe pneumonia ([address] ) [J18 1]                 Thoracentesis  Date/Time: 11/10/2017 4:42 PM  Performed by: Genia Guardado by: Godfrey Burk     Patient location:  Bedside  Other Assisting Provider:  No    Consent:     Consent obtained:  Written    Consent given by:  Patient    Risks discussed:  Bleeding, infection, incomplete drainage, nerve damage, pain and pneumothorax    Alternatives discussed:  No treatment and delayed treatment  Universal protocol:     Procedure explained and questions answered to patient or proxy's satisfaction: yes      Relevant documents present and verified: yes      Test results available and properly labeled: yes       Imaging studies available: yes      Required blood products, implants, devices and special equipment available: yes      Site/side marked: yes      Immediately prior to procedure a time out was called: yes      Patient identity confirmed:  Verbally with patient, arm band and provided demographic data  Indications:     Procedure Purpose: diagnostic and therapeutic      Indications: pleural effusion    Anesthesia (see MAR for exact dosages):      Anesthesia method:  Local infiltration    Local anesthetic:  Lidocaine 1% w/o epi (7cc)  Procedure details:     Preparation: Patient was prepped and draped in usual sterile fashion      Standard thoracentesis cath kit used: Yes      Patient position:  Sitting    Laterality:  Right    Location:  Posterior    Intercostal space:  7th    Puncture method:  Over-the-needle catheter    Ultrasound guidance: yes Reason for ultrasound: Identify fluid collection and guide cathetar placement  Indwelling catheter placed: no      Number of attempts:  2 (Unable to get fluid despite ultrasound showing fluid to 10cm  Moved to rib space above and was able to get fluid)    Drainage color:  Yellow    Drainage characteristics:  Clear    Fluid removed amount:  300cc of fluid removed - not able to aspirate any more fluid  Post-procedure details:     Chest x-ray performed: yes      Patient tolerance of procedure:   Tolerated well, no immediate complications                     Received for:Kanika Pizarro DO  Nov 10 2017  4:46PM [facility] Standard Time

## 2018-01-16 NOTE — DISCHARGE INSTRUCTIONS
- take naproxen 500 mg twice daily as well as 1000 mg tylenol per day  - follow up with orthopedics with call tomorrow    Shoulder Pain   WHAT YOU NEED TO KNOW:   Shoulder pain is a common problem and can affect your daily activities  Pain can be caused by a problem within your shoulder  Shoulder pain may also be caused by pain that spreads to your shoulder from another part of your body  DISCHARGE INSTRUCTIONS:   Return to the emergency department if:   · You have severe pain  · You cannot move your arm or shoulder  · You have numbness or tingling in your shoulder or arm  Contact your healthcare provider if:   · Your pain gets worse or does not go away with treatment  · You have trouble moving your arm or shoulder  · You have questions or concerns about your condition or care  Medicines: You may need any of the following:  · Acetaminophen  decreases pain and fever  It is available without a doctor's order  Ask how much to take and how often to take it  Follow directions  Acetaminophen can cause liver damage if not taken correctly  · NSAIDs , such as ibuprofen, help decrease swelling, pain, and fever  This medicine is available with or without a doctor's order  NSAIDs can cause stomach bleeding or kidney problems in certain people  If you take blood thinner medicine, always ask your healthcare provider if NSAIDs are safe for you  Always read the medicine label and follow directions  · Take your medicine as directed  Contact your healthcare provider if you think your medicine is not helping or if you have side effects  Tell him of her if you are allergic to any medicine  Keep a list of the medicines, vitamins, and herbs you take  Include the amounts, and when and why you take them  Bring the list or the pill bottles to follow-up visits  Carry your medicine list with you in case of an emergency    Follow up with your healthcare provider or orthopedist as directed:  Write down your questions so you remember to ask them during your visits  Manage your symptoms:   · Apply ice  on your shoulder for 20 to 30 minutes every 2 hours or as directed  Use an ice pack, or put crushed ice in a plastic bag  Cover it with a towel  Ice helps prevent tissue damage and decreases swelling and pain  · Apply heat if ice does not help your symptoms  Apply heat on your shoulder for 20 to 30 minutes every 2 hours for as many days as directed  Heat helps decrease pain and muscle spasms  · Go to physical or occupational therapy as directed  A physical therapist teaches you exercises to help improve movement and strength, and to decrease pain  An occupational therapist teaches you skills to help with your daily activities  Prevent shoulder pain:   · Stretch and strengthen your shoulder  Use proper technique during exercises and sports  · Limit activities as directed  Try to avoid repeated overhead movements  © 2017 [facility] Information is for End User's use only and may not be sold, redistributed or otherwise used for commercial purposes  All illustrations and images included in CareNotes® are the copyrighted property of A D A M [employer]  or Juvenal Barboza  The above information is an  only  It is not intended as medical advice for individual conditions or treatments  Talk to your doctor, nurse or pharmacist before following any medical regimen to see if it is safe and effective for you

## 2018-01-16 NOTE — ED NOTES
Pt rang call bell, upon entering room pt already left  Pt seen, assessed and d/c by provider  Pt able to ambulate well through out hallway while being evaluated       Magan Kaufman RN  01/15/18 2044

## 2018-01-16 NOTE — ED PROVIDER NOTES
History  Chief Complaint   Patient presents with    Shoulder Pain     pt was seen here for the same on 12/9  pain coninbarb and does not have primary care doctor and did not follow up with anyone  pt was here for a ct scan of his chest for pneumonia follow up and decided to get his shoulder checked while he was here     Patient presents emergency department for evaluation of chronic right shoulder pain  Patient states that he was diagnosed with either a ligamentous strain or muscle tear and was instructed follow up Orthopedics  He states that he did not follow up with Orthopedics  He states over last month he was waiting for it to get better never did  Patient does report that many years ago he had injury to bilateral rotator cuffs when he was younger  States that passively he has full range of motion, but actively he is very limited secondary to pain  Denies any distal arm weakness  No numbness or tingling  No new injuries to arm  No back pain or chest pain  History provided by:  Patient  Shoulder Pain   Location:  Shoulder  Shoulder location:  R shoulder  Pain details:     Quality:  Aching and sharp    Radiates to:  Does not radiate    Severity:  Moderate    Onset quality:  Gradual    Duration:  1 month    Timing:  Constant    Progression:  Waxing and waning  Handedness:  Right-handed  Relieved by:  NSAIDs  Worsened by: Movement  Ineffective treatments:  None tried  Associated symptoms: no back pain, no decreased range of motion, no muscle weakness, no neck pain, no numbness, no stiffness, no swelling and no tingling        Prior to Admission Medications   Prescriptions Last Dose Informant Patient Reported? Taking?    Guaifenesin 1200 MG TB12   No No   Sig: Take 0 5 tablets by mouth every 12 (twelve) hours   amLODIPine (NORVASC) 10 mg tablet   No No   Sig: Take 1 tablet by mouth daily   benzonatate (TESSALON) 200 MG capsule   No No   Sig: Take 1 capsule by mouth 3 (three) times a day as needed for cough   folic acid (FOLVITE) 1 mg tablet   No No   Sig: Take 1 tablet by mouth daily   naproxen sodium (ANAPROX) 550 mg tablet   No No   Sig: Take 1 tablet by mouth 2 (two) times a day with meals   nicotine (NICODERM CQ) 14 mg/24hr TD 24 hr patch   No No   Sig: Place 1 patch on the skin daily   thiamine 100 MG tablet   No No   Sig: Take 1 tablet by mouth daily      Facility-Administered Medications: None       Past Medical History:   Diagnosis Date    [facility]ism /alcohol abuse ([facility] [address] )     Cerebral aneurysm     Hypertension     ICH (intracerebral hemorrhage) (HCC)        Past Surgical History:   Procedure Laterality Date    SUBDURAL HEMATOMA EVACUATION VIA CRANIOTOMY         Family History   Problem Relation Age of Onset    Diabetes Mother     Hypertension Mother     Diabetes Father     Hypertension Father     Stroke Father     Stroke Brother      I have reviewed and agree with the history as documented  Social History   Substance Use Topics    Smoking status: Current Every Day Smoker     Packs/day: 1 00     Years: 37 00     Start date: 38    Smokeless tobacco: Never Used      Comment: 1ppd x 37 years, cut fown to 1/4 pack recently    Alcohol use 2 4 oz/week     4 Cans of beer per week      Comment: sober since stroke 10/10/17        Review of Systems   Musculoskeletal: Negative for back pain, neck pain and stiffness  Physical Exam  ED Triage Vitals [01/15/18 1853]   Temperature Pulse Respirations Blood Pressure SpO2   98 °F (36 7 °C) (!) 122 16 156/99 93 %      Temp Source Heart Rate Source Patient Position - Orthostatic VS BP Location FiO2 (%)   Oral Monitor Sitting Left arm --      Pain Score       7           Orthostatic Vital Signs  Vitals:    01/15/18 1853   BP: 156/99   Pulse: (!) 122   Patient Position - Orthostatic VS: Sitting       Physical Exam   Constitutional: He is oriented to person, place, and time  He appears well-developed and well-nourished  No distress     HENT:   Head: Normocephalic and atraumatic  Nose: Nose normal    Eyes: Conjunctivae and EOM are normal  Pupils are equal, round, and reactive to light  Neck: Normal range of motion  Neck supple  Cardiovascular: Normal rate, regular rhythm, normal heart sounds and intact distal pulses  Exam reveals no gallop and no friction rub  No murmur heard  HR 85   Pulmonary/Chest: Effort normal and breath sounds normal  No respiratory distress  He has no wheezes  He has no rales  Abdominal: Soft  He exhibits no distension  There is no tenderness  Musculoskeletal: Normal range of motion  He exhibits no edema, tenderness or deformity  Tenderness to palpation over anterior and posterior aspect of right shoulder joint, tenderness also supraspinatus muscle belly  Full passive ROM of right shoulder  Postive empty can and drop arm test     Lymphadenopathy:     He has no cervical adenopathy  Neurological: He is alert and oriented to person, place, and time  Skin: Skin is warm and dry  Capillary refill takes less than 2 seconds  He is not diaphoretic  No erythema  No pallor  Nursing note and vitals reviewed  ED Medications  Medications - No data to display    Diagnostic Studies  Results Reviewed     None                 No orders to display              Procedures  Procedures       Phone Contacts  ED Phone Contact    ED Course  ED Course                                MDM  Number of Diagnoses or Management Options  Chronic right shoulder pain: new and does not require workup  Diagnosis management comments: Differential diagnosis to include not limited to:  Biceps tendinitis, rotator cuff tendinitis, rotator cuff tear, to return as sprain    Patient is a 61-year-old male past medical history of bilateral rotator cuff injuries, and chronic right shoulder pain present emergency department for evaluation of right shoulder pain   Patient states he was seen about a month ago no is instructed to follow up with Orthopedics for possible muscle tear/ligamentous injury  Patient states he no follow up Orthopedics  Patient states that the pain has been waxing waning for past month  Patient states he has noted injuries to the right shoulder  No numbness or tingling  Full passive range of motion on exam   Tenderness on anterior posterior joint  At this time I believe patient does need orthopedic follow-up for a probable rotator cuff tendinitis/tear  Will give patient proximal emergency department have him call with [facility] in [facility] morning  Patient return if she has any worsening pain, swelling, redness, numbness or tingling  Amount and/or Complexity of Data Reviewed  Tests in the radiology section of CPT®: reviewed    Risk of Complications, Morbidity, and/or Mortality  Presenting problems: low  Diagnostic procedures: minimal  Management options: minimal    Patient Progress  Patient progress: stable    CritCare Time    Disposition  Final diagnoses:   Chronic right shoulder pain     Time reflects when diagnosis was documented in both MDM as applicable and the Disposition within this note     Time User Action Codes Description Comment    1/15/2018  8:24 PM Aleksey Freedman [M25 [id number],  G89 29] Chronic right shoulder pain       ED Disposition     ED Disposition Condition Comment    Discharge  Darlin Driver discharge to home/self care      Condition at discharge: Stable        Follow-up Information     Follow up With Specialties Details Why [address] [facility] Call in 1 day to make follow up appointment [address]  [phone]        Discharge Medication List as of 1/15/2018  8:26 PM      START taking these medications    Details   naproxen (NAPROSYN) 500 mg tablet Take 1 tablet by mouth 2 (two) times a day with meals, Starting Mon 1/15/2018, Print         CONTINUE these medications which have NOT CHANGED    Details   amLODIPine (NORVASC) 10 mg tablet Take 1 tablet by mouth daily, Starting Mon 10/23/2017, Print      benzonatate (TESSALON) 200 MG capsule Take 1 capsule by mouth 3 (three) times a day as needed for cough, Starting Tue 51/5651/56/5253, Normal      folic acid (FOLVITE) 1 mg tablet Take 1 tablet by mouth daily, Starting Mon 10/23/2017, Print      Guaifenesin 1200 MG TB12 Take 0 5 tablets by mouth every 12 (twelve) hours, Starting Tue 11/14/2017, Print      naproxen sodium (ANAPROX) 550 mg tablet Take 1 tablet by mouth 2 (two) times a day with meals, Starting Sat 12/9/2017, Print      nicotine (NICODERM CQ) 14 mg/24hr TD 24 hr patch Place 1 patch on the skin daily, Starting Tue 11/14/2017, Print      thiamine 100 MG tablet Take 1 tablet by mouth daily, Starting Mon 10/23/2017, Print           No discharge procedures on file      ED Provider  Electronically Signed by           Madelyn Harris PA-C  01/15/18 2428

## 2018-01-22 VITALS
DIASTOLIC BLOOD PRESSURE: 85 MMHG | WEIGHT: 162.25 LBS | HEIGHT: 72 IN | RESPIRATION RATE: 14 BRPM | BODY MASS INDEX: 21.98 KG/M2 | TEMPERATURE: 97.5 F | SYSTOLIC BLOOD PRESSURE: 143 MMHG | HEART RATE: 113 BPM

## 2020-01-01 ENCOUNTER — APPOINTMENT (INPATIENT)
Dept: RADIOLOGY | Facility: HOSPITAL | Age: 55
DRG: 021 | End: 2020-01-01
Attending: INTERNAL MEDICINE
Payer: COMMERCIAL

## 2020-01-01 ENCOUNTER — APPOINTMENT (INPATIENT)
Dept: RADIOLOGY | Facility: HOSPITAL | Age: 55
DRG: 021 | End: 2020-01-01
Payer: COMMERCIAL

## 2020-01-01 ENCOUNTER — APPOINTMENT (INPATIENT)
Dept: NON INVASIVE DIAGNOSTICS | Facility: HOSPITAL | Age: 55
DRG: 021 | End: 2020-01-01
Payer: COMMERCIAL

## 2020-01-01 ENCOUNTER — APPOINTMENT (INPATIENT)
Dept: GASTROENTEROLOGY | Facility: HOSPITAL | Age: 55
DRG: 021 | End: 2020-01-01
Payer: COMMERCIAL

## 2020-01-01 ENCOUNTER — APPOINTMENT (EMERGENCY)
Dept: RADIOLOGY | Facility: HOSPITAL | Age: 55
DRG: 021 | End: 2020-01-01
Payer: COMMERCIAL

## 2020-01-01 ENCOUNTER — HOSPITAL ENCOUNTER (INPATIENT)
Facility: HOSPITAL | Age: 55
LOS: 5 days | DRG: 021 | End: 2020-01-08
Attending: EMERGENCY MEDICINE | Admitting: EMERGENCY MEDICINE
Payer: COMMERCIAL

## 2020-01-01 ENCOUNTER — ANESTHESIA EVENT (INPATIENT)
Dept: PERIOP | Facility: HOSPITAL | Age: 55
DRG: 021 | End: 2020-01-01
Payer: COMMERCIAL

## 2020-01-01 ENCOUNTER — ANESTHESIA (INPATIENT)
Dept: PERIOP | Facility: HOSPITAL | Age: 55
DRG: 021 | End: 2020-01-01
Payer: COMMERCIAL

## 2020-01-01 DIAGNOSIS — I61.9 HEMORRHAGIC STROKE (HCC): ICD-10-CM

## 2020-01-01 DIAGNOSIS — I61.9 NONTRAUMATIC ACUTE HEMORRHAGE OF BASAL GANGLIA (HCC): Primary | ICD-10-CM

## 2020-01-01 LAB
ABO GROUP BLD BPU: NORMAL
ABO GROUP BLD: NORMAL
ALBUMIN SERPL BCP-MCNC: 2.4 G/DL (ref 3.5–5)
ALBUMIN SERPL BCP-MCNC: 2.4 G/DL (ref 3.5–5)
ALBUMIN SERPL BCP-MCNC: 2.5 G/DL (ref 3.5–5)
ALBUMIN SERPL BCP-MCNC: 2.5 G/DL (ref 3.5–5)
ALBUMIN SERPL BCP-MCNC: 2.7 G/DL (ref 3.5–5)
ALBUMIN SERPL BCP-MCNC: 2.7 G/DL (ref 3.5–5)
ALBUMIN SERPL BCP-MCNC: 3.1 G/DL (ref 3.5–5)
ALBUMIN SERPL BCP-MCNC: 3.5 G/DL (ref 3.5–5)
ALBUMIN SERPL BCP-MCNC: 3.6 G/DL (ref 3.5–5)
ALP SERPL-CCNC: 58 U/L (ref 46–116)
ALP SERPL-CCNC: 58 U/L (ref 46–116)
ALP SERPL-CCNC: 59 U/L (ref 46–116)
ALP SERPL-CCNC: 64 U/L (ref 46–116)
ALP SERPL-CCNC: 64 U/L (ref 46–116)
ALP SERPL-CCNC: 66 U/L (ref 46–116)
ALP SERPL-CCNC: 67 U/L (ref 46–116)
ALP SERPL-CCNC: 74 U/L (ref 46–116)
ALP SERPL-CCNC: 78 U/L (ref 46–116)
ALT SERPL W P-5'-P-CCNC: 110 U/L (ref 12–78)
ALT SERPL W P-5'-P-CCNC: 71 U/L (ref 12–78)
ALT SERPL W P-5'-P-CCNC: 71 U/L (ref 12–78)
ALT SERPL W P-5'-P-CCNC: 73 U/L (ref 12–78)
ALT SERPL W P-5'-P-CCNC: 74 U/L (ref 12–78)
ALT SERPL W P-5'-P-CCNC: 74 U/L (ref 12–78)
ALT SERPL W P-5'-P-CCNC: 76 U/L (ref 12–78)
ALT SERPL W P-5'-P-CCNC: 77 U/L (ref 12–78)
ALT SERPL W P-5'-P-CCNC: 85 U/L (ref 12–78)
AMPHETAMINES SERPL QL SCN: NEGATIVE
AMYLASE SERPL-CCNC: 141 IU/L (ref 25–115)
AMYLASE SERPL-CCNC: 171 IU/L (ref 25–115)
AMYLASE SERPL-CCNC: 194 IU/L (ref 25–115)
AMYLASE SERPL-CCNC: 199 IU/L (ref 25–115)
AMYLASE SERPL-CCNC: 255 IU/L (ref 25–115)
AMYLASE SERPL-CCNC: 346 IU/L (ref 25–115)
AMYLASE SERPL-CCNC: 480 IU/L (ref 25–115)
ANCILLARY VALUES: ABNORMAL
ANCILLARY VALUES: ABNORMAL
ANION GAP BLD CALC-SCNC: 14 MMOL/L (ref 4–13)
ANION GAP SERPL CALCULATED.3IONS-SCNC: 3 MMOL/L (ref 4–13)
ANION GAP SERPL CALCULATED.3IONS-SCNC: 4 MMOL/L (ref 4–13)
ANION GAP SERPL CALCULATED.3IONS-SCNC: 5 MMOL/L (ref 4–13)
ANION GAP SERPL CALCULATED.3IONS-SCNC: 6 MMOL/L (ref 4–13)
ANION GAP SERPL CALCULATED.3IONS-SCNC: 6 MMOL/L (ref 4–13)
ANION GAP SERPL CALCULATED.3IONS-SCNC: 8 MMOL/L (ref 4–13)
ANISOCYTOSIS BLD QL SMEAR: PRESENT
ANISOCYTOSIS BLD QL SMEAR: PRESENT
APTT PPP: 23 SECONDS (ref 23–37)
APTT PPP: 26 SECONDS (ref 23–37)
APTT PPP: 28 SECONDS (ref 23–37)
APTT PPP: 29 SECONDS (ref 23–37)
ARTERIAL PATENCY WRIST A: YES
AST SERPL W P-5'-P-CCNC: 41 U/L (ref 5–45)
AST SERPL W P-5'-P-CCNC: 42 U/L (ref 5–45)
AST SERPL W P-5'-P-CCNC: 43 U/L (ref 5–45)
AST SERPL W P-5'-P-CCNC: 46 U/L (ref 5–45)
AST SERPL W P-5'-P-CCNC: 48 U/L (ref 5–45)
AST SERPL W P-5'-P-CCNC: 50 U/L (ref 5–45)
AST SERPL W P-5'-P-CCNC: 54 U/L (ref 5–45)
AST SERPL W P-5'-P-CCNC: 56 U/L (ref 5–45)
AST SERPL W P-5'-P-CCNC: 82 U/L (ref 5–45)
ATRIAL RATE: 85 BPM
BACTERIA CSF CULT: NO GROWTH
BACTERIA UR QL AUTO: ABNORMAL /HPF
BARBITURATES UR QL: NEGATIVE
BASE EXCESS BLDA CALC-SCNC: -0.1 MMOL/L
BASE EXCESS BLDA CALC-SCNC: -0.8 MMOL/L
BASE EXCESS BLDA CALC-SCNC: -1.3 MMOL/L
BASE EXCESS BLDA CALC-SCNC: 0 MMOL/L (ref -2–3)
BASE EXCESS BLDA CALC-SCNC: 0.3 MMOL/L
BASE EXCESS BLDA CALC-SCNC: 0.7 MMOL/L
BASE EXCESS BLDA CALC-SCNC: 1 MMOL/L
BASE EXCESS BLDA CALC-SCNC: 1 MMOL/L (ref -2–3)
BASE EXCESS BLDA CALC-SCNC: 1.1 MMOL/L
BASE EXCESS BLDA CALC-SCNC: 1.3 MMOL/L
BASE EXCESS BLDA CALC-SCNC: 1.7 MMOL/L
BASE EXCESS BLDA CALC-SCNC: 2 MMOL/L (ref -2–3)
BASE EXCESS BLDA CALC-SCNC: 2 MMOL/L (ref -2–3)
BASE EXCESS BLDA CALC-SCNC: 3 MMOL/L (ref -2–3)
BASOPHILS # BLD AUTO: 0.01 THOUSANDS/ΜL (ref 0–0.1)
BASOPHILS # BLD AUTO: 0.01 THOUSANDS/ΜL (ref 0–0.1)
BASOPHILS # BLD AUTO: 0.02 THOUSANDS/ΜL (ref 0–0.1)
BASOPHILS # BLD AUTO: 0.02 THOUSANDS/ΜL (ref 0–0.1)
BASOPHILS # BLD AUTO: 0.03 THOUSANDS/ΜL (ref 0–0.1)
BASOPHILS # BLD AUTO: 0.03 THOUSANDS/ΜL (ref 0–0.1)
BASOPHILS # BLD AUTO: 0.04 THOUSANDS/ΜL (ref 0–0.1)
BASOPHILS # BLD MANUAL: 0 THOUSAND/UL (ref 0–0.1)
BASOPHILS NFR BLD AUTO: 0 % (ref 0–1)
BASOPHILS NFR MAR MANUAL: 0 % (ref 0–1)
BENZODIAZ UR QL: NEGATIVE
BILIRUB DIRECT SERPL-MCNC: 0.28 MG/DL (ref 0–0.2)
BILIRUB DIRECT SERPL-MCNC: 0.29 MG/DL (ref 0–0.2)
BILIRUB DIRECT SERPL-MCNC: 0.35 MG/DL (ref 0–0.2)
BILIRUB DIRECT SERPL-MCNC: 0.45 MG/DL (ref 0–0.2)
BILIRUB DIRECT SERPL-MCNC: 0.47 MG/DL (ref 0–0.2)
BILIRUB DIRECT SERPL-MCNC: 0.69 MG/DL (ref 0–0.2)
BILIRUB SERPL-MCNC: 0.63 MG/DL (ref 0.2–1)
BILIRUB SERPL-MCNC: 0.64 MG/DL (ref 0.2–1)
BILIRUB SERPL-MCNC: 0.68 MG/DL (ref 0.2–1)
BILIRUB SERPL-MCNC: 0.77 MG/DL (ref 0.2–1)
BILIRUB SERPL-MCNC: 0.85 MG/DL (ref 0.2–1)
BILIRUB SERPL-MCNC: 0.92 MG/DL (ref 0.2–1)
BILIRUB SERPL-MCNC: 1.01 MG/DL (ref 0.2–1)
BILIRUB SERPL-MCNC: 1.07 MG/DL (ref 0.2–1)
BILIRUB SERPL-MCNC: 1.14 MG/DL (ref 0.2–1)
BILIRUB UR QL STRIP: NEGATIVE
BLD GP AB SCN SERPL QL: NEGATIVE
BODY TEMPERATURE: 97.7 DEGREES FEHRENHEIT
BODY TEMPERATURE: 97.9 DEGREES FEHRENHEIT
BODY TEMPERATURE: 97.9 DEGREES FEHRENHEIT
BODY TEMPERATURE: 98.2 DEGREES FEHRENHEIT
BODY TEMPERATURE: 98.4 DEGREES FEHRENHEIT
BODY TEMPERATURE: 99 DEGREES FEHRENHEIT
BODY TEMPERATURE: 99.1 DEGREES FEHRENHEIT
BODY TEMPERATURE: 99.3 DEGREES FEHRENHEIT
BPU ID: NORMAL
BUN BLD-MCNC: 8 MG/DL (ref 5–25)
BUN SERPL-MCNC: 10 MG/DL (ref 5–25)
BUN SERPL-MCNC: 10 MG/DL (ref 5–25)
BUN SERPL-MCNC: 11 MG/DL (ref 5–25)
BUN SERPL-MCNC: 12 MG/DL (ref 5–25)
BUN SERPL-MCNC: 13 MG/DL (ref 5–25)
BUN SERPL-MCNC: 7 MG/DL (ref 5–25)
BUN SERPL-MCNC: 9 MG/DL (ref 5–25)
CA-I BLD-SCNC: 1.09 MMOL/L (ref 1.12–1.32)
CA-I BLD-SCNC: 1.11 MMOL/L (ref 1.12–1.32)
CA-I BLD-SCNC: 1.19 MMOL/L (ref 1.12–1.32)
CA-I BLD-SCNC: 1.2 MMOL/L (ref 1.12–1.32)
CA-I BLD-SCNC: 1.23 MMOL/L (ref 1.12–1.32)
CA-I BLD-SCNC: 1.25 MMOL/L (ref 1.12–1.32)
CALCIUM SERPL-MCNC: 10.1 MG/DL (ref 8.3–10.1)
CALCIUM SERPL-MCNC: 7.6 MG/DL (ref 8.3–10.1)
CALCIUM SERPL-MCNC: 7.8 MG/DL (ref 8.3–10.1)
CALCIUM SERPL-MCNC: 7.9 MG/DL (ref 8.3–10.1)
CALCIUM SERPL-MCNC: 8 MG/DL (ref 8.3–10.1)
CALCIUM SERPL-MCNC: 8.2 MG/DL (ref 8.3–10.1)
CALCIUM SERPL-MCNC: 8.3 MG/DL (ref 8.3–10.1)
CALCIUM SERPL-MCNC: 8.4 MG/DL (ref 8.3–10.1)
CALCIUM SERPL-MCNC: 8.5 MG/DL (ref 8.3–10.1)
CALCIUM SERPL-MCNC: 8.5 MG/DL (ref 8.3–10.1)
CALCIUM SERPL-MCNC: 8.6 MG/DL (ref 8.3–10.1)
CALCIUM SERPL-MCNC: 9.6 MG/DL (ref 8.3–10.1)
CALCIUM SERPL-MCNC: 9.8 MG/DL (ref 8.3–10.1)
CHLORIDE BLD-SCNC: 99 MMOL/L (ref 100–108)
CHLORIDE SERPL-SCNC: 100 MMOL/L (ref 100–108)
CHLORIDE SERPL-SCNC: 102 MMOL/L (ref 100–108)
CHLORIDE SERPL-SCNC: 103 MMOL/L (ref 100–108)
CHLORIDE SERPL-SCNC: 105 MMOL/L (ref 100–108)
CHLORIDE SERPL-SCNC: 107 MMOL/L (ref 100–108)
CHLORIDE SERPL-SCNC: 110 MMOL/L (ref 100–108)
CHLORIDE SERPL-SCNC: 110 MMOL/L (ref 100–108)
CHLORIDE SERPL-SCNC: 111 MMOL/L (ref 100–108)
CHLORIDE SERPL-SCNC: 115 MMOL/L (ref 100–108)
CHLORIDE SERPL-SCNC: 117 MMOL/L (ref 100–108)
CHLORIDE SERPL-SCNC: 120 MMOL/L (ref 100–108)
CHLORIDE SERPL-SCNC: 121 MMOL/L (ref 100–108)
CHLORIDE SERPL-SCNC: 122 MMOL/L (ref 100–108)
CHLORIDE SERPL-SCNC: 126 MMOL/L (ref 100–108)
CHOLEST SERPL-MCNC: 245 MG/DL (ref 50–200)
CK MB SERPL-MCNC: 1 % (ref 0–2.5)
CK MB SERPL-MCNC: 1.4 % (ref 0–2.5)
CK MB SERPL-MCNC: 1.7 NG/ML (ref 0–5)
CK MB SERPL-MCNC: 2.3 NG/ML (ref 0–5)
CK SERPL-CCNC: 146 U/L (ref 39–308)
CK SERPL-CCNC: 149 U/L (ref 39–308)
CK SERPL-CCNC: 154 U/L (ref 39–308)
CK SERPL-CCNC: 160 U/L (ref 39–308)
CK SERPL-CCNC: 165 U/L (ref 39–308)
CK SERPL-CCNC: 166 U/L (ref 39–308)
CLARITY UR: ABNORMAL
CLARITY UR: CLEAR
CO2 SERPL-SCNC: 25 MMOL/L (ref 21–32)
CO2 SERPL-SCNC: 25 MMOL/L (ref 21–32)
CO2 SERPL-SCNC: 26 MMOL/L (ref 21–32)
CO2 SERPL-SCNC: 27 MMOL/L (ref 21–32)
CO2 SERPL-SCNC: 27 MMOL/L (ref 21–32)
CO2 SERPL-SCNC: 29 MMOL/L (ref 21–32)
CO2 SERPL-SCNC: 30 MMOL/L (ref 21–32)
CO2 SERPL-SCNC: 31 MMOL/L (ref 21–32)
COARSE GRAN CASTS URNS QL MICRO: ABNORMAL /LPF
COCAINE UR QL: NEGATIVE
COLOR UR: ABNORMAL
COLOR UR: ABNORMAL
COLOR UR: YELLOW
CREAT BLD-MCNC: 0.5 MG/DL (ref 0.6–1.3)
CREAT SERPL-MCNC: 0.54 MG/DL (ref 0.6–1.3)
CREAT SERPL-MCNC: 0.54 MG/DL (ref 0.6–1.3)
CREAT SERPL-MCNC: 0.55 MG/DL (ref 0.6–1.3)
CREAT SERPL-MCNC: 0.56 MG/DL (ref 0.6–1.3)
CREAT SERPL-MCNC: 0.58 MG/DL (ref 0.6–1.3)
CREAT SERPL-MCNC: 0.6 MG/DL (ref 0.6–1.3)
CREAT SERPL-MCNC: 0.61 MG/DL (ref 0.6–1.3)
CREAT SERPL-MCNC: 0.62 MG/DL (ref 0.6–1.3)
CREAT SERPL-MCNC: 0.67 MG/DL (ref 0.6–1.3)
CREAT SERPL-MCNC: 0.68 MG/DL (ref 0.6–1.3)
CREAT SERPL-MCNC: 0.72 MG/DL (ref 0.6–1.3)
CREAT SERPL-MCNC: 0.73 MG/DL (ref 0.6–1.3)
CROSSMATCH: NORMAL
DS:DELIVERY SYSTEM: AC
DS:DELIVERY SYSTEM: AC
EOSINOPHIL # BLD AUTO: 0 THOUSAND/ΜL (ref 0–0.61)
EOSINOPHIL # BLD AUTO: 0 THOUSAND/ΜL (ref 0–0.61)
EOSINOPHIL # BLD AUTO: 0.01 THOUSAND/ΜL (ref 0–0.61)
EOSINOPHIL # BLD AUTO: 0.06 THOUSAND/ΜL (ref 0–0.61)
EOSINOPHIL # BLD MANUAL: 0 THOUSAND/UL (ref 0–0.4)
EOSINOPHIL # BLD MANUAL: 0 THOUSAND/UL (ref 0–0.4)
EOSINOPHIL # BLD MANUAL: 0.09 THOUSAND/UL (ref 0–0.4)
EOSINOPHIL NFR BLD AUTO: 0 % (ref 0–6)
EOSINOPHIL NFR BLD AUTO: 1 % (ref 0–6)
EOSINOPHIL NFR BLD MANUAL: 0 % (ref 0–6)
EOSINOPHIL NFR BLD MANUAL: 0 % (ref 0–6)
EOSINOPHIL NFR BLD MANUAL: 1 % (ref 0–6)
ERYTHROCYTE [DISTWIDTH] IN BLOOD BY AUTOMATED COUNT: 12 % (ref 11.6–15.1)
ERYTHROCYTE [DISTWIDTH] IN BLOOD BY AUTOMATED COUNT: 12.1 % (ref 11.6–15.1)
ERYTHROCYTE [DISTWIDTH] IN BLOOD BY AUTOMATED COUNT: 12.3 % (ref 11.6–15.1)
ERYTHROCYTE [DISTWIDTH] IN BLOOD BY AUTOMATED COUNT: 12.4 % (ref 11.6–15.1)
ERYTHROCYTE [DISTWIDTH] IN BLOOD BY AUTOMATED COUNT: 12.7 % (ref 11.6–15.1)
ERYTHROCYTE [DISTWIDTH] IN BLOOD BY AUTOMATED COUNT: 12.8 % (ref 11.6–15.1)
ERYTHROCYTE [DISTWIDTH] IN BLOOD BY AUTOMATED COUNT: 12.8 % (ref 11.6–15.1)
ERYTHROCYTE [DISTWIDTH] IN BLOOD BY AUTOMATED COUNT: 12.9 % (ref 11.6–15.1)
ERYTHROCYTE [DISTWIDTH] IN BLOOD BY AUTOMATED COUNT: 12.9 % (ref 11.6–15.1)
EST. AVERAGE GLUCOSE BLD GHB EST-MCNC: 88 MG/DL
ETHANOL SERPL-MCNC: <3 MG/DL (ref 0–3)
FINE GRAN CASTS URNS QL MICRO: ABNORMAL /LPF
FIO2 GAS DIL.REBREATH: 100 L
FIO2 GAS DIL.REBREATH: 40 L
GFR SERPL CREATININE-BSD FRML MDRD: 107 ML/MIN/1.73SQ M
GFR SERPL CREATININE-BSD FRML MDRD: 107 ML/MIN/1.73SQ M
GFR SERPL CREATININE-BSD FRML MDRD: 110 ML/MIN/1.73SQ M
GFR SERPL CREATININE-BSD FRML MDRD: 110 ML/MIN/1.73SQ M
GFR SERPL CREATININE-BSD FRML MDRD: 114 ML/MIN/1.73SQ M
GFR SERPL CREATININE-BSD FRML MDRD: 115 ML/MIN/1.73SQ M
GFR SERPL CREATININE-BSD FRML MDRD: 116 ML/MIN/1.73SQ M
GFR SERPL CREATININE-BSD FRML MDRD: 117 ML/MIN/1.73SQ M
GFR SERPL CREATININE-BSD FRML MDRD: 119 ML/MIN/1.73SQ M
GFR SERPL CREATININE-BSD FRML MDRD: 120 ML/MIN/1.73SQ M
GFR SERPL CREATININE-BSD FRML MDRD: 121 ML/MIN/1.73SQ M
GFR SERPL CREATININE-BSD FRML MDRD: 121 ML/MIN/1.73SQ M
GFR SERPL CREATININE-BSD FRML MDRD: 125 ML/MIN/1.73SQ M
GGT SERPL-CCNC: 177 U/L (ref 5–85)
GGT SERPL-CCNC: 182 U/L (ref 5–85)
GGT SERPL-CCNC: 186 U/L (ref 5–85)
GGT SERPL-CCNC: 187 U/L (ref 5–85)
GGT SERPL-CCNC: 193 U/L (ref 5–85)
GGT SERPL-CCNC: 194 U/L (ref 5–85)
GIANT PLATELETS BLD QL SMEAR: PRESENT
GLUCOSE CSF-MCNC: 92 MG/DL (ref 50–80)
GLUCOSE SERPL-MCNC: 116 MG/DL (ref 65–140)
GLUCOSE SERPL-MCNC: 128 MG/DL (ref 65–140)
GLUCOSE SERPL-MCNC: 133 MG/DL (ref 65–140)
GLUCOSE SERPL-MCNC: 135 MG/DL (ref 65–140)
GLUCOSE SERPL-MCNC: 136 MG/DL (ref 65–140)
GLUCOSE SERPL-MCNC: 146 MG/DL (ref 65–140)
GLUCOSE SERPL-MCNC: 151 MG/DL (ref 65–140)
GLUCOSE SERPL-MCNC: 164 MG/DL (ref 65–140)
GLUCOSE SERPL-MCNC: 165 MG/DL (ref 65–140)
GLUCOSE SERPL-MCNC: 166 MG/DL (ref 65–140)
GLUCOSE SERPL-MCNC: 175 MG/DL (ref 65–140)
GLUCOSE SERPL-MCNC: 178 MG/DL (ref 65–140)
GLUCOSE SERPL-MCNC: 194 MG/DL (ref 65–140)
GLUCOSE SERPL-MCNC: 207 MG/DL (ref 65–140)
GLUCOSE SERPL-MCNC: 210 MG/DL (ref 65–140)
GLUCOSE SERPL-MCNC: 212 MG/DL (ref 65–140)
GLUCOSE SERPL-MCNC: 215 MG/DL (ref 65–140)
GLUCOSE SERPL-MCNC: 217 MG/DL (ref 65–140)
GLUCOSE SERPL-MCNC: 221 MG/DL (ref 65–140)
GLUCOSE SERPL-MCNC: 244 MG/DL (ref 65–140)
GLUCOSE SERPL-MCNC: 91 MG/DL (ref 65–140)
GLUCOSE UR STRIP-MCNC: ABNORMAL MG/DL
GLUCOSE UR STRIP-MCNC: NEGATIVE MG/DL
GRAM STN SPEC: NORMAL
GRAM STN SPEC: NORMAL
HBA1C MFR BLD: 4.7 % (ref 4.2–6.3)
HCO3 BLDA-SCNC: 21.7 MMOL/L (ref 22–28)
HCO3 BLDA-SCNC: 22.9 MMOL/L (ref 22–28)
HCO3 BLDA-SCNC: 23.2 MMOL/L (ref 22–28)
HCO3 BLDA-SCNC: 24.5 MMOL/L (ref 22–28)
HCO3 BLDA-SCNC: 25.3 MMOL/L (ref 22–28)
HCO3 BLDA-SCNC: 26.4 MMOL/L (ref 22–28)
HCO3 BLDA-SCNC: 26.7 MMOL/L (ref 24–30)
HCO3 BLDA-SCNC: 26.9 MMOL/L (ref 22–28)
HCO3 BLDA-SCNC: 27.4 MMOL/L (ref 22–28)
HCO3 BLDA-SCNC: 28.2 MMOL/L (ref 22–28)
HCO3 BLDA-SCNC: 28.7 MMOL/L (ref 22–28)
HCO3 BLDA-SCNC: 29.4 MMOL/L (ref 22–28)
HCO3 BLDA-SCNC: 30.9 MMOL/L (ref 22–28)
HCO3 BLDA-SCNC: 31.1 MMOL/L (ref 22–28)
HCT VFR BLD AUTO: 28.7 % (ref 36.5–49.3)
HCT VFR BLD AUTO: 28.9 % (ref 36.5–49.3)
HCT VFR BLD AUTO: 30 % (ref 36.5–49.3)
HCT VFR BLD AUTO: 30.3 % (ref 36.5–49.3)
HCT VFR BLD AUTO: 31.1 % (ref 36.5–49.3)
HCT VFR BLD AUTO: 32.1 % (ref 36.5–49.3)
HCT VFR BLD AUTO: 33.1 % (ref 36.5–49.3)
HCT VFR BLD AUTO: 34 % (ref 36.5–49.3)
HCT VFR BLD AUTO: 35.8 % (ref 36.5–49.3)
HCT VFR BLD AUTO: 36.3 % (ref 36.5–49.3)
HCT VFR BLD AUTO: 40 % (ref 36.5–49.3)
HCT VFR BLD CALC: 26 % (ref 36.5–49.3)
HCT VFR BLD CALC: 27 % (ref 36.5–49.3)
HCT VFR BLD CALC: 27 % (ref 36.5–49.3)
HCT VFR BLD CALC: 28 % (ref 36.5–49.3)
HCT VFR BLD CALC: 47 % (ref 36.5–49.3)
HCT VFR BLD CALC: 47 % (ref 36.5–49.3)
HDLC SERPL-MCNC: 139 MG/DL
HGB BLD-MCNC: 10.2 G/DL (ref 12–17)
HGB BLD-MCNC: 10.2 G/DL (ref 12–17)
HGB BLD-MCNC: 11 G/DL (ref 12–17)
HGB BLD-MCNC: 11.1 G/DL (ref 12–17)
HGB BLD-MCNC: 12.3 G/DL (ref 12–17)
HGB BLD-MCNC: 12.5 G/DL (ref 12–17)
HGB BLD-MCNC: 13.6 G/DL (ref 12–17)
HGB BLD-MCNC: 9.2 G/DL (ref 12–17)
HGB BLD-MCNC: 9.4 G/DL (ref 12–17)
HGB BLD-MCNC: 9.4 G/DL (ref 12–17)
HGB BLD-MCNC: 9.8 G/DL (ref 12–17)
HGB BLDA-MCNC: 16 G/DL (ref 12–17)
HGB BLDA-MCNC: 16 G/DL (ref 12–17)
HGB BLDA-MCNC: 8.8 G/DL (ref 12–17)
HGB BLDA-MCNC: 9.2 G/DL (ref 12–17)
HGB BLDA-MCNC: 9.2 G/DL (ref 12–17)
HGB BLDA-MCNC: 9.5 G/DL (ref 12–17)
HGB UR QL STRIP.AUTO: ABNORMAL
HGB UR QL STRIP.AUTO: NEGATIVE
HOROWITZ INDEX BLDA+IHG-RTO: 100 MM[HG]
HOROWITZ INDEX BLDA+IHG-RTO: 40 MM[HG]
HOROWITZ INDEX BLDA+IHG-RTO: 50 MM[HG]
HYALINE CASTS #/AREA URNS LPF: ABNORMAL /LPF
IMM GRANULOCYTES # BLD AUTO: 0.03 THOUSAND/UL (ref 0–0.2)
IMM GRANULOCYTES # BLD AUTO: 0.03 THOUSAND/UL (ref 0–0.2)
IMM GRANULOCYTES # BLD AUTO: 0.04 THOUSAND/UL (ref 0–0.2)
IMM GRANULOCYTES # BLD AUTO: 0.05 THOUSAND/UL (ref 0–0.2)
IMM GRANULOCYTES # BLD AUTO: 0.07 THOUSAND/UL (ref 0–0.2)
IMM GRANULOCYTES # BLD AUTO: 0.08 THOUSAND/UL (ref 0–0.2)
IMM GRANULOCYTES # BLD AUTO: 0.09 THOUSAND/UL (ref 0–0.2)
IMM GRANULOCYTES NFR BLD AUTO: 0 % (ref 0–2)
IMM GRANULOCYTES NFR BLD AUTO: 1 % (ref 0–2)
INR PPP: 0.84 (ref 0.84–1.19)
INR PPP: 0.88 (ref 0.84–1.19)
INR PPP: 0.95 (ref 0.84–1.19)
INR PPP: 0.96 (ref 0.84–1.19)
INR PPP: 1.03 (ref 0.84–1.19)
INR PPP: 1.03 (ref 0.84–1.19)
INR PPP: 1.05 (ref 0.84–1.19)
INR PPP: 1.07 (ref 0.84–1.19)
INR PPP: 1.09 (ref 0.84–1.19)
INR PPP: 1.1 (ref 0.84–1.19)
KETONES UR STRIP-MCNC: ABNORMAL MG/DL
KETONES UR STRIP-MCNC: ABNORMAL MG/DL
KETONES UR STRIP-MCNC: NEGATIVE MG/DL
LDLC SERPL CALC-MCNC: 87 MG/DL (ref 0–100)
LEUKOCYTE ESTERASE UR QL STRIP: ABNORMAL
LEUKOCYTE ESTERASE UR QL STRIP: NEGATIVE
LEUKOCYTE ESTERASE UR QL STRIP: NEGATIVE
LIPASE SERPL-CCNC: 33 U/L (ref 73–393)
LIPASE SERPL-CCNC: 36 U/L (ref 73–393)
LIPASE SERPL-CCNC: 42 U/L (ref 73–393)
LIPASE SERPL-CCNC: 43 U/L (ref 73–393)
LIPASE SERPL-CCNC: 71 U/L (ref 73–393)
LIPASE SERPL-CCNC: 79 U/L (ref 73–393)
LYMPHOCYTES # BLD AUTO: 0 % (ref 14–44)
LYMPHOCYTES # BLD AUTO: 0 THOUSAND/UL (ref 0.6–4.47)
LYMPHOCYTES # BLD AUTO: 0.17 THOUSAND/UL (ref 0.6–4.47)
LYMPHOCYTES # BLD AUTO: 0.21 THOUSANDS/ΜL (ref 0.6–4.47)
LYMPHOCYTES # BLD AUTO: 0.24 THOUSAND/UL (ref 0.6–4.47)
LYMPHOCYTES # BLD AUTO: 0.32 THOUSANDS/ΜL (ref 0.6–4.47)
LYMPHOCYTES # BLD AUTO: 0.39 THOUSANDS/ΜL (ref 0.6–4.47)
LYMPHOCYTES # BLD AUTO: 0.39 THOUSANDS/ΜL (ref 0.6–4.47)
LYMPHOCYTES # BLD AUTO: 0.66 THOUSANDS/ΜL (ref 0.6–4.47)
LYMPHOCYTES # BLD AUTO: 0.7 THOUSANDS/ΜL (ref 0.6–4.47)
LYMPHOCYTES # BLD AUTO: 0.85 THOUSANDS/ΜL (ref 0.6–4.47)
LYMPHOCYTES # BLD AUTO: 2 % (ref 14–44)
LYMPHOCYTES # BLD AUTO: 4 % (ref 14–44)
LYMPHOCYTES NFR BLD AUTO: 10 % (ref 14–44)
LYMPHOCYTES NFR BLD AUTO: 3 % (ref 14–44)
LYMPHOCYTES NFR BLD AUTO: 3 % (ref 14–44)
LYMPHOCYTES NFR BLD AUTO: 5 % (ref 14–44)
LYMPHOCYTES NFR BLD AUTO: 6 % (ref 14–44)
LYMPHOCYTES NFR BLD AUTO: 6 % (ref 14–44)
LYMPHOCYTES NFR BLD AUTO: 8 % (ref 14–44)
MACROCYTES BLD QL AUTO: PRESENT
MACROCYTES BLD QL AUTO: PRESENT
MAGNESIUM SERPL-MCNC: 2 MG/DL (ref 1.6–2.6)
MAGNESIUM SERPL-MCNC: 2.1 MG/DL (ref 1.6–2.6)
MAGNESIUM SERPL-MCNC: 2.3 MG/DL (ref 1.6–2.6)
MAGNESIUM SERPL-MCNC: 2.3 MG/DL (ref 1.6–2.6)
MAGNESIUM SERPL-MCNC: 2.4 MG/DL (ref 1.6–2.6)
MAGNESIUM SERPL-MCNC: 2.6 MG/DL (ref 1.6–2.6)
MAGNESIUM SERPL-MCNC: 2.7 MG/DL (ref 1.6–2.6)
MAGNESIUM SERPL-MCNC: 2.9 MG/DL (ref 1.6–2.6)
MAGNESIUM SERPL-MCNC: 3.1 MG/DL (ref 1.6–2.6)
MCH RBC QN AUTO: 33.6 PG (ref 26.8–34.3)
MCH RBC QN AUTO: 33.7 PG (ref 26.8–34.3)
MCH RBC QN AUTO: 33.8 PG (ref 26.8–34.3)
MCH RBC QN AUTO: 33.9 PG (ref 26.8–34.3)
MCH RBC QN AUTO: 34.1 PG (ref 26.8–34.3)
MCH RBC QN AUTO: 34.2 PG (ref 26.8–34.3)
MCH RBC QN AUTO: 34.3 PG (ref 26.8–34.3)
MCH RBC QN AUTO: 34.3 PG (ref 26.8–34.3)
MCH RBC QN AUTO: 34.4 PG (ref 26.8–34.3)
MCH RBC QN AUTO: 34.4 PG (ref 26.8–34.3)
MCH RBC QN AUTO: 34.5 PG (ref 26.8–34.3)
MCHC RBC AUTO-ENTMCNC: 31 G/DL (ref 31.4–37.4)
MCHC RBC AUTO-ENTMCNC: 31.8 G/DL (ref 31.4–37.4)
MCHC RBC AUTO-ENTMCNC: 31.8 G/DL (ref 31.4–37.4)
MCHC RBC AUTO-ENTMCNC: 32.4 G/DL (ref 31.4–37.4)
MCHC RBC AUTO-ENTMCNC: 32.7 G/DL (ref 31.4–37.4)
MCHC RBC AUTO-ENTMCNC: 32.8 G/DL (ref 31.4–37.4)
MCHC RBC AUTO-ENTMCNC: 32.8 G/DL (ref 31.4–37.4)
MCHC RBC AUTO-ENTMCNC: 33.5 G/DL (ref 31.4–37.4)
MCHC RBC AUTO-ENTMCNC: 34 G/DL (ref 31.4–37.4)
MCHC RBC AUTO-ENTMCNC: 34.4 G/DL (ref 31.4–37.4)
MCHC RBC AUTO-ENTMCNC: 34.4 G/DL (ref 31.4–37.4)
MCV RBC AUTO: 100 FL (ref 82–98)
MCV RBC AUTO: 100 FL (ref 82–98)
MCV RBC AUTO: 103 FL (ref 82–98)
MCV RBC AUTO: 105 FL (ref 82–98)
MCV RBC AUTO: 106 FL (ref 82–98)
MCV RBC AUTO: 106 FL (ref 82–98)
MCV RBC AUTO: 108 FL (ref 82–98)
MCV RBC AUTO: 109 FL (ref 82–98)
MCV RBC AUTO: 99 FL (ref 82–98)
METHADONE UR QL: NEGATIVE
MONOCYTES # BLD AUTO: 0.12 THOUSAND/UL (ref 0–1.22)
MONOCYTES # BLD AUTO: 0.37 THOUSAND/UL (ref 0–1.22)
MONOCYTES # BLD AUTO: 0.43 THOUSAND/UL (ref 0–1.22)
MONOCYTES # BLD AUTO: 0.59 THOUSAND/ΜL (ref 0.17–1.22)
MONOCYTES # BLD AUTO: 0.6 THOUSAND/ΜL (ref 0.17–1.22)
MONOCYTES # BLD AUTO: 0.71 THOUSAND/ΜL (ref 0.17–1.22)
MONOCYTES # BLD AUTO: 0.72 THOUSAND/ΜL (ref 0.17–1.22)
MONOCYTES # BLD AUTO: 0.81 THOUSAND/ΜL (ref 0.17–1.22)
MONOCYTES # BLD AUTO: 0.85 THOUSAND/ΜL (ref 0.17–1.22)
MONOCYTES # BLD AUTO: 0.91 THOUSAND/ΜL (ref 0.17–1.22)
MONOCYTES NFR BLD AUTO: 12 % (ref 4–12)
MONOCYTES NFR BLD AUTO: 7 % (ref 4–12)
MONOCYTES NFR BLD AUTO: 8 % (ref 4–12)
MONOCYTES NFR BLD AUTO: 9 % (ref 4–12)
MONOCYTES NFR BLD AUTO: 9 % (ref 4–12)
MONOCYTES NFR BLD: 2 % (ref 4–12)
MONOCYTES NFR BLD: 4 % (ref 4–12)
MONOCYTES NFR BLD: 5 % (ref 4–12)
MYELOCYTES NFR BLD MANUAL: 1 % (ref 0–1)
NEUTROPHILS # BLD AUTO: 5.91 THOUSANDS/ΜL (ref 1.85–7.62)
NEUTROPHILS # BLD AUTO: 6.16 THOUSANDS/ΜL (ref 1.85–7.62)
NEUTROPHILS # BLD AUTO: 6.78 THOUSANDS/ΜL (ref 1.85–7.62)
NEUTROPHILS # BLD AUTO: 7.03 THOUSANDS/ΜL (ref 1.85–7.62)
NEUTROPHILS # BLD AUTO: 7.09 THOUSANDS/ΜL (ref 1.85–7.62)
NEUTROPHILS # BLD AUTO: 8.71 THOUSANDS/ΜL (ref 1.85–7.62)
NEUTROPHILS # BLD AUTO: 9.77 THOUSANDS/ΜL (ref 1.85–7.62)
NEUTROPHILS # BLD MANUAL: 5.71 THOUSAND/UL (ref 1.85–7.62)
NEUTROPHILS # BLD MANUAL: 7.99 THOUSAND/UL (ref 1.85–7.62)
NEUTROPHILS # BLD MANUAL: 8.76 THOUSAND/UL (ref 1.85–7.62)
NEUTS BAND NFR BLD MANUAL: 1 % (ref 0–8)
NEUTS BAND NFR BLD MANUAL: 13 % (ref 0–8)
NEUTS BAND NFR BLD MANUAL: 9 % (ref 0–8)
NEUTS SEG NFR BLD AUTO: 80 % (ref 43–75)
NEUTS SEG NFR BLD AUTO: 82 % (ref 43–75)
NEUTS SEG NFR BLD AUTO: 82 % (ref 43–75)
NEUTS SEG NFR BLD AUTO: 83 % (ref 43–75)
NEUTS SEG NFR BLD AUTO: 84 % (ref 43–75)
NEUTS SEG NFR BLD AUTO: 85 % (ref 43–75)
NEUTS SEG NFR BLD AUTO: 86 % (ref 43–75)
NEUTS SEG NFR BLD AUTO: 87 % (ref 43–75)
NEUTS SEG NFR BLD AUTO: 89 % (ref 43–75)
NEUTS SEG NFR BLD AUTO: 93 % (ref 43–75)
NITRITE UR QL STRIP: NEGATIVE
NON-SQ EPI CELLS URNS QL MICRO: ABNORMAL /HPF
NRBC BLD AUTO-RTO: 0 /100 WBCS
O2 CT BLDA-SCNC: 14.9 ML/DL (ref 16–23)
O2 CT BLDA-SCNC: 15 ML/DL (ref 16–23)
O2 CT BLDA-SCNC: 15 ML/DL (ref 16–23)
O2 CT BLDA-SCNC: 15.1 ML/DL (ref 16–23)
O2 CT BLDA-SCNC: 15.1 ML/DL (ref 16–23)
O2 CT BLDA-SCNC: 15.8 ML/DL (ref 16–23)
O2 CT BLDA-SCNC: 16.4 ML/DL (ref 16–23)
O2 CT BLDA-SCNC: 16.7 ML/DL (ref 16–23)
O2 CT BLDA-SCNC: 19.9 ML/DL (ref 16–23)
OPIATES UR QL SCN: NEGATIVE
OSMOLALITY UR: <50 MMOL/KG
OXYHGB MFR BLDA: 96.5 % (ref 94–97)
OXYHGB MFR BLDA: 98.1 % (ref 94–97)
OXYHGB MFR BLDA: 98.2 % (ref 94–97)
OXYHGB MFR BLDA: 98.9 % (ref 94–97)
OXYHGB MFR BLDA: 99.1 % (ref 94–97)
OXYHGB MFR BLDA: 99.3 % (ref 94–97)
OXYHGB MFR BLDA: 99.4 % (ref 94–97)
OXYHGB MFR BLDA: 99.5 % (ref 94–97)
OXYHGB MFR BLDA: 99.5 % (ref 94–97)
P AXIS: 72 DEGREES
PCO2 BLD: 100.4 MM HG (ref 36–44)
PCO2 BLD: 28 MMOL/L (ref 21–32)
PCO2 BLD: 28 MMOL/L (ref 21–32)
PCO2 BLD: 30 MMOL/L (ref 21–32)
PCO2 BLD: 31 MMOL/L (ref 21–32)
PCO2 BLD: 34 MMOL/L (ref 21–32)
PCO2 BLD: 34 MMOL/L (ref 21–32)
PCO2 BLD: 40.6 MM HG (ref 42–50)
PCO2 BLD: 54 MM HG (ref 36–44)
PCO2 BLD: 58.5 MM HG (ref 36–44)
PCO2 BLD: 84.2 MM HG (ref 36–44)
PCO2 BLDA: 30.9 MM HG (ref 36–44)
PCO2 BLDA: 33.1 MM HG (ref 36–44)
PCO2 BLDA: 33.9 MM HG (ref 36–44)
PCO2 BLDA: 34.3 MM HG (ref 36–44)
PCO2 BLDA: 40.6 MM HG (ref 36–44)
PCO2 BLDA: 43.4 MM HG (ref 36–44)
PCO2 BLDA: 48.8 MM HG (ref 36–44)
PCO2 BLDA: 53.6 MM HG (ref 36–44)
PCO2 BLDA: 57.3 MM HG (ref 36–44)
PCO2 TEMP ADJ BLDA: 30.4 MM HG (ref 36–44)
PCO2 TEMP ADJ BLDA: 32.5 MM HG (ref 36–44)
PCO2 TEMP ADJ BLDA: 33.2 MM HG (ref 36–44)
PCO2 TEMP ADJ BLDA: 34.9 MM HG (ref 36–44)
PCO2 TEMP ADJ BLDA: 40.4 MM HG (ref 36–44)
PCO2 TEMP ADJ BLDA: 53.1 MM HG (ref 36–44)
PCO2 TEMP ADJ BLDA: 58.1 MM HG (ref 36–44)
PCP UR QL: NEGATIVE
PEEP RESPIRATORY: 10 CM[H2O]
PEEP RESPIRATORY: 5 CM[H2O]
PEEP RESPIRATORY: 8 CM[H2O]
PH BLD: 7.1 [PH] (ref 7.35–7.45)
PH BLD: 7.17 [PH] (ref 7.35–7.45)
PH BLD: 7.29 [PH] (ref 7.35–7.45)
PH BLD: 7.3 [PH] (ref 7.35–7.45)
PH BLD: 7.34 [PH] (ref 7.35–7.45)
PH BLD: 7.34 [PH] (ref 7.35–7.45)
PH BLD: 7.41 [PH] (ref 7.35–7.45)
PH BLD: 7.43 [PH] (ref 7.3–7.4)
PH BLD: 7.46 [PH] (ref 7.35–7.45)
PH BLD: 7.46 [PH] (ref 7.35–7.45)
PH BLD: 7.47 [PH] (ref 7.35–7.45)
PH BLD: 7.47 [PH] (ref 7.35–7.45)
PH BLDA: 7.3 [PH] (ref 7.35–7.45)
PH BLDA: 7.34 [PH] (ref 7.35–7.45)
PH BLDA: 7.36 [PH] (ref 7.35–7.45)
PH BLDA: 7.4 [PH] (ref 7.35–7.45)
PH BLDA: 7.41 [PH] (ref 7.35–7.45)
PH BLDA: 7.45 [PH] (ref 7.35–7.45)
PH BLDA: 7.46 [PH] (ref 7.35–7.45)
PH BLDA: 7.46 [PH] (ref 7.35–7.45)
PH BLDA: 7.47 [PH] (ref 7.35–7.45)
PH UR STRIP.AUTO: 6 [PH]
PH UR STRIP.AUTO: 6.5 [PH]
PH UR STRIP.AUTO: 6.5 [PH]
PH UR STRIP.AUTO: 7 [PH]
PH UR STRIP.AUTO: 7.5 [PH]
PHOSPHATE SERPL-MCNC: 2.1 MG/DL (ref 2.7–4.5)
PHOSPHATE SERPL-MCNC: 2.3 MG/DL (ref 2.7–4.5)
PHOSPHATE SERPL-MCNC: 2.8 MG/DL (ref 2.7–4.5)
PHOSPHATE SERPL-MCNC: 2.8 MG/DL (ref 2.7–4.5)
PHOSPHATE SERPL-MCNC: 3.3 MG/DL (ref 2.7–4.5)
PHOSPHATE SERPL-MCNC: 3.4 MG/DL (ref 2.7–4.5)
PHOSPHATE SERPL-MCNC: 3.5 MG/DL (ref 2.7–4.5)
PHOSPHATE SERPL-MCNC: 3.8 MG/DL (ref 2.7–4.5)
PLATELET # BLD AUTO: 143 THOUSANDS/UL (ref 149–390)
PLATELET # BLD AUTO: 147 THOUSANDS/UL (ref 149–390)
PLATELET # BLD AUTO: 148 THOUSANDS/UL (ref 149–390)
PLATELET # BLD AUTO: 148 THOUSANDS/UL (ref 149–390)
PLATELET # BLD AUTO: 150 THOUSANDS/UL (ref 149–390)
PLATELET # BLD AUTO: 152 THOUSANDS/UL (ref 149–390)
PLATELET # BLD AUTO: 153 THOUSANDS/UL (ref 149–390)
PLATELET # BLD AUTO: 176 THOUSANDS/UL (ref 149–390)
PLATELET # BLD AUTO: 179 THOUSANDS/UL (ref 149–390)
PLATELET # BLD AUTO: 186 THOUSANDS/UL (ref 149–390)
PLATELET # BLD AUTO: 200 THOUSANDS/UL (ref 149–390)
PLATELET BLD QL SMEAR: ABNORMAL
PLATELET BLD QL SMEAR: ADEQUATE
PLATELET BLD QL SMEAR: ADEQUATE
PMV BLD AUTO: 10 FL (ref 8.9–12.7)
PMV BLD AUTO: 10.2 FL (ref 8.9–12.7)
PMV BLD AUTO: 10.4 FL (ref 8.9–12.7)
PMV BLD AUTO: 10.4 FL (ref 8.9–12.7)
PMV BLD AUTO: 10.5 FL (ref 8.9–12.7)
PMV BLD AUTO: 10.6 FL (ref 8.9–12.7)
PMV BLD AUTO: 10.7 FL (ref 8.9–12.7)
PMV BLD AUTO: 10.8 FL (ref 8.9–12.7)
PMV BLD AUTO: 10.8 FL (ref 8.9–12.7)
PMV BLD AUTO: 10.9 FL (ref 8.9–12.7)
PMV BLD AUTO: 11 FL (ref 8.9–12.7)
PO2 BLD: 121.8 MM HG (ref 75–129)
PO2 BLD: 289.1 MM HG (ref 75–129)
PO2 BLD: 295.5 MM HG (ref 75–129)
PO2 BLD: 304 MM HG (ref 75–129)
PO2 BLD: 333.4 MM HG (ref 75–129)
PO2 BLD: 345 MM HG (ref 75–129)
PO2 BLD: 348.6 MM HG (ref 75–129)
PO2 BLD: 378 MM HG (ref 75–129)
PO2 BLD: 380 MM HG (ref 75–129)
PO2 BLD: 391.3 MM HG (ref 75–129)
PO2 BLD: 427.4 MM HG (ref 75–129)
PO2 BLD: 94 MM HG (ref 35–45)
PO2 BLDA: 124.3 MM HG (ref 75–129)
PO2 BLDA: 168.5 MM HG (ref 75–129)
PO2 BLDA: 287.2 MM HG (ref 75–129)
PO2 BLDA: 296.5 MM HG (ref 75–129)
PO2 BLDA: 331.9 MM HG (ref 75–129)
PO2 BLDA: 351.2 MM HG (ref 75–129)
PO2 BLDA: 391.9 MM HG (ref 75–129)
PO2 BLDA: 429.8 MM HG (ref 75–129)
PO2 BLDA: 97.8 MM HG (ref 75–129)
POLYCHROMASIA BLD QL SMEAR: PRESENT
POLYCHROMASIA BLD QL SMEAR: PRESENT
POTASSIUM BLD-SCNC: 3.6 MMOL/L (ref 3.5–5.3)
POTASSIUM BLD-SCNC: 3.8 MMOL/L (ref 3.5–5.3)
POTASSIUM BLD-SCNC: 3.9 MMOL/L (ref 3.5–5.3)
POTASSIUM BLD-SCNC: 4 MMOL/L (ref 3.5–5.3)
POTASSIUM SERPL-SCNC: 3.2 MMOL/L (ref 3.5–5.3)
POTASSIUM SERPL-SCNC: 3.3 MMOL/L (ref 3.5–5.3)
POTASSIUM SERPL-SCNC: 3.5 MMOL/L (ref 3.5–5.3)
POTASSIUM SERPL-SCNC: 3.6 MMOL/L (ref 3.5–5.3)
POTASSIUM SERPL-SCNC: 3.7 MMOL/L (ref 3.5–5.3)
POTASSIUM SERPL-SCNC: 3.7 MMOL/L (ref 3.5–5.3)
POTASSIUM SERPL-SCNC: 3.8 MMOL/L (ref 3.5–5.3)
POTASSIUM SERPL-SCNC: 3.9 MMOL/L (ref 3.5–5.3)
POTASSIUM SERPL-SCNC: 4 MMOL/L (ref 3.5–5.3)
PR INTERVAL: 156 MS
PRESSURE SETTING: 10
PRESSURE SETTING: 10
PROT CSF-MCNC: 1807 MG/DL (ref 15–45)
PROT SERPL-MCNC: 6 G/DL (ref 6.4–8.2)
PROT SERPL-MCNC: 6 G/DL (ref 6.4–8.2)
PROT SERPL-MCNC: 6.3 G/DL (ref 6.4–8.2)
PROT SERPL-MCNC: 6.6 G/DL (ref 6.4–8.2)
PROT SERPL-MCNC: 6.7 G/DL (ref 6.4–8.2)
PROT SERPL-MCNC: 6.8 G/DL (ref 6.4–8.2)
PROT SERPL-MCNC: 7.2 G/DL (ref 6.4–8.2)
PROT SERPL-MCNC: 7.3 G/DL (ref 6.4–8.2)
PROT SERPL-MCNC: 8.4 G/DL (ref 6.4–8.2)
PROT UR STRIP-MCNC: ABNORMAL MG/DL
PROT UR STRIP-MCNC: NEGATIVE MG/DL
PROTHROMBIN TIME: 11.1 SECONDS (ref 11.6–14.5)
PROTHROMBIN TIME: 11.6 SECONDS (ref 11.6–14.5)
PROTHROMBIN TIME: 12.3 SECONDS (ref 11.6–14.5)
PROTHROMBIN TIME: 12.4 SECONDS (ref 11.6–14.5)
PROTHROMBIN TIME: 13.1 SECONDS (ref 11.6–14.5)
PROTHROMBIN TIME: 13.1 SECONDS (ref 11.6–14.5)
PROTHROMBIN TIME: 13.3 SECONDS (ref 11.6–14.5)
PROTHROMBIN TIME: 13.5 SECONDS (ref 11.6–14.5)
PROTHROMBIN TIME: 13.7 SECONDS (ref 11.6–14.5)
PROTHROMBIN TIME: 13.8 SECONDS (ref 11.6–14.5)
QRS AXIS: 83 DEGREES
QRSD INTERVAL: 100 MS
QT INTERVAL: 356 MS
QTC INTERVAL: 423 MS
RBC # BLD AUTO: 2.72 MILLION/UL (ref 3.88–5.62)
RBC # BLD AUTO: 2.79 MILLION/UL (ref 3.88–5.62)
RBC # BLD AUTO: 2.8 MILLION/UL (ref 3.88–5.62)
RBC # BLD AUTO: 2.85 MILLION/UL (ref 3.88–5.62)
RBC # BLD AUTO: 2.97 MILLION/UL (ref 3.88–5.62)
RBC # BLD AUTO: 3.01 MILLION/UL (ref 3.88–5.62)
RBC # BLD AUTO: 3.21 MILLION/UL (ref 3.88–5.62)
RBC # BLD AUTO: 3.23 MILLION/UL (ref 3.88–5.62)
RBC # BLD AUTO: 3.57 MILLION/UL (ref 3.88–5.62)
RBC # BLD AUTO: 3.66 MILLION/UL (ref 3.88–5.62)
RBC # BLD AUTO: 3.99 MILLION/UL (ref 3.88–5.62)
RBC # CSF MANUAL: ABNORMAL UL (ref 0–10)
RBC #/AREA URNS AUTO: ABNORMAL /HPF
RBC MORPH BLD: PRESENT
RESPIRATORY RATE: 18
RESPIRATORY RATE: 22
RH BLD: POSITIVE
SAO2 % BLD FROM PO2: 100 % (ref 60–85)
SAO2 % BLD FROM PO2: 97 % (ref 60–85)
SODIUM 24H UR-SCNC: 12 MOL/L
SODIUM BLD-SCNC: 134 MMOL/L (ref 136–145)
SODIUM BLD-SCNC: 135 MMOL/L (ref 136–145)
SODIUM BLD-SCNC: 139 MMOL/L (ref 136–145)
SODIUM BLD-SCNC: 140 MMOL/L (ref 136–145)
SODIUM SERPL-SCNC: 132 MMOL/L (ref 136–145)
SODIUM SERPL-SCNC: 136 MMOL/L (ref 136–145)
SODIUM SERPL-SCNC: 137 MMOL/L (ref 136–145)
SODIUM SERPL-SCNC: 138 MMOL/L (ref 136–145)
SODIUM SERPL-SCNC: 138 MMOL/L (ref 136–145)
SODIUM SERPL-SCNC: 140 MMOL/L (ref 136–145)
SODIUM SERPL-SCNC: 142 MMOL/L (ref 136–145)
SODIUM SERPL-SCNC: 143 MMOL/L (ref 136–145)
SODIUM SERPL-SCNC: 150 MMOL/L (ref 136–145)
SODIUM SERPL-SCNC: 151 MMOL/L (ref 136–145)
SODIUM SERPL-SCNC: 153 MMOL/L (ref 136–145)
SODIUM SERPL-SCNC: 154 MMOL/L (ref 136–145)
SODIUM SERPL-SCNC: 154 MMOL/L (ref 136–145)
SODIUM SERPL-SCNC: 155 MMOL/L (ref 136–145)
SODIUM SERPL-SCNC: 157 MMOL/L (ref 136–145)
SODIUM SERPL-SCNC: 160 MMOL/L (ref 136–145)
SP GR UR STRIP.AUTO: 1 (ref 1–1.03)
SP GR UR STRIP.AUTO: 1.02 (ref 1–1.03)
SP GR UR STRIP.AUTO: >1.045 (ref 1–1.03)
SPECIMEN EXPIRATION DATE: NORMAL
SPECIMEN SOURCE: ABNORMAL
T WAVE AXIS: 75 DEGREES
THC UR QL: NEGATIVE
TRIGL SERPL-MCNC: 96 MG/DL
TROPONIN I SERPL-MCNC: 0.04 NG/ML
TROPONIN I SERPL-MCNC: 0.06 NG/ML
TROPONIN I SERPL-MCNC: 0.07 NG/ML
TROPONIN I SERPL-MCNC: <0.02 NG/ML
TROPONIN I SERPL-MCNC: <0.02 NG/ML
UNIT DISPENSE STATUS: NORMAL
UNIT PRODUCT CODE: NORMAL
UNIT RH: NORMAL
UROBILINOGEN UR QL STRIP.AUTO: 0.2 E.U./DL
UROBILINOGEN UR QL STRIP.AUTO: 1 E.U./DL
VENT AC: 14
VENT AC: 16
VENT AC: 16
VENT AC: 18
VENT AC: 18
VENT AC: 19
VENT TYPE: ABNORMAL
VENT TYPE: ABNORMAL
VENT- AC: AC
VENTILATION VALUE: 500
VENTILATION VALUE: 500
VENTRICULAR RATE: 85 BPM
VT SETTING VENT: 500 ML
VT SETTING VENT: 600 ML
VT SETTING VENT: 650 ML
WBC # BLD AUTO: 11.38 THOUSAND/UL (ref 4.31–10.16)
WBC # BLD AUTO: 6.07 THOUSAND/UL (ref 4.31–10.16)
WBC # BLD AUTO: 6.94 THOUSAND/UL (ref 4.31–10.16)
WBC # BLD AUTO: 7.39 THOUSAND/UL (ref 4.31–10.16)
WBC # BLD AUTO: 7.8 THOUSAND/UL (ref 4.31–10.16)
WBC # BLD AUTO: 8.61 THOUSAND/UL (ref 4.31–10.16)
WBC # BLD AUTO: 8.68 THOUSAND/UL (ref 4.31–10.16)
WBC # BLD AUTO: 8.82 THOUSAND/UL (ref 4.31–10.16)
WBC # BLD AUTO: 9.22 THOUSAND/UL (ref 4.31–10.16)
WBC # BLD AUTO: 9.87 THOUSAND/UL (ref 4.31–10.16)
WBC # BLD AUTO: 9.92 THOUSAND/UL (ref 4.31–10.16)
WBC #/AREA URNS AUTO: ABNORMAL /HPF

## 2020-01-01 PROCEDURE — 99232 SBSQ HOSP IP/OBS MODERATE 35: CPT | Performed by: NEUROLOGICAL SURGERY

## 2020-01-01 PROCEDURE — NC001 PR NO CHARGE: Performed by: ANESTHESIOLOGY

## 2020-01-01 PROCEDURE — 80307 DRUG TEST PRSMV CHEM ANLYZR: CPT | Performed by: PHYSICIAN ASSISTANT

## 2020-01-01 PROCEDURE — 84484 ASSAY OF TROPONIN QUANT: CPT | Performed by: EMERGENCY MEDICINE

## 2020-01-01 PROCEDURE — 84100 ASSAY OF PHOSPHORUS: CPT | Performed by: EMERGENCY MEDICINE

## 2020-01-01 PROCEDURE — 99291 CRITICAL CARE FIRST HOUR: CPT | Performed by: PSYCHIATRY & NEUROLOGY

## 2020-01-01 PROCEDURE — 82805 BLOOD GASES W/O2 SATURATION: CPT | Performed by: EMERGENCY MEDICINE

## 2020-01-01 PROCEDURE — 80053 COMPREHEN METABOLIC PANEL: CPT | Performed by: EMERGENCY MEDICINE

## 2020-01-01 PROCEDURE — 82248 BILIRUBIN DIRECT: CPT | Performed by: EMERGENCY MEDICINE

## 2020-01-01 PROCEDURE — 83615 LACTATE (LD) (LDH) ENZYME: CPT | Performed by: EMERGENCY MEDICINE

## 2020-01-01 PROCEDURE — 87077 CULTURE AEROBIC IDENTIFY: CPT | Performed by: EMERGENCY MEDICINE

## 2020-01-01 PROCEDURE — 94760 N-INVAS EAR/PLS OXIMETRY 1: CPT

## 2020-01-01 PROCEDURE — 5A1945Z RESPIRATORY VENTILATION, 24-96 CONSECUTIVE HOURS: ICD-10-PCS | Performed by: EMERGENCY MEDICINE

## 2020-01-01 PROCEDURE — 83625 ASSAY OF LDH ENZYMES: CPT | Performed by: EMERGENCY MEDICINE

## 2020-01-01 PROCEDURE — 82977 ASSAY OF GGT: CPT | Performed by: EMERGENCY MEDICINE

## 2020-01-01 PROCEDURE — 85014 HEMATOCRIT: CPT

## 2020-01-01 PROCEDURE — 30233R1 TRANSFUSION OF NONAUTOLOGOUS PLATELETS INTO PERIPHERAL VEIN, PERCUTANEOUS APPROACH: ICD-10-PCS | Performed by: EMERGENCY MEDICINE

## 2020-01-01 PROCEDURE — 81001 URINALYSIS AUTO W/SCOPE: CPT | Performed by: EMERGENCY MEDICINE

## 2020-01-01 PROCEDURE — 86923 COMPATIBILITY TEST ELECTRIC: CPT

## 2020-01-01 PROCEDURE — 80048 BASIC METABOLIC PNL TOTAL CA: CPT | Performed by: INTERNAL MEDICINE

## 2020-01-01 PROCEDURE — 99285 EMERGENCY DEPT VISIT HI MDM: CPT | Performed by: EMERGENCY MEDICINE

## 2020-01-01 PROCEDURE — 71045 X-RAY EXAM CHEST 1 VIEW: CPT

## 2020-01-01 PROCEDURE — 4A133B1 MONITORING OF ARTERIAL PRESSURE, PERIPHERAL, PERCUTANEOUS APPROACH: ICD-10-PCS | Performed by: EMERGENCY MEDICINE

## 2020-01-01 PROCEDURE — 99292 CRITICAL CARE ADDL 30 MIN: CPT | Performed by: PHYSICIAN ASSISTANT

## 2020-01-01 PROCEDURE — 82330 ASSAY OF CALCIUM: CPT

## 2020-01-01 PROCEDURE — 82803 BLOOD GASES ANY COMBINATION: CPT

## 2020-01-01 PROCEDURE — 93306 TTE W/DOPPLER COMPLETE: CPT | Performed by: INTERNAL MEDICINE

## 2020-01-01 PROCEDURE — 96365 THER/PROPH/DIAG IV INF INIT: CPT

## 2020-01-01 PROCEDURE — 80061 LIPID PANEL: CPT | Performed by: PHYSICIAN ASSISTANT

## 2020-01-01 PROCEDURE — 99238 HOSP IP/OBS DSCHRG MGMT 30/<: CPT | Performed by: INTERNAL MEDICINE

## 2020-01-01 PROCEDURE — 94003 VENT MGMT INPAT SUBQ DAY: CPT

## 2020-01-01 PROCEDURE — 84157 ASSAY OF PROTEIN OTHER: CPT | Performed by: PHYSICIAN ASSISTANT

## 2020-01-01 PROCEDURE — 85025 COMPLETE CBC W/AUTO DIFF WBC: CPT | Performed by: EMERGENCY MEDICINE

## 2020-01-01 PROCEDURE — 81001 URINALYSIS AUTO W/SCOPE: CPT | Performed by: PHYSICIAN ASSISTANT

## 2020-01-01 PROCEDURE — 93005 ELECTROCARDIOGRAM TRACING: CPT

## 2020-01-01 PROCEDURE — 85610 PROTHROMBIN TIME: CPT | Performed by: EMERGENCY MEDICINE

## 2020-01-01 PROCEDURE — 85027 COMPLETE CBC AUTOMATED: CPT | Performed by: PHYSICIAN ASSISTANT

## 2020-01-01 PROCEDURE — 82150 ASSAY OF AMYLASE: CPT | Performed by: EMERGENCY MEDICINE

## 2020-01-01 PROCEDURE — 85730 THROMBOPLASTIN TIME PARTIAL: CPT | Performed by: INTERNAL MEDICINE

## 2020-01-01 PROCEDURE — 83036 HEMOGLOBIN GLYCOSYLATED A1C: CPT | Performed by: PHYSICIAN ASSISTANT

## 2020-01-01 PROCEDURE — 87205 SMEAR GRAM STAIN: CPT | Performed by: EMERGENCY MEDICINE

## 2020-01-01 PROCEDURE — 82947 ASSAY GLUCOSE BLOOD QUANT: CPT

## 2020-01-01 PROCEDURE — 99291 CRITICAL CARE FIRST HOUR: CPT | Performed by: INTERNAL MEDICINE

## 2020-01-01 PROCEDURE — 93306 TTE W/DOPPLER COMPLETE: CPT

## 2020-01-01 PROCEDURE — 94002 VENT MGMT INPAT INIT DAY: CPT

## 2020-01-01 PROCEDURE — 82550 ASSAY OF CK (CPK): CPT | Performed by: EMERGENCY MEDICINE

## 2020-01-01 PROCEDURE — 80048 BASIC METABOLIC PNL TOTAL CA: CPT | Performed by: EMERGENCY MEDICINE

## 2020-01-01 PROCEDURE — 89050 BODY FLUID CELL COUNT: CPT | Performed by: PHYSICIAN ASSISTANT

## 2020-01-01 PROCEDURE — 82150 ASSAY OF AMYLASE: CPT | Performed by: INTERNAL MEDICINE

## 2020-01-01 PROCEDURE — 85007 BL SMEAR W/DIFF WBC COUNT: CPT | Performed by: EMERGENCY MEDICINE

## 2020-01-01 PROCEDURE — 71250 CT THORAX DX C-: CPT

## 2020-01-01 PROCEDURE — 82945 GLUCOSE OTHER FLUID: CPT | Performed by: PHYSICIAN ASSISTANT

## 2020-01-01 PROCEDURE — C1769 GUIDE WIRE: HCPCS | Performed by: EMERGENCY MEDICINE

## 2020-01-01 PROCEDURE — 83935 ASSAY OF URINE OSMOLALITY: CPT | Performed by: EMERGENCY MEDICINE

## 2020-01-01 PROCEDURE — 70496 CT ANGIOGRAPHY HEAD: CPT

## 2020-01-01 PROCEDURE — 99233 SBSQ HOSP IP/OBS HIGH 50: CPT | Performed by: ANESTHESIOLOGY

## 2020-01-01 PROCEDURE — 84100 ASSAY OF PHOSPHORUS: CPT | Performed by: PHYSICIAN ASSISTANT

## 2020-01-01 PROCEDURE — 83735 ASSAY OF MAGNESIUM: CPT | Performed by: EMERGENCY MEDICINE

## 2020-01-01 PROCEDURE — 87070 CULTURE OTHR SPECIMN AEROBIC: CPT | Performed by: EMERGENCY MEDICINE

## 2020-01-01 PROCEDURE — 02PYX3Z REMOVAL OF INFUSION DEVICE FROM GREAT VESSEL, EXTERNAL APPROACH: ICD-10-PCS | Performed by: INTERNAL MEDICINE

## 2020-01-01 PROCEDURE — 80320 DRUG SCREEN QUANTALCOHOLS: CPT | Performed by: EMERGENCY MEDICINE

## 2020-01-01 PROCEDURE — 82948 REAGENT STRIP/BLOOD GLUCOSE: CPT

## 2020-01-01 PROCEDURE — 99233 SBSQ HOSP IP/OBS HIGH 50: CPT | Performed by: NEUROLOGICAL SURGERY

## 2020-01-01 PROCEDURE — 83690 ASSAY OF LIPASE: CPT | Performed by: EMERGENCY MEDICINE

## 2020-01-01 PROCEDURE — NC001 PR NO CHARGE: Performed by: NEUROLOGICAL SURGERY

## 2020-01-01 PROCEDURE — 36556 INSERT NON-TUNNEL CV CATH: CPT | Performed by: EMERGENCY MEDICINE

## 2020-01-01 PROCEDURE — 61107 TDH PNXR IMPLT VENTR CATH: CPT | Performed by: NEUROLOGICAL SURGERY

## 2020-01-01 PROCEDURE — 93454 CORONARY ARTERY ANGIO S&I: CPT | Performed by: EMERGENCY MEDICINE

## 2020-01-01 PROCEDURE — 85730 THROMBOPLASTIN TIME PARTIAL: CPT | Performed by: EMERGENCY MEDICINE

## 2020-01-01 PROCEDURE — 99285 EMERGENCY DEPT VISIT HI MDM: CPT

## 2020-01-01 PROCEDURE — 02HV33Z INSERTION OF INFUSION DEVICE INTO SUPERIOR VENA CAVA, PERCUTANEOUS APPROACH: ICD-10-PCS | Performed by: EMERGENCY MEDICINE

## 2020-01-01 PROCEDURE — 83735 ASSAY OF MAGNESIUM: CPT | Performed by: PHYSICIAN ASSISTANT

## 2020-01-01 PROCEDURE — 99291 CRITICAL CARE FIRST HOUR: CPT | Performed by: EMERGENCY MEDICINE

## 2020-01-01 PROCEDURE — C1894 INTRO/SHEATH, NON-LASER: HCPCS | Performed by: EMERGENCY MEDICINE

## 2020-01-01 PROCEDURE — 94664 DEMO&/EVAL PT USE INHALER: CPT

## 2020-01-01 PROCEDURE — P9035 PLATELET PHERES LEUKOREDUCED: HCPCS

## 2020-01-01 PROCEDURE — 93010 ELECTROCARDIOGRAM REPORT: CPT | Performed by: INTERNAL MEDICINE

## 2020-01-01 PROCEDURE — 82553 CREATINE MB FRACTION: CPT | Performed by: EMERGENCY MEDICINE

## 2020-01-01 PROCEDURE — 4A133J1 MONITORING OF ARTERIAL PULSE, PERIPHERAL, PERCUTANEOUS APPROACH: ICD-10-PCS | Performed by: EMERGENCY MEDICINE

## 2020-01-01 PROCEDURE — 82805 BLOOD GASES W/O2 SATURATION: CPT | Performed by: INTERNAL MEDICINE

## 2020-01-01 PROCEDURE — NC001 PR NO CHARGE: Performed by: EMERGENCY MEDICINE

## 2020-01-01 PROCEDURE — 84132 ASSAY OF SERUM POTASSIUM: CPT

## 2020-01-01 PROCEDURE — 84295 ASSAY OF SERUM SODIUM: CPT

## 2020-01-01 PROCEDURE — C1760 CLOSURE DEV, VASC: HCPCS | Performed by: EMERGENCY MEDICINE

## 2020-01-01 PROCEDURE — NC001 PR NO CHARGE: Performed by: INTERNAL MEDICINE

## 2020-01-01 PROCEDURE — 85027 COMPLETE CBC AUTOMATED: CPT | Performed by: EMERGENCY MEDICINE

## 2020-01-01 PROCEDURE — 85610 PROTHROMBIN TIME: CPT | Performed by: PHYSICIAN ASSISTANT

## 2020-01-01 PROCEDURE — 99255 IP/OBS CONSLTJ NEW/EST HI 80: CPT | Performed by: PHYSICIAN ASSISTANT

## 2020-01-01 PROCEDURE — 80053 COMPREHEN METABOLIC PANEL: CPT | Performed by: PHYSICIAN ASSISTANT

## 2020-01-01 PROCEDURE — 0BH17EZ INSERTION OF ENDOTRACHEAL AIRWAY INTO TRACHEA, VIA NATURAL OR ARTIFICIAL OPENING: ICD-10-PCS | Performed by: EMERGENCY MEDICINE

## 2020-01-01 PROCEDURE — 80048 BASIC METABOLIC PNL TOTAL CA: CPT | Performed by: PHYSICIAN ASSISTANT

## 2020-01-01 PROCEDURE — 74176 CT ABD & PELVIS W/O CONTRAST: CPT

## 2020-01-01 PROCEDURE — P9100 PATHOGEN TEST FOR PLATELETS: HCPCS

## 2020-01-01 PROCEDURE — 36620 INSERTION CATHETER ARTERY: CPT

## 2020-01-01 PROCEDURE — 94640 AIRWAY INHALATION TREATMENT: CPT

## 2020-01-01 PROCEDURE — 84300 ASSAY OF URINE SODIUM: CPT | Performed by: EMERGENCY MEDICINE

## 2020-01-01 PROCEDURE — 009630Z DRAINAGE OF CEREBRAL VENTRICLE WITH DRAINAGE DEVICE, PERCUTANEOUS APPROACH: ICD-10-PCS | Performed by: NEUROLOGICAL SURGERY

## 2020-01-01 PROCEDURE — 99233 SBSQ HOSP IP/OBS HIGH 50: CPT | Performed by: PSYCHIATRY & NEUROLOGY

## 2020-01-01 PROCEDURE — 85610 PROTHROMBIN TIME: CPT | Performed by: INTERNAL MEDICINE

## 2020-01-01 PROCEDURE — 86901 BLOOD TYPING SEROLOGIC RH(D): CPT | Performed by: EMERGENCY MEDICINE

## 2020-01-01 PROCEDURE — 0B9J8ZX DRAINAGE OF LEFT LOWER LUNG LOBE, VIA NATURAL OR ARTIFICIAL OPENING ENDOSCOPIC, DIAGNOSTIC: ICD-10-PCS | Performed by: INTERNAL MEDICINE

## 2020-01-01 PROCEDURE — 93454 CORONARY ARTERY ANGIO S&I: CPT | Performed by: INTERNAL MEDICINE

## 2020-01-01 PROCEDURE — 80047 BASIC METABLC PNL IONIZED CA: CPT

## 2020-01-01 PROCEDURE — 96374 THER/PROPH/DIAG INJ IV PUSH: CPT

## 2020-01-01 PROCEDURE — 70450 CT HEAD/BRAIN W/O DYE: CPT

## 2020-01-01 PROCEDURE — 86850 RBC ANTIBODY SCREEN: CPT | Performed by: EMERGENCY MEDICINE

## 2020-01-01 PROCEDURE — 99255 IP/OBS CONSLTJ NEW/EST HI 80: CPT | Performed by: NURSE PRACTITIONER

## 2020-01-01 PROCEDURE — 36415 COLL VENOUS BLD VENIPUNCTURE: CPT | Performed by: EMERGENCY MEDICINE

## 2020-01-01 PROCEDURE — 94669 MECHANICAL CHEST WALL OSCILL: CPT

## 2020-01-01 PROCEDURE — 31624 DX BRONCHOSCOPE/LAVAGE: CPT | Performed by: INTERNAL MEDICINE

## 2020-01-01 PROCEDURE — 86900 BLOOD TYPING SEROLOGIC ABO: CPT | Performed by: EMERGENCY MEDICINE

## 2020-01-01 RX ORDER — POTASSIUM CHLORIDE 20MEQ/15ML
40 LIQUID (ML) ORAL ONCE
Status: COMPLETED | OUTPATIENT
Start: 2020-01-01 | End: 2020-01-01

## 2020-01-01 RX ORDER — METOPROLOL TARTRATE 5 MG/5ML
5 INJECTION INTRAVENOUS ONCE
Status: COMPLETED | OUTPATIENT
Start: 2020-01-01 | End: 2020-01-01

## 2020-01-01 RX ORDER — FENTANYL CITRATE 50 UG/ML
50 INJECTION, SOLUTION INTRAMUSCULAR; INTRAVENOUS EVERY 2 HOUR PRN
Status: DISCONTINUED | OUTPATIENT
Start: 2020-01-01 | End: 2020-01-01

## 2020-01-01 RX ORDER — PROPOFOL 10 MG/ML
5-50 INJECTION, EMULSION INTRAVENOUS
Status: DISCONTINUED | OUTPATIENT
Start: 2020-01-01 | End: 2020-01-01

## 2020-01-01 RX ORDER — POTASSIUM CHLORIDE 20MEQ/15ML
20 LIQUID (ML) ORAL ONCE
Status: COMPLETED | OUTPATIENT
Start: 2020-01-01 | End: 2020-01-01

## 2020-01-01 RX ORDER — LIDOCAINE HYDROCHLORIDE 10 MG/ML
INJECTION, SOLUTION EPIDURAL; INFILTRATION; INTRACAUDAL; PERINEURAL
Status: COMPLETED
Start: 2020-01-01 | End: 2020-01-01

## 2020-01-01 RX ORDER — FENTANYL CITRATE-0.9 % NACL/PF 10 MCG/ML
50 PLASTIC BAG, INJECTION (ML) INTRAVENOUS CONTINUOUS
Status: DISCONTINUED | OUTPATIENT
Start: 2020-01-01 | End: 2020-01-01 | Stop reason: SDUPTHER

## 2020-01-01 RX ORDER — MANNITOL 20 G/100ML
50 INJECTION, SOLUTION INTRAVENOUS ONCE
Status: COMPLETED | OUTPATIENT
Start: 2020-01-01 | End: 2020-01-01

## 2020-01-01 RX ORDER — POTASSIUM CHLORIDE 29.8 MG/ML
40 INJECTION INTRAVENOUS ONCE
Status: COMPLETED | OUTPATIENT
Start: 2020-01-01 | End: 2020-01-01

## 2020-01-01 RX ORDER — CHLORHEXIDINE GLUCONATE 0.12 MG/ML
15 RINSE ORAL EVERY 12 HOURS SCHEDULED
Status: DISCONTINUED | OUTPATIENT
Start: 2020-01-01 | End: 2020-01-01

## 2020-01-01 RX ORDER — VASOPRESSIN 20 U/ML
1 INJECTION PARENTERAL ONCE
Status: COMPLETED | OUTPATIENT
Start: 2020-01-01 | End: 2020-01-01

## 2020-01-01 RX ORDER — POTASSIUM CHLORIDE 29.8 MG/ML
40 INJECTION INTRAVENOUS ONCE
Status: DISCONTINUED | OUTPATIENT
Start: 2020-01-01 | End: 2020-01-01

## 2020-01-01 RX ORDER — SODIUM CHLORIDE, SODIUM GLUCONATE, SODIUM ACETATE, POTASSIUM CHLORIDE, MAGNESIUM CHLORIDE, SODIUM PHOSPHATE, DIBASIC, AND POTASSIUM PHOSPHATE .53; .5; .37; .037; .03; .012; .00082 G/100ML; G/100ML; G/100ML; G/100ML; G/100ML; G/100ML; G/100ML
1000 INJECTION, SOLUTION INTRAVENOUS ONCE
Status: COMPLETED | OUTPATIENT
Start: 2020-01-01 | End: 2020-01-01

## 2020-01-01 RX ORDER — THIAMINE MONONITRATE (VIT B1) 100 MG
100 TABLET ORAL DAILY
Status: DISCONTINUED | OUTPATIENT
Start: 2020-01-01 | End: 2020-01-01

## 2020-01-01 RX ORDER — ACETAMINOPHEN 160 MG/5ML
650 SUSPENSION, ORAL (FINAL DOSE FORM) ORAL EVERY 6 HOURS PRN
Status: DISCONTINUED | OUTPATIENT
Start: 2020-01-01 | End: 2020-01-08 | Stop reason: HOSPADM

## 2020-01-01 RX ORDER — FENTANYL CITRATE 50 UG/ML
100 INJECTION, SOLUTION INTRAMUSCULAR; INTRAVENOUS ONCE
Status: COMPLETED | OUTPATIENT
Start: 2020-01-01 | End: 2020-01-01

## 2020-01-01 RX ORDER — FENTANYL CITRATE 50 UG/ML
INJECTION, SOLUTION INTRAMUSCULAR; INTRAVENOUS
Status: COMPLETED
Start: 2020-01-01 | End: 2020-01-01

## 2020-01-01 RX ORDER — AMPHOTERICIN B 50 MG/10ML
50 INJECTION, POWDER, LYOPHILIZED, FOR SOLUTION INTRAVENOUS ONCE
Status: DISCONTINUED | OUTPATIENT
Start: 2020-01-01 | End: 2020-01-08 | Stop reason: HOSPADM

## 2020-01-01 RX ORDER — LEVETIRACETAM 100 MG/ML
750 SOLUTION ORAL EVERY 12 HOURS SCHEDULED
Status: DISCONTINUED | OUTPATIENT
Start: 2020-01-01 | End: 2020-01-01

## 2020-01-01 RX ORDER — ONDANSETRON 2 MG/ML
INJECTION INTRAMUSCULAR; INTRAVENOUS
Status: COMPLETED
Start: 2020-01-01 | End: 2020-01-01

## 2020-01-01 RX ORDER — ETOMIDATE 2 MG/ML
20 INJECTION INTRAVENOUS ONCE
Status: COMPLETED | OUTPATIENT
Start: 2020-01-01 | End: 2020-01-01

## 2020-01-01 RX ORDER — DEXTROSE MONOHYDRATE 25 G/50ML
50 INJECTION, SOLUTION INTRAVENOUS ONCE
Status: COMPLETED | OUTPATIENT
Start: 2020-01-01 | End: 2020-01-01

## 2020-01-01 RX ORDER — ONDANSETRON 2 MG/ML
4 INJECTION INTRAMUSCULAR; INTRAVENOUS ONCE
Status: DISCONTINUED | OUTPATIENT
Start: 2020-01-01 | End: 2020-01-08 | Stop reason: HOSPADM

## 2020-01-01 RX ORDER — PROPOFOL 10 MG/ML
5-50 INJECTION, EMULSION INTRAVENOUS
Status: DISCONTINUED | OUTPATIENT
Start: 2020-01-01 | End: 2020-01-01 | Stop reason: DRUGHIGH

## 2020-01-01 RX ORDER — SODIUM CHLORIDE 450 MG/100ML
1000 INJECTION, SOLUTION INTRAVENOUS ONCE
Status: COMPLETED | OUTPATIENT
Start: 2020-01-01 | End: 2020-01-01

## 2020-01-01 RX ORDER — HYDRALAZINE HYDROCHLORIDE 20 MG/ML
10 INJECTION INTRAMUSCULAR; INTRAVENOUS EVERY 2 HOUR PRN
Status: DISCONTINUED | OUTPATIENT
Start: 2020-01-01 | End: 2020-01-01

## 2020-01-01 RX ORDER — CEFAZOLIN SODIUM 1 G/50ML
1000 SOLUTION INTRAVENOUS AS NEEDED
Status: DISCONTINUED | OUTPATIENT
Start: 2020-01-01 | End: 2020-01-01 | Stop reason: CLARIF

## 2020-01-01 RX ORDER — DEXTROSE MONOHYDRATE 50 MG/ML
100 INJECTION, SOLUTION INTRAVENOUS CONTINUOUS
Status: DISCONTINUED | OUTPATIENT
Start: 2020-01-01 | End: 2020-01-08 | Stop reason: HOSPADM

## 2020-01-01 RX ORDER — MAGNESIUM SULFATE HEPTAHYDRATE 40 MG/ML
2 INJECTION, SOLUTION INTRAVENOUS ONCE
Status: DISCONTINUED | OUTPATIENT
Start: 2020-01-01 | End: 2020-01-01

## 2020-01-01 RX ORDER — ALBUTEROL SULFATE 90 UG/1
4 AEROSOL, METERED RESPIRATORY (INHALATION) EVERY 4 HOURS PRN
Status: DISCONTINUED | OUTPATIENT
Start: 2020-01-01 | End: 2020-01-08 | Stop reason: HOSPADM

## 2020-01-01 RX ORDER — METHYLPREDNISOLONE SODIUM SUCCINATE 1 G/16ML
2000 INJECTION, POWDER, LYOPHILIZED, FOR SOLUTION INTRAMUSCULAR; INTRAVENOUS ONCE
Status: COMPLETED | OUTPATIENT
Start: 2020-01-01 | End: 2020-01-01

## 2020-01-01 RX ORDER — DESMOPRESSIN ACETATE 4 UG/ML
2 INJECTION, SOLUTION INTRAVENOUS; SUBCUTANEOUS ONCE
Status: COMPLETED | OUTPATIENT
Start: 2020-01-01 | End: 2020-01-01

## 2020-01-01 RX ORDER — FENTANYL CITRATE-0.9 % NACL/PF 10 MCG/ML
50 PLASTIC BAG, INJECTION (ML) INTRAVENOUS CONTINUOUS
Status: DISCONTINUED | OUTPATIENT
Start: 2020-01-01 | End: 2020-01-01

## 2020-01-01 RX ORDER — FENTANYL CITRATE 50 UG/ML
INJECTION, SOLUTION INTRAMUSCULAR; INTRAVENOUS
Status: DISCONTINUED
Start: 2020-01-01 | End: 2020-01-01 | Stop reason: WASHOUT

## 2020-01-01 RX ORDER — SUCCINYLCHOLINE/SOD CL,ISO/PF 100 MG/5ML
100 SYRINGE (ML) INTRAVENOUS ONCE
Status: COMPLETED | OUTPATIENT
Start: 2020-01-01 | End: 2020-01-01

## 2020-01-01 RX ORDER — SODIUM CHLORIDE 9 MG/ML
125 INJECTION, SOLUTION INTRAVENOUS CONTINUOUS
Status: DISCONTINUED | OUTPATIENT
Start: 2020-01-01 | End: 2020-01-01

## 2020-01-01 RX ORDER — NOREPINEPHRINE BITARTRATE 1 MG/ML
INJECTION, SOLUTION INTRAVENOUS
Status: DISPENSED
Start: 2020-01-01 | End: 2020-01-01

## 2020-01-01 RX ORDER — DEXTROSE MONOHYDRATE 50 MG/ML
125 INJECTION, SOLUTION INTRAVENOUS CONTINUOUS
Status: DISCONTINUED | OUTPATIENT
Start: 2020-01-01 | End: 2020-01-01

## 2020-01-01 RX ORDER — CEFAZOLIN SODIUM 1 G/3ML
1000 INJECTION, POWDER, FOR SOLUTION INTRAMUSCULAR; INTRAVENOUS ONCE AS NEEDED
Status: DISCONTINUED | OUTPATIENT
Start: 2020-01-01 | End: 2020-01-08 | Stop reason: HOSPADM

## 2020-01-01 RX ORDER — ONDANSETRON 2 MG/ML
INJECTION INTRAMUSCULAR; INTRAVENOUS
Status: DISPENSED
Start: 2020-01-01 | End: 2020-01-01

## 2020-01-01 RX ORDER — LEVOTHYROXINE SODIUM ANHYDROUS 100 UG/5ML
20 INJECTION, POWDER, LYOPHILIZED, FOR SOLUTION INTRAVENOUS ONCE
Status: COMPLETED | OUTPATIENT
Start: 2020-01-01 | End: 2020-01-01

## 2020-01-01 RX ORDER — CEFAZOLIN SODIUM 1 G/50ML
1000 SOLUTION INTRAVENOUS EVERY 8 HOURS
Status: DISCONTINUED | OUTPATIENT
Start: 2020-01-01 | End: 2020-01-08 | Stop reason: HOSPADM

## 2020-01-01 RX ORDER — FOLIC ACID 1 MG/1
1 TABLET ORAL DAILY
Status: DISCONTINUED | OUTPATIENT
Start: 2020-01-01 | End: 2020-01-01

## 2020-01-01 RX ORDER — HYDRALAZINE HYDROCHLORIDE 20 MG/ML
10 INJECTION INTRAMUSCULAR; INTRAVENOUS ONCE
Status: COMPLETED | OUTPATIENT
Start: 2020-01-01 | End: 2020-01-01

## 2020-01-01 RX ORDER — SODIUM CHLORIDE, SODIUM GLUCONATE, SODIUM ACETATE, POTASSIUM CHLORIDE, MAGNESIUM CHLORIDE, SODIUM PHOSPHATE, DIBASIC, AND POTASSIUM PHOSPHATE .53; .5; .37; .037; .03; .012; .00082 G/100ML; G/100ML; G/100ML; G/100ML; G/100ML; G/100ML; G/100ML
125 INJECTION, SOLUTION INTRAVENOUS CONTINUOUS
Status: DISCONTINUED | OUTPATIENT
Start: 2020-01-01 | End: 2020-01-01

## 2020-01-01 RX ADMIN — CHLORHEXIDINE GLUCONATE 0.12% ORAL RINSE 15 ML: 1.2 LIQUID ORAL at 09:20

## 2020-01-01 RX ADMIN — SODIUM CHLORIDE 5 MG/HR: 0.9 INJECTION, SOLUTION INTRAVENOUS at 00:49

## 2020-01-01 RX ADMIN — ACETAMINOPHEN 650 MG: 650 SUSPENSION ORAL at 15:56

## 2020-01-01 RX ADMIN — LEVETIRACETAM 750 MG: 100 SOLUTION ORAL at 22:21

## 2020-01-01 RX ADMIN — DESMOPRESSIN ACETATE 2 MCG: 4 INJECTION INTRAVENOUS at 11:56

## 2020-01-01 RX ADMIN — POTASSIUM PHOSPHATE, MONOBASIC AND POTASSIUM PHOSPHATE, DIBASIC 15 MMOL: 224; 236 INJECTION, SOLUTION INTRAVENOUS at 16:47

## 2020-01-01 RX ADMIN — SODIUM CHLORIDE 1000 MG: 0.9 INJECTION, SOLUTION INTRAVENOUS at 14:10

## 2020-01-01 RX ADMIN — ETOMIDATE 20 MG: 20 INJECTION, SOLUTION INTRAVENOUS at 14:18

## 2020-01-01 RX ADMIN — SODIUM CHLORIDE 7.5 MG/HR: 0.9 INJECTION, SOLUTION INTRAVENOUS at 10:43

## 2020-01-01 RX ADMIN — SODIUM CHLORIDE 125 ML/HR: 0.9 INJECTION, SOLUTION INTRAVENOUS at 14:30

## 2020-01-01 RX ADMIN — SODIUM CHLORIDE 125 ML/HR: 0.9 INJECTION, SOLUTION INTRAVENOUS at 05:31

## 2020-01-01 RX ADMIN — THIAMINE HCL TAB 100 MG 100 MG: 100 TAB at 08:10

## 2020-01-01 RX ADMIN — FOLIC ACID 1 MG: 1 TABLET ORAL at 09:20

## 2020-01-01 RX ADMIN — HYDRALAZINE HYDROCHLORIDE 10 MG: 20 INJECTION INTRAMUSCULAR; INTRAVENOUS at 18:15

## 2020-01-01 RX ADMIN — VASOPRESSIN 0.04 UNITS/MIN: 20 INJECTION INTRAVENOUS at 16:25

## 2020-01-01 RX ADMIN — SODIUM CHLORIDE, SODIUM GLUCONATE, SODIUM ACETATE, POTASSIUM CHLORIDE, MAGNESIUM CHLORIDE, SODIUM PHOSPHATE, DIBASIC, AND POTASSIUM PHOSPHATE 125 ML/HR: .53; .5; .37; .037; .03; .012; .00082 INJECTION, SOLUTION INTRAVENOUS at 12:46

## 2020-01-01 RX ADMIN — INSULIN HUMAN 20 UNITS: 100 INJECTION, SOLUTION PARENTERAL at 22:35

## 2020-01-01 RX ADMIN — SODIUM CHLORIDE 1000 MG: 0.9 INJECTION, SOLUTION INTRAVENOUS at 21:01

## 2020-01-01 RX ADMIN — Medication 50 MCG/HR: at 14:41

## 2020-01-01 RX ADMIN — CHLORHEXIDINE GLUCONATE 0.12% ORAL RINSE 15 ML: 1.2 LIQUID ORAL at 09:54

## 2020-01-01 RX ADMIN — PROPOFOL 30 MCG/KG/MIN: 10 INJECTION, EMULSION INTRAVENOUS at 13:20

## 2020-01-01 RX ADMIN — ALBUTEROL SULFATE 4 PUFF: 90 AEROSOL, METERED RESPIRATORY (INHALATION) at 09:05

## 2020-01-01 RX ADMIN — FOLIC ACID 1 MG: 1 TABLET ORAL at 09:54

## 2020-01-01 RX ADMIN — PHENYLEPHRINE HYDROCHLORIDE 50 MCG/MIN: 10 INJECTION INTRAVENOUS at 22:21

## 2020-01-01 RX ADMIN — DEXTROSE 50 % IN WATER (D50W) INTRAVENOUS SYRINGE 50 ML: at 22:36

## 2020-01-01 RX ADMIN — VASOPRESSIN 0.02 UNITS/MIN: 20 INJECTION INTRAVENOUS at 08:40

## 2020-01-01 RX ADMIN — SODIUM CHLORIDE 5 MG/HR: 0.9 INJECTION, SOLUTION INTRAVENOUS at 15:50

## 2020-01-01 RX ADMIN — ALBUTEROL SULFATE 4 PUFF: 90 AEROSOL, METERED RESPIRATORY (INHALATION) at 14:41

## 2020-01-01 RX ADMIN — CEFTAZIDIME 1000 MG: 1 INJECTION, POWDER, FOR SOLUTION INTRAMUSCULAR; INTRAVENOUS at 08:54

## 2020-01-01 RX ADMIN — IOHEXOL 60 ML: 350 INJECTION, SOLUTION INTRAVENOUS at 09:50

## 2020-01-01 RX ADMIN — AMINOCAPROIC ACID 1 G/HR: 250 INJECTION, SOLUTION INTRAVENOUS at 18:49

## 2020-01-01 RX ADMIN — LEVETIRACETAM 750 MG: 100 SOLUTION ORAL at 20:14

## 2020-01-01 RX ADMIN — LEVETIRACETAM 750 MG: 100 SOLUTION ORAL at 08:11

## 2020-01-01 RX ADMIN — METOPROLOL TARTRATE 5 MG: 5 INJECTION INTRAVENOUS at 15:18

## 2020-01-01 RX ADMIN — IOHEXOL 85 ML: 350 INJECTION, SOLUTION INTRAVENOUS at 16:12

## 2020-01-01 RX ADMIN — SODIUM CHLORIDE 110 ML/HR: 234 INJECTION INTRAMUSCULAR; INTRAVENOUS; SUBCUTANEOUS at 08:32

## 2020-01-01 RX ADMIN — PROPOFOL 40 MCG/KG/MIN: 10 INJECTION, EMULSION INTRAVENOUS at 18:22

## 2020-01-01 RX ADMIN — PROPOFOL 30 MCG/KG/MIN: 10 INJECTION, EMULSION INTRAVENOUS at 18:22

## 2020-01-01 RX ADMIN — SODIUM CHLORIDE 1000 MG: 0.9 INJECTION, SOLUTION INTRAVENOUS at 08:35

## 2020-01-01 RX ADMIN — PROPOFOL 20 MCG/KG/MIN: 10 INJECTION, EMULSION INTRAVENOUS at 14:22

## 2020-01-01 RX ADMIN — FENTANYL CITRATE 100 MCG: 50 INJECTION, SOLUTION INTRAMUSCULAR; INTRAVENOUS at 14:30

## 2020-01-01 RX ADMIN — LEVETIRACETAM 750 MG: 100 SOLUTION ORAL at 11:15

## 2020-01-01 RX ADMIN — SODIUM CHLORIDE, SODIUM GLUCONATE, SODIUM ACETATE, POTASSIUM CHLORIDE, MAGNESIUM CHLORIDE, SODIUM PHOSPHATE, DIBASIC, AND POTASSIUM PHOSPHATE 1000 ML: .53; .5; .37; .037; .03; .012; .00082 INJECTION, SOLUTION INTRAVENOUS at 11:40

## 2020-01-01 RX ADMIN — CHLORHEXIDINE GLUCONATE 0.12% ORAL RINSE 15 ML: 1.2 LIQUID ORAL at 08:10

## 2020-01-01 RX ADMIN — PROPOFOL 50 MCG/KG/MIN: 10 INJECTION, EMULSION INTRAVENOUS at 00:23

## 2020-01-01 RX ADMIN — LEVOTHYROXINE SODIUM ANHYDROUS 40 MCG/HR: 100 INJECTION, POWDER, LYOPHILIZED, FOR SOLUTION INTRAVENOUS at 09:40

## 2020-01-01 RX ADMIN — POTASSIUM CHLORIDE 40 MEQ: 29.8 INJECTION, SOLUTION INTRAVENOUS at 09:54

## 2020-01-01 RX ADMIN — SODIUM CHLORIDE, SODIUM GLUCONATE, SODIUM ACETATE, POTASSIUM CHLORIDE, MAGNESIUM CHLORIDE, SODIUM PHOSPHATE, DIBASIC, AND POTASSIUM PHOSPHATE 1000 ML: .53; .5; .37; .037; .03; .012; .00082 INJECTION, SOLUTION INTRAVENOUS at 10:30

## 2020-01-01 RX ADMIN — DEXTROSE 200 ML/HR: 5 SOLUTION INTRAVENOUS at 20:56

## 2020-01-01 RX ADMIN — CEFAZOLIN SODIUM 1000 MG: 1 SOLUTION INTRAVENOUS at 15:18

## 2020-01-01 RX ADMIN — VASOPRESSIN 0.04 UNITS/MIN: 20 INJECTION INTRAVENOUS at 22:53

## 2020-01-01 RX ADMIN — LEVETIRACETAM 750 MG: 100 SOLUTION ORAL at 09:20

## 2020-01-01 RX ADMIN — THIAMINE HCL TAB 100 MG 100 MG: 100 TAB at 09:20

## 2020-01-01 RX ADMIN — DEXTROSE 1000 ML: 5 SOLUTION INTRAVENOUS at 02:12

## 2020-01-01 RX ADMIN — SODIUM CHLORIDE 12 MG/HR: 0.9 INJECTION, SOLUTION INTRAVENOUS at 04:55

## 2020-01-01 RX ADMIN — DEXTROSE 200 ML/HR: 5 SOLUTION INTRAVENOUS at 15:56

## 2020-01-01 RX ADMIN — SODIUM CHLORIDE 5 MG/HR: 0.9 INJECTION, SOLUTION INTRAVENOUS at 14:20

## 2020-01-01 RX ADMIN — ACETAMINOPHEN 650 MG: 650 SUSPENSION ORAL at 21:46

## 2020-01-01 RX ADMIN — PROPOFOL 20 MCG/KG/MIN: 10 INJECTION, EMULSION INTRAVENOUS at 04:34

## 2020-01-01 RX ADMIN — FOLIC ACID 1 MG: 1 TABLET ORAL at 08:10

## 2020-01-01 RX ADMIN — SODIUM CHLORIDE 12 MG/HR: 0.9 INJECTION, SOLUTION INTRAVENOUS at 00:14

## 2020-01-01 RX ADMIN — Medication 50 MCG/HR: at 21:04

## 2020-01-01 RX ADMIN — POTASSIUM CHLORIDE 40 MEQ: 29.8 INJECTION, SOLUTION INTRAVENOUS at 11:22

## 2020-01-01 RX ADMIN — FENTANYL CITRATE 100 MCG: 50 INJECTION, SOLUTION INTRAMUSCULAR; INTRAVENOUS at 14:40

## 2020-01-01 RX ADMIN — VASOPRESSIN 1 UNITS: 20 INJECTION INTRAVENOUS at 22:45

## 2020-01-01 RX ADMIN — METOPROLOL TARTRATE 5 MG: 5 INJECTION INTRAVENOUS at 17:16

## 2020-01-01 RX ADMIN — METOPROLOL TARTRATE 5 MG: 5 INJECTION INTRAVENOUS at 11:46

## 2020-01-01 RX ADMIN — SODIUM CHLORIDE 1000 ML: 0.45 INJECTION, SOLUTION INTRAVENOUS at 23:59

## 2020-01-01 RX ADMIN — CEFAZOLIN SODIUM 1000 MG: 1 SOLUTION INTRAVENOUS at 08:01

## 2020-01-01 RX ADMIN — POTASSIUM CHLORIDE 40 MEQ: 1.5 SOLUTION ORAL at 11:22

## 2020-01-01 RX ADMIN — ACETAMINOPHEN 650 MG: 650 SUSPENSION ORAL at 08:11

## 2020-01-01 RX ADMIN — SODIUM CHLORIDE 5 MG/HR: 0.9 INJECTION, SOLUTION INTRAVENOUS at 18:18

## 2020-01-01 RX ADMIN — FENTANYL CITRATE 50 MCG: 50 INJECTION, SOLUTION INTRAMUSCULAR; INTRAVENOUS at 08:11

## 2020-01-01 RX ADMIN — FENTANYL CITRATE 50 MCG: 50 INJECTION, SOLUTION INTRAMUSCULAR; INTRAVENOUS at 00:16

## 2020-01-01 RX ADMIN — FENTANYL CITRATE 50 MCG: 50 INJECTION, SOLUTION INTRAMUSCULAR; INTRAVENOUS at 18:11

## 2020-01-01 RX ADMIN — SODIUM CHLORIDE 12 MG/HR: 0.9 INJECTION, SOLUTION INTRAVENOUS at 02:22

## 2020-01-01 RX ADMIN — ONDANSETRON 4 MG: 2 INJECTION INTRAMUSCULAR; INTRAVENOUS at 14:05

## 2020-01-01 RX ADMIN — CHLORHEXIDINE GLUCONATE 0.12% ORAL RINSE 15 ML: 1.2 LIQUID ORAL at 20:14

## 2020-01-01 RX ADMIN — SODIUM CHLORIDE 125 ML/HR: 0.9 INJECTION, SOLUTION INTRAVENOUS at 21:04

## 2020-01-01 RX ADMIN — SODIUM CHLORIDE 100 ML/HR: 234 INJECTION INTRAMUSCULAR; INTRAVENOUS; SUBCUTANEOUS at 15:18

## 2020-01-01 RX ADMIN — DESMOPRESSIN ACETATE 23.2 MCG: 4 INJECTION INTRAVENOUS at 17:12

## 2020-01-01 RX ADMIN — CHLORHEXIDINE GLUCONATE 0.12% ORAL RINSE 15 ML: 1.2 LIQUID ORAL at 20:56

## 2020-01-01 RX ADMIN — CHLORHEXIDINE GLUCONATE 0.12% ORAL RINSE 15 ML: 1.2 LIQUID ORAL at 21:45

## 2020-01-01 RX ADMIN — POTASSIUM CHLORIDE 20 MEQ: 20 SOLUTION ORAL at 16:58

## 2020-01-01 RX ADMIN — METHYLPREDNISOLONE SODIUM SUCCINATE 2000 MG: 1 INJECTION, POWDER, FOR SOLUTION INTRAMUSCULAR; INTRAVENOUS at 22:44

## 2020-01-01 RX ADMIN — Medication 100 MG: at 14:18

## 2020-01-01 RX ADMIN — LIDOCAINE HYDROCHLORIDE 300 MG: 10 INJECTION, SOLUTION EPIDURAL; INFILTRATION; INTRACAUDAL; PERINEURAL at 17:31

## 2020-01-01 RX ADMIN — SODIUM CHLORIDE 6 MG/HR: 0.9 INJECTION, SOLUTION INTRAVENOUS at 21:28

## 2020-01-01 RX ADMIN — DEXTROSE 250 ML/HR: 5 SOLUTION INTRAVENOUS at 01:26

## 2020-01-01 RX ADMIN — FENTANYL CITRATE 50 MCG: 50 INJECTION, SOLUTION INTRAMUSCULAR; INTRAVENOUS at 21:20

## 2020-01-01 RX ADMIN — SODIUM CHLORIDE 5 MG/HR: 0.9 INJECTION, SOLUTION INTRAVENOUS at 10:45

## 2020-01-01 RX ADMIN — MANNITOL 50 G: 20 INJECTION, SOLUTION INTRAVENOUS at 17:10

## 2020-01-01 RX ADMIN — CEFAZOLIN SODIUM 1000 MG: 1 SOLUTION INTRAVENOUS at 23:08

## 2020-01-01 RX ADMIN — LEVOTHYROXINE SODIUM ANHYDROUS 20 MCG/HR: 100 INJECTION, POWDER, LYOPHILIZED, FOR SOLUTION INTRAVENOUS at 22:52

## 2020-01-01 RX ADMIN — PROPOFOL 50 MCG/KG/MIN: 10 INJECTION, EMULSION INTRAVENOUS at 14:25

## 2020-01-01 RX ADMIN — THIAMINE HCL TAB 100 MG 100 MG: 100 TAB at 09:54

## 2020-01-01 RX ADMIN — CEFTAZIDIME 1000 MG: 1 INJECTION, POWDER, FOR SOLUTION INTRAMUSCULAR; INTRAVENOUS at 21:57

## 2020-01-01 RX ADMIN — FENTANYL CITRATE 50 MCG: 50 INJECTION, SOLUTION INTRAMUSCULAR; INTRAVENOUS at 15:56

## 2020-01-01 RX ADMIN — SODIUM CHLORIDE 1000 ML: 0.45 INJECTION, SOLUTION INTRAVENOUS at 15:53

## 2020-01-01 RX ADMIN — PROPOFOL 30 MCG/KG/MIN: 10 INJECTION, EMULSION INTRAVENOUS at 23:02

## 2020-01-01 RX ADMIN — PROPOFOL 30 MCG/KG/MIN: 10 INJECTION, EMULSION INTRAVENOUS at 06:20

## 2020-01-01 RX ADMIN — SODIUM CHLORIDE 125 ML/HR: 0.9 INJECTION, SOLUTION INTRAVENOUS at 17:16

## 2020-01-01 RX ADMIN — SODIUM CHLORIDE 6 MG/HR: 0.9 INJECTION, SOLUTION INTRAVENOUS at 21:04

## 2020-01-01 RX ADMIN — SODIUM CHLORIDE 125 ML/HR: 0.9 INJECTION, SOLUTION INTRAVENOUS at 08:19

## 2020-01-01 RX ADMIN — PROPOFOL 20 MCG/KG/MIN: 10 INJECTION, EMULSION INTRAVENOUS at 21:04

## 2020-01-01 RX ADMIN — LEVOTHYROXINE SODIUM ANHYDROUS 20 MCG: 100 INJECTION, POWDER, LYOPHILIZED, FOR SOLUTION INTRAVENOUS at 22:39

## 2020-01-03 PROBLEM — I10 HYPERTENSION: Status: ACTIVE | Noted: 2020-01-01

## 2020-01-03 PROBLEM — G93.5 BRAIN COMPRESSION (HCC): Status: ACTIVE | Noted: 2020-01-01

## 2020-01-03 PROBLEM — I61.9 NONTRAUMATIC ACUTE HEMORRHAGE OF BASAL GANGLIA (HCC): Status: ACTIVE | Noted: 2020-01-01

## 2020-01-03 PROBLEM — J96.00 ACUTE RESPIRATORY FAILURE (HCC): Status: ACTIVE | Noted: 2020-01-01

## 2020-01-03 NOTE — PLAN OF CARE
Problem: NEUROSENSORY - ADULT  Goal: Achieves stable or improved neurological status  Description  INTERVENTIONS  - Monitor and report changes in neurological status  - Monitor vital signs such as temperature, blood pressure, glucose, and any other labs ordered   - Initiate measures to prevent increased intracranial pressure  - Monitor for seizure activity and implement precautions if appropriate      Outcome: Progressing  Goal: Remains free of injury related to seizures activity  Description  INTERVENTIONS  - Maintain airway, patient safety  and administer oxygen as ordered  - Monitor patient for seizure activity, document and report duration and description of seizure to physician/advanced practitioner  - If seizure occurs,  ensure patient safety during seizure  - Reorient patient post seizure  - Seizure pads on all 4 side rails  - Instruct patient/family to notify RN of any seizure activity including if an aura is experienced  - Instruct patient/family to call for assistance with activity based on nursing assessment  - Administer anti-seizure medications if ordered    Outcome: Progressing  Goal: Achieves maximal functionality and self care  Description  INTERVENTIONS  - Monitor swallowing and airway patency with patient fatigue and changes in neurological status  - Encourage and assist patient to increase activity and self care     - Encourage visually impaired, hearing impaired and aphasic patients to use assistive/communication devices  Outcome: Progressing     Problem: CARDIOVASCULAR - ADULT  Goal: Maintains optimal cardiac output and hemodynamic stability  Description  INTERVENTIONS:  - Monitor I/O, vital signs and rhythm  - Monitor for S/S and trends of decreased cardiac output  - Administer and titrate ordered vasoactive medications to optimize hemodynamic stability  - Assess quality of pulses, skin color and temperature  - Assess for signs of decreased coronary artery perfusion  - Instruct patient to report change in severity of symptoms  Outcome: Progressing  Goal: Absence of cardiac dysrhythmias or at baseline rhythm  Description  INTERVENTIONS:  - Continuous cardiac monitoring, vital signs, obtain 12 lead EKG if ordered  - Administer antiarrhythmic and heart rate control medications as ordered  - Monitor electrolytes and administer replacement therapy as ordered  Outcome: Progressing     Problem: RESPIRATORY - ADULT  Goal: Achieves optimal ventilation and oxygenation  Description  INTERVENTIONS:  - Assess for changes in respiratory status  - Assess for changes in mentation and behavior  - Position to facilitate oxygenation and minimize respiratory effort  - Oxygen administered by appropriate delivery if ordered  - Initiate smoking cessation education as indicated  - Encourage broncho-pulmonary hygiene including cough, deep breathe, Incentive Spirometry  - Assess the need for suctioning and aspirate as needed  - Assess and instruct to report SOB or any respiratory difficulty  - Respiratory Therapy support as indicated  Outcome: Progressing     Problem: GASTROINTESTINAL - ADULT  Goal: Minimal or absence of nausea and/or vomiting  Description  INTERVENTIONS:  - Administer IV fluids if ordered to ensure adequate hydration  - Maintain NPO status until nausea and vomiting are resolved  - Nasogastric tube if ordered  - Administer ordered antiemetic medications as needed  - Provide nonpharmacologic comfort measures as appropriate  - Advance diet as tolerated, if ordered  - Consider nutrition services referral to assist patient with adequate nutrition and appropriate food choices  Outcome: Progressing  Goal: Maintains or returns to baseline bowel function  Description  INTERVENTIONS:  - Assess bowel function  - Encourage oral fluids to ensure adequate hydration  - Administer IV fluids if ordered to ensure adequate hydration  - Administer ordered medications as needed  - Encourage mobilization and activity  - Consider nutritional services referral to assist patient with adequate nutrition and appropriate food choices  Outcome: Progressing  Goal: Maintains adequate nutritional intake  Description  INTERVENTIONS:  - Monitor percentage of each meal consumed  - Identify factors contributing to decreased intake, treat as appropriate  - Assist with meals as needed  - Monitor I&O, weight, and lab values if indicated  - Obtain nutrition services referral as needed  Outcome: Progressing     Problem: GENITOURINARY - ADULT  Goal: Maintains or returns to baseline urinary function  Description  INTERVENTIONS:  - Assess urinary function  - Encourage oral fluids to ensure adequate hydration if ordered  - Administer IV fluids as ordered to ensure adequate hydration  - Administer ordered medications as needed  - Offer frequent toileting  - Follow urinary retention protocol if ordered  Outcome: Progressing  Goal: Absence of urinary retention  Description  INTERVENTIONS:  - Assess patients ability to void and empty bladder  - Monitor I/O  - Bladder scan as needed  - Discuss with physician/AP medications to alleviate retention as needed  - Discuss catheterization for long term situations as appropriate  Outcome: Progressing  Goal: Urinary catheter remains patent  Description  INTERVENTIONS:  - Assess patency of urinary catheter  - If patient has a chronic morel, consider changing catheter if non-functioning  - Follow guidelines for intermittent irrigation of non-functioning urinary catheter  Outcome: Progressing     Problem: METABOLIC, FLUID AND ELECTROLYTES - ADULT  Goal: Electrolytes maintained within normal limits  Description  INTERVENTIONS:  - Monitor labs and assess patient for signs and symptoms of electrolyte imbalances  - Administer electrolyte replacement as ordered  - Monitor response to electrolyte replacements, including repeat lab results as appropriate  - Instruct patient on fluid and nutrition as appropriate  Outcome: Progressing  Goal: Fluid balance maintained  Description  INTERVENTIONS:  - Monitor labs   - Monitor I/O and WT  - Instruct patient on fluid and nutrition as appropriate  - Assess for signs & symptoms of volume excess or deficit  Outcome: Progressing  Goal: Glucose maintained within target range  Description  INTERVENTIONS:  - Monitor Blood Glucose as ordered  - Assess for signs and symptoms of hyperglycemia and hypoglycemia  - Administer ordered medications to maintain glucose within target range  - Assess nutritional intake and initiate nutrition service referral as needed  Outcome: Progressing     Problem: SKIN/TISSUE INTEGRITY - ADULT  Goal: Skin integrity remains intact  Description  INTERVENTIONS  - Identify patients at risk for skin breakdown  - Assess and monitor skin integrity  - Assess and monitor nutrition and hydration status  - Monitor labs (i e  albumin)  - Assess for incontinence   - Turn and reposition patient  - Assist with mobility/ambulation  - Relieve pressure over bony prominences  - Avoid friction and shearing  - Provide appropriate hygiene as needed including keeping skin clean and dry  - Evaluate need for skin moisturizer/barrier cream  - Collaborate with interdisciplinary team (i e  Nutrition, Rehabilitation, etc )   - Patient/family teaching  Outcome: Progressing  Goal: Incision(s), wounds(s) or drain site(s) healing without S/S of infection  Description  INTERVENTIONS  - Assess and document risk factors for skin impairment   - Assess and document dressing, incision, wound bed, drain sites and surrounding tissue  - Consider nutrition services referral as needed  - Oral mucous membranes remain intact  - Provide patient/ family education  Outcome: Progressing  Goal: Oral mucous membranes remain intact  Description  INTERVENTIONS  - Assess oral mucosa and hygiene practices  - Implement preventative oral hygiene regimen  - Implement oral medicated treatments as ordered  - Initiate Nutrition services referral as needed  Outcome: Progressing     Problem: HEMATOLOGIC - ADULT  Goal: Maintains hematologic stability  Description  INTERVENTIONS  - Assess for signs and symptoms of bleeding or hemorrhage  - Monitor labs  - Administer supportive blood products/factors as ordered and appropriate  Outcome: Progressing     Problem: MUSCULOSKELETAL - ADULT  Goal: Maintain or return mobility to safest level of function  Description  INTERVENTIONS:  - Assess patient's ability to carry out ADLs; assess patient's baseline for ADL function and identify physical deficits which impact ability to perform ADLs (bathing, care of mouth/teeth, toileting, grooming, dressing, etc )  - Assess/evaluate cause of self-care deficits   - Assess range of motion  - Assess patient's mobility  - Assess patient's need for assistive devices and provide as appropriate  - Encourage maximum independence but intervene and supervise when necessary  - Involve family in performance of ADLs  - Assess for home care needs following discharge   - Consider OT consult to assist with ADL evaluation and planning for discharge  - Provide patient education as appropriate  Outcome: Progressing  Goal: Maintain proper alignment of affected body part  Description  INTERVENTIONS:  - Support, maintain and protect limb and body alignment  - Provide patient/ family with appropriate education  Outcome: Progressing     Problem: SAFETY,RESTRAINT: NV/NON-SELF DESTRUCTIVE BEHAVIOR  Goal: Remains free of harm/injury (restraint for non violent/non self-detsructive behavior)  Description  INTERVENTIONS:  - Instruct patient/family regarding restraint use   - Assess and monitor physiologic and psychological status   - Provide interventions and comfort measures to meet assessed patient needs   - Identify and implement measures to help patient regain control  - Assess readiness for release of restraint   Outcome: Progressing  Goal: Returns to optimal restraint-free functioning  Description  INTERVENTIONS:  - Assess the patient's behavior and symptoms that indicate continued need for restraint  - Identify and implement measures to help patient regain control  - Assess readiness for release of restraint   Outcome: Progressing     Problem: Nutrition/Hydration-ADULT  Goal: Nutrient/Hydration intake appropriate for improving, restoring or maintaining nutritional needs  Description  Monitor and assess patient's nutrition/hydration status for malnutrition  Collaborate with interdisciplinary team and initiate plan and interventions as ordered  Monitor patient's weight and dietary intake as ordered or per policy  Utilize nutrition screening tool and intervene as necessary  Determine patient's food preferences and provide high-protein, high-caloric foods as appropriate       INTERVENTIONS:  - Monitor oral intake, urinary output, labs, and treatment plans  - Assess nutrition and hydration status and recommend course of action  - Evaluate amount of meals eaten  - Assist patient with eating if necessary   - Allow adequate time for meals  - Recommend/ encourage appropriate diets, oral nutritional supplements, and vitamin/mineral supplements  - Order, calculate, and assess calorie counts as needed  - Recommend, monitor, and adjust tube feedings and TPN/PPN based on assessed needs  - Assess need for intravenous fluids  - Provide specific nutrition/hydration education as appropriate  - Include patient/family/caregiver in decisions related to nutrition  Outcome: Progressing     Problem: PAIN - ADULT  Goal: Verbalizes/displays adequate comfort level or baseline comfort level  Description  Interventions:  - Encourage patient to monitor pain and request assistance  - Assess pain using appropriate pain scale  - Administer analgesics based on type and severity of pain and evaluate response  - Implement non-pharmacological measures as appropriate and evaluate response  - Consider cultural and social influences on pain and pain management  - Notify physician/advanced practitioner if interventions unsuccessful or patient reports new pain  Outcome: Progressing     Problem: INFECTION - ADULT  Goal: Absence or prevention of progression during hospitalization  Description  INTERVENTIONS:  - Assess and monitor for signs and symptoms of infection  - Monitor lab/diagnostic results  - Monitor all insertion sites, i e  indwelling lines, tubes, and drains  - Monitor endotracheal if appropriate and nasal secretions for changes in amount and color  - [place] appropriate cooling/warming therapies per order  - Administer medications as ordered  - Instruct and encourage patient and family to use good hand hygiene technique  - Identify and instruct in appropriate isolation precautions for identified infection/condition  Outcome: Progressing     Problem: SAFETY ADULT  Goal: Patient will remain free of falls  Description  INTERVENTIONS:  - Assess patient frequently for physical needs  -  Identify cognitive and physical deficits and behaviors that affect risk of falls    -  [place] fall precautions as indicated by assessment   - Educate patient/family on patient safety including physical limitations  - Instruct patient to call for assistance with activity based on assessment  - Modify environment to reduce risk of injury  - Consider OT/PT consult to assist with strengthening/mobility  Outcome: Progressing  Goal: Maintain or return to baseline ADL function  Description  INTERVENTIONS:  -  Assess patient's ability to carry out ADLs; assess patient's baseline for ADL function and identify physical deficits which impact ability to perform ADLs (bathing, care of mouth/teeth, toileting, grooming, dressing, etc )  - Assess/evaluate cause of self-care deficits   - Assess range of motion  - Assess patient's mobility; develop plan if impaired  - Assess patient's need for assistive devices and provide as appropriate  - Encourage maximum independence but intervene and supervise when necessary  - Involve family in performance of ADLs  - Assess for home care needs following discharge   - Consider OT consult to assist with ADL evaluation and planning for discharge  - Provide patient education as appropriate  Outcome: Progressing  Goal: Maintain or return mobility status to optimal level  Description  INTERVENTIONS:  - Assess patient's baseline mobility status (ambulation, transfers, stairs, etc )    - Identify cognitive and physical deficits and behaviors that affect mobility  - Identify mobility aids required to assist with transfers and/or ambulation (gait belt, sit-to-stand, lift, walker, cane, etc )  - [place] fall precautions as indicated by assessment  - Record patient progress and toleration of activity level on Mobility SBAR; progress patient to next Phase/Stage  - Instruct patient to call for assistance with activity based on assessment  - Consider rehabilitation consult to assist with strengthening/weightbearing, etc   Outcome: Progressing     Problem: DISCHARGE PLANNING  Goal: Discharge to home or other facility with appropriate resources  Description  INTERVENTIONS:  - Identify barriers to discharge w/patient and caregiver  - Arrange for needed discharge resources and transportation as appropriate  - Identify discharge learning needs (meds, wound care, etc )  - Arrange for interpretive services to assist at discharge as needed  - Refer to Case Management Department for coordinating discharge planning if the patient needs post-hospital services based on physician/advanced practitioner order or complex needs related to functional status, cognitive ability, or social support system  Outcome: Progressing     Problem: Knowledge Deficit  Goal: Patient/family/caregiver demonstrates understanding of disease process, treatment plan, medications, and discharge instructions  Description  Complete learning assessment and assess knowledge base   Interventions:  - Provide teaching at level of understanding  - Provide teaching via preferred learning methods  Outcome: Progressing     Problem: Neurological Deficit  Goal: Neurological status is stable or improving  Description  Interventions:  - Monitor and assess patient's level of consciousness, motor function, sensory function, and level of assistance needed for ADLs  - Monitor and report changes from baseline  Collaborate with interdisciplinary team to initiate plan and implement interventions as ordered  - Provide and maintain a safe environment  - Consider seizure precautions  - Consider fall precautions  - Consider aspiration precautions  - Consider bleeding precautions  Outcome: Progressing     Problem: Activity Intolerance/Impaired Mobility  Goal: Mobility/activity is maintained at optimum level for patient  Description  Interventions:  - Assess and monitor patient  barriers to mobility and need for assistive/adaptive devices  - Assess patient's emotional response to limitations  - Collaborate with interdisciplinary team and initiate plans and interventions as ordered  - Encourage independent activity per ability   - Maintain proper body alignment  - Perform active/passive rom as tolerated/ordered  - Plan activities to conserve energy   - Turn patient as appropriate  Outcome: Progressing     Problem: Communication Impairment  Goal: Ability to express needs and understand communication  Description  Assess patient's communication skills and ability to understand information  Patient will demonstrate use of effective communication techniques, alternative methods of communication and understanding even if not able to speak  - Encourage communication and provide alternate methods of communication as needed  - Collaborate with case management/ for discharge needs  - Include patient/family/caregiver in decisions related to communication    Outcome: Progressing Problem: Potential for Aspiration  Goal: Ventilated patient's risk of aspiration is minimized  Description  Assess and monitor vital signs, respiratory status, airway cuff pressure, and labs (WBC)  Monitor for signs of aspiration (tachypnea, cough, rales, wheezing, cyanosis, fever)  - Elevate head of bed 30 degrees if patient has tube feeding   - Monitor tube feeding  Outcome: Progressing     Problem: Nutrition  Goal: Nutrition/Hydration status is improving  Description  Monitor and assess patient's nutrition/hydration status for malnutrition (ex- brittle hair, bruises, dry skin, pale skin and conjunctiva, muscle wasting, smooth red tongue, and disorientation)  Collaborate with interdisciplinary team and initiate plan and interventions as ordered  Monitor patient's weight and dietary intake as ordered or per policy  Utilize nutrition screening tool and intervene per policy  Determine patient's food preferences and provide high-protein, high-caloric foods as appropriate  - Assist patient with eating   - Allow adequate time for meals   - Encourage patient to take dietary supplement as ordered  - Collaborate with clinical nutritionist   - Include patient/family/caregiver in decisions related to nutrition    Outcome: Progressing

## 2020-01-03 NOTE — PROGRESS NOTES
01/03/20 1500   Plan of Care   Comments Stroke alert pt no family present     Assessment Completed by: Unit visit

## 2020-01-03 NOTE — H&P
H&P Exam - Critical Care   Lucilla Fleischer 48 y o  male MRN: [id number]  Unit/Bed#: ED 01 Encounter: [id number]      -------------------------------------------------------------------------------------------------------------  Chief Complaint: Intracranial hemorrhage/ Unable to communicate     History of Present Illness   HX and PE limited by: Patient being intubated and sedated   Lucilla Fleischer is a 48 y o  male with a significant PMH of ETOH abuse, HTN, SAH secondary to aneurysm in 2017 presented via EMS after he was at [address] [place] [address] with a friend and had an acute change in mental status  He was intubated in the ED with etomidate and succinylcholine for airway protection secondary to decreased mental status  Patient was unable to verbalize any complaints secondary to being ventilated and decreased mental status    History obtained from chart review, the patient and Mother Roxanne Cerna)  -------------------------------------------------------------------------------------------------------------  Assessment and Plan:    Neuro:    Diagnosis: Left basal ganglia hemorrhage with intraventricular extension   Plan: Intracerebral Hemorrhage (ICH) Score:  Saman Coma Sore 5-12 equals +1   Age greater than or equal to 80 No   ICH Volume greater than or equal to 30 ml No   Intraventricular Hemorrhage Yes (1 Point)   Infratentorial Origin of Hemorrhage No   Total: 2   o ICH stroke protocol   o Neurosurgery consulted and plan to place EVD at bedside   o Check CT imaging following EVD placement   o Monitor ICP  o Monitor hourly neuro checks  o Will need to check CTA to evaluate for possible aneurysm   o Neurology consulted   o Started on diprivan and fentanyl for sedation    Diagnosis: ETOH abuse  o Plan:  Mother states that the patient is an active drinker with a long history of abuse  o Will start on thiamine and folate  o Monitor for signs of w/d   CV:    Diagnosis: Hypertension  o Plan: Placed on cardene infusion in the ED  o Goal SBP <160  o Monitor on telemetry  o Check echo per stroke protocol   o EKG shows Sinus tachycardia     Pulm:   Diagnosis: Acute respiratory failure   o Plan: Patient intubated in ED for airway protection   o Continue AC/VC ventilatory support   o Check ABG  o Post-intubation CXR show ETT 5CM above rebecca    GI:    Diagnosis: No acute issues  o Plan: Will start on PPI for GI prophylaxis   o Zofran prn nausea     :    Diagnosis: No acute issues  o Plan: Garcia placed to monitor I/O in critically ill patient   o Monitor renal function       F/E/N:    Plan: Monitor electrolytes closely   If there is a concern for intracranial hypertension, will start HTS and monitor serial NA/osmol       Heme/Onc:    Diagnosis: No acute issues       Endo:    Diagnosis: No acute issues  o Plan:  Monitor BG       ID:    Diagnosis: No acute issues  o Plan: Monitor for signs of acute infection  o Patient may have aspirated prior to intubation  No indication for abx at this time     MSK/Skin:    Diagnosis: Immobility   o Plan: Opt out of early mobility protocol secondary to [employer]  o Turn and prop  o PMR per stroke protocl   o PT/OT per stroke protocol     Disposition: Admit to [employer]  Code Status: Level 1 - Full Code  --------------------------------------------------------------------------------------------------------------  Review of Systems    Review of systems was unable to be performed secondary to decreased mental status    Physical Exam   Constitutional: He is intubated  Middle aged male lying in bed on the ventilator    HENT:   Head: Normocephalic and atraumatic  Eyes: Pupils are equal, round, and reactive to light  Neck: Neck supple  Cardiovascular: Normal pulses  Tachycardia present  Pulmonary/Chest: Breath sounds normal  He is intubated  Abdominal: Soft  He exhibits no distension     Genitourinary:   Genitourinary Comments: Garcia in place with clear yellow urine   Musculoskeletal: Normal range of motion  Neurological:   GCS on exam was E1, V1t, M5  Patient moving RUE and L side  PERRL  No gaze deviation  +Cough   Skin: Skin is warm and dry  Vitals reviewed  --------------------------------------------------------------------------------------------------------------  Historical Information   Past Medical History:   Diagnosis Date    Alcoholism /alcohol abuse ([facility] )     Cerebral aneurysm     Hypertension     [facility]int[facility] hemorrhage[facility] ([facility] )      Past Surgical History:   Procedure Laterality Date    SUBDURAL HEMATOMA EVACUATION VIA CRANIOTOMY       Social History   Social History     Substance and Sexual Activity   Alcohol Use Yes    Alcohol/week: 4 0 standard drinks    Types: 4 Cans of beer per week    Comment: sober since stroke 10/10/17     Social History     Substance and Sexual Activity   Drug Use No    Comment: sober since 10/10/17     Social History     Tobacco Use   Smoking Status Current Every Day Smoker    Packs/day: 1 00    Years: 37 00    Pack years: 37 00    Start date: 1980   Smokeless Tobacco Never Used   Tobacco Comment    1ppd x 37 years, cut fown to 1/4 pack recently     Exercise History: Ambulatory at baseline   Family History:   Family History   Problem Relation Age of Onset    Diabetes Mother     Hypertension Mother     Diabetes Father     Hypertension Father     Stroke Father     Stroke Brother      Family history unknown    Vitals:   Vitals:    01/03/20 1430 01/03/20 1434 01/03/20 1438 01/03/20 1450   BP:  (!) 165/111 (!) 147/114 116/83   Pulse: (!) 108 98 96 104   Resp: (!) 36 16 21 15   SpO2:  96% 96% 96%   Weight:         No data recorded  IBW: -88 kg     Body mass index is 22 99 kg/m²    N/A    Medications:  Current Facility-Administered Medications   Medication Dose Route Frequency    fentaNYL 1000 mcg in sodium chloride 0 9% 100mL infusion  50 mcg/hr Intravenous Continuous    niCARdipine (CARDENE) 25 mg (STANDARD CONCENTRATION) in sodium chloride 0 9% 250 mL  1-15 mg/hr Intravenous Titrated    ondansetron (ZOFRAN) 4 mg/2 mL injection **ADS Override Pull**        ondansetron (ZOFRAN) 8 mg in sodium chloride 0 9 % 50 mL IVPB  8 mg Intravenous Once    propofol (DIPRIVAN) 1000 mg in 100 mL infusion (premix)  5-50 mcg/kg/min Intravenous Titrated    sodium chloride 0 9 % infusion  125 mL/hr Intravenous Continuous     Home medications:  Prior to Admission Medications   Prescriptions Last Dose Informant Patient Reported? Taking? Guaifenesin 1200 MG TB12   No No   Sig: Take 0 5 tablets by mouth every 12 (twelve) hours   amLODIPine (NORVASC) 10 mg tablet   No No   Sig: Take 1 tablet by mouth daily   benzonatate (TESSALON) 200 MG capsule   No No   Sig: Take 1 capsule by mouth 3 (three) times a day as needed for cough   folic acid (FOLVITE) 1 mg tablet   No No   Sig: Take 1 tablet by mouth daily   naproxen (NAPROSYN) 500 mg tablet   No No   Sig: Take 1 tablet by mouth 2 (two) times a day with meals   naproxen sodium (ANAPROX) 550 mg tablet   No No   Sig: Take 1 tablet by mouth 2 (two) times a day with meals   nicotine (NICODERM CQ) 14 mg/24hr TD 24 hr patch   No No   Sig: Place 1 patch on the skin daily   thiamine 100 MG tablet   No No   Sig: Take 1 tablet by mouth daily      Facility-Administered Medications: None     Allergies:   Allergies   Allergen Reactions    Bee Venom          Laboratory and Diagnostics:  Results from last 7 days   Lab Units 01/03/20  1420 01/03/20  1419   I STAT HEMOGLOBIN g/dl 16 0 16 0   HEMATOCRIT, ISTAT % 47 47     Results from last 7 days   Lab Units 01/03/20  1420 01/03/20  1419 01/03/20  1415   SODIUM mmol/L  --   --  132*   POTASSIUM mmol/L  --   --  4 0   CHLORIDE mmol/L  --   --  100   CO2 mmol/L  --   --  27   CO2, I-STAT mmol/L 28 28  --    AGAP mmol/L 14*  --   --    ANION GAP mmol/L  --   --  5   BUN mg/dL  --   --  9   CREATININE mg/dL  --   --  0 72   CALCIUM mg/dL  --   --  9 8   GLUCOSE RANDOM mg/dL  --   --  128 Results from last 7 days   Lab Units 01/03/20  1415   TROPONIN I ng/mL <0 02         ABG:    VBG:          Micro:          EKG: Sinus tach  Imaging: I have personally reviewed pertinent reports   , I have personally reviewed pertinent films in PACS and CT brain shows large R BG hemorrhage with IV extension     ------------------------------------------------------------------------------------------------------------  Advance Directive and Living Will:      Power of :    POLST:    ------------------------------------------------------------------------------------------------------------  Anticipated Length of Stay is > 2 midnights    Counseling / Coordination of Care  Total Critical Care time spent 48 minutes excluding procedures, teaching and family updates  Alec Merida PA-C        Portions of the record may have been created with voice recognition software  Occasional wrong word or "sound a like" substitutions may have occurred due to the inherent limitations of voice recognition software    Read the chart carefully and recognize, using context, where substitutions have occurred

## 2020-01-03 NOTE — RESPIRATORY THERAPY NOTE
RT Ventilator Management Note  Renzo Akers 48 y o  male MRN: [id number]  Unit/Bed#: ICU 11 Encounter: [id number]      Daily Screen       1/3/2020  1439 1/3/2020  1632          Patient safety screen outcome[de-identified]  Failed  Failed              Physical Exam:   Assessment Type: Assess only  General Appearance: Sedated  Respiratory Pattern: Tachypneic, Assisted, Irregular  Chest Assessment: Chest expansion symmetrical  Bilateral Breath Sounds: Diminished  Cough: Productive  Suction: ET Tube  O2 Device: Vent      Resp Comments: Pt received from the ED, intubated with a 7 5 ETT, 24@ lip, and agitated on A/C VC mode   16/450/50%/+10

## 2020-01-03 NOTE — ED ATTENDING ATTESTATION
1/3/2020  Maribell Duncan DO, saw and evaluated the patient  I have discussed the patient with the resident/non-physician practitioner and agree with the resident's/non-physician practitioner's findings, Plan of Care, and MDM as documented in the resident's/non-physician practitioner's note, except where noted  All available labs and Radiology studies were reviewed  I was present for key portions of any procedure(s) performed by the resident/non-physician practitioner and I was immediately available to provide assistance  At this point I agree with the current assessment done in the Emergency Department  I have conducted an independent evaluation of this patient a history and physical is as follows:    Patient presents via EMS for sudden change in mental status that was witnessed by his friend with whom he was shopping at [address]  He began to be confused and tremulous  His friend drove across the street to an urgent care center where he vomited and had apparent right upper extremity weakness, facial droop and expressive aphasia  EMS found him to be significantly hypertensive with continued symptoms as above  The friend did not know if he is taking any anticoagulants or has a history of stroke  Symptoms did not improve on route to the hospital   Patient was made a pre-hospital stroke alert based on EMS report and was taken direct to CT scan where he was met by Neurology  CT scan demonstrated a large left hemispheric intracerebral hemorrhage  Patient was started on a Cardene infusion to control his blood pressure  ROS:  Unable to complete due to mental status       PE: In moderate distress upon arrival, alert but confused and only able to follow some commands; PERRL, EOMI, left preferential gaze; MMM, no posterior oropharyngeal exudate, edema or erythema; HRR, no murmur, monitor shows sinus tachycardia at 120 bpm; lungs CTA w/o w/r/r, POx 97% on RA (nl); abdomen s/nt/nd, nl BS in all 4 quadrant; (-) LE edema or calf TTP, FROM extremities x2, right upper extremity weakness, no movement of the right lower extremity; skin p/w/d; CN II-XII GI/NF except as above, oriented to name  Patient vomited in CT scan after head CT  Unable to lie flat for CTA  Patient was electively taken to an ED room where he underwent RSI  DDx:  Confusion/weakness/aphasia - acute stroke, seizure, metabolic syndrome, alcohol withdrawal       A/P: Will check CT/CTA head, cardiac w/u, consult Neurology, reeval for deterioration, admit  Pt is not a tPA candidate due to hemorrhagic findings  ED Course         Critical Care Time  CriticalCare Time  Performed by: Tran Robison DO  Authorized by:  Tran Robison DO     Critical care provider statement:     Critical care time (minutes):  40    Critical care time was exclusive of:  Separately billable procedures and treating other patients and teaching time    Critical care was necessary to treat or prevent imminent or life-threatening deterioration of the following conditions:  CNS failure or compromise    Critical care was time spent personally by me on the following activities:  Obtaining history from patient or surrogate, development of treatment plan with patient or surrogate, discussions with consultants, evaluation of patient's response to treatment, examination of patient, ordering and performing treatments and interventions, ordering and review of laboratory studies, ordering and review of radiographic studies, re-evaluation of patient's condition, review of old charts and ventilator management    I assumed direction of critical care for this patient from another provider in my specialty: no

## 2020-01-03 NOTE — PROGRESS NOTES
01/03/20 1800   Plan of Care   Comments Called by staff to l;ook for family of pt  they have not arrived  yet at hospital

## 2020-01-03 NOTE — PROGRESS NOTES
01/03/20 1800   Clinical Encounter Type   Visited With Health care provider   Referral From Nurse   Referral To

## 2020-01-03 NOTE — ED PROVIDER NOTES
History  No chief complaint on file  Patient is a 53-year-old male with previous medical history of a subarachnoid hemorrhage in 2017, MCA aneurysm presenting emergency department with altered mental status  Patient was walking out of a home improvement store when he was noted to be nauseous, altered with right-sided paralysis  EMS was called immediately  EMS called ahead and a pre-hospital stroke alert was called  Patient has right-sided hemiparesis, aphasia, left gaze preference, contracted right upper extremity  CT head revealed a large basal ganglia stroke with extension into the left ventricle  STROKE Alert   Severity:  Severe  Onset quality:  Sudden  Timing:  Constant  Progression:  Worsening  Chronicity:  New  Associated symptoms: nausea and vomiting        Prior to Admission Medications   Prescriptions Last Dose Informant Patient Reported? Taking?    Guaifenesin 1200 MG TB12   No No   Sig: Take 0 5 tablets by mouth every 12 (twelve) hours   amLODIPine (NORVASC) 10 mg tablet   No No   Sig: Take 1 tablet by mouth daily   benzonatate (TESSALON) 200 MG capsule   No No   Sig: Take 1 capsule by mouth 3 (three) times a day as needed for cough   folic acid (FOLVITE) 1 mg tablet   No No   Sig: Take 1 tablet by mouth daily   naproxen (NAPROSYN) 500 mg tablet   No No   Sig: Take 1 tablet by mouth 2 (two) times a day with meals   naproxen sodium (ANAPROX) 550 mg tablet   No No   Sig: Take 1 tablet by mouth 2 (two) times a day with meals   nicotine (NICODERM CQ) 14 mg/24hr TD 24 hr patch   No No   Sig: Place 1 patch on the skin daily   thiamine 100 MG tablet   No No   Sig: Take 1 tablet by mouth daily      Facility-Administered Medications: None       Past Medical History:   Diagnosis Date    Alcoholism /alcohol abuse ([facility] [address] )     Cerebral aneurysm     Hypertension     ICH (intracerebral hemorrhage) (HCC)        Past Surgical History:   Procedure Laterality Date    SUBDURAL HEMATOMA EVACUATION VIA CRANIOTOMY         Family History   Problem Relation Age of Onset    Diabetes Mother     Hypertension Mother     Diabetes Father     Hypertension Father     Stroke Father     Stroke Brother      I have reviewed and agree with the history as documented  Social History     Tobacco Use    Smoking status: Current Every Day Smoker     Packs/day: 1 00     Years: 37 00     Pack years: 37 00     Start date: 1980    Smokeless tobacco: Never Used    Tobacco comment: 1ppd x 37 years, cut fown to 1/4 pack recently   Substance Use Topics    Alcohol use: Yes     Alcohol/week: 4 0 standard drinks     Types: 4 Cans of beer per week     Comment: sober since stroke 10/10/17    Drug use: No     Comment: sober since 10/10/17        Review of Systems   Unable to perform ROS: Acuity of condition   Gastrointestinal: Positive for nausea and vomiting  Physical Exam  ED Triage Vitals   Temperature Pulse Respirations Blood Pressure SpO2   01/03/20 1530 01/03/20 1355 01/03/20 1355 01/03/20 1355 01/03/20 1355   99 °F (37 2 °C) (!) 118 18 (!) 205/144 94 %      Temp src Heart Rate Source Patient Position - Orthostatic VS BP Location FiO2 (%)   -- -- 01/03/20 1355 -- --     Lying        Pain Score       --                    Orthostatic Vital Signs  Vitals:    01/03/20 1500 01/03/20 1530 01/03/20 1540 01/03/20 1700   BP: 133/95 118/83 107/83 149/99   Pulse: (!) 106 (!) 108 104 68   Patient Position - Orthostatic VS:           Physical Exam   Constitutional: He appears well-developed  He appears distressed  Confused male rambling   HENT:   Head: Normocephalic and atraumatic  Right Ear: External ear normal    Left Ear: External ear normal    Eyes: Conjunctivae and EOM are normal  Right eye exhibits no discharge  Left eye exhibits no discharge  Neck: Normal range of motion  Neck supple  No tracheal deviation present  No thyromegaly present  Cardiovascular: Normal rate and regular rhythm     Pulmonary/Chest: Effort normal and breath sounds normal  No stridor  No respiratory distress  Abdominal: Soft  He exhibits no distension  There is no tenderness  Genitourinary: Penis normal    Musculoskeletal: He exhibits deformity  He exhibits no edema  Contracted right upper extremity   Neurological: He exhibits abnormal muscle tone  Right-sided spatial neglect, dysarthria, pupils 2 and reactive bilaterally, muscular strength 2/5 in the right upper and right lower extremity  Skin: Skin is warm  Capillary refill takes less than 2 seconds  No rash noted  No erythema  No pallor  Nursing note and vitals reviewed        ED Medications  Medications   niCARdipine (CARDENE) 25 mg (STANDARD CONCENTRATION) in sodium chloride 0 9% 250 mL (5 mg/hr Intravenous Restarted 1/3/20 1723)   ondansetron (ZOFRAN) 4 mg/2 mL injection **ADS Override Pull** (  Canceled Entry 1/3/20 1422)   fentaNYL 1000 mcg in sodium chloride 0 9% 100mL infusion (50 mcg/hr Intravenous New Bag 1/3/20 1441)   propofol (DIPRIVAN) 1000 mg in 100 mL infusion (premix) ( Intravenous Canceled Entry 1/3/20 1715)   sodium chloride 0 9 % infusion (125 mL/hr Intravenous New Bag 1/3/20 1716)   ondansetron (ZOFRAN) injection 4 mg (4 mg Intravenous Not Given 1/3/20 1720)   aminocaproic acid (AMICAR) 5 g in sodium chloride 0 9 % 250 mL infusion (has no administration in time range)   hydrALAZINE (APRESOLINE) injection 10 mg (has no administration in time range)   ondansetron (ZOFRAN) 4 mg/2 mL injection **ADS Override Pull** (4 mg  Given 1/3/20 1405)   etomidate (AMIDATE) 2 mg/mL injection 20 mg (20 mg Intravenous Given 1/3/20 1418)   Succinylcholine Chloride 100 mg/5 mL syringe 100 mg (100 mg Intravenous Given 1/3/20 1418)   fentanyl citrate (PF) 100 MCG/2ML 100 mcg (100 mcg Intravenous Given 1/3/20 1430)   fentanyl citrate (PF) 100 MCG/2ML 100 mcg (100 mcg Intravenous Given 1/3/20 1440)   iohexol (OMNIPAQUE) 350 MG/ML injection (MULTI-DOSE) 85 mL (85 mL Intravenous Given 1/3/20 1612)   desmopressin (DDAVP) 23 2 mcg in sodium chloride 0 9 % 50 mL IVPB (23 2 mcg Intravenous New Bag 1/3/20 1712)   mannitol 20 % IVPB premix 50 g 250 mL (50 g Intravenous New Bag 1/3/20 1710)   lidocaine (PF) (XYLOCAINE-MPF) 1 % injection **ADS Override Pull** (300 mg  Given 1/3/20 1731)       Diagnostic Studies  Results Reviewed     Procedure Component Value Units Date/Time    STAT Gram Stain [[id number]] Collected:  01/03/20 1600    Lab Status:  Final result Specimen:  Other Updated:  01/03/20 1742     Gram Stain Result No bacteria seen      Rare Polys    RBC count,CSF [[id number]]  (Abnormal) Collected:  01/03/20 1600    Lab Status:  Final result Specimen:  Cerebrospinal Fluid from Drain Updated:  01/03/20 1738     RBC, ,000 uL     Urine Microscopic [[id number]]  (Abnormal) Collected:  01/03/20 1502    Lab Status:  Final result Specimen:  Urine, Indwelling Garcia Catheter Updated:  01/03/20 1708     RBC, UA None Seen /hpf      WBC, UA None Seen /hpf      Epithelial Cells None Seen /hpf      Bacteria, UA None Seen /hpf      Hyaline Casts, UA 3-5 /lpf      Fine granular casts 5-10 /lpf      COARSE GRANULAR CASTS 0-3 /lpf     Total Protein, CSF [[id number]]  (Abnormal) Collected:  01/03/20 1600    Lab Status:  Final result Specimen:  Cerebrospinal Fluid from Drain Updated:  01/03/20 1644     Protein, CSF 1,807 mg/dL     Glucose, CSF [[id number]]  (Abnormal) Collected:  01/03/20 1600    Lab Status:  Final result Specimen:  Cerebrospinal Fluid from Drain Updated:  01/03/20 1642     Glucose, CSF 92 mg/dL     CSF culture and Gram stain [[id number]] Collected:  01/03/20 1600    Lab Status:   In process Specimen:  Cerebrospinal Fluid from Drain Updated:  01/03/20 1609    CSF white cell count with differential [[id number]] Collected:  01/03/20 1559    Lab Status:  No result Specimen:  Cerebrospinal Fluid from Drain     UA (URINE) with reflex to Scope [[id number]]  (Abnormal) Collected:  01/03/20 1502    Lab Status:  Final result Specimen:  Urine, Indwelling Garcia Catheter Updated:  01/03/20 1555     Color, UA Dk Yellow     Clarity, UA Cloudy     Specific Jensen, UA 1 020     pH, UA 6 5     Leukocytes, UA Negative     Nitrite, UA Negative     Protein,  (3+) mg/dl      Glucose,  (1/10%) mg/dl      Ketones, UA 15 (1+) mg/dl      Urobilinogen, UA 1 0 E U /dl      Bilirubin, UA Negative     Blood, UA Large    Rapid drug screen, urine [[id number]]  (Normal) Collected:  01/03/20 1501    Lab Status:  Final result Specimen:  Urine, Catheter Updated:  01/03/20 1546     Amph/Meth UR Negative     Barbiturate Ur Negative     Benzodiazepine Urine Negative     Cocaine Urine Negative     Methadone Urine Negative     Opiate Urine Negative     PCP Ur Negative     THC Urine Negative    Narrative:       FOR MEDICAL PURPOSES ONLY  IF CONFIRMATION NEEDED PLEASE CONTACT THE LAB WITHIN 5 DAYS      Drug Screen Cutoff Levels:  AMPHETAMINE/METHAMPHETAMINES  1000 ng/mL  BARBITURATES     200 ng/mL  BENZODIAZEPINES     200 ng/mL  COCAINE      300 ng/mL  METHADONE      300 ng/mL  OPIATES      300 ng/mL  PHENCYCLIDINE     25 ng/mL  THC       50 ng/mL      Troponin I [[id number]]  (Normal) Collected:  01/03/20 1415    Lab Status:  Final result Specimen:  Blood from Arm, Right Updated:  01/03/20 1445     Troponin I <0 02 ng/mL     Basic metabolic panel [[id number]]  (Abnormal) Collected:  01/03/20 1415    Lab Status:  Final result Specimen:  Blood from Arm, Right Updated:  01/03/20 1441     Sodium 132 mmol/L      Potassium 4 0 mmol/L      Chloride 100 mmol/L      CO2 27 mmol/L      ANION GAP 5 mmol/L      BUN 9 mg/dL      Creatinine 0 72 mg/dL      Glucose 128 mg/dL      Calcium 9 8 mg/dL      eGFR 107 ml/min/1 73sq m     Narrative:       [facility] guidelines for Chronic Kidney Disease (CKD):     Stage 1 with normal or high GFR (GFR > 90 mL/min/1 73 square meters)    Stage 2 Mild CKD (GFR = 60-89 mL/min/1 73 square meters)   Stage 3A Moderate CKD (GFR = 45-59 mL/min/1 73 square meters)    Stage 3B Moderate CKD (GFR = 30-44 mL/min/1 73 square meters)    Stage 4 Severe CKD (GFR = 15-29 mL/min/1 73 square meters)    Stage 5 End Stage CKD (GFR <15 mL/min/1 73 square meters)  Note: GFR calculation is accurate only with a steady state creatinine    POCT Chem 8+ [[id number]]  (Abnormal) Collected:  01/03/20 1420    Lab Status:  Final result Specimen:  Venous Updated:  01/03/20 1425     SODIUM, I-STAT 135 mmol/l      Potassium, i-STAT 4 0 mmol/L      Chloride, istat 99 mmol/L      CO2, i-STAT 28 mmol/L      Anion Gap, i-STAT 14 mmol/L      Calcium, Ionized i-STAT 1 09 mmol/L      BUN, I-STAT 8 mg/dl      Creatinine, i-STAT 0 5 mg/dl      eGFR 125 ml/min/1 73sq m      Glucose, i-STAT 135 mg/dl      Hct, i-STAT 47 %      Hgb, i-STAT 16 0 g/dl      Specimen Type VENOUS    Narrative:       [employer] guidelines for Chronic Kidney Disease (CKD):     Stage 1 with normal or high GFR (GFR > 90 mL/min/1 73 square meters)    Stage 2 Mild CKD (GFR = 60-89 mL/min/1 73 square meters)    Stage 3A Moderate CKD (GFR = 45-59 mL/min/1 73 square meters)    Stage 3B Moderate CKD (GFR = 30-44 mL/min/1 73 square meters)    Stage 4 Severe CKD (GFR = 15-29 mL/min/1 73 square meters)    Stage 5 End Stage CKD (GFR <15 mL/min/1 73 square meters)  Note: GFR calculation is accurate only with a steady state creatinine    POCT Blood Gas (CG8+) [[id number]]  (Abnormal) Collected:  01/03/20 1419    Lab Status:  Final result Specimen:  Venous Updated:  01/03/20 1423     ph, Elian ISTAT 7 426     pCO2, Elian i-STAT 40 6 mm HG      pO2, Elian i-STAT 94 0 mm HG      BE, i-STAT 2 mmol/L      HCO3, Elian i-STAT 26 7 mmol/L      CO2, i-STAT 28 mmol/L      O2 Sat, i-STAT 97 %      SODIUM, I-STAT 134 mmol/l      Potassium, i-STAT 4 0 mmol/L      Calcium, Ionized i-STAT 1 11 mmol/L      Hct, i-STAT 47 %      Hgb, i-STAT 16 0 g/dl      Glucose, i-STAT 136 mg/dl Specimen Type VENOUS    Fingerstick Glucose (POCT) [[id number]]  (Abnormal) Collected:  01/03/20 1357    Lab Status:  Final result Updated:  01/03/20 1357     POC Glucose 146 mg/dl                  CTA head w wo contrast   Final Result by Isidro Dukes MD (01/03 0536)      Large acute left basal ganglionic hemorrhage has increased in size in 2 hours, with the greater decompression into the ventricular system  Increased mass effect  Stable vascular ectasia most striking in the right greater than left M1 segments  Possible spot sign within the anterior inferior margin of the left basal ganglionic hemorrhage  Ectatic left distal M2 vessels may be slightly more prominent than on prior CTA study, 10/12/2017  Conventional angiography is suggested  These are not directly relayed to patient's intracranial hemorrhage  * I personally discussed this result to Benitez Guzmán on 1/3/2020 4:45PM       Workstation performed: [id number]         XR chest 1 view portable   Final Result by Edy Garrison MD (01/03 1612)      No acute cardiopulmonary disease              Workstation performed: [id number]         CT stroke alert brain   Final Result by Ritesh Ramires MD (01/03 1418)   Large acute left basal ganglia hemorrhage, likely hypertensive in origin, associated with substantial mass effect, approximately 7 mm midline shift to the right, extensive intraventricular hemorrhage and developing hydrocephalus         Findings were personally discussed by phone with Dr Neida Li on 1/3/2020 2:05 PM             Workstation performed: [id number]         CT head wo contrast    (Results Pending)         Procedures  Intubation  Date/Time: 1/3/2020 2:49 PM  Performed by: Ricardo Farnsworth DO  Authorized by: Ricardo Farnsworth DO     Patient location:  ED  Other Assisting Provider: Yes (comment)    Consent:     Consent obtained:  Emergent situation  Pre-procedure details:     Patient status:  Altered mental status    Mallampati score:  2    Pretreatment medications:  None    Paralytics:  Succinylcholine  Indications:     Indications for intubation: airway protection    Procedure details:     Preoxygenation:  Nonrebreather mask    CPR in progress: no      Intubation method:  Oral    Oral intubation technique:  Direct    Laryngoscope blade: Mac 3    Tube size (mm):  7 5    Tube type:  Cuffed    Number of attempts:  1    Cricoid pressure: yes      Tube visualized through cords: yes    Placement assessment:     ETT to lip:  22    ETT to teeth:  23    Tube secured with:  ETT farris    Breath sounds:  Equal    Placement verification: chest rise, condensation, CXR verification, direct visualization and equal breath sounds      CXR findings:  ETT in proper place  Post-procedure details:     Patient tolerance of procedure: Tolerated well, no immediate complications          ED Course                               MDM  Number of Diagnoses or Management Options  Hemorrhagic stroke [facility]): new and requires workup  Diagnosis management comments: Pre-hospital stroke alert presenting emergency department with stroke-like symptoms  CT showed large basal ganglia stroke with significant midline shift, ventricular extension of the hemorrhage  Patient vomited in the CT scanner  Patient intubated the emergency department by me secondary to airway protection  Chest x-ray shows proper placement of endotracheal tube  Patient was hypertensive  Cardene drip ordered to maintain systolic blood pressure less than 160  Patient is given multiple doses of propofol in order to control agitation after intubation  Patient will be further evaluated with a CTA head and neck  Patient be admitted to ICU  Patient is not a tPA candidate secondary to hemorrhagic stroke  Neurosurgery consulted for placement of a intraventricular drain         Amount and/or Complexity of Data Reviewed  Clinical lab tests: reviewed  Tests in the radiology section of CPT®: reviewed  Discussion of test results with the performing providers: yes  Decide to obtain previous medical records or to obtain history from someone other than the patient: yes  Obtain history from someone other than the patient: yes  Review and summarize past medical records: yes  Discuss the patient with other providers: yes  Independent visualization of images, tracings, or specimens: yes    Risk of Complications, Morbidity, and/or Mortality  Presenting problems: high  Diagnostic procedures: high  Management options: high          Disposition  Final diagnoses:   Hemorrhagic stroke ([facility] [address] )     Time reflects when diagnosis was documented in both MDM as applicable and the Disposition within this note     Time User Action Codes Description Comment    1/3/2020  2:53 PM Darío Wolff Add [I61 9] Nontraumatic acute hemorrhage of basal ganglia ([facility] [address] )     1/3/2020  5:07 PM Fidel Sample [I61 9] Hemorrhagic stroke [facility])       ED Disposition     ED Disposition Condition Date/Time Comment    Admit Stable Fri Stef 3, 2020  5:06 PM Case was discussed with Ja and the patient's admission status was agreed to be Admission Status: inpatient status to the service of Dr Kelsie Arizmendi           Follow-up Information    None         Current Discharge Medication List      CONTINUE these medications which have NOT CHANGED    Details   amLODIPine (NORVASC) 10 mg tablet Take 1 tablet by mouth daily  Qty: 30 tablet, Refills: 0      benzonatate (TESSALON) 200 MG capsule Take 1 capsule by mouth 3 (three) times a day as needed for cough  Qty: 20 capsule, Refills: 0      folic acid (FOLVITE) 1 mg tablet Take 1 tablet by mouth daily  Qty: 30 tablet, Refills: 0      Guaifenesin 1200 MG TB12 Take 0 5 tablets by mouth every 12 (twelve) hours  Qty: 30 tablet, Refills: 0      naproxen (NAPROSYN) 500 mg tablet Take 1 tablet by mouth 2 (two) times a day with meals  Qty: 20 tablet, Refills: 0      naproxen sodium (ANAPROX) 550 mg tablet Take 1 tablet by mouth 2 (two) times a day with meals  Qty: 20 tablet, Refills: 0      nicotine (NICODERM CQ) 14 mg/24hr TD 24 hr patch Place 1 patch on the skin daily  Qty: 28 patch, Refills: 0      thiamine 100 MG tablet Take 1 tablet by mouth daily  Qty: 30 tablet, Refills: 0           No discharge procedures on file  ED Provider  Attending physically available and evaluated Platt Oneidageovani CHEN managed the patient along with the ED Attending      Electronically Signed by         Margarita Garay,   01/03/20 110[address]  35, DO  01/03/20 [id number]

## 2020-01-03 NOTE — CONSULTS
Consultation - Stroke   Alexander Copeland 48 y o  male MRN: [id number]  Unit/Bed#: ED 01 Encounter: [id number]      Assessment/Plan   Assessment:   Alexander Copeland is a 48 y o  male with prior SAH/IVH s/p EVD in October 2017, who presented to the ED as a pre-hospital stroke alert with altered mental status, right hemiparesis, left gaze preference, and aphasia  CT revealed a large left basal ganglia hemorrhage with significant mass effect and 7 mm midline shift, suspicious for hypertensive etiology  NIHSS 22  He required intubation in the ED  TPA Decision: Patient not a TPA candidate  Bleeding risk  Plan:   - CTA pending   - Maintain blood pressure < 944 systolic  - Neurosurgery following  - Medical management and supportive care per ICU team  Correction of any metabolic or infectious disturbances  History of Present Illness     Reason for Consult / Principal Problem: Stroke Alert   Hx and PE limited by: AMS, Aphasia  History provided by medics/ chart review  Patient last known well: 1300  Stroke alert called: 9260  Neurology time of arrival: 1346  HPI: Alexander Copeland is a 48 y o  male with prior SAH with IVH s/p EVD in October 2017, right MCA pseudoaneurysm, alcohol abuse, and tobacco abuse, who presented to the ED as a pre-hospital stroke alert  The patient was with his friend at [address] [place] [address], when his friend noted a sudden change in mental status  He was noted to become very confused  The patient's friend took him to the urgent care across the street  At the urgent care, he had vomited  He developed right upper extremity weakness, right facial droop, and expressive aphasia  EMS was called  They found him to be significantly hypertensive with persistent symptoms  EMS activated a pre-hospital stroke alert  On arrival to the hospital, he was awake  He continued to exhibit his symptoms as described above  Neurology had met the patient in CT scan    CT revealed a large acute left basal ganglia hemorrhage with mass effect, producing approximately 7 mm of midline shift to the right with extensive IVH and developing hydrocephalus  CTA head was attempted, but due to vomiting and intolerance lying flat, the patient was taken to the ED room  The patient was then intubated for airway protection  Final NIHSS was 22 prior to intubation  No tPA was given due to hemorrhage  While in the ED, he remained significantly hypertensive in the 736 systolic  He was placed on Cardene  He was not on any anticoagulation or antiplatelets prior to admission  In October 2017, the patient presented to the hospital with blurred vision  He was also noted to be intoxicated  At that time, he was found to have a subarachnoid hemorrhage within the 3rd ventricle, frontal horn, right lateral ventricle, and temporal horn, left lateral ventricle  He did require intubation at that time  He also had a right frontal EVD placed  Initial thought was that this was related to a aneurysm  Mycotic aneurysms were also suspected, but infectious workup was negative      Inpatient consult to Neurology  Consult performed by: Cindy Canavan, PA-C  Consult ordered by: Sally Nelson PA-C          Review of Systems   Unable to perform ROS: Acuity of condition   Intubated, altered mental status      Historical Information   Past Medical History:   Diagnosis Date    Alcoholism /alcohol abuse ([facility] [address] )     Cerebral aneurysm     Hypertension     ICH (intracerebral hemorrhage) ([facility] [address] )      Past Surgical History:   Procedure Laterality Date    SUBDURAL HEMATOMA EVACUATION VIA CRANIOTOMY       Social History   Social History     Substance and Sexual Activity   Alcohol Use Yes    Alcohol/week: 4 0 standard drinks    Types: 4 Cans of beer per week    Comment: sober since stroke 10/10/17     Social History     Substance and Sexual Activity   Drug Use No    Comment: sober since 10/10/17     Social History     Tobacco Use   Smoking Status Current Every Day Smoker    Packs/day: 1 00    Years: 37 00    Pack years: 37 00    Start date: 1980   Smokeless Tobacco Never Used   Tobacco Comment    1ppd x 37 years, cut fown to 1/4 pack recently     Family History:   Family History   Problem Relation Age of Onset    Diabetes Mother     Hypertension Mother     Diabetes Father     Hypertension Father     Stroke Father     Stroke Brother        Review of previous medical records was completed  Meds/Allergies   all current active meds have been reviewed    Allergies   Allergen Reactions    Bee Venom        Objective   Vitals:Blood pressure (!) 205/144, pulse (!) 118, resp  rate 18, weight 76 9 kg (169 lb 8 2 oz), SpO2 94 %  ,Body mass index is 22 99 kg/m²  No intake or output data in the 24 hours ending 01/03/20 1404    Invasive Devices: Invasive Devices     Peripheral Intravenous Line            Peripheral IV Left -- days          Drain            External Urinary Catheter Small 805 days                Physical Exam   Constitutional:   Acutely ill appearing  Appears in distress secondary to vomiting  HENT:   Head: Normocephalic and atraumatic  Right Ear: External ear normal    Left Ear: External ear normal    Nose: Nose normal    Mouth/Throat: Oropharynx is clear and moist    Eyes: Conjunctivae are normal  Right eye exhibits no discharge  Left eye exhibits no discharge  No scleral icterus  Left gaze palsy noted    Cardiovascular:   Tachycardic   Pulmonary/Chest: Effort normal    Musculoskeletal: He exhibits no edema or deformity  Neurological:   As detailed below  Skin: Skin is warm and dry  No rash noted  He is not diaphoretic  No erythema  Psychiatric:   Cannot assess   Nursing note and vitals reviewed  Neurologic Exam     Mental Status   Patient is awake  Speech is incomprehensible  Difficulty following commands or answering questions  Required emergent intubation so mental status testing is limited        Cranial Nerves   CN testing limited: Left gaze palsy noted  PERRL  Conjugate gaze  Right facial droop noted  Motor Exam   Normal strength of LUE/LLE, moving spontaneously  No movement of RLE  Some movement of RUE, although drifts to bed  Sensory Exam   Decreased sensation to light touch on right  Sensory exam limited by emergent situation/mental status  Gait, Coordination, and Reflexes     Gait  Gait: (Deferred)  Tremulous        NIHSS:  1a Level of Consciousness: 1 = Not alert, but arousable with minimal stimulation   1b  LOC Questions: 2 = Answers neither correctly   1c  LOC Commands: 2 = Obeys neither correctly   2  Best Gaze: 2 = Forced Deviation   3  Visual: 0 = No visual field loss   4  Facial Palsy: 2=Partial paralysis (total or near total paralysis of the lower face)   5a  Motor Right Arm: 3=No effort against gravity, limb falls   5b  Motor Left Arm: 0=No drift, limb holds 90 (or 45) degrees for full 10 seconds   6a  Motor Right Le=No movement   6b  Motor Left Le=No drift, limb holds 90 (or 45) degrees for full 10 seconds   7  Limb Ataxia:  0   8  Sensory: 2=Severe to total sensory loss; patient is not aware of being touched in face, arm, leg   9  Best Language:  2   10  Dysarthria: 2   11  Extinction and Inattention (formerly Neglect): 0   Total Score: 22     Time NIHSS was completed: 1400    Lab Results: I have personally reviewed pertinent reports  Imaging Studies: I have personally reviewed pertinent reports  and I have personally reviewed pertinent films in PACS (CT)  EKG, Pathology, and Other Studies: I have personally reviewed pertinent reports  VTE Prophylaxis: Sequential compression device (Venodyne)  All pharmacologic on hold       Code Status: Prior

## 2020-01-03 NOTE — QUICK NOTE
Quick note    Mary LORA 48 year     1965     Location ICU 11    Problems:  1  Large left basal ganglia hemorrhage  2  Intraventricular hemorrhage - extensive  3  Hydrocephalus - early with oblong enlarged third ventricle and dilated temporal horns as well as casted lateral ventricles left greater than  4  Abnormal cerebral vascular vessels  5  Prior spontaneous intracranial hemorrhage predominantly in third ventricle 2017  6  M1 vascular ectasia bilateral  7  Vertebral artery ectasia bilaterally  8  Right distal M4 pseudoaneurysm  9  Alcohol abuse-longstanding-ongoing  10  Previous drug abuse - cocaine  11  Hypertension  12  Status post placement of right frontal EVD  13  Status post ongoing increase in size of left basal ganglia hemorrhage  14  Worsening midline shift 12 m from previously 7 mm  15  Increase in intraventricular hemorrhage  16  Non survivable    This 43-year-old man lives in a trailer with friends  He used to live at his mother's house but was asked to leave because of continued alcohol abuse    Apparently today he was driving to the [employer] with a friend  Got there  Walking around [place] unwell  Felt nauseous and vomiting  Friend had to stop the car he vomited out of the car  Drove him to an urgent care center opposite [employer] and dropped off  Developed speech difficulty and right hemiparesis    Transferedr to [facility] ;     Evaluated and had placement of right EVD as emergent procedure in the ER and transferred to ICU    So in summary this 43-year-old man with longstanding alcoholism and previously mesencephalic hemorrhage without obvious underlying cause in  is known to have vascular ectasia of his M1 and vertebral arteries and a pseudoaneurysm pseudoaneurysm of the distal right MCA artery     He now presents with severe headache nausea vomiting, and then became combative with right hemiparesis     Found on CT scan to have large left basal ganglia hemorrhage with extensive intraventricular extension and early hydrocephalus     Right EVD was placed  Follow-up CTA in good location    About an hour after successful placement of right frontal EVD and drainage of right lateral ventricle at +15 mm mmHg there was sudden increase in blood-stained output from the drain- 45 cc over half an hour    With elevated blood pressure    Needing propofol 50 micro g per kg per and fentanyl 50 micro g per hour and increased Cardene    Pre eyes remain conjugate with pinpoint pupils 2 mm sluggishly reactive    PEs elevated in the mid 40  Started on mannitol    Cardene was increased    Repeat head CT scan was carried out:  I reviewed it myself    This showed significant worsening his left-sided deep basal ganglia hemorrhage with now worsening midline shift 12 mm previously 7 mm    Overall dimensions of hemorrhage increased to 5 1 cm x 6 3 cm previously 4 8 x 4 4 cm  Increased vasogenic edema surrounding with effacement of adjacent sulci    Further extensive hemorrhage into the ventricles    The left ventricle is across the midline    The right for frontal EVD remains in good position but displaced by hemorrhage in the anterior horn of the right lateral ventricle    His condition was stabilized  The EVD was opened up was found to have thick clot within the lumen obstructing free flow after the thick amounts of increased blood had come through    Clot was aspirated and flushed out with serial aliquots of normal saline, preservative-free      Then gentle ballotage of the remaining clot within the ventricular catheter system with 3 to 4 cc of normal saline    The  [employer] monotorr external ventricular drain system was cleared of blood and the buretrol emptied as new baseline for drainage with the EVD set at + 15 mm    CSF was then noted to drain steadily as his  ICPs were in the 27s mm Hg    Pupils remained small 2 mm sluggishly faintly reacting    Tono is being started to help possible stabilize any further bleeding    In my view this is a non survivable devastating intracranial hemorrhage    I discussed his presentation and imaging and hospital course with my colleague Dr Henrique Garcia  He agreed this was a non survival situation    No indication for of any further heroic neurosurgical procedures  Any potential considered procedure would be futile    I discussed his presentation and conferred about his management management at several instances throughout the course of the afternoon with Dr Alyx Jorgensen and the CCM team    I spoke directly with his mother Mrs Aleta Dance prior to placement of the EVD and reiterated the seriousness of his condition    1/3/2020 5:47 PM    Zaina Villa MD, Attending Neurological Surgeon

## 2020-01-03 NOTE — PROGRESS NOTES
01/03/20 1500   Clinical Encounter Type   Visited With Patient not available;Health care provider   Routine Visit Introduction   Crisis Visit Code;ED  (Stroke alert)   Referral From Nurse   Referral To

## 2020-01-04 NOTE — PROGRESS NOTES
md at bedside to assess pt  Upon arrival into icu  On vent support  Not following any commands  Moving left side spontaneously   Ventriculostomy placed in the ed this pm

## 2020-01-04 NOTE — OCCUPATIONAL THERAPY NOTE
Occupational Therapy Cancellation Note  OT orders received, pt's chart reviewed  Pt with large L BG hemorrhage, with significant worsening due to continued hemorrhage now s/p EVD  Per neurology documentation, pt's prognosis is very poor and family is discussing comfort care  OT to continue to follow and attempt initial OT evaluation as appropriate      Lang Bond, MOT, OTR/L

## 2020-01-04 NOTE — PROGRESS NOTES
Progress Note - Neurology   Page Angel 48 y o  male MRN: [id number]  Unit/Bed#: ICU 11 Encounter: [id number]    Assessment:  Large L BG hemorrhage, likely hypertensive, with significant worsening due to continued hemorrhage, now s/p EVD  Given his imaging and clinical status agree that his prognosis is very poor  Plan:  -continued neurochecks  -CT and CTA reviewed - worsening BG hemorrhage with significant localized edema and herniation into brainstem; spot sign present  -SBP <140  -appreciate Neurosurgery recommendations; EVD management  -ICP management per ICU if continued aggressive measures  -no clear indication for AED ppx  -continued GOC discussion with family  -will follow as needed      Subjective:   Admitted to ICU since alert yesterday  Received an EVD followed by a CTA, which revealed a spot sign concerning for expanding hemorrhage  Subsequently had worsened exam and repeat HCT revealed significantly expanded hemorrhage with midline shift  Initiated on mannitol and aminocaproic acid to manage ICP/bleeding  Per discussion between Neurosurgery, ICU, and family, it was deemed that his hemorrhage is not survivable  In discussion with family at bedside this morning, they are leaning towards comfort care measures but will discuss further  ROS:  Unable to obtain due to mental status  Vitals: Blood pressure 122/73, pulse 62, temperature 100 °F (37 8 °C), resp  rate 14, height 5' 11" (1 803 m), weight 68 5 kg (151 lb 0 2 oz), SpO2 99 %  ,Body mass index is 21 06 kg/m²  Physical Exam: General appearance: intubated, appears comfortable  Head: Normocephalic, without obvious abnormality, atraumatic  Eyes: conjunctivae/corneas clear  PERRL, EOM's intact  Fundi benign    Neck: no adenopathy, no carotid bruit, no JVD and supple, symmetrical, trachea midline  Lungs: clear to auscultation bilaterally  Heart: regular rate and rhythm, S1, S2 normal, no murmur, click, rub or gallop  Abdomen: soft, non-tender; bowel sounds normal; no masses,  no organomegaly  Extremities: extremities normal, warm and well-perfused; no cyanosis, clubbing, or edema    Neurologic exam:  Intubated  Unresponsive to any verbal or tactile stimulation  Gaze midline  Pupils miotic, 2mm bilaterally, unresponsive  No VOR noted  +cough/gag  R lower facial weakness  Withdraws to noxious stim on L, however no movement noted on R     Lab, Imaging and other studies:   CBC:   Results from last 7 days   Lab Units 01/04/20  0421 01/03/20  2251 01/03/20  1420   WBC Thousand/uL 9 92 11 38*  --    RBC Million/uL 3 57* 3 66*  --    HEMOGLOBIN g/dL 12 3 12 5  --    I STAT HEMOGLOBIN g/dl  --   --  16 0   HEMATOCRIT % 35 8* 36 3*  --    HEMATOCRIT, ISTAT %  --   --  47   MCV fL 100* 99*  --    PLATELETS Thousands/uL 179 186  --    , BMP/CMP:   Results from last 7 days   Lab Units 01/04/20  0421 01/03/20  1420 01/03/20  1419 01/03/20  1415   SODIUM mmol/L 137  --   --  132*   POTASSIUM mmol/L 3 6  --   --  4 0   CHLORIDE mmol/L 102  --   --  100   CO2 mmol/L 29  --   --  27   CO2, I-STAT mmol/L  --  28 28  --    BUN mg/dL 10  --   --  9   CREATININE mg/dL 0 67  --   --  0 72   GLUCOSE, ISTAT mg/dl  --  135 136  --    CALCIUM mg/dL 8 6  --   --  9 8   AST U/L 82*  --   --   --    ALT U/L 110*  --   --   --    ALK PHOS U/L 64  --   --   --    EGFR ml/min/1 73sq m 110 125  --  107   , Coagulation:   Results from last 7 days   Lab Units 01/03/20  2251   INR  0 84     VTE Prophylaxis: Sequential compression device (Venodyne)     Counseling / Coordination of Care  Total Critical Care time spent 30 minutes excluding procedures, teaching and family updates

## 2020-01-04 NOTE — PROGRESS NOTES
Pastoral Care Progress Note    2020  Patient: Adi Brown : 1965  Admission Date & Time: 1/3/2020 1352  MRN: [id number] CSN: [id number]                     Chaplaincy Interventions Utilized:   Empowerment: Clarified, confirmed, or reviewed information from treatment team , Encouraged focus on present and Provided anticipatory guidance    Exploration: Explored emotional needs & resources and Explored spiritual needs & resources    Collaboration: Consulted with interdisciplinary team    Relationship Building: Cultivated a relationship of care and support and Listened empathically    Ritual: Provided prayer      Chaplaincy Outcomes Achieved:   Made decisions, Tearfully processed emotions and Verbally processed emotions    Spiritual Coping Strategies Utilized:   Spiritual comfort       [address] [address] Affiliation [employer]

## 2020-01-04 NOTE — PROGRESS NOTES
Family meeting held with numerous family members including daughter and mom, brothers and sisters  Meeting included attending physician, , and myself  Family in agreement with each other for goals of care  [employer] rep on site and talks to family afterwards  Family in agreement since pt had designated donor status on PA ID card

## 2020-01-04 NOTE — RESPIRATORY THERAPY NOTE
RT Ventilator Management Note  Emmanuel Ely 48 y o  male MRN: [id number]  Unit/Bed#: ICU 11 Encounter: [id number]     01/04/20 1158   Vent Information   Vent ID [id number]   Vent type     Vent Mode AC/VC   $ Pulse Oximetry Spot Check Charge Completed   SpO2 100 %   AC/VC Settings   Resp Rate (BPM) 14 BPM   Vt (mL) 500 mL   FIO2 (%) 40 %   PEEP (cmH2O) 5 cmH2O   Flow Pattern (LPM) 65 L/min   Trigger Sensitivity Flow (lpm) 3 %   Humidification Heater   Heater Temperature (Set) 98 6 °F (37 °C)   AC/VC Actuals   Resp Rate (BPM) 14 BPM   VT (mL) 576   MV 7 41   MAP (cmH2O) 7 9 cmH2O   Peak Pressure (cmH2O) 18 cmH2O   I/E Ratio (Obs) 1:4 1   Heater Temperature (Obs) 98 6 °F (37 °C)   Static Compliance (mL/cmH20) 110 mL/cmH2O   Plateau Pressure (cm H2O) 10 cm H2O     Found patient with vents changes of Vt 500 ml, PEEP 5

## 2020-01-04 NOTE — NUTRITION
01/04/20 1126   Assessment   Timepoint Initial  (Trigger-Restraints)   Labs   List Completed Labs   (AST/ALT 82/110, Glu 133,Chol 245  Meds reviewed include Propofol, Thiamine, Zofran)   Feeding Route   Propofol mL/hr 20 mL/hr   Propofol Kcals 528 kcal   Adequacy of Intake   Nutrition Modality NPO   Estimated Calorie Intake 25-50%  (Propofol)   Estimated calorie intake compared to estimated need Not meeting needs<1 day   Nutrition Prognosis   Nutrition Concerns Other (Comment); Dysphagia  (Poor prognosis with incapability to acquire self of nutrition, Alcohol Abuse )   Comorbid Concerns Other (Comment)  (Alcohol Dependence, Intraventricular Hemmorhage,ARF,HTN)   Nutrition Considerations Other (Comment)  (Nutrition education not warranted)   PES Statement   Problem Intake   Related to Inability for PO   As evidenced by: NPO Status   Patient Nutrition Goals   Goal initiate enteral feed   Goal Status initiated   Timeframe to complete goal 24-48hrs   Recommendations/Interventions   Summary Spoke with RN  Reviewed chart  Plan of care discussed with family  EN Recommnedations below  Malnutrition/BMI Present No   Interventions EN initiate   Nutrition Recommendations Initiate EN/PN;Tube Feeding Recommendation provided  (Initiate Jevity 1 2 20ml/hr x 8 hr  Increase 10ml/hr q4hr until Goal 70ml/jzv30gs (1680mlTV)  Flush with 160ml FW q 6hr   Provides 2016kcal, 93grm Protein, 1360mlFW EN+640ml Flush=2000mlTVFW)   Nutrition Complexity Risk   Nutrition complexity level High risk   Follow up date 01/08/20  (chk plan of care Comfort vs other, Propofol rate)
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
## 2020-01-04 NOTE — SPEECH THERAPY NOTE
Speech therapy consult received  Patient currently intubated and sedated  Will sign off  Please re-consult when appropriate

## 2020-01-04 NOTE — PROCEDURES
PreOp Diagnosis:   Acute Intraventricular hemorrhage with left intracranial hemorrhage    PostOp Diagnosis:  Same as above    Procedure:  right frontal ventriculostomy    Anesthesia:  Local    Physician Assistant:  Luma Beckham PA-C    Supervising attending:  Dr Lars Kingsley    Indications:  Jorge L Vargas is a 48 y o  male who presents with altered mental status with further neurologicla decline and vomiting requiring emergent intubation  CT head demonstrated a left intracranial hemorrhage with midline shift and intraventricular hemorrhage  As such, we discussed the risks and benefits of a right frontal ventriculostomy with his mother over the phone  His mother understood the risks and the benefits including bleeding infection stroke paralysis seizure and death  Operative details: The head was clipped in the usual fashion  Kelly Stank point was marked  The head was then prepped and draped in the usual sterile fashion  A 15 blade was then used to make a 2 cm linear incision  Self-retaining retractor was then placed  The periosteum was then elevated  A hand drill was then used to make a single enrique hole  The dura was opened sharply with a ventriculostomy trocar  Next a ventriculostomy was placed into the right frontal horn on the 1st attempt  Brisk egress of bloody CSF was and countered  The catheter was then tunneled posterior laterally and secured in place  The incision was closed using 3 0 nylons  The drain was secured in place with a tension loop  The ICP originally read 6-9mmHg when transduced  The patient was then brought for a CT which revealed satisfactory positioning of the catheter  Patient tolerated the procedure well and was brought to the ICU for further management in critical but stable condition  I was present and scrubbed for the entirety procedure

## 2020-01-04 NOTE — PROGRESS NOTES
Progress Note - Neurosurgery   Katie Lyon 48 y o  male MRN: [id number]  Unit/Bed#: ICU 11 Encounter: [id number]    Assessment:  Large dominant [place] basal ganglia hemorrhage with involvement of the surrounding brain and displacement to the cerebral cortex  There is displacement of surrounding brain with midline shift  Unfortunately poor prognosis given this bleeding clinical status  Patient seen earlier today    A total of 20 min was spent with the patient, of which more than 50% was spent in direct counseling coordination of care  Plan:  Spoke with [address] with patient's family  Goals of care conference to occur this afternoon    VTE Prophylaxis: Sequential compression device (Venodyne)     Subjective/Objective   Chief Complaint:  Unresponsive    Vitals: Blood pressure 148/92, pulse 58, temperature 100 °F (37 8 °C), resp  rate 18, height 5' 11" (1 803 m), weight 68 5 kg (151 lb 0 2 oz), SpO2 99 %  ,Body mass index is 21 06 kg/m²      Hemodynamic Monitoring: MAP: Arterial Line MAP (mmHg): 100 mmHg    Physical Exam:   Intubated  Unresponsive to verbal stimuli  Flexion withdrawal on the left to deep painful stimulus  No movement on the right  Patient does not track  No spontaneous eye opening  Pupils are midposition and very sluggishly reactive at 4 mm  No doll's eyes      Intake/Output       01/04/20 0701 - 01/05/20 0700      [id number] Total       Intake    Total Intake -- -- --       Output    Urine  355  -- 355    Output (mL) (Urethral Catheter) 355 -- 355    Drains  121  -- 121    Output (mL) (Ventriculostomy/Subdural Ventricular drainage catheter Right) 121 -- 121    Total Output 476 -- 476       Net I/O     -476 -- -476          Invasive Devices     Central Venous Catheter Line            CVC Central Lines 01/03/20 Triple 20cm less than 1 day          Peripheral Intravenous Line            Peripheral IV Right Forearm -- days    Peripheral IV 01/03/20 Right Forearm less than 1 day          Arterial Line            Arterial Line 01/03/20 Right Radial less than 1 day          Drain            NG/OG/Enteral Tube Orogastric 16 Fr Center mouth -- days    External Urinary Catheter Small 806 days    Urethral Catheter 1 day    Ventriculostomy/Subdural Ventricular drainage catheter Right 1 day          Airway            ETT  Cuffed 7 5 mm -- days                          Lab Results:   Lab Results   Component Value Date    WBC 9 92 01/04/2020    HGB 12 3 01/04/2020    HCT 35 8 (L) 01/04/2020     (H) 01/04/2020     01/04/2020    MCH 34 5 (H) 01/04/2020    MCHC 34 4 01/04/2020    RDW 12 9 01/04/2020    MPV 10 2 01/04/2020    NRBC 0 01/03/2020    SODIUM 137 01/04/2020     01/04/2020    CO2 29 01/04/2020    BUN 10 01/04/2020    CREATININE 0 67 01/04/2020    CALCIUM 8 6 01/04/2020    AST 82 (H) 01/04/2020     (H) 01/04/2020    ALKPHOS 64 01/04/2020    EGFR 110 01/04/2020    INR 0 84 01/03/2020       Imaging Studies: I have personally reviewed pertinent films in PACS CT scan of the brain carefully reviewed this demonstrates a basal ganglia hemorrhage with intraventricular extension involvement of the overlying cortex in the dominant hemisphere there is midline shift into portion of the brainstem

## 2020-01-04 NOTE — PROGRESS NOTES
Pt received from ED approximately 1630 and nsx kendrick Loredo and Dr Martinez Montoya to bedside immediately  Dr Martinez Montoya remains at bedside and escorts patient to CT scan with 2 RNs and RT

## 2020-01-04 NOTE — CONSULTS
Consult- Marisel Nixon 1965, 48 y o  male MRN: [id number]    Unit/Bed#: ICU 11 Encounter: [id number]    Primary Care Provider: No primary care provider on file  Date and time admitted to hospital: 1/3/2020  1:52 PM      Inpatient consult to Neurosurgery  Consult performed by: Jessica Robertson PA-C  Consult ordered by: Maykel Iqbal PA-C          * Nontraumatic acute hemorrhage of the left basal ganglia  Assessment & Plan  AMS [facility] at [employer]  [facility]  Intracerebral Hemorrhage (ICH) Score:    [place] Coma Sore 5-12 equals +1   Age greater than or equal to 80 No   ICH Volume greater than or equal to 30 ml Yes (1 Point)   Intraventricular Hemorrhage Yes (1 Point)   Infratentorial Origin of Hemorrhage No   Total: 3       Imaging:   CT head wo, stroke alert 1/3/2020: Large acute basal ganglia hemorrhage with intraventricular extension including 3rd and 4th ventricule  Significnat mass effect with midline shift 7mm and mild hydrocephalus  Plan:  · Continue frequent neurological exam    · Plan for placement of right frontal EVD  · Blood pressure management  · Hold all AP/AC medications  · Neurology consult appreciated  · Ongoing medical management per primary team  · DVT PPX: SCDs      Intraventricular hemorrhage ([facility] [address] )  Assessment & Plan  Plan for placement of EVD  Will send baseline CSF    Brain compression [facility])  Assessment & Plan  Evident by midline shift and effacement of ventricle    See plan as above    Alcohol dependence ([facility] [address] )  Assessment & Plan  Drinks 30pk/day with additional whiskey at times    Monitor and treat for withdrawl        History of Present Illness     HPI: Marisel Nixon is a 48 y o  male with PMH including hypertension, alcohol abuse, 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  10/2017who presents as pre-hospital stroke alert  He developed altered mental status while at [address] with a friend   He was taken to an urgent care across the street  He had emesis and exam demonstrated right hemiparesis, left gaze preference and aphasia  CT imaging demonstrated large left basal ganglia hemorrhage with Midline shift 7mm and IVH with mild hydrocephalus  NIH 22  Given poor mental status and emesis, he was emergently intubated  He was significantly hypertensive with systolic into 441J for which he was placed on Cardene  Per report, he drinks approximately 30 pack/day with possibly additional whiskey  Drug use is unknown to family  No documented AP/AC medications in home med list      Of note, prior admission October 11-22, 2017 for non traumatic SAH and required EVD  At that time, he was found to have an incidental right mca pseudoaneurysm  He was seen in the clinic once then failed to follow-up further       Review of Systems   Unable to perform ROS: Intubated       Historical Information   Past Medical History:   Diagnosis Date    Alcoholism /alcohol abuse ([address] )     Cerebral aneurysm     Hypertension     ICH (intracerebral hemorrhage) (HCC)      Past Surgical History:   Procedure Laterality Date    SUBDURAL HEMATOMA EVACUATION VIA CRANIOTOMY       Social History     Substance and Sexual Activity   Alcohol Use Yes    Alcohol/week: 4 0 standard drinks    Types: 4 Cans of beer per week    Comment: sober since stroke 10/10/17     Social History     Substance and Sexual Activity   Drug Use No    Comment: sober since 10/10/17     Social History     Tobacco Use   Smoking Status Current Every Day Smoker    Packs/day: 1 00    Years: 37 00    Pack years: 37 00    Start date: 1980   Smokeless Tobacco Never Used   Tobacco Comment    1ppd x 37 years, cut fown to 1/4 pack recently     Family History   Problem Relation Age of Onset    Diabetes Mother     Hypertension Mother     Diabetes Father     Hypertension Father     Stroke Father     Stroke Brother        Meds/Allergies   all current active meds have been reviewed, current meds:   Current Facility-Administered Medications   Medication Dose Route Frequency    aminocaproic acid (AMICAR) 5 g in sodium chloride 0 9 % 250 mL infusion  1 g/hr Intravenous Continuous    fentaNYL 1000 mcg in sodium chloride 0 9% 100mL infusion  50 mcg/hr Intravenous Continuous    fentanyl citrate (PF) 100 MCG/2ML **ADS Override Pull**        hydrALAZINE (APRESOLINE) injection 10 mg  10 mg Intravenous Once    niCARdipine (CARDENE) 25 mg (STANDARD CONCENTRATION) in sodium chloride 0 9% 250 mL  1-15 mg/hr Intravenous Titrated    ondansetron (ZOFRAN) 4 mg/2 mL injection **ADS Override Pull**        ondansetron (ZOFRAN) injection 4 mg  4 mg Intravenous Once    propofol (DIPRIVAN) 1000 mg in 100 mL infusion (premix)  5-50 mcg/kg/min Intravenous Titrated    sodium chloride 0 9 % infusion  125 mL/hr Intravenous Continuous    and PTA meds:   Prior to Admission Medications   Prescriptions Last Dose Informant Patient Reported? Taking?    Guaifenesin 1200 MG TB12   No No   Sig: Take 0 5 tablets by mouth every 12 (twelve) hours   amLODIPine (NORVASC) 10 mg tablet   No No   Sig: Take 1 tablet by mouth daily   benzonatate (TESSALON) 200 MG capsule   No No   Sig: Take 1 capsule by mouth 3 (three) times a day as needed for cough   folic acid (FOLVITE) 1 mg tablet   No No   Sig: Take 1 tablet by mouth daily   naproxen (NAPROSYN) 500 mg tablet   No No   Sig: Take 1 tablet by mouth 2 (two) times a day with meals   naproxen sodium (ANAPROX) 550 mg tablet   No No   Sig: Take 1 tablet by mouth 2 (two) times a day with meals   nicotine (NICODERM CQ) 14 mg/24hr TD 24 hr patch   No No   Sig: Place 1 patch on the skin daily   thiamine 100 MG tablet   No No   Sig: Take 1 tablet by mouth daily      Facility-Administered Medications: None     Allergies   Allergen Reactions    Bee Venom        Objective   I/O       01/02 0701 - 01/03 0700 01/03 0701 - 01/04 0700    I V  (mL/kg)  323 9 (4 7)    Blood  462    IV Piggyback 300    Total Intake(mL/kg)  1085 9 (15 9)    Urine (mL/kg/hr)  805    Drains  85    Total Output  890    Net  +195 9                Physical Exam   Constitutional: He appears well-developed  No distress  HENT:   Head: Normocephalic and atraumatic  Eyes: Pupils are equal, round, and reactive to light  No scleral icterus  2mm and non reactive  Neck: Neck supple  No JVD present  No tracheal deviation present  Cardiovascular: Normal rate  Pulmonary/Chest:   Intubated and ventilated   Abdominal: Soft  He exhibits no distension  Skin: Skin is warm and dry  Neurologic Exam     Mental Status   Level of consciousness: responsive to painful stimuli  Examine limited at this time secondary to recent intubation and medications  Reported -  Minimally verbal, incomprehensible  Was following simple commands prior but not currently  Left gaze preference  RUE antigravity, RLE no movement  Grossly moving LUE/LLE     Cranial Nerves     CN III, IV, VI   Pupils are equal, round, and reactive to light  Vitals:Blood pressure 143/74, pulse 74, temperature 99 9 °F (37 7 °C), temperature source Probe, resp  rate 14, weight 68 5 kg (151 lb 0 2 oz), SpO2 100 %  ,Body mass index is 20 48 kg/m²       Lab Results:   Results from last 7 days   Lab Units 01/03/20  1420 01/03/20  1419   I STAT HEMOGLOBIN g/dl 16 0 16 0   HEMATOCRIT, ISTAT % 47 47     Results from last 7 days   Lab Units 01/03/20  1420 01/03/20  1419 01/03/20  1415   POTASSIUM mmol/L  --   --  4 0   CHLORIDE mmol/L  --   --  100   CO2 mmol/L  --   --  27   CO2, I-STAT mmol/L 28 28  --    BUN mg/dL  --   --  9   CREATININE mg/dL  --   --  0 72   CALCIUM mg/dL  --   --  9 8   GLUCOSE, ISTAT mg/dl 135 136  --              Results from last 7 days   Lab Units 01/03/20  1708   INR  0 88     No results found for: TROPONINT  ABG:  Lab Results   Component Value Date    PHART 7 402 01/03/2020    USI7FUK 43 4 01/03/2020    PO2ART 168 5 (H) 01/03/2020    OJS1YRL 26 4 01/03/2020    BEART 1 3 01/03/2020    SOURCE Line, Arterial 01/03/2020       Imaging Studies: I have personally reviewed pertinent reports  and I have personally reviewed pertinent films in PACS    Xr Chest 1 View Portable    Result Date: 1/3/2020  Impression: No acute cardiopulmonary disease  Workstation performed: [id number]     Ct Stroke Alert Brain    Result Date: 1/3/2020  Impression: Large acute left basal ganglia hemorrhage, likely hypertensive in origin, associated with substantial mass effect, approximately 7 mm midline shift to the right, extensive intraventricular hemorrhage and developing hydrocephalus Findings were personally discussed by phone with Dr Domi Oh on 1/3/2020 2:05 PM  Workstation performed: [id number]       EKG, Pathology, and Other Studies: I have personally reviewed pertinent reports  VTE Prophylaxis: Sequential compression device Thong Holman     Code Status: Level 1 - Full Code  Advance Directive and Living Will:      Power of :    POLST:      Counseling / Coordination of Care  I spent 45 minutes with the patient

## 2020-01-04 NOTE — ASSESSMENT & PLAN NOTE
AMS while at [employer]  [facility]  Intracerebral Hemorrhage (ICH) Score:    [place] Coma Sore 5-12 equals +1   Age greater than or equal to 80 No   ICH Volume greater than or equal to 30 ml Yes (1 Point)   Intraventricular Hemorrhage Yes (1 Point)   Infratentorial Origin of Hemorrhage No   Total: 3       Imaging:   CT head wo, stroke alert 1/3/2020: Large acute basal ganglia hemorrhage with intraventricular extension including 3rd and 4th ventricule  Significnat mass effect with midline shift 7mm and mild hydrocephalus  Plan:  · Continue frequent neurological exam    · Plan for placement of right frontal EVD  · Blood pressure management  · Hold all AP/AC medications  · Neurology consult appreciated    · Ongoing medical management per primary team  · DVT PPX: SCDs

## 2020-01-04 NOTE — RESPIRATORY THERAPY NOTE
RT Ventilator Management Note  Lexx Oliver 48 y o  male MRN: [id number]  Unit/Bed#: ICU 11 Encounter: [id number]      Daily Screen       1/3/2020  1632 1/4/2020  0758          Patient safety screen outcome[de-identified]  Failed  Failed      Not Ready for Weaning due to[de-identified]    PEEP > 8cmH2O;Underline problem not resolved              Physical Exam:   Assessment Type: Assess only  Bilateral Breath Sounds: Clear      Resp Comments: Found patient on AC/VC 14/550/50%/ PEEP  #7 5 ETT secured @ 24 cm at lip  FIO2 decreased to 40%  Suctioned small amount mucous from ETT

## 2020-01-05 NOTE — OCCUPATIONAL THERAPY NOTE
Occupational Therapy Cancellation Note  Pt's family to determine goals of care due to pt's poor prognosis  OT to continue to follow and attempt initial OT evaluation as medically and clinically appropriate      COOPER Ramirez, OTR/L

## 2020-01-05 NOTE — PROGRESS NOTES
Patient's condition unchanged    [facility] conference underway    Will consider EVD management based on the outcome of this study

## 2020-01-05 NOTE — PHYSICAL THERAPY NOTE
Physical Therapy Cancellation Note    PT order received; chart reviewed; noted pt is currently intubated w/ EVD in place (pt was admitted w/ large acute basal ganglia hemorrhage with intraventricular extension including 3rd and 4th ventricle); goals of care conference is pending in PM, per neuro sx note; will follow peripherally at this time and initiate PT assessment when clinically appropriate      Francis Gonzalez, PT

## 2020-01-05 NOTE — PROGRESS NOTES
Progress Note - [facility] attending admit note  Arianna Melchor 48 y o  male MRN: [id number]  Unit/Bed#: ICU 11 Encounter: [id number]    Date of service 01/03/2020 2:30 p m  Patient is a 26-year-old male with a significant history of alcohol abuse who presented the emergency department after having sign concerning for acute stroke  Patient was noted to be at [address] with his friend he did not feel well and his [facility]  While in the urgent care patient had stroke-like symptoms he was sent to the emergency department via EMS  Upon arrival stroke alert was initiated  Patient was noted to have right upper and lower extremity weakness and then had episodes of vomiting with decreased mental status  Patient required emergent intervention  CT scan displayed a left basal ganglia ICH with intraventricular hemorrhage and evidence mass effect  Neurosurgery was consulted to evaluate patient  Critical Care Medicine to admit patient      Vital signs:  Blood pressure 220/110, heart rate 72, saturation 100%, respiratory rate 16    GEN: Patient having episodes of agitation with coughing on ventilator  HEENT:  Pupils 2 mm bilaterally, left gaze preference  CV: RRR, now  Murmur  Resp:  Coarse rhonchorous breath sounds bilaterally  GI: soft,NT/ND  :  No urinary catheter  Neuro:  Prior to intubation patient was reported to have left gaze preference, right facial droop, right upper extremity movement antigravity, no right lower extremity movement, spontaneous left upper and lower extremity movement  Skin: warm, dry    Initial and ICH 2    Assessment:    Left basal ganglia ICH with intraventricular hemorrhage and hydrocephalus with evidence of brain compression and mass effect and surrounding jett hemorrhage cerebral edema status post EVD placement 01/03/2020  Hypertensive emergency  Acute hypoxic respiratory failure  Aspiration pneumonitis  Alcohol abuse reported consumption of 30 beers per day as well as kirstinkey  History of subarachnoid hemorrhage with pseudoaneurysm of right MCA with admission requiring EVD placement    Patient had significant decline in emergency department requiring placement of EVD  Continue with neurologic checks q 1 hour  Maintain systolic blood pressure 639-718  Maintain EVD 10 mm Hg  Continue with ICH stroke workup including hemoglobin A1c, lipid panel and echocardiogram   Repeat CT scan to be performed status post EVD placement to obtain CTA to rule out vascular involvement of ICH  Neurology and neurosurgery consultation  Patient does not have a history of being on anticoagulants as per his family  INR pending  Plan to repeat CT scan in [facility] Awaiting for family arrival    Continue with ventilatory support  Patient is not appropriate for spontaneous breathing trial or appropriate for extubation  Check ABG in a vest ventilatory settings to maintain pCO2 40-45 and saturation greater than 92%  Aggressive airway clearance protocol  Patient did have frothy sputum after intubation which may be secondary to high-output cardiac failure with extensive hypertension versus aspiration with recent vomiting  Continue to monitor patient's respiratory status and oxygenation  At this time antibiotics will not be initiated  Cardene infusion initiated for hypertensive emergency  Maintain systolic blood pressure as per above  Patient has a history of alcohol abuse for which he is known to be current consumer of large quantities of alcohol daily  Plan to check urinary drug screen as well as blood alcohol level  Monitor for signs of alcohol withdrawal   Initiate thiamine, folate and multivitamin  Update:  CT scan obtained status post EVD placement shows expansion patient's ICH  Neurologic exam decline since initial exam   Continue with current plan of care  Update:  Patient evaluated upon arrival to ICU    Patient had a significant output from his EVD after being positioned and EVD at a level of 10 mm of mercury after transport  EVD repositioned to 15 mm of mercury with still significant output  Then increase to 20 mm of mercury and continued to have significant output  ICPs noted to be in the 45s  EVD clamped at 20 mm of mercury secondary to concern for over drainage and expansion of ICH  Neurologic exam remained poor with a GCS of 3 T  Patient is not spontaneously participating with exam and is not becoming agitated  Repeat CT scan obtained due to concern for ongoing bleeding  Patient had expansion of [address] again with the amount of blood in ventricular system as well as in parenchyma concerning for non friable hemorrhage  Patient transferred back to ICU  EVD reopened at 21 with clot obstructing flow  Neurosurgery contacted and drainage system flushed  EVD positions back at 10 mm Hg, draining has improved  Plan to maintain EVD at 10 mm Hg  Continue with blood pressure goals as well as neurologic checks  Repeat CT scan in a m [place] [address] Patient's mother and daughter were updated in regards to events and informed that patient has a poor prognosis and will likely progress to brain death  [facility] contacted for evaluation  During this event patient's medications were provided by the family for review  Patient did not have any anticoagulations  INR and PTT were appropriate  Patient did have a bottle of aspirin  DDAVP it was administered along with Amicar and platelet transfusion        Critical care time 97 minutes

## 2020-01-05 NOTE — PROGRESS NOTES
CRITICAL CARE PROGRESS NOTE     Name: Leo Eden   Age & Sex: 48 y o  male   MRN: [id number]  Unit/Bed#: ICU 11   Encounter: [id number]  Code Status: Level 1 - Full Code    [address] PROBLEMS   Principal Problem:    Nontraumatic acute hemorrhage of the left basal ganglia  Active Problems:    Alcohol dependence (HCC)    Intraventricular hemorrhage (HCC)    Acute respiratory failure    Hypertension    Brain compression (HCC)    ASSESSMENT/PLAN   ·   · Diagnosis: Left basal ganglia hemorrhage with intraventricular extension   Plan: Intracerebral Hemorrhage (ICH) Score: 2   [place] Coma Sore 5-12 equals +1   Age greater than or equal to 80 No   ICH Volume greater than or equal to 30 ml No   Intraventricular Hemorrhage Yes (1 Point)   Infratentorial Origin of Hemorrhage No   Total: 2   ? EVD in place  ? Neurology onboard  ? Started on diprivan and fentanyl for sedation   ? COG discussion yesterday  Poor prognosis  Family considering give of life  New family meeting scheduled for today and afternoon  · Diagnosis: ETOH abuse  ? Plan: Mother states that the patient is an active drinker with a long history of abuse  ? Will start on thiamine and folate  ? Monitor for signs of w/d   CV:   · Diagnosis: Hypertension  ? Plan: Placed on cardene infusion in the ED  ? Goal SBP <160  ? Monitor on telemetry  ? echo 01/04:  EF 60%, no regional wall motion abnormality  No source of embolism identified  ? EKG 01/03, NSR HR 85     Pulm:  · Diagnosis: Acute respiratory failure   ? Plan: Patient intubated in ED for airway protection   ? Continue AC/VC ventilatory support   ? Check ABG  ? Post-intubation CXR show ETT 5CM above rebecca     GI:   · Diagnosis: No acute issues  ? Plan: Will start on PPI for GI prophylaxis   ? Zofran prn      :   · Diagnosis: No acute issues  ? Plan: Garcia placed to monitor I/O in critically ill patient   ?  Monitor renal function         F/E/N:   · Plan: Monitor electrolytes closely  · If there is a concern for intracranial hypertension, will start HTS and monitor serial NA/osmol         Heme/Onc:   · Diagnosis: No acute issues         Endo:   · Diagnosis: No acute issues  ? Plan:  Monitor BG         ID:   · Diagnosis: No acute issues  ? Plan: Monitor for signs of acute infection  ? Patient may have aspirated prior to intubation  No indication for abx at this time      MSK/Skin:   · Diagnosis: Immobility   ? Plan: Opt out of early mobility protocol secondary to [address]  ? Turn and prop  ? PMR per stroke protocl   ? PT/OT per stroke protocol      Disposition: Continue critical care    SUBJECTIVE     Subjective:  Patient intubated    24hr Events:  No events    OBJECTIVE     Vitals:    20 1140 20 1143 20 1310 20 1340   BP: 149/79  127/75 120/76   Pulse: 84  68 76   Resp:    Temp: 99 7 °F (37 6 °C)  100 °F (37 8 °C) 100 °F (37 8 °C)   TempSrc:       SpO2: 98% 98% 99% 99%   Weight:       Height:          Temperature:   Temp (24hrs), Av 7 °F (37 6 °C), Min:99 °F (37 2 °C), Max:100 4 °F (38 °C)    Temperature: 100 °F (37 8 °C)  Weights:   IBW: 75 3 kg    Body mass index is 21 06 kg/m²  Weight (last 2 days)     Date/Time   Weight    20 1800   68 5 (151 02)    20 1354   76 9 (169 51)            Physical Exam   Physical Exam:   GENERAL: NAD, downward gaze positive corneal, pupillary, gagging, withdrawal reflex   HEENT:  NC/AT, PERRL, unable to assess OEM, no scleral icterus  CARDIAC:  RRR, +S1/S2, no S3/S4 heard, no m/g/r  PULMONARY:  non-labored breathing, good air movement bilaterally  ABDOMEN:  Soft, NT/ND, +BS, no rebound/guarding/rigidity  Extremities:  2+ Pulses in DP/PT  No edema, cyanosis, or clubbing  NEUROLOGIC:  GCS ( eye 1, V 1T, M 1)  Downward gaze positive corneal, pupillary, gagging, withdrawal reflex     SKIN:  No rashes or erythema    Hemodynamic Monitoring:  N/A     Non-Invasive/Invasive Ventilation Settings:  Respiratory    Lab Data (Last 4 hours)    None         O2/Vent Data (Last 4 hours)      01/05 1143           Vent Mode AC/VC       Resp Rate (BPM) (BPM) 14       Vt (mL) (mL) 500       FIO2 (%) (%) 40       PEEP (cmH2O) (cmH2O) 5       MV 10                 No results found for: PHART, IYQ5PXI, PO2ART, AGG8JNR, K3SIMRKP, BEART, SOURCE  SpO2: SpO2: 99 %  Invasive Lines & Devices: Invasive Devices     Central Venous Catheter Line            CVC Central Lines 01/03/20 Triple 20cm 1 day          Peripheral Intravenous Line            Peripheral IV Right Forearm -- days    Peripheral IV 01/03/20 Right Forearm 1 day          Drain            NG/OG/Enteral Tube Orogastric 16 Fr Center mouth -- days    External Urinary Catheter Small 807 days    Urethral Catheter 1 day    Ventriculostomy/Subdural Ventricular drainage catheter Right 1 day          Airway            ETT  Cuffed 7 5 mm -- days                 Intake & Output:  I/O       01/03 0701 - 01/04 0700 01/04 0701 - 01/05 0700 01/05 0701 - 01/06 0700    I V  (mL/kg) 2646 9 (38 6) 2212 (32 3) 823 8 (12)    Blood 462      NG/GT 50 50     IV Piggyback 300      Feedings  320     Total Intake(mL/kg) 3458 9 (50 5) 2582 (37 7) 823 8 (12)    Urine (mL/kg/hr) 1705 865 (0 5) 410 (0 9)    Emesis/NG output 200 0     Drains 271 343 78    Total Output 2176 1208 488    Net +1282 9 +1374 +335 8               Nutrition:        Diet Orders   (From admission, onward)             Start     Ordered    01/04/20 0926  Diet Enteral/Parenteral; Tube Feeding No Oral Diet; Jevity 1 2 Rob; Continuous; 60  Diet effective now     Question Answer Comment   Diet Type Enteral/Parenteral    Enteral/Parenteral Tube Feeding No Oral Diet    Tube Feeding Formula: Jevity 1 2 Rob    Bolus/Cyclic/Continuous Continuous    Tube Feeding Goal Rate (mL/hr): 60    RD to adjust diet per protocol? Yes        01/04/20 0931              LABORATORY DATA     Labs: I have personally reviewed pertinent reports        Results from last 7 days Lab Units 01/05/20 0438 01/04/20  0421 01/03/20  2251   WBC Thousand/uL 8 82 9 92 11 38*   HEMOGLOBIN g/dL 11 1* 12 3 12 5   HEMATOCRIT % 33 1* 35 8* 36 3*   PLATELETS Thousands/uL 147* 179 186   NEUTROS PCT % 80*  --  86*   MONOS PCT % 8  --  8    Results from last 7 days   Lab Units 01/05/20  0438 01/04/20  0421 01/03/20  1420 01/03/20  1419  01/03/20  1415   POTASSIUM mmol/L 3 7 3 6  --   --   --  4 0   CHLORIDE mmol/L 103 102  --   --   --  100   CO2 mmol/L 30 29  --   --   --  27   CO2, I-STAT mmol/L  --   --  28 28   < >  --    BUN mg/dL 13 10  --   --   --  9   CREATININE mg/dL 0 58* 0 67  --   --   --  0 72   CALCIUM mg/dL 8 6 8 6  --   --   --  9 8   ALK PHOS U/L 58 64  --   --   --   --    ALT U/L 77 110*  --   --   --   --    AST U/L 46* 82*  --   --   --   --    GLUCOSE, ISTAT mg/dl  --   --  135 136  --   --     < > = values in this interval not displayed  Results from last 7 days   Lab Units 01/05/20 0438 01/04/20  0421   MAGNESIUM mg/dL 2 3 2 0     Results from last 7 days   Lab Units 01/05/20 0438 01/04/20  0421   PHOSPHORUS mg/dL 2 8 3 8      Results from last 7 days   Lab Units 01/05/20 0438 01/03/20  2251 01/03/20  1708   INR  0 95 0 84 0 88   PTT seconds 28  --   --          Results from last 7 days   Lab Units 01/03/20  1415   TROPONIN I ng/mL <0 02     Micro:  Lab Results   Component Value Date    BLOODCX No Growth After 5 Days  11/09/2017    BLOODCX No Growth After 5 Days  11/09/2017    BLOODCX No Growth After 5 Days  10/15/2017    SPUTUMCULTUR 3+ Growth of  11/09/2017     IMAGING & DIAGNOSTIC TESTING     Imaging: I have personally reviewed pertinent reports  Cta Head W Wo Contrast    Result Date: 1/3/2020  Impression: Large acute left basal ganglionic hemorrhage has increased in size in 2 hours, with the greater decompression into the ventricular system  Increased mass effect  Stable vascular ectasia most striking in the right greater than left M1 segments   Possible spot sign within the anterior inferior margin of the left basal ganglionic hemorrhage  Ectatic left distal M2 vessels may be slightly more prominent than on prior CTA study, 10/12/2017  Conventional angiography is suggested  These are not directly relayed to patient's intracranial hemorrhage  * I personally discussed this result to Beatrice Aguila on 1/3/2020 4:45PM  Workstation performed: [id number]     Xr Chest Portable    Result Date: 1/4/2020  Impression: New left subclavian line with tip at the cavoatrial junction  No pneumothorax  Unchanged position of ET tube and enteric tube  No acute cardiopulmonary disease  Workstation performed: [id number]     Xr Chest 1 View Portable    Result Date: 1/3/2020  Impression: No acute cardiopulmonary disease  Workstation performed: [id number]     Ct Head Wo Contrast    Result Date: 1/3/2020  Impression: Increased size of left basal ganglia hemorrhage now measuring 5 1 x 6 3 cm (series 2, image 20), previously 4 8 x 4 4 cm, noting developing vasogenic edema with effacement of the adjacent sulci  Increased breakthrough hemorrhage into the ventricles, however ventricular size is not significantly changed  Increased rightward midline shift now measuring 12 mm, previously 7 mm  A right frontal approach ventriculostomy remains in stable position  The study was marked in [facility] for immediate notification  Workstation performed: [id number]     Ct Stroke Alert Brain    Result Date: 1/3/2020  Impression: Large acute left basal ganglia hemorrhage, likely hypertensive in origin, associated with substantial mass effect, approximately 7 mm midline shift to the right, extensive intraventricular hemorrhage and developing hydrocephalus Findings were personally discussed by phone with Dr Charisse Bruner on 1/3/2020 2:05 PM  Workstation performed: [id number]     EKG, Pathology, and Other Studies: I have personally reviewed pertinent reports       ALLERGIES     Allergies   Allergen Reactions    Bee Venom MEDICATIONS     Current Facility-Administered Medications:  acetaminophen 650 mg Oral Q6H PRN Akhil Keller MD    chlorhexidine 15 mL Swish & Spit Q12H [facility] Rise GABRIEL Colón    fentanyl citrate (PF) 50 mcg Intravenous Q2H PRN Jone Atkins MD    folic acid 1 mg Per NG Tube Daily Jone Atkins MD    hydrALAZINE 10 mg Intravenous Q2H PRN Danna Tsai MD    levETIRAcetam 750 mg Oral Q12H [facility] Jone Atkins MD    niCARdipine 1-15 mg/hr Intravenous Titrated Rise GABRIEL Colón Last Rate: 5 mg/hr (01/05/20 1220)   ondansetron 4 mg Intravenous Once Pedro Tena DO    propofol 5-50 mcg/kg/min Intravenous Titrated Jone Atkins MD Last Rate: 30 mcg/kg/min (01/05/20 1320)   sodium chloride 125 mL/hr Intravenous Continuous Rise GABRIEL Colón Last Rate: 125 mL/hr (01/05/20 0531)   thiamine 100 mg Per NG Tube Daily Jone Atkins MD        niCARdipine 1-15 mg/hr Last Rate: 5 mg/hr (01/05/20 1220)   propofol 5-50 mcg/kg/min Last Rate: 30 mcg/kg/min (01/05/20 1320)   sodium chloride 125 mL/hr Last Rate: 125 mL/hr (01/05/20 0531)       acetaminophen 650 mg Q6H PRN   fentanyl citrate (PF) 50 mcg Q2H PRN   hydrALAZINE 10 mg Q2H PRN     ==  Yolie Ruvalcaba MD  Internal Medicine Residency, PGY-1  [address] [facility]

## 2020-01-05 NOTE — RESPIRATORY THERAPY NOTE
RT Ventilator Management Note  Rayne Morales 48 y o  male MRN: [id number]  Unit/Bed#: ICU 11 Encounter: [id number]      Daily Screen       1/4/2020  0758 1/5/2020  0759          Patient safety screen outcome[de-identified]  Failed  Failed      Not Ready for Weaning due to[de-identified]  PEEP > 8cmH2O;Underline problem not resolved  Underline problem not resolved              Physical Exam:   Assessment Type: Assess only      Resp Comments: Patient remains on AC/VC not following commands  ETT was attempted to be repositioned to left side of mouth, however patient clamped down on tube with teeth and ETT was unable to be repositioned and patient was placed on 100% FIO2  HR up to 150s during this time  ETT was re-secured with bite block tube farris

## 2020-01-06 PROBLEM — G93.6 CEREBRAL EDEMA (HCC): Status: ACTIVE | Noted: 2020-01-01

## 2020-01-06 NOTE — RESPIRATORY THERAPY NOTE
RT Ventilator Management Note  Lc Sagastume 48 y o  male MRN: [id number]  Unit/Bed#: ICU 11 Encounter: [id number]      Daily Screen       1/5/2020  0759 1/6/2020  0916          Patient safety screen outcome[de-identified]  Failed  Failed      Not Ready for Weaning due to[de-identified]  Underline problem not resolved  Underline problem not resolved; Hemodynamically unstable              Physical Exam:   Assessment Type: (P) Assess only  General Appearance: (P) Unresponsive  Respiratory Pattern: (P) Assisted  Chest Assessment: (P) Chest expansion symmetrical  Bilateral Breath Sounds: (P) Clear, Diminished  Suction: (P) ET Tube  O2 Device: (P) vent      Resp Comments: (P) pt unresponsive, no weaning attempted, pt appears comfortable on current settings, will continue to monitor

## 2020-01-06 NOTE — PROGRESS NOTES
CRITICAL CARE PROGRESS NOTE     Name: Marisel Nixon   Age & Sex: 48 y o  male   MRN: [id number]  Unit/Bed#: ICU 11   Encounter: [id number]  Code Status: Level 1 - Full Code    [address] [facility] PROBLEMS   Principal Problem:    Nontraumatic acute hemorrhage of the left basal ganglia  Active Problems:    Alcohol dependence (HCC)    Intraventricular hemorrhage (HCC)    Acute respiratory failure    Hypertension    Brain compression (HCC)    ASSESSMENT/PLAN   Neurological/Psychiatric   · Diagnosis: Left basal ganglia hemorrhage with intraventricular extension   Plan: Intracerebral Hemorrhage (ICH) Score: 2   [place] Coma Sore 5-12 equals +1   Age greater than or equal to 80 No   ICH Volume greater than or equal to 30 ml No   Intraventricular Hemorrhage Yes (1 Point)   Infratentorial Origin of Hemorrhage No   Total: 2   ? EVD in place  Clamped on 1/5  ? Neurology onboard  ? Started on diprivan and fentanyl for sedation   ? COG discussion yesterday  Poor prognosis  Family considering give of life  New family meeting scheduled for today and afternoon  · Diagnosis: ETOH abuse  ? Plan: Mother states that the patient is an active drinker with a long history of abuse  ? Will start on thiamine and folate  ? Monitor for signs of w/d   CV:   · Diagnosis: Hypertension  ? Plan: Placed on cardene infusion in the ED  ? Goal SBP <160  ? Monitor on telemetry  ? echo 01/04:  EF 60%, no regional wall motion abnormality  No source of embolism identified  ? EKG 01/03, NSR HR 85     Pulm:  · Diagnosis: Acute respiratory failure   ? Plan: Patient intubated in ED for airway protection   ? Continue AC/VC ventilatory support   ? Check ABG  ? Post-intubation CXR show ETT 5CM above rebecca     GI:   · Diagnosis: No acute issues  ? Plan: Will start on PPI for GI prophylaxis   ? Zofran prn      :   · Diagnosis: No acute issues  ? Plan: Garcia placed to monitor I/O in critically ill patient   ?  Monitor renal function         F/E/N:   · Plan: Monitor electrolytes closely  · If there is a concern for intracranial hypertension, will start HTS and monitor serial NA/osmol         Heme/Onc:   · Diagnosis: No acute issues         Endo:   · Diagnosis: No acute issues  ? Plan:  Monitor BG         ID:   · Diagnosis: No acute issues  ? Plan: Monitor for signs of acute infection  ? Patient may have aspirated prior to intubation  No indication for abx at this time      MSK/Skin:   · Diagnosis: Immobility   ? Plan: Opt out of early mobility protocol secondary to [address]  ? Turn and prop  ? PMR per stroke protocl   ? PT/OT per stroke protocol   ·     VTE Pharmacologic Prophylaxis: Reason for no pharmacologic prophylaxis ICH  VTE Mechanical Prophylaxis: sequential compression device    Disposition: Continue critical care    SUBJECTIVE     Subjective: Patient is intubated    24hr Events: No acute events    OBJECTIVE     Vitals:    20 0339 20 0400 20 0500 20 0600   BP:  163/86 148/80 140/77   Pulse:  84 102 98   Resp:  16 20 (!) 26   Temp:  98 6 °F (37 °C) 98 6 °F (37 °C) 98 6 °F (37 °C)   TempSrc:       SpO2: 98% 98% 98% 97%   Weight:       Height:          Temperature:   Temp (24hrs), Av 7 °F (37 6 °C), Min:98 6 °F (37 °C), Max:100 8 °F (38 2 °C)    Temperature: 98 6 °F (37 °C)  Weights:   IBW: 75 3 kg    Body mass index is 21 06 kg/m²  Weight (last 2 days)     None        Physical Exam   Constitutional: He appears well-developed and well-nourished  He is sedated and intubated  HENT:   Head: Normocephalic and atraumatic  Eyes: Right pupil is not reactive  Left pupil is not reactive  Fixed  5mm   Neck: Neck supple  No JVD present  No tracheal deviation present  Cardiovascular: Regular rhythm, S1 normal, S2 normal, normal heart sounds and intact distal pulses  Exam reveals no S3 and no S4    Pulmonary/Chest: Breath sounds normal  He is intubated  He has no wheezes  He has no rhonchi  He has no rales  Abdominal: Soft  He exhibits no distension  Musculoskeletal: He exhibits no edema or deformity  Neurological: He is unresponsive  He displays abnormal reflex  GCS eye subscore is 1  GCS verbal subscore is 1  GCS motor subscore is 1  Skin: Skin is warm and dry  Nursing note and vitals reviewed  Hemodynamic Monitoring:       Non-Invasive/Invasive Ventilation Settings:  Respiratory    Lab Data (Last 4 hours)    None         O2/Vent Data (Last 4 hours)      01/06 0339           Vent Mode AC/VC       Resp Rate (BPM) (BPM) 14       Vt (mL) (mL) 500       FIO2 (%) (%) 40       PEEP (cmH2O) (cmH2O) 5       MV 10 1                 No results found for: PHART, AVF0QMT, PO2ART, WGZ1URK, X0CKCZXJ, BEART, SOURCE    Invasive Lines & Devices:   Invasive Devices     Central Venous Catheter Line            CVC Central Lines 01/03/20 Triple 20cm 2 days          Peripheral Intravenous Line            Peripheral IV Right Forearm -- days    Peripheral IV 01/03/20 Right Forearm 2 days          Drain            NG/OG/Enteral Tube Orogastric 16 Fr Center mouth -- days    External Urinary Catheter Small 808 days    Urethral Catheter 2 days    Ventriculostomy/Subdural Ventricular drainage catheter Right 2 days          Airway            ETT  Cuffed 7 5 mm -- days                 Intake & Output:  I/O       01/04 0701 - 01/05 0700 01/05 0701 - 01/06 0700 01/06 0701 - 01/07 0700    I V  (mL/kg) 2212 (32 3) 3597 (52 5)     Blood       NG/GT 50      IV Piggyback       Feedings 320 783     Total Intake(mL/kg) 2582 (37 7) 4380 (63 9)     Urine (mL/kg/hr) 865 (0 5) 4965 (3)     Emesis/NG output 0 0     Drains 343 191     Total Output 1208 5156     Net +1374 -776            Unmeasured Emesis Occurrence  1 x         Nutrition:        Diet Orders   (From admission, onward)             Start     Ordered    01/04/20 0926  Diet Enteral/Parenteral; Tube Feeding No Oral Diet; Jevity 1 2 Rob; Continuous; 60  Diet effective now Question Answer Comment   Diet Type Enteral/Parenteral    Enteral/Parenteral Tube Feeding No Oral Diet    Tube Feeding Formula: Jevity 1 2 Rob    Bolus/Cyclic/Continuous Continuous    Tube Feeding Goal Rate (mL/hr): 60    RD to adjust diet per protocol? Yes        01/04/20 0931              LABORATORY DATA     Labs: I have personally reviewed pertinent reports  Results from last 7 days   Lab Units 01/05/20 0438 01/04/20 0421 01/03/20  2251   WBC Thousand/uL 8 82 9 92 11 38*   HEMOGLOBIN g/dL 11 1* 12 3 12 5   HEMATOCRIT % 33 1* 35 8* 36 3*   PLATELETS Thousands/uL 147* 179 186   NEUTROS PCT % 80*  --  86*   MONOS PCT % 8  --  8      Results from last 7 days   Lab Units 01/05/20 0438 01/04/20  0421 01/03/20  1420 01/03/20  1419  01/03/20  1415   POTASSIUM mmol/L 3 7 3 6  --   --   --  4 0   CHLORIDE mmol/L 103 102  --   --   --  100   CO2 mmol/L 30 29  --   --   --  27   CO2, I-STAT mmol/L  --   --  28 28   < >  --    BUN mg/dL 13 10  --   --   --  9   CREATININE mg/dL 0 58* 0 67  --   --   --  0 72   CALCIUM mg/dL 8 6 8 6  --   --   --  9 8   ALK PHOS U/L 58 64  --   --   --   --    ALT U/L 77 110*  --   --   --   --    AST U/L 46* 82*  --   --   --   --    GLUCOSE, ISTAT mg/dl  --   --  135 136  --   --     < > = values in this interval not displayed  Results from last 7 days   Lab Units 01/05/20 0438 01/04/20  0421   MAGNESIUM mg/dL 2 3 2 0     Results from last 7 days   Lab Units 01/05/20 0438 01/04/20  0421   PHOSPHORUS mg/dL 2 8 3 8      Results from last 7 days   Lab Units 01/05/20 0438 01/03/20  2251 01/03/20  1708   INR  0 95 0 84 0 88   PTT seconds 28  --   --          Results from last 7 days   Lab Units 01/03/20  1415   TROPONIN I ng/mL <0 02     Micro:  Lab Results   Component Value Date    BLOODCX No Growth After 5 Days  11/09/2017    BLOODCX No Growth After 5 Days  11/09/2017    BLOODCX No Growth After 5 Days   10/15/2017    SPUTUMCULTUR 3+ Growth of  11/09/2017     IMAGING & DIAGNOSTIC TESTING     Imaging: I have personally reviewed pertinent reports  Cta Head W Wo Contrast    Result Date: 1/3/2020  Impression: Large acute left basal ganglionic hemorrhage has increased in size in 2 hours, with the greater decompression into the ventricular system  Increased mass effect  Stable vascular ectasia most striking in the right greater than left M1 segments  Possible spot sign within the anterior inferior margin of the left basal ganglionic hemorrhage  Ectatic left distal M2 vessels may be slightly more prominent than on prior CTA study, 10/12/2017  Conventional angiography is suggested  These are not directly relayed to patient's intracranial hemorrhage  * I personally discussed this result to Nilsa Gil on 1/3/2020 4:45PM  Workstation performed: [id number]     Xr Chest Portable    Result Date: 1/4/2020  Impression: New left subclavian line with tip at the cavoatrial junction  No pneumothorax  Unchanged position of ET tube and enteric tube  No acute cardiopulmonary disease  Workstation performed: [id number]     Xr Chest 1 View Portable    Result Date: 1/3/2020  Impression: No acute cardiopulmonary disease  Workstation performed: [id number]     Ct Head Wo Contrast    Result Date: 1/3/2020  Impression: Increased size of left basal ganglia hemorrhage now measuring 5 1 x 6 3 cm (series 2, image 20), previously 4 8 x 4 4 cm, noting developing vasogenic edema with effacement of the adjacent sulci  Increased breakthrough hemorrhage into the ventricles, however ventricular size is not significantly changed  Increased rightward midline shift now measuring 12 mm, previously 7 mm  A right frontal approach ventriculostomy remains in stable position  The study was marked in [facility] for immediate notification   Workstation performed: [id number]     Ct Stroke Alert Brain    Result Date: 1/3/2020  Impression: Large acute left basal ganglia hemorrhage, likely hypertensive in origin, associated with substantial mass effect, approximately 7 mm midline shift to the right, extensive intraventricular hemorrhage and developing hydrocephalus Findings were personally discussed by phone with Dr Domi Oh on 1/3/2020 2:05 PM  Workstation performed: [id number]     EKG, Pathology, and Other Studies: I have personally reviewed pertinent reports       ALLERGIES     Allergies   Allergen Reactions    Bee Venom        MEDICATIONS       Current Facility-Administered Medications:  acetaminophen 650 mg Oral Q6H PRN Jean Claude Cisse MD    chlorhexidine 15 mL Swish & Spit Q12H [address] Guillermo Crawford PA-C    fentanyl citrate (PF) 50 mcg Intravenous Q2H PRN Mayda Quispe MD    folic acid 1 mg Per NG Tube Daily Mayda Quispe MD    hydrALAZINE 10 mg Intravenous Q2H PRN Kim Stallworth MD    levETIRAcetam 750 mg Oral Q12H [address] Mayda Quispe MD    niCARdipine 1-15 mg/hr Intravenous Titrated Guillermo Crawford PA-C Last Rate: 10 mg/hr (01/06/20 0653)   ondansetron 4 mg Intravenous Once Pedro Tena DO    propofol 5-50 mcg/kg/min Intravenous Titrated Mayda Quispe MD Last Rate: Stopped (01/06/20 5497)   sodium chloride 125 mL/hr Intravenous Continuous Guillermo Crawford PA-C Last Rate: 125 mL/hr (01/06/20 0366)   thiamine 100 mg Per NG Tube Daily Mayda Quispe MD        niCARdipine 1-15 mg/hr Last Rate: 10 mg/hr (01/06/20 0653)   propofol 5-50 mcg/kg/min Last Rate: Stopped (01/06/20 0337)   sodium chloride 125 mL/hr Last Rate: 125 mL/hr (01/06/20 0658)       acetaminophen 650 mg Q6H PRN   fentanyl citrate (PF) 50 mcg Q2H PRN   hydrALAZINE 10 mg Q2H PRN     ==  Pardeep Brown MD PGY-1  Critical Care Rotation

## 2020-01-06 NOTE — ASSESSMENT & PLAN NOTE
AMS [place] [facility] [employer]  [facility] then transferred to [facility]  Intracerebral Hemorrhage (ICH) Score:    [place] Com[place] Sore 5-12 equals +1   Age greater than or equal to 80 No   ICH Volume greater than or equal to 30 ml Yes (1 Point)   Intraventricular Hemorrhage Yes (1 Point)   Infratentorial Origin of Hemorrhage No   Total: 3       Imaging:   · CT head wo, stroke alert 1/3/2020: Large acute basal ganglia hemorrhage with intraventricular extension including 3rd and 4th ventricule  Significnat mass effect with midline shift 7mm and mild hydrocephalus  · CT head 1/3/2019: Increased size of left BG hemorrhage measuring 5 1 by 6 3cm  Increased breakthrough IVH  Rightward shift of 12 mm  Right frontal ventriculostomy in stable position  Plan:  · Continue frequent neurological exam    · EVD in place but clamped per critical care  Will follow up with family wishes today per CCM  · Discussion yesterday with plan to proceed to comfort care measures  Family had wished to see if patient would progress to brain death overnight in order to be a candidate for GOL/organ donation  · DVT PPX: SCDs  No pharm DVT at this time  · Neurology and CCM management at this time  · GOC need to be established  Patient having elevated ICP's with EVD in place, but clamped per CCM although patient has not been transitioned to comfort care measures and remains a Level 1 code  If family wishes to proceed as present then no further need for neurosurgery at this time  Can call service for removal of EVD when requested for as needed during remainder of hospitalization

## 2020-01-06 NOTE — RESPIRATORY THERAPY NOTE
RT Ventilator Management Note  Jesu Rodríguez 48 y o  male MRN: [id number]  Unit/Bed#: ICU 11 Encounter: [id number]      Daily Screen       1/4/2020  0758 1/5/2020  0759          Patient safety screen outcome[de-identified]  Failed  Failed      Not Ready for Weaning due to[de-identified]  PEEP > 8cmH2O;Underline problem not resolved  Underline problem not resolved              Physical Exam:   Assessment Type: (P) Assess only  General Appearance: (P) Sedated  Respiratory Pattern: (P) Assisted, Symmetrical  Chest Assessment: (P) Chest expansion symmetrical  Bilateral Breath Sounds: (P) Clear, Diminished  R Breath Sounds: (P) Clear, Diminished  L Breath Sounds: (P) Clear, Diminished  Suction: (P) ET Tube      Resp Comments: (P) Pt remains on AC settings with no changes @ this time  VS are stable and pt appears comfortable  Will continue to monitor

## 2020-01-06 NOTE — OCCUPATIONAL THERAPY NOTE
OT CANCEL NOTE    Pt chart reviewed  Pt is now planned to proceed on comfort care measures  No longer appropriate for skilled OT services  Will D/C OT  Thanks       Betty Sims MOT, OTR/L

## 2020-01-06 NOTE — PROGRESS NOTES
01/06/20 1500   Clinical Encounter Type   Visited With Health care provider   Routine Visit Follow-up   Crisis Visit Critical Care

## 2020-01-06 NOTE — TREATMENT PLAN
Patient's EVD was removed as patients family requesting to move towards brain death and only supportive measures that would assist him in being able to donate his organs  Patients EVD had been clamped since [id number] on 1/5/2020  At this time the patients ICP's have been ranging 59-95 today  The EVD was serving no purpose other than to transduce ICP's presently  Discussed with family and they were agreeable to removing it at this time  EVD removed and suture tied in place  No active drainage at this time  Patients exam consistent with further progression  Patient no longer has a cough gag reflex  pupils remain fixed and dilated  No response to stimuli in extremities at this time  No further neurosurgical intervention  Patient to have death exam with apnea testing and likely proceed with GOL per CCM

## 2020-01-06 NOTE — UTILIZATION REVIEW
Initial Clinical Review    Admission: Date/Time/Statement: Inpatient Admission Orders (From admission, onward)     Ordered        01/03/20 1438  Inpatient Admission  Once                   Orders Placed This Encounter   Procedures    Inpatient Admission     Standing Status:   Standing     Number of Occurrences:   1     Order Specific Question:   Admitting Physician     Answer:   Geronimo Maciel [[id number]]     Order Specific Question:   Level of Care     Answer:   Critical Care [15]     Order Specific Question:   Estimated length of stay     Answer:   More than 2 Midnights     Order Specific Question:   Certification     Answer:   I certify that inpatient services are medically necessary for this patient for a duration of greater than two midnights  See H&P and MD Progress Notes for additional information about the patient's course of treatment  ED Arrival Information     Expected Arrival Acuity Means of Arrival Escorted By Service Admission Type    1/3/2020  1/3/2020 13:52 - Ambulance [facility] EMS Critical Care/ICU -    Arrival Complaint    Stroke alert      Assessment/Plan:   49 yo male to ER from Urgent care via EMS for sudden change in mental status that was witnessed by his friend then began to be confused and tremulous  Taken to urgent care where he vomited and had apparent right upper extremity weakness, facial droop and expressive aphasia, found him to be significantly hypertensive  Hx etoh abuse, cerebral aneurysm, htn, ICH  Presents with continued symptoms as above taken direct to CT scan where he was met by Neurology  CT scan demonstrated a large left hemispheric intracerebral hemorrhage  Patient became combative & required intubation  Patient was started on a Cardene infusion to control his blood pressure  Admitted to inpatient status for non-traumatic acute hemorrhage l basal ganglia  Neuro & neurosurgery consulted     To OR for: [facility] CATH/DEVICE    ED Triage Vitals Temperature Pulse Respirations Blood Pressure SpO2   01/03/20 1530 01/03/20 1355 01/03/20 1355 01/03/20 1355 01/03/20 1355   99 °F (37 2 °C) (!) 118 18 (!) 205/144 94 %      Temp Source Heart Rate Source Patient Position - Orthostatic VS BP Location FiO2 (%)   01/03/20 1800 01/03/20 1800 01/03/20 1355 01/03/20 1800 --   Probe Monitor Lying Right arm       Pain Score       --               Wt Readings from Last 1 Encounters:   01/03/20 68 5 kg (151 lb 0 2 oz)     Additional Vital Signs:   01/04/20 0300  100 8 °F (38 2 °C)Abnormal   72  18  127/73  87  154/62  84 mmHg  100 %    17 mmHg  74   01/04/20 0200  101 1 °F (38 4 °C)Abnormal   74  17  125/71  87  150/60  80 mmHg  100 %      71   01/04/20 0100  101 5 °F (38 6 °C)Abnormal   68  16  119/69  84  142/58  76 mmHg  100 %      67   01/04/20 0000  101 5 °F (38 6 °C)Abnormal   68  16  118/69  83  146/58  76 mmHg  99 %    16 mmHg  67   Pertinent Labs/Diagnostic Test Results:   Results from last 7 days   Lab Units 01/03/20  2251 01/03/20  1420 01/03/20  1419   WBC Thousand/uL 11 38*  --   --    HEMOGLOBIN g/dL 12 5  --   --    I STAT HEMOGLOBIN g/dl  --  16 0 16 0   HEMATOCRIT % 36 3*  --   --    HEMATOCRIT, ISTAT %  --  47 47   PLATELETS Thousands/uL 186  --   --    NEUTROS ABS Thousands/µL 9 77*  --   --      Results from last 7 days   Lab Units 01/03/20  1420 01/03/20  1419 01/03/20  1415   SODIUM mmol/L  --   --  132*   POTASSIUM mmol/L  --   --  4 0   CHLORIDE mmol/L  --   --  100   CO2 mmol/L  --   --  27   CO2, I-STAT mmol/L 28 28  --    AGAP mmol/L 14*  --   --    ANION GAP mmol/L  --   --  5   BUN mg/dL  --   --  9   CREATININE mg/dL  --   --  0 72   EGFR ml/min/1 73sq m 125  --  107   CALCIUM mg/dL  --   --  9 8   CALCIUM, IONIZED, ISTAT mmol/L 1 09* 1 11*  --    MAGNESIUM mg/dL  --   --   --    PHOSPHORUS mg/dL  --   --   --      Results from last 7 days   Lab Units 01/03/20  1357   POC GLUCOSE mg/dl 146*     Results from last 7 days   Lab Units 01/03/20  1415   GLUCOSE RANDOM mg/dL 128      Results from last 7 days   Lab Units 01/03/20  1739   PH ART  7 402   PCO2 ART mm Hg 43 4   PO2 ART mm Hg 168 5*   HCO3 ART mmol/L 26 4   BASE EXC ART mmol/L 1 3   O2 CONTENT ART mL/dL 19 9   O2 HGB, ARTERIAL % 98 2*   ABG SOURCE  Line, Arterial     Results from last 7 days   Lab Units 01/03/20  1419   PH, VINNY I-STAT  7 426*   PCO2, VINNY ISTAT mm HG 40 6*   PO2, VINNY ISTAT mm HG 94 0*   HCO3, VINNY ISTAT mmol/L 26 7   I STAT BASE EXC mmol/L 2   I STAT O2 SAT % 97*     Results from last 7 days   Lab Units 01/03/20  1415   TROPONIN I ng/mL <0 02     Results from last 7 days   Lab Units 01/03/20  2251 01/03/20  1708   PROTIME seconds 11 1* 11 6   INR  0 84 0 88   PTT seconds  --   --      Results from last 7 days   Lab Units 01/03/20  1502   CLARITY UA  Cloudy   COLOR UA  Dk Yellow   SPEC GRAV UA  1 020   PH UA  6 5   GLUCOSE UA mg/dl 100 (1/10%)*   KETONES UA mg/dl 15 (1+)*   BLOOD UA  Large*   PROTEIN UA mg/dl 300 (3+)*   NITRITE UA  Negative   BILIRUBIN UA  Negative   UROBILINOGEN UA E U /dl 1 0   LEUKOCYTES UA  Negative   WBC UA /hpf None Seen   RBC UA /hpf None Seen   BACTERIA UA /hpf None Seen   EPITHELIAL CELLS WET PREP /hpf None Seen     Results from last 7 days   Lab Units 01/03/20  1501   AMPH/METH  Negative   BARBITURATE UR  Negative   BENZODIAZEPINE UR  Negative   COCAINE UR  Negative   METHADONE URINE  Negative   OPIATE UR  Negative   PCP UR  Negative   THC UR  Negative     Results from last 7 days   Lab Units 01/03/20  1708   ETHANOL LVL mg/dL <3     Results from last 7 days   Lab Units 01/03/20  1600   GRAM STAIN RESULT  No bacteria seen  Rare Polys     Results from last 7 days   Lab Units 01/03/20  1600   GLUCOSE CSF mg/dL 92*   PROTEIN CSF mg/dL 1,807*   RBC CSF uL 117,000*   CSF CULTURE  No growth     1/3  Ct brain=  Large acute left basal ganglia hemorrhage, likely hypertensive in origin, associated with substantial mass effect, approximately 7 mm midline shift to the right, extensive intraventricular hemorrhage and developing hydrocephalus  Cxr=  No acute cardiopulmonary disease  cta head & neck=  Large acute left basal ganglionic hemorrhage has increased in size in 2 hours, with the greater decompression into the ventricular system  Increased mass effect  Stable vascular ectasia most striking in the right greater than left M1 segments  Possible spot sign within the anterior inferior margin of the left basal ganglionic hemorrhage  Ectatic left distal M2 vessels may be slightly more prominent than on prior CTA study, 10/12/2017  Conventional angiography is suggested  These are not directly relayed to patient's intracranial hemorrhage  Ct head=  Increased size of left basal ganglia hemorrhage now measuring 5 1 x 6 3 cm (series 2, image 20), previously 4 8 x 4 4 cm, noting developing vasogenic edema with effacement of the adjacent sulci  Increased breakthrough hemorrhage into the ventricles, however ventricular size is not significantly changed  Increased rightward midline shift now measuring 12 mm, previously 7 mm  A right frontal approach ventriculostomy remains in stable position  Cxr=  New left subclavian line with tip at the cavoatrial junction  No pneumothorax  Unchanged position of ET tube and enteric tube    Ekg=  Normal sinus rhythm  Possible Left atrial enlargement  RSR' or QR pattern in V1 suggests right ventricular conduction delay  Nonspecific T wave abnormality  ED Treatment:   Medication Administration from 01/03/2020 1342 to 01/03/2020 1617       Date/Time Order Dose Route     01/03/2020 1435 niCARdipine (CARDENE) 25 mg (STANDARD CONCENTRATION) in sodium chloride 0 9% 250 mL 8 mg/hr Intravenous     01/03/2020 1420 niCARdipine (CARDENE) 25 mg (STANDARD CONCENTRATION) in sodium chloride 0 9% 250 mL 5 mg/hr Intravenous     01/03/2020 1405 ondansetron (ZOFRAN) 4 mg/2 mL injection **ADS Override Pull** 4 mg      01/03/2020 1418 etomidate (AMIDATE) 2 mg/mL injection 20 mg 20 mg Intravenous     01/03/2020 1418 Succinylcholine Chloride 100 mg/5 mL syringe 100 mg 100 mg Intravenous     01/03/2020 1422 propofol (DIPRIVAN) 1000 mg in 100 mL infusion (premix) 20 mcg/kg/min Intravenous     01/03/2020 1430 fentanyl citrate (PF) 100 MCG/2ML 100 mcg 100 mcg Intravenous     01/03/2020 1441 fentaNYL 1000 mcg in sodium chloride 0 9% 100mL infusion 50 mcg/hr Intravenous     01/03/2020 1440 fentanyl citrate (PF) 100 MCG/2ML 100 mcg 100 mcg Intravenous     01/03/2020 1512 propofol (DIPRIVAN) 1000 mg in 100 mL infusion (premix) 75 mcg/kg/min Intravenous     01/03/2020 1425 propofol (DIPRIVAN) 1000 mg in 100 mL infusion (premix) 50 mcg/kg/min Intravenous     01/03/2020 1612 iohexol (OMNIPAQUE) 350 MG/ML injection (MULTI-DOSE) 85 mL 85 mL Intravenous        Past Medical History:   Diagnosis Date    Alcoholism /alcohol abuse ([facility] [address] )     Cerebral aneurysm     Hypertension     ICH (intracerebral hemorrhage) ([facility] )      Present on Admission:   Alcohol dependence ([facility] [address] )   Nontraumatic acute hemorrhage of the left basal ganglia   Acute respiratory failure   Hypertension   Intraventricular hemorrhage (HCC)  Admitting Diagnosis: Stroke-like symptoms [R29 90]  Nontraumatic acute hemorrhage of basal ganglia (HCC) [I61 9]  Age/Sex: 48 y o  male  Admission Orders:  ICU  Vented   ogt   Consult neurosurgery  Consult neuro  Vs hourly  Neuro checks hourly  Incentive spirometry  Pt/ot/st eval & tx  venodynes  Transfuse 2units plt  Fall precautions  Scheduled Medications:    Medications:  chlorhexidine 15 mL [employer] V25S [address]   folic acid 1 mg Per NG Tube Daily   levETIRAcetam 750 mg Oral Q12H CARMITA   ondansetron 4 mg Intravenous Once   thiamine 100 mg Per NG Tube Daily     Continuous IV Infusions:    niCARdipine 1-15 mg/hr Intravenous Titrated   propofol 5-50 mcg/kg/min Intravenous Titrated   sodium chloride 125 mL/hr Intravenous Continuous     PRN Meds:    acetaminophen 650 mg Oral Q6H PRN   fentanyl citrate (PF) 50 mcg Intravenous Q2H PRN   hydrALAZINE 10 mg Intravenous Q2H PRN       IP CONSULT TO PHYSICAL MEDICINE REHAB  IP CONSULT TO NEUROSURGERY  IP CONSULT TO NEUROLOGY  IP CONSULT TO CASE MANAGEMENT    Network Utilization Review Department  [email or web address]  ATTENTION: Please call with any questions or concerns to [phone] and carefully listen to the prompts so that you are directed to the right person  All voicemails are confidential   Svetlana Calhoun all requests for admission clinical reviews, approved or denied determinations and any other requests to dedicated fax number below belonging to the campus where the patient is receiving treatment   List of dedicated fax numbers for the Facilities:  [address] DENIALS (Administrative/Medical Necessity) [phone]   [address] (Maternity/NICU/Pediatrics) [phone]   Gillian Ang [phone]   [facility] [phone]   [facility] [phone]   [facility] [phone]   [address] [place] [address] [phone]   [facility]  [phone]   [address] [place] [address], [address]  [address] [address] [address] [facility] [phone]

## 2020-01-06 NOTE — PROGRESS NOTES
Progress Note - Yfn Carrera 1965, 48 y o  male MRN: [id number]    Unit/Bed#: ICU 11 Encounter: [id number]    Primary Care Provider: No primary care provider on file  Date and time admitted to hospital: 1/3/2020  1:52 PM    * Nontraumatic acute hemorrhage of the left basal ganglia  Assessment & Plan  AMS while at [employer]  Taken to [facility] then transferred to [facility]  Intracerebral Hemorrhage (ICH) Score:    Saman Coma Sore 5-12 equals +1   Age greater than or equal to 80 No   ICH Volume greater than or equal to 30 ml Yes (1 Point)   Intraventricular Hemorrhage Yes (1 Point)   Infratentorial Origin of Hemorrhage No   Total: 3       Imaging:   · CT head wo, stroke alert 1/3/2020: Large acute basal ganglia hemorrhage with intraventricular extension including 3rd and 4th ventricule  Significnat mass effect with midline shift 7mm and mild hydrocephalus  · CT head 1/3/2019: Increased size of left BG hemorrhage measuring 5 1 by 6 3cm  Increased breakthrough IVH  Rightward shift of 12 mm  Right frontal ventriculostomy in stable position  Plan:  · Continue frequent neurological exam    · EVD in place but clamped per critical care  Will follow up with family wishes today per CCM  · Discussion yesterday with plan to proceed to comfort care measures  Family had wished to see if patient would progress to brain death overnight in order to be a candidate for GOL/organ donation  · DVT PPX: SCDs  No pharm DVT at this time  · Neurology and CCM management at this time  · GOC need to be established  Patient having elevated ICP's with EVD in place, but clamped per CCM although patient has not been transitioned to comfort care measures and remains a Level 1 code  If family wishes to proceed as present then no further need for neurosurgery at this time  Can call service for removal of EVD when requested for as needed during remainder of hospitalization         Cerebral edema (HCC)  Assessment & Plan  · 2/2 to large volume hemorrhage with IVH extension  · See plan above  Brain compression ([facility] [address] )  Assessment & Plan  · Evident by midline shift and effacement of ventricle    · See plan as above    Intraventricular hemorrhage (HCC)  Assessment & Plan  · 2/2 to extension from large left BG hemorrhage  · EVD has been clamped overnight  · [address] follow up conversation with family  Alcohol dependence ([facility] [address] )  Assessment & Plan  · Drinks 30pk/day with additional whiskey at times    · Monitor and treat for withdrawl    Subjective/Objective   Chief Complaint: None     Subjective: Patient remains intubated at this time  Per nursing patient had significant events overnight  At approximately 3 am he was reported to have no brainstem reflexes  He had no cough or gag on exam with fixed and dilated pupils  Per nursing staff this morning patient is moving all extremities to noxious stimuli as well as violent coughing on the vent  Patient has weak gag present when deep suctioning with [place]  Per nursing staff CCM attending last night clamped EVD around [id number] as patient was progressing towards brain death and potential organ donation  Patient has had elevated ICP's  Neurosurgery has not been contacted in regards to this matter  Recommend clarifying GOC since patient still has withdraw and reflexes present with EVD in place and clamped at a Level 1 code status  Objective: laying in bed in NAD       I/O       01/04 0701 - 01/05 0700 01/05 0701 - 01/06 0700 01/06 0701 - 01/07 0700    I V  (mL/kg) 2212 (32 3) 3597 (52 5) 860 6 (12 6)    Blood       NG/GT 50      IV Piggyback       Feedings 320 783     Total Intake(mL/kg) 2582 (37 7) 4380 (63 9) 860 6 (12 6)    Urine (mL/kg/hr) 865 (0 5) 4965 (3) 3450 (13 8)    Emesis/NG output 0 0     Drains 343 191     Total Output 1208 5156 3450    Net +1374 [phone] 4           Unmeasured Emesis Occurrence  1 x           Invasive Devices     Central Venous Catheter Line            CVC Central Lines 01/03/20 Triple 20cm 2 days          Peripheral Intravenous Line            Peripheral IV Right Forearm -- days    Peripheral IV 01/03/20 Right Forearm 2 days          Drain            NG/OG/Enteral Tube Orogastric 16 Fr Center mouth -- days    External Urinary Catheter Small 808 days    Urethral Catheter 2 days    Ventriculostomy/Subdural Ventricular drainage catheter Right 2 days          Airway            ETT  Cuffed 7 5 mm -- days                Physical Exam:  Vitals: Blood pressure 140/77, pulse 98, temperature 98 6 °F (37 °C), resp  rate (!) 26, height 5' 11" (1 803 m), weight 68 5 kg (151 lb 0 2 oz), SpO2 97 %  ,Body mass index is 21 06 kg/m²  Hemodynamic Monitoring: MAP: Arterial Line MAP (mmHg): 142 mmHg, CPP: CPP: 33, ICP Mean: ICP Mean (mmHg): 62 mmHg    General appearance: intubated  Head: Normocephalic, without obvious abnormality  Right frontal EVD in place  Clamped  Eyes: pupils are dilated bilaterally at 6mm and non-reactive to light on examination  Lungs: non labored breathing on ventilator  Heart: regular heart rate  Neurologic:   Mental status: GCS 6T  Patient withdraw to pain in RUE and BLE  Extension in LUE  Very brisk patella 4+  Cough and weak, but present gag intact  No pupillary response  No spontaneous movement appreciated       Lab Results:  Results from last 7 days   Lab Units 01/06/20  0836 01/05/20 0438 01/04/20  0421 01/03/20  2251   WBC Thousand/uL 9 87 8 82 9 92 11 38*   HEMOGLOBIN g/dL 13 6 11 1* 12 3 12 5   HEMATOCRIT % 40 0 33 1* 35 8* 36 3*   PLATELETS Thousands/uL 200 147* 179 186   NEUTROS PCT % 89* 80*  --  86*   MONOS PCT % 7 8  --  8     Results from last 7 days   Lab Units 01/06/20  0836 01/05/20  0438 01/04/20  0421 01/03/20  1420 01/03/20  1419   POTASSIUM mmol/L 3 2* 3 7 3 6  --   --    CHLORIDE mmol/L 115* 103 102  --   --    CO2 mmol/L 30 30 29  --   --    CO2, I-STAT mmol/L  --   --   --  28 28   BUN mg/dL 7 13 10  --   -- CREATININE mg/dL 0 68 0 58* 0 67  --   --    CALCIUM mg/dL 10 1 8 6 8 6  --   --    ALK PHOS U/L 78 58 64  --   --    ALT U/L 85* 77 110*  --   --    AST U/L 54* 46* 82*  --   --    GLUCOSE, ISTAT mg/dl  --   --   --  135 136     Results from last 7 days   Lab Units 01/06/20  0836 01/05/20  0438 01/04/20  0421   MAGNESIUM mg/dL 2 9* 2 3 2 0     Results from last 7 days   Lab Units 01/06/20  0836 01/05/20  0438 01/04/20  0421   PHOSPHORUS mg/dL 3 5 2 8 3 8     Results from last 7 days   Lab Units 01/05/20  0438 01/03/20  2251 01/03/20  1708   INR  0 95 0 84 0 88   PTT seconds 28  --   --      No results found for: TROPONINT  ABG:  Lab Results   Component Value Date    PHART 7 402 01/03/2020    SNV2JPX 43 4 01/03/2020    PO2ART 168 5 (H) 01/03/2020    ZWH6ISU 26 4 01/03/2020    BEART 1 3 01/03/2020    SOURCE Line, Arterial 01/03/2020       Imaging Studies: I have personally reviewed pertinent reports  and I have personally reviewed pertinent films in PACS  Cta Head W Wo Contrast    Result Date: 1/3/2020  Impression: Large acute left basal ganglionic hemorrhage has increased in size in 2 hours, with the greater decompression into the ventricular system  Increased mass effect  Stable vascular ectasia most striking in the right greater than left M1 segments  Possible spot sign within the anterior inferior margin of the left basal ganglionic hemorrhage  Ectatic left distal M2 vessels may be slightly more prominent than on prior CTA study, 10/12/2017  Conventional angiography is suggested  These are not directly relayed to patient's intracranial hemorrhage  * I personally discussed this result to Adriana Walton on 1/3/2020 4:45PM  Workstation performed: [id number]     Xr Chest Portable    Result Date: 1/4/2020  Impression: New left subclavian line with tip at the cavoatrial junction  No pneumothorax  Unchanged position of ET tube and enteric tube  No acute cardiopulmonary disease   Workstation performed: [id number] Xr Chest 1 View Portable    Result Date: 1/3/2020  Impression: No acute cardiopulmonary disease  Workstation performed: [id number]     Ct Head Wo Contrast    Result Date: 1/3/2020  Impression: Increased size of left basal ganglia hemorrhage now measuring 5 1 x 6 3 cm (series 2, image 20), previously 4 8 x 4 4 cm, noting developing vasogenic edema with effacement of the adjacent sulci  Increased breakthrough hemorrhage into the ventricles, however ventricular size is not significantly changed  Increased rightward midline shift now measuring 12 mm, previously 7 mm  A right frontal approach ventriculostomy remains in stable position  The study was marked in [facility] for immediate notification  Workstation performed: [id number]     Ct Stroke Alert Brain    Result Date: 1/3/2020  Impression: Large acute left basal ganglia hemorrhage, likely hypertensive in origin, associated with substantial mass effect, approximately 7 mm midline shift to the right, extensive intraventricular hemorrhage and developing hydrocephalus Findings were personally discussed by phone with Dr Theo Grubbs on 1/3/2020 2:05 PM  Workstation performed: [id number]       EKG, Pathology, and Other Studies: I have personally reviewed pertinent reports        VTE Pharmacologic Prophylaxis: Reason for no pharmacologic prophylaxis IPH with IVH     VTE Mechanical Prophylaxis: sequential compression device

## 2020-01-06 NOTE — ASSESSMENT & PLAN NOTE
· 2/2 to extension from large left BG hemorrhage  · EVD has been clamped overnight  · [address] follow up conversation with family

## 2020-01-06 NOTE — RESPIRATORY THERAPY NOTE
resp care      01/06/20 1700   Respiratory Assessment   Resp Comments no vent changes todday, no issues,  appears comfortable on current settings, will continue to monitor

## 2020-01-06 NOTE — CONSULTS
Patient with poor prognosis and family is considering gift of life  Please re-consult if goals of care change

## 2020-01-06 NOTE — PHYSICAL THERAPY NOTE
Physical Therapy Cancellation Note  PT orders received  Chart reviewed  Patient is currently intubated/sedated and not appropriate at this to participate in skilled PT services  PT to follow on caseload  Will re-attempt once patient is medically stable and appropriate        Antoinette Blood PT, DPT

## 2020-01-06 NOTE — OCCUPATIONAL THERAPY NOTE
OT CANCEL NOTE    OT orders received  Chart reviewed  Pt is currently intubated/sedated and not appropriate to engage in skilled OT services at this time  Will hold initial OT evaluation  Will continue to follow pt on caseload and see pt when medically stable and as clinically appropriate      Betty GAMBOA, OTR/L

## 2020-01-06 NOTE — PLAN OF CARE
Problem: NEUROSENSORY - ADULT  Goal: Achieves stable or improved neurological status  Description  INTERVENTIONS  - Monitor and report changes in neurological status  - Monitor vital signs such as temperature, blood pressure, glucose, and any other labs ordered   - Initiate measures to prevent increased intracranial pressure  - Monitor for seizure activity and implement precautions if appropriate      Outcome: Progressing  Goal: Remains free of injury related to seizures activity  Description  INTERVENTIONS  - Maintain airway, patient safety  and administer oxygen as ordered  - Monitor patient for seizure activity, document and report duration and description of seizure to physician/advanced practitioner  - If seizure occurs,  ensure patient safety during seizure  - Reorient patient post seizure  - Seizure pads on all 4 side rails  - Instruct patient/family to notify RN of any seizure activity including if an aura is experienced  - Instruct patient/family to call for assistance with activity based on nursing assessment  - Administer anti-seizure medications if ordered    Outcome: Progressing  Goal: Achieves maximal functionality and self care  Description  INTERVENTIONS  - Encourage and assist patient to increase activity and self care     - Encourage visually impaired, hearing impaired and aphasic patients to use assistive/communication devices   Outcome: Progressing     Problem: CARDIOVASCULAR - ADULT  Goal: Maintains optimal cardiac output and hemodynamic stability  Description  INTERVENTIONS:  - Monitor I/O, vital signs and rhythm  - Monitor for S/S and trends of decreased cardiac output  - Administer and titrate ordered vasoactive medications to optimize hemodynamic stability  - Assess quality of pulses, skin color and temperature  - Assess for signs of decreased coronary artery perfusion  - Instruct patient to report change in severity of symptoms  Outcome: Progressing  Goal: Absence of cardiac dysrhythmias or at baseline rhythm  Description  INTERVENTIONS:  - Continuous cardiac monitoring, vital signs, obtain 12 lead EKG if ordered  - Monitor electrolytes and administer replacement therapy as ordered   Outcome: Progressing     Problem: RESPIRATORY - ADULT  Goal: Achieves optimal ventilation and oxygenation  Description  INTERVENTIONS:  - Assess for changes in respiratory status  - Assess for changes in mentation and behavior  - Position to facilitate oxygenation and minimize respiratory effort  - Oxygen administered by appropriate delivery if ordered  - Assess the need for suctioning and aspirate as neededy  - Respiratory Therapy support as indicated   Outcome: Progressing     Problem: GASTROINTESTINAL - ADULT  Goal: Minimal or absence of nausea and/or vomiting  Description  INTERVENTIONS:  - Administer IV fluids if ordered to ensure adequate hydration  - Maintain NPO status until nausea and vomiting are resolved  - Nasogastric tube if ordered  - Administer ordered antiemetic medications as needed  - Provide nonpharmacologic comfort measures as appropriate  - Advance diet as tolerated, if ordered  - Consider nutrition services referral to assist patient with adequate nutrition and appropriate food choices  Outcome: Progressing  Goal: Maintains or returns to baseline bowel function  Description  INTERVENTIONS:  - Assess bowel function  - Encourage oral fluids to ensure adequate hydration  - Administer IV fluids if ordered to ensure adequate hydration  - Administer ordered medications as needed  - Encourage mobilization and activity  - Consider nutritional services referral to assist patient with adequate nutrition and appropriate food choices  Outcome: Progressing  Goal: Maintains adequate nutritional intake  Description  INTERVENTIONS:  - Monitor percentage of each meal consumed  - Identify factors contributing to decreased intake, treat as appropriate  - Assist with meals as needed  - Monitor I&O, weight, and lab values if indicated  - Obtain nutrition services referral as needed  Outcome: Progressing     Problem: GENITOURINARY - ADULT  Goal: Maintains or returns to baseline urinary function  Description  INTERVENTIONS:  - Assess urinary function  - Encourage oral fluids to ensure adequate hydration if ordered  - Administer IV fluids as ordered to ensure adequate hydration  - Administer ordered medications as needed  - Offer frequent toileting  - Follow urinary retention protocol if ordered  Outcome: Progressing  Goal: Absence of urinary retention  Description  INTERVENTIONS:  - Assess patients ability to void and empty bladder  - Monitor I/O  - Bladder scan as needed  - Discuss with physician/AP medications to alleviate retention as needed  - Discuss catheterization for long term situations as appropriate  Outcome: Progressing  Goal: Urinary catheter remains patent  Description  INTERVENTIONS:  - Assess patency of urinary catheter  - If patient has a chronic morel, consider changing catheter if non-functioning     Outcome: Progressing     Problem: METABOLIC, FLUID AND ELECTROLYTES - ADULT  Goal: Electrolytes maintained within normal limits  Description  INTERVENTIONS:  - Monitor labs and assess patient for signs and symptoms of electrolyte imbalances  - Administer electrolyte replacement as ordered  - Monitor response to electrolyte replacements, including repeat lab results as appropriate     Outcome: Progressing  Goal: Fluid balance maintained  Description  INTERVENTIONS:  - Monitor labs   - Monitor I/O and WT  - Instruct patient on fluid and nutrition as appropriate  - Assess for signs & symptoms of volume excess or deficit  Outcome: Progressing  Goal: Glucose maintained within target range  Description  INTERVENTIONS:  - Monitor Blood Glucose as ordered  - Assess for signs and symptoms of hyperglycemia and hypoglycemia  - Administer ordered medications to maintain glucose within target range  - Assess nutritional intake and initiate nutrition service referral as needed  Outcome: Progressing     Problem: SKIN/TISSUE INTEGRITY - ADULT  Goal: Skin integrity remains intact  Description  INTERVENTIONS  - Identify patients at risk for skin breakdown  - Assess and monitor skin integrity  - Assess and monitor nutrition and hydration status  - Monitor labs (i e  albumin)  - Assess for incontinence   - Turn and reposition patient  - Assist with mobility/ambulation  - Relieve pressure over bony prominences  - Avoid friction and shearing  - Provide appropriate hygiene as needed including keeping skin clean and dry  - Evaluate need for skin moisturizer/barrier cream  - Collaborate with interdisciplinary team (i e  Nutrition, Rehabilitation, etc )   - Patient/family teaching  Outcome: Progressing  Goal: Incision(s), wounds(s) or drain site(s) healing without S/S of infection  Description  INTERVENTIONS  - Assess and document risk factors for skin impairment   - Assess and document dressing, incision, wound bed, drain sites and surrounding tissue  - Consider nutrition services referral as needed  - Oral mucous membranes remain intact  - Provide patient/ family education  Outcome: Progressing  Goal: Oral mucous membranes remain intact  Description  INTERVENTIONS  - Assess oral mucosa and hygiene practices  - Implement preventative oral hygiene regimen  - Implement oral medicated treatments as ordered  - Initiate Nutrition services referral as needed  Outcome: Progressing     Problem: HEMATOLOGIC - ADULT  Goal: Maintains hematologic stability  Description  INTERVENTIONS  - Assess for signs and symptoms of bleeding or hemorrhage  - Monitor labs  - Administer supportive blood products/factors as ordered and appropriate  Outcome: Progressing     Problem: MUSCULOSKELETAL - ADULT  Goal: Maintain or return mobility to safest level of function  Description  INTERVENTIONS:  - Assess patient's ability to carry out ADLs; assess patient's baseline for ADL function and identify physical deficits which impact ability to perform ADLs (bathing, care of mouth/teeth, toileting, grooming, dressing, etc )  - Assess/evaluate cause of self-care deficits   - Assess range of motion  - Assess patient's mobility  - Assess patient's need for assistive devices and provide as appropriate  - Encourage maximum independence but intervene and supervise when necessary  - Involve family in performance of ADLs  - Assess for home care needs following discharge   - Consider OT consult to assist with ADL evaluation and planning for discharge  - Provide patient education as appropriate  Outcome: Progressing  Goal: Maintain proper alignment of affected body part  Description  INTERVENTIONS:  - Support, maintain and protect limb and body alignment  - Provide patient/ family with appropriate education  Outcome: Progressing     Problem: Nutrition/Hydration-ADULT  Goal: Nutrient/Hydration intake appropriate for improving, restoring or maintaining nutritional needs  Description  Monitor and assess patient's nutrition/hydration status for malnutrition  Collaborate with interdisciplinary team and initiate plan and interventions as ordered  Monitor patient's weight and dietary intake as ordered or per policy  Utilize nutrition screening tool and intervene as necessary  Determine patient's food preferences and provide high-protein, high-caloric foods as appropriate       INTERVENTIONS:  - Monitor oral intake, urinary output, labs, and treatment plans  - Assess nutrition and hydration status and recommend course of action  - Evaluate amount of meals eaten  - Assist patient with eating if necessary   - Allow adequate time for meals  - Recommend/ encourage appropriate diets, oral nutritional supplements, and vitamin/mineral supplements  - Order, calculate, and assess calorie counts as needed  - Recommend, monitor, and adjust tube feedings and TPN/PPN based on assessed needs  - Assess need for intravenous fluids  - Provide specific nutrition/hydration education as appropriate  - Include patient/family/caregiver in decisions related to nutrition  Outcome: Progressing     Problem: SAFETY ADULT  Goal: Patient will remain free of falls  Description  INTERVENTIONS:  - Assess patient frequently for physical needs  -  Identify cognitive and physical deficits and behaviors that affect risk of falls    -  [place] fall precautions as indicated by assessment   - Educate patient/family on patient safety including physical limitations  - Instruct patient to call for assistance with activity based on assessment  - Modify environment to reduce risk of injury  - Consider OT/PT consult to assist with strengthening/mobility  Outcome: Progressing  Goal: Maintain or return to baseline ADL function  Description  INTERVENTIONS:  -  Assess patient's ability to carry out ADLs; assess patient's baseline for ADL function and identify physical deficits which impact ability to perform ADLs (bathing, care of mouth/teeth, toileting, grooming, dressing, etc )  - Assess/evaluate cause of self-care deficits   - Assess range of motion  - Assess patient's mobility; develop plan if impaired  - Assess patient's need for assistive devices and provide as appropriate  - Encourage maximum independence but intervene and supervise when necessary  - Involve family in performance of ADLs  - Assess for home care needs following discharge   - Consider OT consult to assist with ADL evaluation and planning for discharge  - Provide patient education as appropriate  Outcome: Progressing  Goal: Maintain or return mobility status to optimal level  Description  INTERVENTIONS:  - Assess patient's baseline mobility status (ambulation, transfers, stairs, etc )    - Identify cognitive and physical deficits and behaviors that affect mobility  - Identify mobility aids required to assist with transfers and/or ambulation (gait belt, sit-to-stand, lift, walker, cane, etc )  - [place] fall precautions as indicated by assessment  - Record patient progress and toleration of activity level on Mobility SBAR; progress patient to next Phase/Stage  - Instruct patient to call for assistance with activity based on assessment  - Consider rehabilitation consult to assist with strengthening/weightbearing, etc   Outcome: Progressing     Problem: DISCHARGE PLANNING  Goal: Discharge to home or other facility with appropriate resources  Description  INTERVENTIONS:  - Identify barriers to discharge w/patient and caregiver  - Arrange for needed discharge resources and transportation as appropriate  - Identify discharge learning needs (meds, wound care, etc )  - Arrange for interpretive services to assist at discharge as needed  - Refer to Case Management Department for coordinating discharge planning if the patient needs post-hospital services based on physician/advanced practitioner order or complex needs related to functional status, cognitive ability, or social support system  Outcome: Progressing     Problem: Knowledge Deficit  Goal: Patient/family/caregiver demonstrates understanding of disease process, treatment plan, medications, and discharge instructions  Description  Complete learning assessment and assess knowledge base  Interventions:  - Provide teaching at level of understanding  - Provide teaching via preferred learning methods  Outcome: Progressing     Problem: Neurological Deficit  Goal: Neurological status is stable or improving  Description  Interventions:  - Monitor and assess patient's level of consciousness, motor function, sensory function, and level of assistance needed for ADLs  - Monitor and report changes from baseline  Collaborate with interdisciplinary team to initiate plan and implement interventions as ordered  - Provide and maintain a safe environment  - Consider seizure precautions  - Consider fall precautions  - Consider aspiration precautions    - Consider bleeding precautions  Outcome: Progressing     Problem: Activity Intolerance/Impaired Mobility  Goal: Mobility/activity is maintained at optimum level for patient  Description  Interventions:  - Assess and monitor patient  barriers to mobility and need for assistive/adaptive devices  - Assess patient's emotional response to limitations  - Collaborate with interdisciplinary team and initiate plans and interventions as ordered  - Encourage independent activity per ability   - Maintain proper body alignment  - Perform active/passive rom as tolerated/ordered  - Plan activities to conserve energy   - Turn patient as appropriate  Outcome: Progressing     Problem: Communication Impairment  Goal: Ability to express needs and understand communication  Description  Assess patient's communication skills and ability to understand information  Patient will demonstrate use of effective communication techniques, alternative methods of communication and understanding even if not able to speak  - Encourage communication and provide alternate methods of communication as needed  - Collaborate with case management/ for discharge needs  - Include patient/family/caregiver in decisions related to communication  Outcome: Progressing     Problem: Potential for Aspiration  Goal: Ventilated patient's risk of aspiration is minimized  Description  Assess and monitor vital signs, respiratory status, airway cuff pressure, and labs (WBC)  Monitor for signs of aspiration (tachypnea, cough, rales, wheezing, cyanosis, fever)  - Elevate head of bed 30 degrees if patient has tube feeding   - Monitor tube feeding  Outcome: Progressing     Problem: Nutrition  Goal: Nutrition/Hydration status is improving  Description  Monitor and assess patient's nutrition/hydration status for malnutrition (ex- brittle hair, bruises, dry skin, pale skin and conjunctiva, muscle wasting, smooth red tongue, and disorientation)  Collaborate with interdisciplinary team and initiate plan and interventions as ordered  Monitor patient's weight and dietary intake as ordered or per policy  Utilize nutrition screening tool and intervene per policy  Determine patient's food preferences and provide high-protein, high-caloric foods as appropriate  - Assist patient with eating   - Allow adequate time for meals   - Encourage patient to take dietary supplement as ordered  - Collaborate with clinical nutritionist   - Include patient/family/caregiver in decisions related to nutrition  Outcome: Progressing     Problem: Prexisting or High Potential for Compromised Skin Integrity  Goal: Skin integrity is maintained or improved  Description  INTERVENTIONS:  - Identify patients at risk for skin breakdown  - Assess and monitor skin integrity  - Assess and monitor nutrition and hydration status  - Monitor labs   - Assess for incontinence   - Turn and reposition patient  - Assist with mobility/ambulation  - Relieve pressure over bony prominences  - Avoid friction and shearing  - Provide appropriate hygiene as needed including keeping skin clean and dry  - Evaluate need for skin moisturizer/barrier cream  - Collaborate with interdisciplinary team   - Patient/family teaching  - Consider wound care consult   Outcome: Progressing     Problem: Potential for Falls  Goal: Patient will remain free of falls  Description  INTERVENTIONS:  - Assess patient frequently for physical needs  -  Identify cognitive and physical deficits and behaviors that affect risk of falls    -  [place] fall precautions as indicated by assessment   - Educate patient/family on patient safety including physical limitations  - Instruct patient to call for assistance with activity based on assessment  - Modify environment to reduce risk of injury  - Consider OT/PT consult to assist with strengthening/mobility  Outcome: Progressing

## 2020-01-07 NOTE — PROGRESS NOTES
CRITICAL CARE PROGRESS NOTE     Name: Katie Lyon   Age & Sex: 48 y o  male   MRN: [id number]  Unit/Bed#: ICU 11   Encounter: [id number]  Code Status: Level 1 - Full Code    ACTIVE HOSPITAL PROBLEMS   Principal Problem:    Nontraumatic acute hemorrhage of the left basal ganglia  Active Problems:    Alcohol dependence (HCC)    Intraventricular hemorrhage (HCC)    Acute respiratory failure    Hypertension    Brain compression (HCC)    Cerebral edema (HCC)    ASSESSMENT/PLAN   Neurological/Psychiatric   · Diagnosis: Left basal ganglia hemorrhage with intraventricular extension   Plan: Intracerebral Hemorrhage (ICH) Score: 2   [place] Sore 5-12 equals +1   Age greater than or equal to 80 No   ICH Volume greater than or equal to 30 ml No   Intraventricular Hemorrhage Yes (1 Point)   Infratentorial Origin of Hemorrhage No   Total: 2   ? EVD clamped on 1/5 and removed on 1/6  · [address] 66 discussion yesterday   Brain death exam today  [facility] following    CV:   · Diagnosis: Hypertension  ? Goal SBP <160  ? Monitor on telemetry  ? echo 01/04:  EF 60%, no regional wall motion abnormality   No source of embolism identified    ? ZYX 87/21, KUZ UZ 69     Pulm:  · Diagnosis: Acute respiratory failure   ? Plan: Patient intubated in ED for airway protection   ? Continue ventilatory support   ? Post-intubation CXR show ETT 5CM above rebecca     GI:   · Diagnosis: No acute issues       :   · Diagnosis: No acute issues  ? Plan: Garcia placed to monitor I/O in critically ill patient   ? Monitor renal function and maintain per gift [facility] life        F/E/N:   · Plan: Monitor electrolytes closely  · If there is a concern for intracranial hypertension, will start HTS and monitor serial NA/osmol         Heme/Onc:   · Diagnosis: No acute issues         Endo:   · Diagnosis: No acute issues  ? Plan:  Monitor BG         ID:   · Diagnosis: No acute issues  ? Plan: Monitor for signs of acute infection  ?  Patient may have aspirated prior to intubation  No indication for abx at this time      MSK/Skin:   · Diagnosis: Immobility   ? Plan: Opt out of early mobility protocol secondary to [address]  ? Turn and prop    VTE Pharmacologic Prophylaxis: Reason for no pharmacologic prophylaxis ICH  VTE Mechanical Prophylaxis: sequential compression device    Disposition: Continue critical care    SUBJECTIVE     Subjective: Patient is intubated    24hr Events: No acute events    OBJECTIVE     Vitals:    20 0434 20 0500 20 0600 20 0700   BP:       Pulse:  86 80 84   Resp:  14 14 22   Temp:  98 6 °F (37 °C) 99 3 °F (37 4 °C) 99 3 °F (37 4 °C)   TempSrc:  Bladder Bladder Bladder   SpO2: 98% 98% 100% 94%   Weight:       Height:          Temperature:   Temp (24hrs), Av 1 °F (37 3 °C), Min:98 2 °F (36 8 °C), Max:100 4 °F (38 °C)    Temperature: 99 3 °F (37 4 °C)  Weights:   IBW: 75 3 kg    Body mass index is 21 06 kg/m²  Weight (last 2 days)     None        Physical Exam   Constitutional: He appears well-developed  He is intubated  HENT:   Head: Normocephalic and atraumatic  Eyes: Right pupil is not reactive  Left pupil is not reactive  Fixed and dilated   Neck: Neck supple  No JVD present  No tracheal deviation present  Cardiovascular: Regular rhythm, S1 normal, S2 normal and intact distal pulses  Pulmonary/Chest: He is intubated  He has no wheezes  He has no rhonchi  He has no rales  Abdominal: Soft  He exhibits no distension  Musculoskeletal: He exhibits no edema or deformity  Neurological: He is unresponsive  GCS eye subscore is 1  GCS verbal subscore is 1  GCS motor subscore is 1  No cough, gag or corneal reflexes  Skin: Skin is warm and dry  Nursing note and vitals reviewed      Hemodynamic Monitoring:       Non-Invasive/Invasive Ventilation Settings:  Respiratory    Lab Data (Last 4 hours)       0608            pH, Arterial       7 298           pCO2, Arterial       57 3           pO2, Arterial       331 9 HCO3, Arterial       27 4           Base Excess, Arterial       0 3                O2/Vent Data       01/07 0434   Most Recent         Vent Mode AC/VC  AC/VC      Resp Rate (BPM) (BPM) 14  22      Vt (mL) (mL) 500  500      FIO2 (%) (%) 40  40      PEEP (cmH2O) (cmH2O) 5  10      MV 7 4  7 4                Lab Results   Component Value Date    PHART 7 298 (L) 01/07/2020    LEN9VPK 57 3 (HH) 01/07/2020    PO2ART 331 9 (H) 01/07/2020    HTV4WEO 27 4 01/07/2020    BEART 0 3 01/07/2020    SOURCE Line, Arterial 01/07/2020       Invasive Lines & Devices: Invasive Devices     Central Venous Catheter Line            CVC Central Lines 01/03/20 Triple 20cm 3 days          Peripheral Intravenous Line            Peripheral IV Right Forearm -- days    Peripheral IV 01/03/20 Right Forearm 3 days          Arterial Line            Arterial Line 01/06/20 Left Radial less than 1 day          Drain            NG/OG/Enteral Tube Orogastric 16 Fr Center mouth -- days    External Urinary Catheter Small 809 days    Urethral Catheter 3 days          Airway            ETT  Cuffed 7 5 mm -- days                 Intake & Output:  I/O       01/05 0701 - 01/06 0700 01/06 0701 - 01/07 0700 01/07 0701 - 01/08 0700    I V  (mL/kg) 3597 (52 5) 8387 8 (122 5)     NG/GT  30     IV Piggyback  1893 3     Feedings 783 40     Total Intake(mL/kg) 4380 (63 9) 95224 2 (151 1)     Urine (mL/kg/hr) 4965 (3) 8015 (4 9)     Emesis/NG output 0      Drains 191      Total Output 5156 8015     Net -776 +2336 2            Unmeasured Emesis Occurrence 1 x          Nutrition:     LABORATORY DATA     Labs: I have personally reviewed pertinent reports        Results from last 7 days   Lab Units 01/07/20  0630 01/07/20  0625 01/07/20  6015  01/07/20  0537 01/07/20  0208 01/06/20  2150 01/06/20  0836   WBC Thousand/uL  --   --   --   --  6 07 7 39 8 61 9 87   HEMOGLOBIN g/dL  --   --   --   --  9 4* 10 2* 11 0* 13 6   I STAT HEMOGLOBIN g/dl 9 5* 9 2* 8 8* < >  --   --   --   --    HEMATOCRIT %  --   --   --   --  30 3* 32 1* 34 0* 40 0   HEMATOCRIT, ISTAT % 28* 27* 26*   < >  --   --   --   --    PLATELETS Thousands/uL  --   --   --   --  143* 152 176 200   NEUTROS PCT %  --   --   --   --   --  84* 82* 89*   MONOS PCT %  --   --   --   --   --  12 9 7    < > = values in this interval not displayed  Results from last 7 days   Lab Units 01/07/20  0630 01/07/20  0625 01/07/20  8702  01/07/20  0536 01/07/20  0405 01/07/20  0208  01/06/20  2150   POTASSIUM mmol/L  --   --   --   --  3 9 3 9 3 9   < > 3 8   CHLORIDE mmol/L  --   --   --   --  111* 110* 117*   < > 122*   CO2 mmol/L  --   --   --   --  27 29 30   < > 30   CO2, I-STAT mmol/L 34* 34* 31   < >  --   --   --   --   --    BUN mg/dL  --   --   --   --  11 11 11   < > 12   CREATININE mg/dL  --   --   --   --  0 61 0 62 0 54*   < > 0 62   CALCIUM mg/dL  --   --   --   --  7 8* 7 9* 8 0*   < > 8 3   ALK PHOS U/L  --   --   --   --  58  --  67  --  64   ALT U/L  --   --   --   --  74  --  76  --  74   AST U/L  --   --   --   --  48*  --  56*  --  50*   GLUCOSE, ISTAT mg/dl 210* 212* 215*   < >  --   --   --   --   --     < > = values in this interval not displayed  Results from last 7 days   Lab Units 01/07/20  0536 01/07/20 0208 01/06/20  2037   MAGNESIUM mg/dL 2 3 2 4 2 6     Results from last 7 days   Lab Units 01/07/20  0536 01/07/20  0208 01/06/20  0836   PHOSPHORUS mg/dL 2 8 3 3 3 5      Results from last 7 days   Lab Units 01/07/20  0536 01/07/20  0208 01/06/20  2150   INR  1 09 1 07 1 03   PTT seconds 28 26 23         Results from last 7 days   Lab Units 01/07/20  0537 01/07/20  0208 01/06/20  2150   TROPONIN I ng/mL 0 04 0 06* 0 07*     Micro:  Lab Results   Component Value Date    BLOODCX No Growth After 5 Days  11/09/2017    BLOODCX No Growth After 5 Days  11/09/2017    BLOODCX No Growth After 5 Days   10/15/2017    SPUTUMCULTUR 3+ Growth of  11/09/2017     IMAGING & DIAGNOSTIC TESTING Imaging: I have personally reviewed pertinent reports  Cta Head W Wo Contrast    Result Date: 1/3/2020  Impression: Large acute left basal ganglionic hemorrhage has increased in size in 2 hours, with the greater decompression into the ventricular system  Increased mass effect  Stable vascular ectasia most striking in the right greater than left M1 segments  Possible spot sign within the anterior inferior margin of the left basal ganglionic hemorrhage  Ectatic left distal M2 vessels may be slightly more prominent than on prior CTA study, 10/12/2017  Conventional angiography is suggested  These are not directly relayed to patient's intracranial hemorrhage  * I personally discussed this result to Veronica Curtis on 1/3/2020 4:45PM  Workstation performed: [id number]     Xr Chest Portable    Result Date: 1/4/2020  Impression: New left subclavian line with tip at the cavoatrial junction  No pneumothorax  Unchanged position of ET tube and enteric tube  No acute cardiopulmonary disease  Workstation performed: [id number]     Xr Chest 1 View Portable    Result Date: 1/3/2020  Impression: No acute cardiopulmonary disease  Workstation performed: [id number]     Ct Head Wo Contrast    Result Date: 1/3/2020  Impression: Increased size of left basal ganglia hemorrhage now measuring 5 1 x 6 3 cm (series 2, image 20), previously 4 8 x 4 4 cm, noting developing vasogenic edema with effacement of the adjacent sulci  Increased breakthrough hemorrhage into the ventricles, however ventricular size is not significantly changed  Increased rightward midline shift now measuring 12 mm, previously 7 mm  A right frontal approach ventriculostomy remains in stable position  The study was [facility] in [facility] for immediate notification   Workstation performed: [id number]     Ct Stroke Alert Brain    Result Date: 1/3/2020  Impression: Large acute left basal ganglia hemorrhage, likely hypertensive in origin, associated with substantial mass effect, approximately 7 mm midline shift to the right, extensive intraventricular hemorrhage and developing hydrocephalus Findings were personally discussed by phone with Dr Erin Beard on 1/3/2020 2:05 PM  Workstation performed: [id number]     EKG, Pathology, and Other Studies: I have personally reviewed pertinent reports       ALLERGIES     Allergies   Allergen Reactions    Bee Venom        MEDICATIONS       Current Facility-Administered Medications:  acetaminophen 650 mg Oral Q6H PRN Remedios Marshall MD    albuterol 4 puff Inhalation Q4H PRN Rubina Noel MD    amphotericin B 50 mg Intravenous PRN Rubina Noel MD    cefazolin 1,000 mg Intravenous Q8H Rubina Noel MD    cefazolin 1,000 mg Intravenous PRN Rubina Noel MD    cefTAZidime 1,000 mg Intravenous Q12H Rubina Noel MD    dextrose 100 mL/hr Intravenous Continuous Remedios Marshall MD Last Rate: 100 mL/hr (01/07/20 0500)   EPINEPHrine        levothyroxine 200 mcg in 0 9% sodium chloride 500 mL infusion 20 mcg/hr Intravenous Titrated Remedios Marshall MD Last Rate: 40 mcg/hr (01/07/20 0404)   methylPREDNISolone sodium succinate 1,000 mg Intravenous PRN HAILEE Dukes    methylPREDNISolone sodium succinate 1,000 mg Intravenous Q6H [facility] Rubina Noel MD    norepinephrine 1-30 mcg/min Intravenous Titrated Mary Gatica PA-C Last Rate: Stopped (01/06/20 2245)   ondansetron 4 mg Intravenous Once Pedro Tena DO    phenylephine  mcg/min Intravenous Titrated Remedios Marshall MD Last Rate: Stopped (01/07/20 0145)   sodium chloride 100 mL/hr Intravenous Continuous HAILEE Carney    vasopressin (PITRESSIN) in 0 9 % sodium chloride 100 mL 0 04 Units/min Intravenous Continuous Alex Bustillo MD Last Rate: 0 04 Units/min (01/06/20 2253)       dextrose 100 mL/hr Last Rate: 100 mL/hr (01/07/20 0500)   levothyroxine 200 mcg in 0 9% sodium chloride 500 mL infusion 20 mcg/hr Last Rate: 40 mcg/hr (01/07/20 0404)   norepinephrine 1-30 mcg/min Last Rate: Stopped (01/06/20 5075)   phenylephine  mcg/min Last Rate: Stopped (01/07/20 0145)   sodium chloride 100 mL/hr    vasopressin (PITRESSIN) in 0 9 % sodium chloride 100 mL 0 04 Units/min Last Rate: 0 04 Units/min (01/06/20 1693)       acetaminophen 650 mg Q6H PRN   albuterol 4 puff Q4H PRN   amphotericin B 50 mg PRN   cefazolin 1,000 mg PRN   methylPREDNISolone sodium succinate 1,000 mg PRN     ==  Chanda Spence MD PGY-1  Critical Care Rotation

## 2020-01-07 NOTE — DISCHARGE SUMMARY
Discharge Summary - Kristyn Fairchild 48 y o  male MRN: [id number]    Unit/Bed#: ICU 11 Encounter: [id number] PCP: No primary care provider on file  Admission Date:   Admission Orders (From admission, onward)     Ordered        01/03/20 1438  Inpatient Admission  Once                     Admitting Diagnosis: Stroke-like symptoms [R29 90]  Nontraumatic acute hemorrhage of basal ganglia ([address] ) [I61 9]    HPI: HPI per Yassine Frank "HX and PE limited by: Patient being intubated and sedated   Kristyn Fairchild is a 48 y o  male with a significant PMH of ETOH abuse, HTN, SAH secondary to aneurysm in 2017 presented via EMS after he was at [address] with a friend and had an acute change in mental status  He was intubated in the ED with etomidate and succinylcholine for airway protection secondary to decreased mental status  Patient was unable to verbalize any complaints secondary to being ventilated and decreased mental status     History obtained from chart review, the patient and Mother Hai Ring) "    Procedures Performed:   Orders Placed This Encounter   Procedures    Cardiac catheterization   [facility] Intubation       Summary of Hospital Course: Patient initially presented to the emergency department via EMS on 01/03/20 for evaluation of altered mental status  A prehospital stroke alert was called and CT head showed a large basal ganglia stroke with extension into the left ventricle  Neurosurgery was consulted and placed a right frontal ventriculostomy while the patient was still in the emergency department and then was transferred to the ICU  Approximately 1 hour after placement of the EVD there was an abrupt increase in output from the drain  Repeat head CT scan showed worsening of his left-sided hemorrhage which was interpreted by neurosurgery as being a non-survivable intracranial hemorrhage  Goals of care discussion took place with patient's family   They were initially leaning towards comfort directed care but wanted to see if the patient would progress to brain death over the weekend  Family expressed that the patient was an organ donor and [facility] [employer] was contacted to follow  The patient's EVD was clamped on 20 and removed 20  The patient did not have a gag, cough or corneal reflexes on  but formal brain death examination was deferred because the patient had elevated serum Na  Serum Na normalized by 20 and a formal brain death examination was conducted  Time of death called at 65  [facility] is following for organ donation      Disposition:      Final Diagnosis: Brain Death    Resolved Problems  Date Reviewed: 2020    None          Date, Time and Cause of Death    Date of Death:  20  Time of Death:   6:31 AM  Preliminary Cause of Death:  Brain death  Entered by:  Dr Jeannine Mcginnis [KF1 1]     Attribution     KF1 1 Alejandro Harp, 10 [facility] 20 14:22

## 2020-01-07 NOTE — RESPIRATORY THERAPY NOTE
RT Ventilator Management Note  Casa Dutton 48 y o  male MRN: [id number]  Unit/Bed#: ICU 11 Encounter: [id number]      Daily Screen       1/6/2020  0916 1/7/2020  0856          Patient safety screen outcome[de-identified]  Failed  Failed      Not Ready for Weaning due to[de-identified]  Underline problem not resolved; Hemodynamically unstable  Underline problem not resolved              Physical Exam:   Assessment Type: (P) Assess only  General Appearance: (P) Unresponsive  Respiratory Pattern: (P) Assisted  Chest Assessment: (P) Chest expansion symmetrical  Bilateral Breath Sounds: (P) Clear  Cough: None      Resp Comments: (P) Pt  is [facility] pt  No weaning  Changes made per [facility] genie

## 2020-01-07 NOTE — RESPIRATORY THERAPY NOTE
resp care    Pt  bronched at the bedside for [facility]  Pt  On 100% FIO2 for procedure  One sample sent  Used disposable scope Tolerated well

## 2020-01-07 NOTE — PROGRESS NOTES
[place] Brain Death Exam  Mara Fyr 48 y o  male MRN: [id number]  Unit/Bed#: ICU 11 Encounter: [id number]    Medical prerequisites:    Core temperature >=36C (96 8F)    Temp (24hrs), Av °F (37 2 °C), Min:98 2 °F (36 8 °C), Max:100 4 °F (38 °C)  Current: Temperature: 98 6 °F (37 °C)    Stable systolic BP >= 736 or MAP >60    Vitals:    20 0300 20 0400 20 0434 20 0500   BP:       Pulse: 100 104  86   Resp: 14 14  14   Temp: 98 2 °F (36 8 °C) 98 2 °F (36 8 °C)  98 6 °F (37 °C)   TempSrc:    Bladder   SpO2: 96% 97% 98% 98%   Weight:       Height:         Arterial Line BP: 120/70  Arterial Line MAP (mmHg): 90 mmHg    Clinical Exam:    GCS: 3T  Motor response to pain:   No purposeful response to painful stimuli  Pupils: No pupillary response to bright light   Pupils 3 mm in size   Ocular Movement:    No corneal reflex present   No facial movement to painful stimuli   Pharyngeal and tracheal reflexes:   No response to stimulation of posterior pharynx with  tongue blade   No response to bronchial stimulation  Vestibular Reflexes:   No oculovestibular reflex present (caloric testing)   No oculocephalic reflex present (doll's eyes)  Apnea Testing (patient must be eucapnic to perform this exam)   Patient was placed on 100% FIO2 for 20 minutes prior to the test initiation   Baseline ABG 7 34/54/304/29 4/100%   ABG at 10 minutes 7 09/100 4/378/30 9/100%    Ancillary Tests to support Brain Death Diagnosis:    Nuclear flow study: N/A  EEG: N/A    I testify that the clinical history, laboratory data, and medication administration record have been reviewed and there are no confounding factors that may be contributing to this patient's neurological exam findings        Date of death: 2020  Time death pronounced: 06:31 am     SIGNATURE: Julieth Cruz DO

## 2020-01-07 NOTE — PROCEDURES
BRONCHOSCOPY NOTE   Sonia Vee 48 y o  male MRN: [id number]  Unit/Bed#: ICU 11 Encounter: [id number]    Procedure Performed by: Dr hDara Leo DO    Procedure Supervised by: Dr Momo Galicia MD    PRE OPERATIVE DIAGNOSIS: Intracerebral Hemorrhage, Brain Death    POST OPERATIVE DIAGNOSIS: Intracerebral Hemorrhage, Brain Death    LOCATION: Bedside (ICU Bed 11)    Informed consent obtained  Images reviewed prior to the procedure  A Time Out was performed  ASA: 5    MALLAMPATI SCORE: unable to assess, endotracheal intubation    MONITORING:  Cardiac rhythm, pulse and pulse oximetry were monitored continuously during the procedure  Blood pressure was monitored every 3 minutes during the procedure  SEDATION: None    PROCEDURE:    No suspicion or identified risk for TB or other airborne infectious disease; bronchoscopy procedure being performed for diagnostic purposes and as a part of routine organ donation pre-evaluation  Standard airway preparation completed per respiratory therapy protocol  A standard bronchoscope was inserted thru the endotracheal tube and advanced to the level of the main rebecca  The distal trachea was normal in appearance  The main rebecca was normal in appearance  Airway survey was completed bilaterally to at least the second segmental level  The anatomy was normal   The mucosa was normal in appearance  There were no endobronchial masses, lesions, or obstructions noted  Secretions were minimal     Bronchoalveolar Lavage performed: Total of 20 cc in Left Lower Lobe  Bronchoscope removed  The patient was kept in ICU for post-procedure recovery    COMPLICATIONS:  None  There were no unplanned events  ESTIMATED BLOOD LOSS: None    FINDINGS:   1  Normal endobronchial evaluation  PLAN:  · Continue routine vent management  · Await results of specimens obtained      Dhara Leo DO

## 2020-01-07 NOTE — PROGRESS NOTES
01/07/20 1100   Clinical Encounter Type   Visited With Health care provider   Routine Visit Follow-up   Crisis Visit Critical Care   Patient Spiritual Encounters   Spiritual Encounter Notes [facility] rep says that family has left for the day & that she intends on giving them updates on pt's condition via phone  Family does not appear to be returning

## 2020-01-07 NOTE — DEATH NOTE
INPATIENT DEATH NOTE  Page Angel 48 y o  male MRN: [id number]  Unit/Bed#: ICU 11 Encounter: [id number]    Date, Time and Cause of Death    Date of Death:  20  Time of Death:   6:31 AM  Preliminary Cause of Death:  Brain death  Entered by:  Dr Jeannie King [KF1 1]     Attribution     KF1 1 Tommie Zhao, [address] 20 14:22           Patient's Information  Pronounced by: Dr Jeannie King   Did the patient's death occur in the ED?: No  Did the patient's death occur in the OR?: No  Did the patient's death occur less than 10 days post-op?: No  Did the patient's death occur within 24 hours of admission?: No  Was code status DNR at the time of death?: No    PHYSICAL EXAM:  See brain death documentation per Dr Jeannie King  Patient has confirmed brain death but not cardiac death  Medical Examiner notification criteria:  NONE APPLICABLE   Medical Examiner's office notified?:  No, does not meet ME notification criteria   Medical Examiner accepted case?:  No  Name of Medical Examiner: na    Family Notification  Was the family notified?: Yes  Date Notified: 20  Time Notified: 631  Notified by: Dr Jeannie King   Name of Family Notified of Death: Autumn Malik Person Relationship to Patient: Daughter    Autopsy Options:  Decision for post-mortem examination not yet made by next of kin      Primary Service Attending Physician notified?:  yes    Physician/Resident responsible for completing Discharge Summary:  Dr Rachel Newton

## 2020-01-07 NOTE — RESPIRATORY THERAPY NOTE
resp care      01/07/20 1603   Respiratory Assessment   Resp Comments Pt  stable at this time-no vent changes   ETT  Cuffed 7 5 mm   No Placement Date or Time found     Previously placed by?: Attending  Preoxygenated: Yes  Technique: Rapid sequence  Type: Cuffed  Tube Size: 7 5 mm  Secured at (cm): 21  Placed By: Resident    Secured at (cm) 24   Measured from [facility] by Commercial tube farris   Site Condition Dry   HI-LO Suction  Intermittent suction   Additional Assessments   SpO2 96 %

## 2020-01-07 NOTE — PLAN OF CARE
Problem: NEUROSENSORY - ADULT  Goal: Achieves stable or improved neurological status  Description  INTERVENTIONS  - Monitor and report changes in neurological status  - Monitor vital signs such as temperature, blood pressure, glucose, and any other labs ordered   - Initiate measures to prevent increased intracranial pressure  - Monitor for seizure activity and implement precautions if appropriate      Outcome: Not Progressing  Goal: Remains free of injury related to seizures activity  Description  INTERVENTIONS  - Maintain airway, patient safety  and administer oxygen as ordered  - Monitor patient for seizure activity, document and report duration and description of seizure to physician/advanced practitioner  - If seizure occurs,  ensure patient safety during seizure  - Reorient patient post seizure  - Seizure pads on all 4 side rails  - Instruct patient/family to notify RN of any seizure activity including if an aura is experienced  - Instruct patient/family to call for assistance with activity based on nursing assessment  - Administer anti-seizure medications if ordered    Outcome: Not Progressing  Goal: Achieves maximal functionality and self care  Description  INTERVENTIONS  - Encourage and assist patient to increase activity and self care     - Encourage visually impaired, hearing impaired and aphasic patients to use assistive/communication devices   Outcome: Not Progressing     Problem: CARDIOVASCULAR - ADULT  Goal: Maintains optimal cardiac output and hemodynamic stability  Description  INTERVENTIONS:  - Monitor I/O, vital signs and rhythm  - Monitor for S/S and trends of decreased cardiac output  - Administer and titrate ordered vasoactive medications to optimize hemodynamic stability  - Assess quality of pulses, skin color and temperature  - Assess for signs of decreased coronary artery perfusion  - Instruct patient to report change in severity of symptoms  Outcome: Not Progressing  Goal: Absence of cardiac dysrhythmias or at baseline rhythm  Description  INTERVENTIONS:  - Continuous cardiac monitoring, vital signs, obtain 12 lead EKG if ordered  - Monitor electrolytes and administer replacement therapy as ordered   Outcome: Not Progressing     Problem: RESPIRATORY - ADULT  Goal: Achieves optimal ventilation and oxygenation  Description  INTERVENTIONS:  - Assess for changes in respiratory status  - Assess for changes in mentation and behavior  - Position to facilitate oxygenation and minimize respiratory effort  - Oxygen administered by appropriate delivery if ordered  - Assess the need for suctioning and aspirate as neededy  - Respiratory Therapy support as indicated   Outcome: Not Progressing     Problem: GASTROINTESTINAL - ADULT  Goal: Minimal or absence of nausea and/or vomiting  Description  INTERVENTIONS:  - Administer IV fluids if ordered to ensure adequate hydration  - Maintain NPO status until nausea and vomiting are resolved  - Nasogastric tube if ordered  - Administer ordered antiemetic medications as needed  - Provide nonpharmacologic comfort measures as appropriate  - Advance diet as tolerated, if ordered  - Consider nutrition services referral to assist patient with adequate nutrition and appropriate food choices  Outcome: Not Progressing  Goal: Maintains or returns to baseline bowel function  Description  INTERVENTIONS:  - Assess bowel function  - Encourage oral fluids to ensure adequate hydration  - Administer IV fluids if ordered to ensure adequate hydration  - Administer ordered medications as needed  - Encourage mobilization and activity  - Consider nutritional services referral to assist patient with adequate nutrition and appropriate food choices  Outcome: Not Progressing  Goal: Maintains adequate nutritional intake  Description  INTERVENTIONS:  - Monitor percentage of each meal consumed  - Identify factors contributing to decreased intake, treat as appropriate  - Assist with meals as needed  - Monitor I&O, weight, and lab values if indicated  - Obtain nutrition services referral as needed  Outcome: Not Progressing     Problem: GENITOURINARY - ADULT  Goal: Maintains or returns to baseline urinary function  Description  INTERVENTIONS:  - Assess urinary function  - Encourage oral fluids to ensure adequate hydration if ordered  - Administer IV fluids as ordered to ensure adequate hydration  - Administer ordered medications as needed  - Offer frequent toileting  - Follow urinary retention protocol if ordered  Outcome: Not Progressing  Goal: Absence of urinary retention  Description  INTERVENTIONS:  - Assess patients ability to void and empty bladder  - Monitor I/O  - Bladder scan as needed  - Discuss with physician/AP medications to alleviate retention as needed  - Discuss catheterization for long term situations as appropriate  Outcome: Not Progressing  Goal: Urinary catheter remains patent  Description  INTERVENTIONS:  - Assess patency of urinary catheter  - If patient has a chronic morel, consider changing catheter if non-functioning     Outcome: Not Progressing     Problem: METABOLIC, FLUID AND ELECTROLYTES - ADULT  Goal: Electrolytes maintained within normal limits  Description  INTERVENTIONS:  - Monitor labs and assess patient for signs and symptoms of electrolyte imbalances  - Administer electrolyte replacement as ordered  - Monitor response to electrolyte replacements, including repeat lab results as appropriate     Outcome: Not Progressing  Goal: Fluid balance maintained  Description  INTERVENTIONS:  - Monitor labs   - Monitor I/O and WT  - Instruct patient on fluid and nutrition as appropriate  - Assess for signs & symptoms of volume excess or deficit  Outcome: Not Progressing  Goal: Glucose maintained within target range  Description  INTERVENTIONS:  - Monitor Blood Glucose as ordered  - Assess for signs and symptoms of hyperglycemia and hypoglycemia  - Administer ordered medications to maintain glucose within target range  - Assess nutritional intake and initiate nutrition service referral as needed  Outcome: Not Progressing     Problem: SKIN/TISSUE INTEGRITY - ADULT  Goal: Skin integrity remains intact  Description  INTERVENTIONS  - Identify patients at risk for skin breakdown  - Assess and monitor skin integrity  - Assess and monitor nutrition and hydration status  - Monitor labs (i e  albumin)  - Assess for incontinence   - Turn and reposition patient  - Assist with mobility/ambulation  - Relieve pressure over bony prominences  - Avoid friction and shearing  - Provide appropriate hygiene as needed including keeping skin clean and dry  - Evaluate need for skin moisturizer/barrier cream  - Collaborate with interdisciplinary team (i e  Nutrition, Rehabilitation, etc )   - Patient/family teaching  Outcome: Not Progressing  Goal: Incision(s), wounds(s) or drain site(s) healing without S/S of infection  Description  INTERVENTIONS  - Assess and document risk factors for skin impairment   - Assess and document dressing, incision, wound bed, drain sites and surrounding tissue  - Consider nutrition services referral as needed  - Oral mucous membranes remain intact  - Provide patient/ family education  Outcome: Not Progressing  Goal: Oral mucous membranes remain intact  Description  INTERVENTIONS  - Assess oral mucosa and hygiene practices  - Implement preventative oral hygiene regimen  - Implement oral medicated treatments as ordered  - Initiate Nutrition services referral as needed  Outcome: Not Progressing     Problem: HEMATOLOGIC - ADULT  Goal: Maintains hematologic stability  Description  INTERVENTIONS  - Assess for signs and symptoms of bleeding or hemorrhage  - Monitor labs  - Administer supportive blood products/factors as ordered and appropriate  Outcome: Not Progressing     Problem: MUSCULOSKELETAL - ADULT  Goal: Maintain or return mobility to safest level of function  Description  INTERVENTIONS:  - Assess patient's ability to carry out ADLs; assess patient's baseline for ADL function and identify physical deficits which impact ability to perform ADLs (bathing, care of mouth/teeth, toileting, grooming, dressing, etc )  - Assess/evaluate cause of self-care deficits   - Assess range of motion  - Assess patient's mobility  - Assess patient's need for assistive devices and provide as appropriate  - Encourage maximum independence but intervene and supervise when necessary  - Involve family in performance of ADLs  - Assess for home care needs following discharge   - Consider OT consult to assist with ADL evaluation and planning for discharge  - Provide patient education as appropriate  Outcome: Not Progressing  Goal: Maintain proper alignment of affected body part  Description  INTERVENTIONS:  - Support, maintain and protect limb and body alignment  - Provide patient/ family with appropriate education  Outcome: Not Progressing     Problem: Nutrition/Hydration-ADULT  Goal: Nutrient/Hydration intake appropriate for improving, restoring or maintaining nutritional needs  Description  Monitor and assess patient's nutrition/hydration status for malnutrition  Collaborate with interdisciplinary team and initiate plan and interventions as ordered  Monitor patient's weight and dietary intake as ordered or per policy  Utilize nutrition screening tool and intervene as necessary  Determine patient's food preferences and provide high-protein, high-caloric foods as appropriate       INTERVENTIONS:  - Monitor oral intake, urinary output, labs, and treatment plans  - Assess nutrition and hydration status and recommend course of action  - Evaluate amount of meals eaten  - Assist patient with eating if necessary   - Allow adequate time for meals  - Recommend/ encourage appropriate diets, oral nutritional supplements, and vitamin/mineral supplements  - Order, calculate, and assess calorie counts as needed  - Recommend, monitor, and adjust tube feedings and TPN/PPN based on assessed needs  - Assess need for intravenous fluids  - Provide specific nutrition/hydration education as appropriate  - Include patient/family/caregiver in decisions related to nutrition  Outcome: Not Progressing     Problem: SAFETY ADULT  Goal: Patient will remain free of falls  Description  INTERVENTIONS:  - Assess patient frequently for physical needs  -  Identify cognitive and physical deficits and behaviors that affect risk of falls    -  [place] fall precautions as indicated by assessment   - Educate patient/family on patient safety including physical limitations  - Instruct patient to call for assistance with activity based on assessment  - Modify environment to reduce risk of injury  - Consider OT/PT consult to assist with strengthening/mobility  Outcome: Not Progressing  Goal: Maintain or return to baseline ADL function  Description  INTERVENTIONS:  -  Assess patient's ability to carry out ADLs; assess patient's baseline for ADL function and identify physical deficits which impact ability to perform ADLs (bathing, care of mouth/teeth, toileting, grooming, dressing, etc )  - Assess/evaluate cause of self-care deficits   - Assess range of motion  - Assess patient's mobility; develop plan if impaired  - Assess patient's need for assistive devices and provide as appropriate  - Encourage maximum independence but intervene and supervise when necessary  - Involve family in performance of ADLs  - Assess for home care needs following discharge   - Consider OT consult to assist with ADL evaluation and planning for discharge  - Provide patient education as appropriate  Outcome: Not Progressing  Goal: Maintain or return mobility status to optimal level  Description  INTERVENTIONS:  - Assess patient's baseline mobility status (ambulation, transfers, stairs, etc )    - Identify cognitive and physical deficits and behaviors that affect mobility  - Identify mobility aids required to assist with transfers and/or ambulation (gait belt, sit-to-stand, lift, walker, cane, etc )  - [place] fall precautions as indicated by assessment  - Record patient progress and toleration of activity level on Mobility SBAR; progress patient to next Phase/Stage  - Instruct patient to call for assistance with activity based on assessment  - Consider rehabilitation consult to assist with strengthening/weightbearing, etc   Outcome: Not Progressing     Problem: DISCHARGE PLANNING  Goal: Discharge to home or other facility with appropriate resources  Description  INTERVENTIONS:  - Identify barriers to discharge w/patient and caregiver  - Arrange for needed discharge resources and transportation as appropriate  - Identify discharge learning needs (meds, wound care, etc )  - Arrange for interpretive services to assist at discharge as needed  - Refer to Case Management Department for coordinating discharge planning if the patient needs post-hospital services based on physician/advanced practitioner order or complex needs related to functional status, cognitive ability, or social support system  Outcome: Not Progressing     Problem: Knowledge Deficit  Goal: Patient/family/caregiver demonstrates understanding of disease process, treatment plan, medications, and discharge instructions  Description  Complete learning assessment and assess knowledge base  Interventions:  - Provide teaching at level of understanding  - Provide teaching via preferred learning methods  Outcome: Not Progressing     Problem: Neurological Deficit  Goal: Neurological status is stable or improving  Description  Interventions:  - Monitor and assess patient's level of consciousness, motor function, sensory function, and level of assistance needed for ADLs  - Monitor and report changes from baseline  Collaborate with interdisciplinary team to initiate plan and implement interventions as ordered     - Provide and maintain a safe environment  - Consider seizure precautions  - Consider fall precautions  - Consider aspiration precautions  - Consider bleeding precautions  Outcome: Not Progressing     Problem: Activity Intolerance/Impaired Mobility  Goal: Mobility/activity is maintained at optimum level for patient  Description  Interventions:  - Assess and monitor patient  barriers to mobility and need for assistive/adaptive devices  - Assess patient's emotional response to limitations  - Collaborate with interdisciplinary team and initiate plans and interventions as ordered  - Encourage independent activity per ability   - Maintain proper body alignment  - Perform active/passive rom as tolerated/ordered  - Plan activities to conserve energy   - Turn patient as appropriate  Outcome: Not Progressing     Problem: Communication Impairment  Goal: Ability to express needs and understand communication  Description  Assess patient's communication skills and ability to understand information  Patient will demonstrate use of effective communication techniques, alternative methods of communication and understanding even if not able to speak  - Encourage communication and provide alternate methods of communication as needed  - Collaborate with case management/ for discharge needs  - Include patient/family/caregiver in decisions related to communication  Outcome: Not Progressing     Problem: Potential for Aspiration  Goal: Ventilated patient's risk of aspiration is minimized  Description  Assess and monitor vital signs, respiratory status, airway cuff pressure, and labs (WBC)  Monitor for signs of aspiration (tachypnea, cough, rales, wheezing, cyanosis, fever)  - Elevate head of bed 30 degrees if patient has tube feeding   - Monitor tube feeding    Outcome: Not Progressing     Problem: Nutrition  Goal: Nutrition/Hydration status is improving  Description  Monitor and assess patient's nutrition/hydration status for malnutrition (ex- brittle hair, bruises, dry skin, pale skin and conjunctiva, muscle wasting, smooth red tongue, and disorientation)  Collaborate with interdisciplinary team and initiate plan and interventions as ordered  Monitor patient's weight and dietary intake as ordered or per policy  Utilize nutrition screening tool and intervene per policy  Determine patient's food preferences and provide high-protein, high-caloric foods as appropriate  - Assist patient with eating   - Allow adequate time for meals   - Encourage patient to take dietary supplement as ordered  - Collaborate with clinical nutritionist   - Include patient/family/caregiver in decisions related to nutrition  Outcome: Not Progressing     Problem: Prexisting or High Potential for Compromised Skin Integrity  Goal: Skin integrity is maintained or improved  Description  INTERVENTIONS:  - Identify patients at risk for skin breakdown  - Assess and monitor skin integrity  - Assess and monitor nutrition and hydration status  - Monitor labs   - Assess for incontinence   - Turn and reposition patient  - Assist with mobility/ambulation  - Relieve pressure over bony prominences  - Avoid friction and shearing  - Provide appropriate hygiene as needed including keeping skin clean and dry  - Evaluate need for skin moisturizer/barrier cream  - Collaborate with interdisciplinary team   - Patient/family teaching  - Consider wound care consult   Outcome: Not Progressing     Problem: Potential for Falls  Goal: Patient will remain free of falls  Description  INTERVENTIONS:  - Assess patient frequently for physical needs  -  Identify cognitive and physical deficits and behaviors that affect risk of falls    -  [place] fall precautions as indicated by assessment   - Educate patient/family on patient safety including physical limitations  - Instruct patient to call for assistance with activity based on assessment  - Modify environment to reduce risk of injury  - Consider OT/PT consult to assist with strengthening/mobility  Outcome: Not Progressing

## 2020-01-07 NOTE — RESPIRATORY THERAPY NOTE
RT Ventilator Management Note  Arianna Melchor 48 y o  male MRN: [id number]  Unit/Bed#: ICU 11 Encounter: [id number]      Daily Screen       1/5/2020  0759 1/6/2020  0916          Patient safety screen outcome[de-identified]  Failed  Failed      Not Ready for Weaning due to[de-identified]  Underline problem not resolved  Underline problem not resolved; Hemodynamically unstable              Physical Exam:   Assessment Type: (P) Assess only  Bilateral Breath Sounds: (P) Clear  Cough: None      Resp Comments: (P) Pt  doing well on A/C  No changes at this time

## 2020-01-07 NOTE — PHYSICAL THERAPY NOTE
PT Cancel Note    Chart Reviewed  Pt is now planned to processed to comfort care measures  Pt is no longer appropriate for PT services  Will D/C PT orders  Please re-consult if medically and clinically appropriate         Artemio Arora PT, DPT

## 2020-01-08 ENCOUNTER — APPOINTMENT (INPATIENT)
Dept: RADIOLOGY | Facility: HOSPITAL | Age: 55
DRG: 021 | End: 2020-01-08
Payer: COMMERCIAL

## 2020-01-08 VITALS
DIASTOLIC BLOOD PRESSURE: 129 MMHG | TEMPERATURE: 97.5 F | BODY MASS INDEX: 21.14 KG/M2 | SYSTOLIC BLOOD PRESSURE: 196 MMHG | HEIGHT: 71 IN | OXYGEN SATURATION: 97 % | WEIGHT: 151.01 LBS | HEART RATE: 112 BPM | RESPIRATION RATE: 16 BRPM

## 2020-01-08 LAB
ALBUMIN SERPL BCP-MCNC: 2.5 G/DL (ref 3.5–5)
ALBUMIN SERPL BCP-MCNC: 2.7 G/DL (ref 3.5–5)
ALP SERPL-CCNC: 70 U/L (ref 46–116)
ALP SERPL-CCNC: 75 U/L (ref 46–116)
ALT SERPL W P-5'-P-CCNC: 66 U/L (ref 12–78)
ALT SERPL W P-5'-P-CCNC: 67 U/L (ref 12–78)
AMYLASE SERPL-CCNC: 138 IU/L (ref 25–115)
AMYLASE SERPL-CCNC: 145 IU/L (ref 25–115)
ANION GAP SERPL CALCULATED.3IONS-SCNC: 10 MMOL/L (ref 4–13)
ANION GAP SERPL CALCULATED.3IONS-SCNC: 7 MMOL/L (ref 4–13)
APTT PPP: 27 SECONDS (ref 23–37)
ARTERIAL PATENCY WRIST A: YES
ARTERIAL PATENCY WRIST A: YES
AST SERPL W P-5'-P-CCNC: 34 U/L (ref 5–45)
AST SERPL W P-5'-P-CCNC: 35 U/L (ref 5–45)
BASE EXCESS BLDA CALC-SCNC: -3.3 MMOL/L
BASE EXCESS BLDA CALC-SCNC: 2.1 MMOL/L
BASOPHILS # BLD AUTO: 0 THOUSANDS/ΜL (ref 0–0.1)
BASOPHILS NFR BLD AUTO: 0 % (ref 0–1)
BILIRUB DIRECT SERPL-MCNC: 0.39 MG/DL (ref 0–0.2)
BILIRUB DIRECT SERPL-MCNC: 0.42 MG/DL (ref 0–0.2)
BILIRUB SERPL-MCNC: 0.65 MG/DL (ref 0.2–1)
BILIRUB SERPL-MCNC: 0.68 MG/DL (ref 0.2–1)
BODY TEMPERATURE: 97.5 DEGREES FEHRENHEIT
BODY TEMPERATURE: 97.7 DEGREES FEHRENHEIT
BUN SERPL-MCNC: 12 MG/DL (ref 5–25)
BUN SERPL-MCNC: 13 MG/DL (ref 5–25)
CALCIUM SERPL-MCNC: 8.3 MG/DL (ref 8.3–10.1)
CALCIUM SERPL-MCNC: 8.9 MG/DL (ref 8.3–10.1)
CHLORIDE SERPL-SCNC: 106 MMOL/L (ref 100–108)
CHLORIDE SERPL-SCNC: 107 MMOL/L (ref 100–108)
CK SERPL-CCNC: 109 U/L (ref 39–308)
CK SERPL-CCNC: 120 U/L (ref 39–308)
CO2 SERPL-SCNC: 23 MMOL/L (ref 21–32)
CO2 SERPL-SCNC: 26 MMOL/L (ref 21–32)
CREAT SERPL-MCNC: 0.54 MG/DL (ref 0.6–1.3)
CREAT SERPL-MCNC: 0.62 MG/DL (ref 0.6–1.3)
EOSINOPHIL # BLD AUTO: 0 THOUSAND/ΜL (ref 0–0.61)
EOSINOPHIL NFR BLD AUTO: 0 % (ref 0–6)
ERYTHROCYTE [DISTWIDTH] IN BLOOD BY AUTOMATED COUNT: 12.2 % (ref 11.6–15.1)
GFR SERPL CREATININE-BSD FRML MDRD: 114 ML/MIN/1.73SQ M
GFR SERPL CREATININE-BSD FRML MDRD: 121 ML/MIN/1.73SQ M
GGT SERPL-CCNC: 179 U/L (ref 5–85)
GGT SERPL-CCNC: 187 U/L (ref 5–85)
GLUCOSE SERPL-MCNC: 201 MG/DL (ref 65–140)
GLUCOSE SERPL-MCNC: 213 MG/DL (ref 65–140)
HCO3 BLDA-SCNC: 20.6 MMOL/L (ref 22–28)
HCO3 BLDA-SCNC: 26.8 MMOL/L (ref 22–28)
HCT VFR BLD AUTO: 31 % (ref 36.5–49.3)
HGB BLD-MCNC: 10.3 G/DL (ref 12–17)
HOROWITZ INDEX BLDA+IHG-RTO: 100 MM[HG]
HOROWITZ INDEX BLDA+IHG-RTO: 40 MM[HG]
IMM GRANULOCYTES # BLD AUTO: 0.05 THOUSAND/UL (ref 0–0.2)
IMM GRANULOCYTES NFR BLD AUTO: 1 % (ref 0–2)
INR PPP: 1.02 (ref 0.84–1.19)
LDH SERPL-CCNC: 191 IU/L (ref 121–224)
LDH SERPL-CCNC: 193 IU/L (ref 121–224)
LDH SERPL-CCNC: 201 IU/L (ref 121–224)
LDH SERPL-CCNC: 207 IU/L (ref 121–224)
LDH SERPL-CCNC: 212 IU/L (ref 121–224)
LDH SERPL-CCNC: 229 IU/L (ref 121–224)
LDH1 CFR SERPL ELPH: 30 % (ref 17–32)
LDH1 CFR SERPL ELPH: 30 % (ref 17–32)
LDH1 CFR SERPL ELPH: 32 % (ref 17–32)
LDH1 CFR SERPL ELPH: 32 % (ref 17–32)
LDH1 CFR SERPL ELPH: 33 % (ref 17–32)
LDH1 CFR SERPL ELPH: 34 % (ref 17–32)
LDH2 CFR SERPL ELPH: 32 % (ref 25–40)
LDH2 CFR SERPL ELPH: 34 % (ref 25–40)
LDH2 CFR SERPL ELPH: 34 % (ref 25–40)
LDH2 CFR SERPL ELPH: 35 % (ref 25–40)
LDH2 CFR SERPL ELPH: 36 % (ref 25–40)
LDH2 CFR SERPL ELPH: 38 % (ref 25–40)
LDH3 CFR SERPL ELPH: 17 % (ref 17–27)
LDH3 CFR SERPL ELPH: 18 % (ref 17–27)
LDH4 CFR SERPL ELPH: 6 % (ref 5–13)
LDH4 CFR SERPL ELPH: 7 % (ref 5–13)
LDH4 CFR SERPL ELPH: 7 % (ref 5–13)
LDH5 CFR SERPL ELPH: 10 % (ref 4–20)
LDH5 CFR SERPL ELPH: 11 % (ref 4–20)
LDH5 CFR SERPL ELPH: 15 % (ref 4–20)
LDH5 CFR SERPL ELPH: 5 % (ref 4–20)
LDH5 CFR SERPL ELPH: 8 % (ref 4–20)
LDH5 CFR SERPL ELPH: 8 % (ref 4–20)
LIPASE SERPL-CCNC: 39 U/L (ref 73–393)
LIPASE SERPL-CCNC: 41 U/L (ref 73–393)
LYMPHOCYTES # BLD AUTO: 0.31 THOUSANDS/ΜL (ref 0.6–4.47)
LYMPHOCYTES NFR BLD AUTO: 3 % (ref 14–44)
MAGNESIUM SERPL-MCNC: 2.1 MG/DL (ref 1.6–2.6)
MAGNESIUM SERPL-MCNC: 2.2 MG/DL (ref 1.6–2.6)
MCH RBC QN AUTO: 33.9 PG (ref 26.8–34.3)
MCHC RBC AUTO-ENTMCNC: 33.2 G/DL (ref 31.4–37.4)
MCV RBC AUTO: 102 FL (ref 82–98)
MONOCYTES # BLD AUTO: 0.69 THOUSAND/ΜL (ref 0.17–1.22)
MONOCYTES NFR BLD AUTO: 7 % (ref 4–12)
NEUTROPHILS # BLD AUTO: 9.24 THOUSANDS/ΜL (ref 1.85–7.62)
NEUTS SEG NFR BLD AUTO: 89 % (ref 43–75)
NRBC BLD AUTO-RTO: 0 /100 WBCS
O2 CT BLDA-SCNC: 14.2 ML/DL (ref 16–23)
O2 CT BLDA-SCNC: 15.1 ML/DL (ref 16–23)
OXYHGB MFR BLDA: 96.7 % (ref 94–97)
OXYHGB MFR BLDA: 99.3 % (ref 94–97)
PCO2 BLDA: 32.4 MM HG (ref 36–44)
PCO2 BLDA: 42 MM HG (ref 36–44)
PEEP RESPIRATORY: 5 CM[H2O]
PEEP RESPIRATORY: 8 CM[H2O]
PH BLDA: 7.42 [PH] (ref 7.35–7.45)
PH BLDA: 7.42 [PH] (ref 7.35–7.45)
PHOSPHATE SERPL-MCNC: 2.6 MG/DL (ref 2.7–4.5)
PHOSPHATE SERPL-MCNC: 2.8 MG/DL (ref 2.7–4.5)
PLATELET # BLD AUTO: 169 THOUSANDS/UL (ref 149–390)
PMV BLD AUTO: 10.7 FL (ref 8.9–12.7)
PO2 BLDA: 309.3 MM HG (ref 75–129)
PO2 BLDA: 91.4 MM HG (ref 75–129)
POTASSIUM SERPL-SCNC: 3.2 MMOL/L (ref 3.5–5.3)
POTASSIUM SERPL-SCNC: 3.6 MMOL/L (ref 3.5–5.3)
PROT SERPL-MCNC: 5.9 G/DL (ref 6.4–8.2)
PROT SERPL-MCNC: 7.1 G/DL (ref 6.4–8.2)
PROTHROMBIN TIME: 13 SECONDS (ref 11.6–14.5)
RBC # BLD AUTO: 3.04 MILLION/UL (ref 3.88–5.62)
SODIUM SERPL-SCNC: 139 MMOL/L (ref 136–145)
SODIUM SERPL-SCNC: 140 MMOL/L (ref 136–145)
SPECIMEN SOURCE: ABNORMAL
SPECIMEN SOURCE: ABNORMAL
VENT AC: 16
VENT AC: 16
VENT- AC: AC
VENT- AC: AC
VT SETTING VENT: 650 ML
VT SETTING VENT: 650 ML
WBC # BLD AUTO: 10.29 THOUSAND/UL (ref 4.31–10.16)

## 2020-01-08 PROCEDURE — 94760 N-INVAS EAR/PLS OXIMETRY 1: CPT

## 2020-01-08 PROCEDURE — 83690 ASSAY OF LIPASE: CPT | Performed by: EMERGENCY MEDICINE

## 2020-01-08 PROCEDURE — 85610 PROTHROMBIN TIME: CPT | Performed by: INTERNAL MEDICINE

## 2020-01-08 PROCEDURE — 88300 SURGICAL PATH GROSS: CPT | Performed by: PATHOLOGY

## 2020-01-08 PROCEDURE — 82550 ASSAY OF CK (CPK): CPT | Performed by: EMERGENCY MEDICINE

## 2020-01-08 PROCEDURE — 71045 X-RAY EXAM CHEST 1 VIEW: CPT

## 2020-01-08 PROCEDURE — 83735 ASSAY OF MAGNESIUM: CPT | Performed by: EMERGENCY MEDICINE

## 2020-01-08 PROCEDURE — 82977 ASSAY OF GGT: CPT | Performed by: EMERGENCY MEDICINE

## 2020-01-08 PROCEDURE — 80053 COMPREHEN METABOLIC PANEL: CPT | Performed by: EMERGENCY MEDICINE

## 2020-01-08 PROCEDURE — 83625 ASSAY OF LDH ENZYMES: CPT | Performed by: EMERGENCY MEDICINE

## 2020-01-08 PROCEDURE — 82150 ASSAY OF AMYLASE: CPT | Performed by: INTERNAL MEDICINE

## 2020-01-08 PROCEDURE — 83615 LACTATE (LD) (LDH) ENZYME: CPT | Performed by: EMERGENCY MEDICINE

## 2020-01-08 PROCEDURE — 84100 ASSAY OF PHOSPHORUS: CPT | Performed by: EMERGENCY MEDICINE

## 2020-01-08 PROCEDURE — 82805 BLOOD GASES W/O2 SATURATION: CPT | Performed by: EMERGENCY MEDICINE

## 2020-01-08 PROCEDURE — 82248 BILIRUBIN DIRECT: CPT | Performed by: EMERGENCY MEDICINE

## 2020-01-08 PROCEDURE — 85025 COMPLETE CBC W/AUTO DIFF WBC: CPT | Performed by: EMERGENCY MEDICINE

## 2020-01-08 PROCEDURE — 85730 THROMBOPLASTIN TIME PARTIAL: CPT | Performed by: INTERNAL MEDICINE

## 2020-01-08 RX ORDER — POTASSIUM CHLORIDE 29.8 MG/ML
40 INJECTION INTRAVENOUS ONCE
Status: COMPLETED | OUTPATIENT
Start: 2020-01-08 | End: 2020-01-08

## 2020-01-08 RX ORDER — SODIUM CHLORIDE 9 MG/ML
INJECTION, SOLUTION INTRAVENOUS CONTINUOUS PRN
Status: DISCONTINUED | OUTPATIENT
Start: 2020-01-08 | End: 2020-01-08 | Stop reason: SURG

## 2020-01-08 RX ORDER — HEPARIN SODIUM 1000 [USP'U]/ML
INJECTION, SOLUTION INTRAVENOUS; SUBCUTANEOUS AS NEEDED
Status: DISCONTINUED | OUTPATIENT
Start: 2020-01-08 | End: 2020-01-08 | Stop reason: SURG

## 2020-01-08 RX ORDER — MAGNESIUM HYDROXIDE 1200 MG/15ML
LIQUID ORAL AS NEEDED
Status: DISCONTINUED | OUTPATIENT
Start: 2020-01-08 | End: 2020-01-08 | Stop reason: HOSPADM

## 2020-01-08 RX ORDER — MANNITOL 250 MG/ML
INJECTION, SOLUTION INTRAVENOUS AS NEEDED
Status: DISCONTINUED | OUTPATIENT
Start: 2020-01-08 | End: 2020-01-08 | Stop reason: SURG

## 2020-01-08 RX ORDER — ROCURONIUM BROMIDE 10 MG/ML
INJECTION, SOLUTION INTRAVENOUS AS NEEDED
Status: DISCONTINUED | OUTPATIENT
Start: 2020-01-08 | End: 2020-01-08 | Stop reason: SURG

## 2020-01-08 RX ORDER — METOPROLOL TARTRATE 5 MG/5ML
5 INJECTION INTRAVENOUS ONCE
Status: COMPLETED | OUTPATIENT
Start: 2020-01-08 | End: 2020-01-08

## 2020-01-08 RX ORDER — FUROSEMIDE 10 MG/ML
INJECTION INTRAMUSCULAR; INTRAVENOUS AS NEEDED
Status: DISCONTINUED | OUTPATIENT
Start: 2020-01-08 | End: 2020-01-08 | Stop reason: SURG

## 2020-01-08 RX ADMIN — PHENYLEPHRINE HYDROCHLORIDE 100 MCG: 10 INJECTION INTRAVENOUS at 04:54

## 2020-01-08 RX ADMIN — METOPROLOL TARTRATE 5 MG: 5 INJECTION INTRAVENOUS at 02:05

## 2020-01-08 RX ADMIN — POTASSIUM CHLORIDE 40 MEQ: 29.8 INJECTION, SOLUTION INTRAVENOUS at 03:07

## 2020-01-08 RX ADMIN — HEPARIN SODIUM 21000 UNITS: 1000 INJECTION INTRAVENOUS; SUBCUTANEOUS at 07:12

## 2020-01-08 RX ADMIN — FUROSEMIDE 40 MG: 10 INJECTION, SOLUTION INTRAMUSCULAR; INTRAVENOUS at 05:12

## 2020-01-08 RX ADMIN — PHENYLEPHRINE HYDROCHLORIDE 200 MCG: 10 INJECTION INTRAVENOUS at 04:55

## 2020-01-08 RX ADMIN — SODIUM CHLORIDE 100 ML/HR: 234 INJECTION INTRAMUSCULAR; INTRAVENOUS; SUBCUTANEOUS at 04:10

## 2020-01-08 RX ADMIN — SODIUM CHLORIDE: 0.9 INJECTION, SOLUTION INTRAVENOUS at 05:58

## 2020-01-08 RX ADMIN — SODIUM CHLORIDE 5 MG/HR: 0.9 INJECTION, SOLUTION INTRAVENOUS at 00:43

## 2020-01-08 RX ADMIN — PHENYLEPHRINE HYDROCHLORIDE 200 MCG: 10 INJECTION INTRAVENOUS at 04:59

## 2020-01-08 RX ADMIN — ROCURONIUM BROMIDE 50 MG: 50 INJECTION, SOLUTION INTRAVENOUS at 04:23

## 2020-01-08 RX ADMIN — PHENYLEPHRINE HYDROCHLORIDE 100 MCG: 10 INJECTION INTRAVENOUS at 05:48

## 2020-01-08 RX ADMIN — VASOPRESSIN 0.02 UNITS/MIN: 20 INJECTION INTRAVENOUS at 01:05

## 2020-01-08 RX ADMIN — MANNITOL 50 G: 12.5 INJECTION, SOLUTION INTRAVENOUS at 04:46

## 2020-01-08 RX ADMIN — SODIUM CHLORIDE 1000 MG: 0.9 INJECTION, SOLUTION INTRAVENOUS at 02:10

## 2020-01-08 RX ADMIN — SODIUM CHLORIDE: 0.9 INJECTION, SOLUTION INTRAVENOUS at 04:12

## 2020-01-08 RX ADMIN — SODIUM CHLORIDE 100 ML/HR: 234 INJECTION INTRAMUSCULAR; INTRAVENOUS; SUBCUTANEOUS at 01:05

## 2020-01-08 RX ADMIN — PHENYLEPHRINE HYDROCHLORIDE 100 MCG: 10 INJECTION INTRAVENOUS at 05:06

## 2020-01-08 NOTE — ANESTHESIA PREPROCEDURE EVALUATION
Review of Systems/Medical History  Patient summary reviewed  Chart reviewed      Cardiovascular  Hypertension ,    Pulmonary  Smoker cigarette smoker  ,        GI/Hepatic  Negative GI/hepatic ROS          Negative  ROS        Endo/Other    Comment: Alcohol abuse    GYN       Hematology  Anemia ,     Musculoskeletal  Negative musculoskeletal ROS        Neurology    Cerebral bleeding with side effects , intracerebral hemorrhage,   Comment: Brain dead s/p [facility]   Negative psychology ROS              Physical Exam    Airway  Comment: ETT in situ           Dental       Cardiovascular      Pulmonary      Other Findings       Lab Results   Component Value Date    WBC 8 68 01/07/2020    HGB 9 4 (L) 01/07/2020     01/07/2020     Lab Results   Component Value Date    SODIUM 138 01/07/2020    K 3 5 01/07/2020    BUN 11 01/07/2020    CREATININE 0 62 01/07/2020    EGFR 114 01/07/2020    GLUCOSE 210 (H) 01/07/2020     Lab Results   Component Value Date    PTT 28 01/07/2020      Lab Results   Component Value Date    INR 0 96 01/07/2020     Blood type B+/antibody neg  Lab Results   Component Value Date    HGBA1C 4 7 01/04/2020     Anesthesia Plan  ASA Score- 6     Anesthesia Type- general with ASA Monitors  Additional Monitors:   Airway Plan: ETT  Plan Factors-    Induction-     Postoperative Plan-     Informed Consent-   I personally reviewed this patient with the CRNA  Discussed and agreed on the Anesthesia Plan with the CRNA  Lucien Martin

## 2020-01-09 LAB
BACTERIA SPT RESP CULT: ABNORMAL
BACTERIA SPT RESP CULT: ABNORMAL
GRAM STN SPEC: ABNORMAL
GRAM STN SPEC: ABNORMAL
LDH SERPL-CCNC: 211 IU/L (ref 121–224)
LDH SERPL-CCNC: 226 IU/L (ref 121–224)
LDH1 CFR SERPL ELPH: 35 % (ref 17–32)
LDH1 CFR SERPL ELPH: 35 % (ref 17–32)
LDH2 CFR SERPL ELPH: 37 % (ref 25–40)
LDH2 CFR SERPL ELPH: 39 % (ref 25–40)
LDH3 CFR SERPL ELPH: 18 % (ref 17–27)
LDH3 CFR SERPL ELPH: 18 % (ref 17–27)
LDH4 CFR SERPL ELPH: 5 % (ref 5–13)
LDH4 CFR SERPL ELPH: 5 % (ref 5–13)
LDH5 CFR SERPL ELPH: 3 % (ref 4–20)
LDH5 CFR SERPL ELPH: 5 % (ref 4–20)

## 2020-05-27 NOTE — ASSESSMENT & PLAN NOTE
· Appreciate involvement from pulmonology  · CT scan of the chest is negative for PE  · Given improvement in hemoptysis overnight, patient will not require a bronchoscopy today and his NPO status can be revoked
· Clinically improved overnight  · Continue day 2 of cefepime IV for suspected GNR pneumonia  · Check ambulatory sats  · Continue cough supportive meds  · Sputum culture pending, urine for legionella/strep negative, flu negative
· Clinically improving  · Continue day 3 of cefepime IV for suspected GNR pneumonia  · Sputum culture pending, urine for legionella/strep negative, flu negative
· Improved  · Continue to monitor  · Likely related to pneumonia
· Mild hypokalemia-replete, can recheck in a few days
· No signs or symptoms of withdrawal
· No signs or symptoms of withdrawal
· Patient had already been cutting down his tobacco use pre-hospital  · Continue nicotine patch  · Follow up outpatient with pulmonology for formal PFTs to determine whether he has COPD
· Patient has been since admission to [facility] in October
· Patient has been since admission to [facility] in October
· Pulmonary following   · Clinically improving  · Continue day 4 of cefepime IV for suspected GNR pneumonia  · Sputum culture pending, urine for legionella/strep negative, flu negative  · For repeat chest x-ray in a m 
· Pulmonary following   · Clinically improving  · Continue day 5 of cefepime IV for suspected GNR pneumonia, consider conversion to PO levaquin to complete course of 10-14 days?   · Sputum culture =mixed respiratory [place]; urine for legionella/strep negative, flu negative  · Needs outpatient CT chest in 8 weeks
· Repeat CT scan of the head done last evening showed resolution  · Continue to follow up with Neurosurgery non urgently regarding underlying aneurysm
· Repeat CT scan of the head done last evening showed resolution  · Continue to follow up with Neurosurgery non urgently regarding underlying aneurysm
· S/p thoracentesis 11/10 with 300ml of fluid removed  · Pulmonary following  · Culture pending
· S/p thoracentesis 11/10 with 300ml of fluid removed  · Pulmonary following  · Culture pending  · Clinically improving daily  Pain less today 
· S/p thoracentesis 11/10 with 300ml of fluid removed  · Repeat chest x-ray done today shows ongoing right-sided loculated effusion, defer to pulmonology for further plan  · Culture pending  · Clinically improving daily- pain improving
· Tachycardia and leukocytosis POA, due to HCAP  · Can reduce IVF rate
· Tachycardia and leukocytosis POA, due to HCAP  · Clinically improved
· Tachycardia and leukocytosis POA, due to HCAP  · Clinically improved  · Heplock IVF
· Tachycardia and leukocytosis POA, due to HCAP  · Clinically improved, though leukocytosis is persisting
Judy Mesa (DO)

## 2022-10-14 NOTE — PROCEDURES
Thoracentesis  Date/Time: 11/10/2017 4:42 PM  Performed by: Cristina Acuna by: Kathleen Gr     Patient location:  Bedside  Other Assisting Provider: No    Consent:     Consent obtained:  Written    Consent given by:  Patient    Risks discussed:  Bleeding, infection, incomplete drainage, nerve damage, pain and pneumothorax    Alternatives discussed:  No treatment and delayed treatment  Universal protocol:     Procedure explained and questions answered to patient or proxy's satisfaction: yes      Relevant documents present and verified: yes      Test results available and properly labeled: yes      Imaging studies available: yes      Required blood products, implants, devices and special equipment available: yes      Site/side marked: yes      Immediately prior to procedure a time out was called: yes      Patient identity confirmed:  Verbally with patient, arm band and provided demographic data  Indications:     Procedure Purpose: diagnostic and therapeutic      Indications: pleural effusion    Anesthesia (see MAR for exact dosages): Anesthesia method:  Local infiltration    Local anesthetic:  Lidocaine 1% w/o epi (7cc)  Procedure details:     Preparation: Patient was prepped and draped in usual sterile fashion      Standard thoracentesis cath kit used: Yes      Patient position:  Sitting    Laterality:  Right    Location:  Posterior    Intercostal space:  7th    Puncture method:  Over-the-needle catheter    Ultrasound guidance: yes      Reason for ultrasound: Identify fluid collection and guide cathetar placement  Indwelling catheter placed: no      Number of attempts:  2 (Unable to get fluid despite ultrasound showing fluid to 10cm   Moved to rib space above and was able to get fluid)    Drainage color:  Yellow    Drainage characteristics:  Clear    Fluid removed amount:  300cc of fluid removed - not able to aspirate any more fluid  Post-procedure details:     Chest x-ray performed: yes Patient tolerance of procedure:   Tolerated well, no immediate complications 1

## 2023-09-05 NOTE — PROGRESS NOTES
Pulmonary Progress Note  Adolfo Gay 50 y o  male MRN: [id number]  Unit/Bed#: -01 Encounter: [id number]      Assesment and Recommendations:    1  Abnormal chest x-ray/CT chest with right lower lobe infiltrate consistent with healthcare associated pneumonia and small parapneumonic effusion  Thoracentesis yesterday was only able to yield 300 cc of fluid that is clearly exudative on evaluation  So far pleural fluid cultures are negative however  · Continue empiric antibiotics with cefepime  · Mucinex b i d   · Follow-up chest x-ray 11/13/2017    He will ultimately need a repeat CT chest in 8 weeks with follow-up in our pulmonary office    2  Pleuritic chest pain/hemoptysis resolved  CT chest is negative for PE  · Will start Lovenox for DVT prophylaxis    3  Tobacco abuse  · Nicotine replacement therapy  · Smoking cessation    Will see again on 11/13/2017 after chest x-ray PA and lateral   Please call with any questions until that time  Chief Complaint:  Abdominal pain    Subjective: The patient appears to have a muscle strain in his right lower chest/upper abdomen as he gets some discomfort with coughing  The pleuritic type chest pain in his right chest wall seems to have resolved  He has had no further hemoptysis  He continues to cough up white/yellow phlegm  He denies any fevers or chills  He denies any shortness of breath  Vitals: Blood pressure 133/96, pulse 93, temperature 98 3 °F (36 8 °C), temperature source Oral, resp  rate 18, height 6' (1 829 m), weight 74 5 kg (164 lb 3 9 oz), SpO2 95 % , room air, Body mass index is 22 28 kg/m²        Intake/Output Summary (Last 24 hours) at 11/11/17 1538  Last data filed at 11/11/17 1316   Gross per 24 hour   Intake          2451 25 ml   Output             3900 ml   Net         -1448 75 ml       Physical exam:  General:  Patient is awake, alert, non-toxic and in no acute respiratory distress  Neck: No JVD  CV:  Regular, +S1 and S2, No murmurs, gallops or rubs appreciated  Lungs:  Decreased breath sounds bilateral right greater than left  Abdomen: Soft, +BS, Non-tender, non-distended  Extremities: No clubbing, cyanosis or edema  Neuro: No focal deficits    Labs:   CBC:   Lab Results   Component Value Date    WBC 15 76 (H) 11/11/2017    HGB 12 2 11/11/2017    HCT 35 4 (L) 11/11/2017    MCV 94 11/11/2017     11/11/2017    MCH 32 4 11/11/2017    MCHC 34 5 11/11/2017    RDW 12 8 11/11/2017    MPV 10 4 11/11/2017   , CMP:   Lab Results   Component Value Date    PROT 7 5 11/10/2017   , PT/INR: No results found for: PT, INR  Microbiology Results (last 21 days)     Procedure Component Value - Date/Time   AFB culture [[id number]] Collected: 11/10/17 1559   Lab Status: Preliminary result Specimen: Body Fluid from Pleural, Right Updated: 11/11/17 1525    AFB Stain No acid fast bacilli seen   Body fluid culture (Pleural Fluid Culture) and Gram stain [[id number]] Collected: 11/10/17 1559   Lab Status: Preliminary result Specimen: Body Fluid from Pleural, Right Updated: 11/11/17 1425    Body Fluid Culture, Sterile No growth   Fungal culture [[id number]] Collected: 11/10/17 1559   Lab Status: In process Specimen: Body Fluid from Pleural, Right Updated: 11/10/17 1640   Gram stain [[id number]] Collected: 11/10/17 1558   Lab Status: Final result Specimen: Body Fluid from Pleural, Right Updated: 11/10/17 1834    Gram Stain Result 4+ Polys     Rare Mononuclear Cells     No No bacteria seen   Lactate dehydrogenase, body fluid [[id number]] (Normal) Collected: 11/10/17 1558   Lab Status: Final result Specimen: Body Fluid from Pleural, Right Updated: 11/10/17 1926    LD, Fluid 1,919 U/L    Protein, body fluid [[id number]] (Normal) Collected: 11/10/17 1558   Lab Status: Final result Specimen:  Body Fluid from Pleural, Right Updated: 11/10/17 1926    Protein, Fluid 5 3 g/dL     Comment:   The reference range and other method performance specifications have not been established for this body fluid  The test result must be integrated into the clinical context for interpretation  Strep Pneumoniae, Urine [[id number]] (Normal) Collected: 11/09/17 1452   Lab Status: Final result Specimen: Urine from Urine, Clean Catch Updated: 11/09/17 1900    Strep pneumoniae antigen, urine Negative   Sputum culture and Gram stain [[id number]] Collected: 11/09/17 1449   Lab Status: Final result Specimen: Sputum from Expectorated Sputum Updated: 11/11/17 1300    Sputum Culture 3+ Growth of     Comment: Mixed Respiratory charlotte       Gram Stain Result 2+ Epithelial cells per low power field     No polys seen     2+ Gram positive cocci in pairs     1+ Gram negative rods     Rare Gram positive rods   Influenza A/B and RSV by PCR (Indicated for patients > 2 mo of age) [[id number]] (Normal) Collected: 11/09/17 1442   Lab Status: Final result Specimen: Nasopharyngeal from Nasopharyngeal Swab Updated: 11/10/17 0743    INFLU A PCR None Detected    INFLU B PCR None Detected    RSV PCR None Detected   MRSA culture [[id number]] (Normal) Collected: 11/09/17 1438   Lab Status: Final result Specimen: Nares from Nose Updated: 11/10/17 1945    MRSA Culture Only No Methicillin Resistant Staphlyococcus aureus (MRSA) isolated   Blood culture #2 [[id number]] Collected: 11/09/17 1151   Lab Status: Preliminary result Specimen: Blood from Arm, Left Updated: 11/11/17 1501    Blood Culture No Growth at 48 hrs  Blood culture #1 [[id number]] Collected: 11/09/17 1148   Lab Status: Preliminary result Specimen: Blood from Arm, Right Updated: 11/11/17 1501    Blood Culture No Growth at 48 hrs  Imaging and other studies: I have personally reviewed pertinent films in PACS    Chest x-ray 11/10/2017:  Persistent right lower lobe infiltrate, no pneumothorax    Ernestina Locus, DO      Portions of the record may have been created with voice recognition software   Occasional wrong word or "sound a like" substitutions may have occurred due to the inherent limitations of voice recognition software  Read the chart carefully and recognize, using context, where substitutions have occurred  Quality 226: Preventive Care And Screening: Tobacco Use: Screening And Cessation Intervention: Patient screened for tobacco use and is an ex/non-smoker Quality 402: Tobacco Use And Help With Quitting Among Adolescents: Patient screened for tobacco and never smoked Quality 431: Preventive Care And Screening: Unhealthy Alcohol Use - Screening: Patient not identified as an unhealthy alcohol user when screened for unhealthy alcohol use using a systematic screening method Detail Level: Detailed

## 2024-09-16 NOTE — PROCEDURES
Central Line Insertion  Date/Time: 1/3/2020 7:33 PM  Performed by: Mayda Quispe MD  Authorized by: Mayda Quispe MD     Patient location:  ICU  Other Assisting Provider: Yes (comment)    Consent:     Consent obtained:  Verbal    Consent given by:  Healthcare agent    Risks discussed:  Arterial puncture, bleeding, incorrect placement, infection, nerve damage and pneumothorax    Alternatives discussed:  No treatment  Universal protocol:     Procedure explained and questions answered to patient or proxy's satisfaction: yes      Patient identity confirmed:  Arm band  Pre-procedure details:     Hand hygiene: Hand hygiene performed prior to insertion      Sterile barrier technique: All elements of maximal sterile technique followed      Skin preparation:  2% chlorhexidine    Skin preparation agent: Skin preparation agent completely dried prior to procedure    Indications:     Central line indications: medications requiring central line    Anesthesia (see MAR for exact dosages): Anesthesia method:  Local infiltration    Local anesthetic:  Lidocaine 1% w/o epi  Procedure details:     Location:  Left subclavian    Vessel type: vein      Laterality:  Left    Patient position:  Flat    Catheter type:  Triple lumen 20cm    Catheter size:  7 Fr    Landmarks identified: yes      Ultrasound guidance: no      Number of attempts:  1    Successful placement: yes    Post-procedure details:     Post-procedure:  Dressing applied and line sutured    Assessment:  Blood return through all ports, free fluid flow, placement verified by x-ray and no pneumothorax on x-ray    Post-procedure complications: none      Patient tolerance of procedure:   Tolerated well, no immediate complications  Comments:      Dr Maribel El Quality 130: Documentation Of Current Medications In The Medical Record: Current Medications Documented Quality 431: Preventive Care And Screening: Unhealthy Alcohol Use - Screening: Patient not identified as an unhealthy alcohol user when screened for unhealthy alcohol use using a systematic screening method Detail Level: Detailed Quality 226: Preventive Care And Screening: Tobacco Use: Screening And Cessation Intervention: Patient screened for tobacco use and is an ex/non-smoker

## (undated) DEVICE — POOLE SUCTION HANDLE: Brand: CARDINAL HEALTH

## (undated) DEVICE — SUT SILK 2-0 30 IN A305H

## (undated) DEVICE — SPONGE LAP 18 X 18 IN STRL RFD

## (undated) DEVICE — SUT PROLENE 5-0 C-1 36 IN M8720

## (undated) DEVICE — SHROUD KIT ADULT DISP

## (undated) DEVICE — SUT SILK 4-0 18 IN A183H

## (undated) DEVICE — TELFA NON-ADHERENT ABSORBENT DRESSING: Brand: TELFA

## (undated) DEVICE — SUT ETHIBOND 2 LR/LR 30 IN X496T

## (undated) DEVICE — MEDI-VAC YANK SUCT HNDL W/TPRD BULBOUS TIP: Brand: CARDINAL HEALTH

## (undated) DEVICE — SUT SILK 2-0 18 IN A185H

## (undated) DEVICE — INTENDED FOR TISSUE SEPARATION, AND OTHER PROCEDURES THAT REQUIRE A SHARP SURGICAL BLADE TO PUNCTURE OR CUT.: Brand: BARD-PARKER SAFETY BLADES SIZE 10, STERILE

## (undated) DEVICE — INTENDED FOR TISSUE SEPARATION, AND OTHER PROCEDURES THAT REQUIRE A SHARP SURGICAL BLADE TO PUNCTURE OR CUT.: Brand: BARD-PARKER SAFETY BLADES SIZE 15, STERILE

## (undated) DEVICE — 3000CC GUARDIAN II: Brand: GUARDIAN

## (undated) DEVICE — SPECIMEN CONTAINER STERILE PEEL PACK

## (undated) DEVICE — BETHLEHEM MAJOR GENERAL PACK: Brand: CARDINAL HEALTH

## (undated) DEVICE — TRU-CUT NEEDLE BIOPSY SOFT TISSUE 14G X 15CM: Brand: TRU-CUT

## (undated) DEVICE — TOWEL SET X-RAY

## (undated) DEVICE — SAW BLADE STERNUM EXTENDED 531

## (undated) DEVICE — BONE WAX WHITE: Brand: BONE WAX WHITE

## (undated) DEVICE — SUT SILK 0 30 IN A306H

## (undated) DEVICE — TUBING SUCTION 5MM X 12 FT

## (undated) DEVICE — PENCIL ELECTROSURG E-Z CLEAN -0035H

## (undated) DEVICE — CHLORAPREP HI-LITE 26ML ORANGE

## (undated) DEVICE — ROSEBUD DISSECTORS: Brand: DEROYAL

## (undated) DEVICE — 3M™ IOBAN™ 2 ANTIMICROBIAL INCISE DRAPE 6651EZ: Brand: IOBAN™ 2

## (undated) DEVICE — ELECTRODE BLADE MOD E-Z CLEAN  2.75IN 7CM -0012AM